# Patient Record
Sex: MALE | Race: WHITE | NOT HISPANIC OR LATINO | Employment: FULL TIME | URBAN - METROPOLITAN AREA
[De-identification: names, ages, dates, MRNs, and addresses within clinical notes are randomized per-mention and may not be internally consistent; named-entity substitution may affect disease eponyms.]

---

## 2017-03-08 ENCOUNTER — TELEPHONE (OUTPATIENT)
Dept: INTERNAL MEDICINE | Facility: CLINIC | Age: 52
End: 2017-03-08

## 2017-03-08 NOTE — TELEPHONE ENCOUNTER
----- Message from Jyothi Field sent at 3/8/2017 10:58 AM CST -----  Contact: Self [phone]  Patient is calling to schedule an appointment he thinks is his bronchitis or a stomach bug and would like to be seen some time this week. Please advice

## 2017-03-09 NOTE — TELEPHONE ENCOUNTER
----- Message from Uma Mahoney sent at 3/9/2017  8:10 AM CST -----  No. [phone] or [phone]    Patient returned your call.

## 2017-06-15 ENCOUNTER — OFFICE VISIT (OUTPATIENT)
Dept: INTERNAL MEDICINE | Facility: CLINIC | Age: 52
End: 2017-06-15
Payer: OTHER GOVERNMENT

## 2017-06-15 VITALS
DIASTOLIC BLOOD PRESSURE: 76 MMHG | TEMPERATURE: 98 F | WEIGHT: 315 LBS | HEIGHT: 71 IN | SYSTOLIC BLOOD PRESSURE: 124 MMHG | BODY MASS INDEX: 44.1 KG/M2 | HEART RATE: 80 BPM | RESPIRATION RATE: 16 BRPM

## 2017-06-15 DIAGNOSIS — J30.9 ALLERGIC RHINITIS, UNSPECIFIED ALLERGIC RHINITIS TRIGGER, UNSPECIFIED RHINITIS SEASONALITY: ICD-10-CM

## 2017-06-15 DIAGNOSIS — K62.89 ANAL OR RECTAL PAIN: Primary | ICD-10-CM

## 2017-06-15 DIAGNOSIS — K61.1 PERI-RECTAL ABSCESS: ICD-10-CM

## 2017-06-15 PROCEDURE — 99214 OFFICE O/P EST MOD 30 MIN: CPT | Mod: S$GLB,,, | Performed by: FAMILY MEDICINE

## 2017-06-15 PROCEDURE — 99999 PR PBB SHADOW E&M-EST. PATIENT-LVL IV: CPT | Mod: PBBFAC,,, | Performed by: FAMILY MEDICINE

## 2017-06-15 RX ORDER — SULFAMETHOXAZOLE AND TRIMETHOPRIM 800; 160 MG/1; MG/1
1 TABLET ORAL 2 TIMES DAILY
Qty: 28 TABLET | Refills: 0 | Status: SHIPPED | OUTPATIENT
Start: 2017-06-15 | End: 2020-12-07

## 2017-06-15 RX ORDER — HYDROCODONE BITARTRATE AND ACETAMINOPHEN 7.5; 325 MG/1; MG/1
1 TABLET ORAL EVERY 6 HOURS PRN
Qty: 30 TABLET | Refills: 0 | Status: SHIPPED | OUTPATIENT
Start: 2017-06-15 | End: 2020-12-07

## 2017-06-15 NOTE — PROGRESS NOTES
"Subjective:   Patient ID: Ashwin Peter is a 51 y.o. male.    Chief Complaint: Abscess      HPI  Cordial 50 yo male had perirectal abscess in [place] in December per his history. Had to go to ER and spontaneously resolved. Thinks problem has returned based upon abnormal sensation and "yellow" drainage.Denies fevers or chills    Patient queried and denies any further complaints.      ALLERGIES AND MEDICATIONS: updated and reviewed.  Review of patient's allergies indicates:  No Known Allergies    Current Outpatient Prescriptions:     fluticasone (FLONASE) 50 mcg/actuation nasal spray, 1 spray by Nasal route daily as needed. , Disp: , Rfl:     cetirizine (ZYRTEC) 10 MG tablet, Take 1 tablet (10 mg total) by mouth once daily., Disp: 10 tablet, Rfl: 0    hydrocodone-acetaminophen 7.5-325mg (NORCO) 7.5-325 mg per tablet, Take 1 tablet by mouth every 6 (six) hours as needed for Pain., Disp: 30 tablet, Rfl: 0    sulfamethoxazole-trimethoprim 800-160mg (BACTRIM DS) 800-160 mg Tab, Take 1 tablet by mouth 2 (two) times daily., Disp: 28 tablet, Rfl: 0    Review of Systems   Constitutional: Negative for activity change, appetite change, chills, diaphoresis, fatigue, fever and unexpected weight change.   HENT: Negative for congestion, ear discharge, ear pain, postnasal drip, rhinorrhea, sneezing and sore throat.    Eyes: Negative for photophobia and discharge.   Respiratory: Negative for cough, chest tightness, shortness of breath and wheezing.    Cardiovascular: Negative for chest pain and palpitations.   Gastrointestinal: Positive for rectal pain. Negative for abdominal distention, abdominal pain, anal bleeding, blood in stool, constipation, diarrhea, nausea and vomiting.   Genitourinary: Negative for dysuria.   Musculoskeletal: Negative for arthralgias and neck pain.   Skin: Negative for rash.   Neurological: Negative for headaches.       Objective:     Vitals:    06/15/17 1444   BP: 124/76   Pulse: 80   Resp: 16 " "  Temp: 97.8 °F (36.6 °C)   TempSrc: Oral   Weight: (!) 148.5 kg (327 lb 6.1 oz)   Height: 5' 11" (1.803 m)   PainSc: 0-No pain     Body mass index is 45.66 kg/m².    Physical Exam   Constitutional: He appears well-developed and well-nourished.   HENT:   Head: Normocephalic and atraumatic.   Right Ear: Hearing, tympanic membrane, external ear and ear canal normal. No drainage or swelling. No decreased hearing is noted.   Left Ear: Hearing, tympanic membrane, external ear and ear canal normal. No drainage or swelling. No decreased hearing is noted.   Nose: Nose normal. No rhinorrhea.   Mouth/Throat: Oropharynx is clear and moist. No oropharyngeal exudate, posterior oropharyngeal edema or posterior oropharyngeal erythema.   Eyes: Conjunctivae, EOM and lids are normal. Pupils are equal, round, and reactive to light. Right eye exhibits no discharge and no exudate. Left eye exhibits no discharge and no exudate. Right conjunctiva is not injected. Left conjunctiva is not injected.   Neck: Trachea normal and full passive range of motion without pain. Normal carotid pulses, no hepatojugular reflux and no JVD present. Carotid bruit is not present. No no neck rigidity. No edema and no erythema present. No thyroid mass and no thyromegaly present.   Cardiovascular: Normal rate, regular rhythm and normal heart sounds.    Pulmonary/Chest: Effort normal. No respiratory distress.   Abdominal: Soft. Normal appearance and bowel sounds are normal. There is no tenderness. There is negative Tellez's sign.   I see a external, non-thrombosed hemorrhoid but do not appreciate an abscess   Lymphadenopathy:     He has no cervical adenopathy.   Neurological: He is alert.   Skin: Skin is warm and dry.   Psychiatric: He has a normal mood and affect. His speech is normal and behavior is normal.       Assessment and Plan:   Ashwin was seen today for abscess.    Diagnoses and all orders for this visit:    Anal or rectal pain    Possible mallory-rectal " abscess    -     Ambulatory consult to General Surgery    -     sulfamethoxazole-trimethoprim 800-160mg (BACTRIM DS) 800-160 mg Tab; Take 1 tablet by mouth 2 (two) times daily.  -     hydrocodone-acetaminophen 7.5-325mg (NORCO) 7.5-325 mg per tablet; Take 1 tablet by mouth every 6 (six) hours as needed for Pain.        Return in about 2 weeks (around 6/29/2017).    THIS NOTE WILL BE SHARED WITH THE PATIENT.

## 2017-06-22 ENCOUNTER — OFFICE VISIT (OUTPATIENT)
Dept: SURGERY | Facility: CLINIC | Age: 52
End: 2017-06-22
Payer: OTHER GOVERNMENT

## 2017-06-22 VITALS
BODY MASS INDEX: 44.1 KG/M2 | HEART RATE: 78 BPM | DIASTOLIC BLOOD PRESSURE: 66 MMHG | WEIGHT: 315 LBS | SYSTOLIC BLOOD PRESSURE: 160 MMHG | HEIGHT: 71 IN

## 2017-06-22 DIAGNOSIS — K61.1 PERIRECTAL ABSCESS: Primary | ICD-10-CM

## 2017-06-22 PROCEDURE — 99999 PR PBB SHADOW E&M-EST. PATIENT-LVL III: CPT | Mod: PBBFAC,,, | Performed by: NURSE PRACTITIONER

## 2017-06-22 PROCEDURE — 99213 OFFICE O/P EST LOW 20 MIN: CPT | Mod: S$GLB,,, | Performed by: NURSE PRACTITIONER

## 2017-06-22 RX ORDER — CLOTRIMAZOLE AND BETAMETHASONE DIPROPIONATE 10; .64 MG/G; MG/G
CREAM TOPICAL 2 TIMES DAILY
Qty: 45 G | Refills: 2 | Status: SHIPPED | OUTPATIENT
Start: 2017-06-22 | End: 2020-12-07

## 2017-06-22 NOTE — PROGRESS NOTES
"Patient ID:  Ashwin Peter is a 51 y.o. male     Chief Complaint:   Chief Complaint   Patient presents with    Abscess        HPI: Pt comes to [facility] with complaints of some rectal tingling on the right.  In December he was in [place] on vacation and he developed a perirectal abscess on the right, while waiting for surgery the abscess spontaneously burst and the pain resolved, since then he has had intermittent "tingling" in  His rectal area, he will have some yellowish drainage and the tingling would go away, denies fever, no hx of colon or rectal surgery, has bm daily  Never had colonoscopy     ROS:        Constitutional: No fever, chills, activity or appetite change.      HENT: No hearing loss, facial swelling, neck pain or stiffness.       Eyes: No discharge, itching and visual disturbance.      Respiratory: No apnea, cough, choking or shortness of breath.       Cardiovascular: No leg swelling or chest pain      Gastrointestinal: No abdominal distention or change in bowel habbits     Genitourinary: No dysuria, frequency or flank pain.      Musculoskeletal: No arthralgias or gait problem.      Neurological: No dizziness, seizures or weakness.      Hematological: No adenopathy.      Psychiatric/Behavioral: No hallucinations or behavioral problems.       PE:    APPEARANCE: Well nourished, well developed, in no acute distress.   CHEST: Lungs clear. Normal respiratory effort.  CARDIOVASCULAR: Normal rhythm. No edema.  ABDOMEN: Soft. No tenderness or masses.  Rectum:  Perianal skin with fungal infection and several scratch marks, eversion of anus revealed no abnormality or fissure, OSMAN revealed no masses, blood or stool in vault, normal sphincter tone, anoscopy revealed normal internal hemorrhoids with no bleeding or stigmata of same. No tenderness with rectal, no abscess found  Musculoskeletal: Symmetric, normal range of motion and strength.   Neurological: Alert and oriented to person, place, and time. Normal " reflexes.   Skin: Skin is warm and dry.   Psychiatric: Normal mood and affect. Behavior is normal and appropriate.     Impression:    Perirectal abscess    PLAN:  Soak warm water  Keep buttock dry  lotrisome for fungal skin infection  Return to office for any increase in rectal pain  Long discussion about poss of anal fistula

## 2017-06-22 NOTE — PATIENT INSTRUCTIONS
Anal Fistula  The anal canal is the end portion of the intestinal tract. It includes the rectum and anus. Sometimes, an abnormal passage forms from the anal canal to the skin near the anus. This is called an anal fistula. Anal fistulas can also form from the anal canal to other organs, such as the vagina or urinary tract.  An anal fistula most often occurs due to an anal abscess or infection. It can also occur with certain conditions, such as Crohns disease. Trauma to the anal canal and surgery can also lead to anal fistulas.  Symptoms of an anal fistula can include:  · Pain in or near the rectum  · Drainage, which may contain blood, pus, or both (the drainage may be constant or stop and start again)  · Bleeding from the rectum  · Urinary problems  If you have an anal abscess or infection along with a fistula, you may also notice redness, swelling, or soreness in or near the anus or rectum. You may have a fever as well.  If caused by Crohns disease, an anal fistula may respond to medicines such as antibiotics and immunosuppressants. This may lead to complete closure of the fistula. But once treatment stops, there is a high chance that the fistula may form again.  Anal fistulas often require surgery if other treatments dont correct the problem. The type of surgery depends on the type of fistula. More than one surgery may be required.  Please discuss all forms of treatment with your healthcare provider.  Home care  As you recover from treatment, make sure to take any prescribed medicines as directed. Do not take any over-the-counter medicines without first talking to your healthcare provider.  You may also be advised to:  · Soak in a warm bath 3 or 4 times a day.  · Wear a pad over your anal area as directed.  · Eat a diet high in fiber.  · Drink plenty of fluids.  · Use a stool softener or bulk laxative as needed.  · Return to your normal routine only after being cleared by your healthcare provider.  Follow-up  care  Follow up with your healthcare provider, or as advised.  When to seek medical advice  Call your healthcare provider right away if any of these occur:  · Fever of 100.4°F (38°C) or higher  · Hard or painful stools or trouble controlling your bowel movements  · Symptoms of anal fistula return  · Increased pain, redness, swelling, or drainage in or near the anus or rectum  · Pain in the belly that does not respond to treatment or that does not go away after a few hours  · Swelling in the belly that does not go away after a few hours  · Mucus, pus or blood in the stool (dark or bright red)  · Vomiting that wont stop  Call 911  Call 911 right away if any of these occur:  · Trouble breathing or swallowing  · Fainting  · Rapid heart rate  · Large amounts of blood in stool  Resources  The resources below can help you learn more about anal fistulas. They may also help you find support if you have conditions such as Crohns disease.  · National [place] of Diabetes and Digestive and Kidney Diseases (NIDDK), www.niddk.nih.gov  · Crohns and Colitis Foundation of [place], www.ccfa.org  Date Last Reviewed: 6/22/2015  © 2202-1285 [employer]. [address] [place] [address], [place] [address]. All rights reserved. This information is not intended as a substitute for professional medical care. Always follow your healthcare professional's instructions.

## 2017-06-22 NOTE — LETTER
June 22, 2017      Lucio Donaldson MD  [address]  [address]th [address]  [place] [address]           [facility]  [address]  [facility] [address]  Phone: [phone]          Patient: Ashwin Peter   MR Number: [id number]   YOB: 1965   Date of Visit: 6/22/2017       Dear Dr. Lucio Donaldson:    Thank you for referring Ashwin Peter to me for evaluation. Attached you will find relevant portions of my assessment and plan of care.    If you have questions, please do not hesitate to call me. I look forward to following Ashwin Peter along with you.    Sincerely,    Hafsa Silveira, NP    Enclosure  CC:  No Recipients    If you would like to receive this communication electronically, please contact [email or web address] or [phone] to request more information on [employer] Link access.    For providers and/or their staff who would like to refer a patient to [facility], please contact us through our one-stop-shop provider referral line, [facility], at [phone].    If you feel you have received this communication in error or would no longer like to receive these types of communications, please e-mail [email or web address]

## 2018-04-22 ENCOUNTER — TELEPHONE (OUTPATIENT)
Dept: SPORTS MEDICINE | Facility: CLINIC | Age: 53
End: 2018-04-22

## 2018-04-22 NOTE — PROGRESS NOTES
Ashwin Peter, a 52 y.o. male, presents today for evaluation of his RIGHT and LEFT knee.      HISTORY OF PRESENT ILLNESS   Location: L>R knee, anterior/medial  Onset: insidious,   Palliative:               Relative rest   R/L VSI, [employer], 16.02, little improvement    L - CSI 03/23/16  Provocative:    ADLs  Prior:               Right: ACL reconstruction, OSH, 20xx              Left: meniscectomy, OSH, 20xx  Progression: worsening discomfort  Quality:              Stiffness                Sharp pain   L - 8/10    R - 3/10  Radiation: none  Severity: per nursing documentation  Timing: intermittent w/ use  Trauma: none  Review of systems (ROS):  A 10+ review of systems was performed with pertinent positives and negatives noted above in the history of present illness. Other systems were negative unless otherwise specified.    PHYSICAL EXAMINATION  General:  The patient is alert and oriented x 3. Mood is pleasant. Observation of ears, eyes and nose reveal no gross abnormalities. HEENT: NCAT, sclera anicteric.   Lungs: Respirations are equal and unlabored.  Gait is coordinated. Patient can toe walk and heel walk without difficulty.    RIGHT/LEFT KNEE EXAMINATION    Observation/Inspection  Gait:   antalgic   Alignment:  Neutral   Scars:   None   Muscle atrophy: Mild  Effusion:  None   Warmth:  None   Discoloration:   none     Tenderness / Crepitus (T / C):         T / C      T / C  Patella   - / -   Lateral joint line   - / -     Peripatellar medial  -  Medial joint line    + / -  Peripatellar lateral -  Medial plica   - / -  Patellar tendon -   Popliteal fossa   - / -  Quad tendon   -   Gastrocnemius   -  Prepatellar Bursa - / -   Quadricep   -  Tibial tubercle  -  Thigh/hamstring  -  Pes anserine/HS -  Fibula    -  ITB   - / -  Tibia     -  Tib/fib joint  - / -  LCL    -    MFC   - / -   MCL: Proximal  -    LFC   - / -   Distal    -          ROM: (* = pain)  PASSIVE   ACTIVE    Left :   5 / 0 / 145*   5 / 0 /  145*     Right :    5 / 0 / 145*   5 / 0 / 145*    Patellofemoral examination:  See above noted areas of tenderness.   Patella position    Subluxation / dislocation: Centered        Sup. / Inf;   Normal   Crepitus (PF):    Absent   Patellar Mobility:       Medial-lateral:   Normal    Superior-inferior:  Normal    Inferior tilt   Normal    Patellar tendon:  Normal   Lateral tilt:    Normal   J-sign:     None   Patellofemoral grind:   No pain       Meniscal Signs:     Pain on terminal extension:  +  Pain on terminal flexion:  +  Aarons maneuver:  +*  Squat     NT    Ligament Examination:  ACL / Lachman:  WNL  PCL-Post.  drawer: normal 0 to 2mm  MCL- Valgus:  normal 0 to 2mm  LCL- Varus:    normal 0 to 2mm  Pivot shift:  guarding   Dial Test:   difference c/w other side   At 30° flexion: normal (< 5°)    At 90° flexion: normal (< 5°)   Reverse Pivot Shift:   normal (Equal)     Strength: (* = with pain) Painful Side  Quadriceps   5/5  Hamstrin/5    Extremity Neuro-vascular Examination:   Sensation:  Grossly intact to light touch all dermatomal regions.   Motor Function:  Fully intact motor function at hip, knee, foot and ankle    DTRs;  quadriceps and  achilles 2+.  No clonus and downgoing Babinski.    Vascular status:  DP and PT pulses 2+, brisk capillary refill, symmetric.     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, right  #2 Kellgren-Joseph Grade III osteoarthritis of knee, med > ant compartment, left     No evidence of neurologic pathology  No evidence of vascular pathology     Imaging studies reviewed:   X-ray knee, bilateral 18.04     Plan:    We discussed the importance of appropriate diet, weight, and regular exercise including quadriceps strengthening     We discussed options including:  #1 watchful waiting,  #2 physical therapy aimed at:   Core stability   Strengthening quadriceps    Gait training  #3  injection therapy:   CSI iaknee                          Right,                           Left, effective good%, repeat    VSI iaknee    Right, effective modest%, repeat                          Left, effective modest%, repeat  #4 MRI for further evaluation                 The patient chooses #3 CSI iaknee bilat     Pain management required: handout given  Bracing required:   Physical therapy required:   Activity (e.g. sports, work) restrictions: as tolerated              school/vocation:      Follow up: pt will contact us BY TELEPHONE in 2 w  A/e csi iaknee bilat  Should symptoms worsen or fail to resolve, consider:  Revisiting the above options

## 2018-04-23 ENCOUNTER — HOSPITAL ENCOUNTER (OUTPATIENT)
Dept: RADIOLOGY | Facility: HOSPITAL | Age: 53
Discharge: HOME | End: 2018-04-23
Attending: FAMILY MEDICINE
Payer: OTHER GOVERNMENT

## 2018-04-23 ENCOUNTER — OFFICE VISIT (OUTPATIENT)
Dept: SPORTS MEDICINE | Facility: CLINIC | Age: 53
End: 2018-04-23
Payer: OTHER GOVERNMENT

## 2018-04-23 VITALS — WEIGHT: 315 LBS | TEMPERATURE: 98 F | BODY MASS INDEX: 44.1 KG/M2 | HEIGHT: 71 IN

## 2018-04-23 DIAGNOSIS — M17.0 PRIMARY OSTEOARTHRITIS OF BOTH KNEES: Primary | ICD-10-CM

## 2018-04-23 DIAGNOSIS — M25.561 PAIN IN BOTH KNEES, UNSPECIFIED CHRONICITY: ICD-10-CM

## 2018-04-23 DIAGNOSIS — M25.562 PAIN IN BOTH KNEES, UNSPECIFIED CHRONICITY: ICD-10-CM

## 2018-04-23 PROCEDURE — 99214 OFFICE O/P EST MOD 30 MIN: CPT | Mod: 25,S$GLB,, | Performed by: FAMILY MEDICINE

## 2018-04-23 PROCEDURE — 73564 X-RAY EXAM KNEE 4 OR MORE: CPT | Mod: TC,50,FY,PO

## 2018-04-23 PROCEDURE — 73564 X-RAY EXAM KNEE 4 OR MORE: CPT | Mod: 26,50,, | Performed by: RADIOLOGY

## 2018-04-23 PROCEDURE — 20611 DRAIN/INJ JOINT/BURSA W/US: CPT | Mod: 50,S$GLB,, | Performed by: FAMILY MEDICINE

## 2018-04-23 PROCEDURE — 99999 PR PBB SHADOW E&M-EST. PATIENT-LVL III: CPT | Mod: PBBFAC,,, | Performed by: FAMILY MEDICINE

## 2018-04-23 RX ORDER — TRIAMCINOLONE ACETONIDE 40 MG/ML
40 INJECTION, SUSPENSION INTRA-ARTICULAR; INTRAMUSCULAR
Status: DISCONTINUED | OUTPATIENT
Start: 2018-04-23 | End: 2018-04-23 | Stop reason: HOSPADM

## 2018-04-23 RX ADMIN — TRIAMCINOLONE ACETONIDE 40 MG: 40 INJECTION, SUSPENSION INTRA-ARTICULAR; INTRAMUSCULAR at 04:04

## 2018-04-23 NOTE — PROCEDURES
"Large Joint Aspiration/Injection  Date/Time: 4/23/2018 4:08 PM  Performed by: CHAYITO HARPER  Authorized by: CHAYITO HARPER     Consent Done?:  Yes (Verbal)  Indications:  Pain  Procedure site marked: Yes    Timeout: Prior to procedure the correct patient, procedure, and site was verified      Location:  Knee  Site:  R knee and L knee  Prep: Patient was prepped and draped in usual sterile fashion    Ultrasonic Guidance for needle placement: Yes  Images are saved and documented.  Needle size:  18 G  Approach:  Lateral  Medications:  40 mg triamcinolone acetonide 40 mg/mL; 40 mg triamcinolone acetonide 40 mg/mL  Patient tolerance:  Patient tolerated the procedure well with no immediate complications    Additional Comments: Description of ultrasound utilization for needle guidance:   Ultrasound guidance used for needle localization.  Images saved and stored for documentation.  The knee joint was visualized.  Dynamic visualization of the 20g x 1.5"  needle was continuous throughout the procedure.      "

## 2020-09-07 NOTE — TELEPHONE ENCOUNTER
Spoke c pt.     R/s appt to earlier date.      I have personally performed a face to face diagnostic evaluation on this patient. I have reviewed the ACP note and agree with the history, exam and plan of care, except as noted.

## 2020-11-12 ENCOUNTER — TELEPHONE (OUTPATIENT)
Dept: INTERNAL MEDICINE | Facility: CLINIC | Age: 55
End: 2020-11-12

## 2020-11-12 NOTE — TELEPHONE ENCOUNTER
Patient was advised to get a sooner appointment with a new PCP since last time he seen by Dr Avila was on 2015.  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  Patient advised again Urgent Care for further eval on swelling legs.  Patient was scheduled with Dr Guo on 12/02/20 @ 1 pm.  Patient verbalized understanding.

## 2020-11-12 NOTE — TELEPHONE ENCOUNTER
----- Message from Tessa Morley sent at 11/12/2020  2:10 PM CST -----  Contact: [phone]/Self  Type:  Sooner Appointment Request     Caller is requesting a sooner appointment.  Caller declined first available appointment listed below.  Caller will not accept being placed on the waitlist and is requesting a message be sent to doctor.  Name of Caller: pt  When is the first available appointment? -  Symptoms or Reason: swelling on both legs   Would the patient rather a call back or a response via [facility]? Call back  Best Call Back Number: [phone]  Additional Information:

## 2020-12-02 ENCOUNTER — HOSPITAL ENCOUNTER (OUTPATIENT)
Dept: RADIOLOGY | Facility: HOSPITAL | Age: 55
Discharge: HOME | End: 2020-12-02
Attending: INTERNAL MEDICINE
Payer: OTHER GOVERNMENT

## 2020-12-02 ENCOUNTER — OFFICE VISIT (OUTPATIENT)
Dept: INTERNAL MEDICINE | Facility: CLINIC | Age: 55
End: 2020-12-02
Payer: OTHER GOVERNMENT

## 2020-12-02 VITALS
OXYGEN SATURATION: 95 % | BODY MASS INDEX: 44.1 KG/M2 | SYSTOLIC BLOOD PRESSURE: 118 MMHG | HEART RATE: 77 BPM | HEIGHT: 71 IN | WEIGHT: 315 LBS | RESPIRATION RATE: 18 BRPM | DIASTOLIC BLOOD PRESSURE: 60 MMHG | TEMPERATURE: 98 F

## 2020-12-02 DIAGNOSIS — Z00.00 PREVENTATIVE HEALTH CARE: Primary | ICD-10-CM

## 2020-12-02 DIAGNOSIS — Z13.1 SCREENING FOR DIABETES MELLITUS: ICD-10-CM

## 2020-12-02 DIAGNOSIS — D86.2 SARCOIDOSIS OF LUNG WITH SARCOIDOSIS OF LYMPH NODES: ICD-10-CM

## 2020-12-02 DIAGNOSIS — M79.89 SWELLING OF LOWER LEG: ICD-10-CM

## 2020-12-02 DIAGNOSIS — Z11.4 ENCOUNTER FOR SCREENING FOR HIV: ICD-10-CM

## 2020-12-02 DIAGNOSIS — Z11.59 ENCOUNTER FOR HEPATITIS C SCREENING TEST FOR LOW RISK PATIENT: ICD-10-CM

## 2020-12-02 DIAGNOSIS — Z13.220 SCREENING FOR HYPERLIPIDEMIA: ICD-10-CM

## 2020-12-02 DIAGNOSIS — Z23 NEED FOR VACCINATION: ICD-10-CM

## 2020-12-02 DIAGNOSIS — Z11.3 SCREENING FOR VENEREAL DISEASE: ICD-10-CM

## 2020-12-02 PROCEDURE — 71046 X-RAY EXAM CHEST 2 VIEWS: CPT | Mod: TC,FY,PO

## 2020-12-02 PROCEDURE — 99999 PR PBB SHADOW E&M-EST. PATIENT-LVL V: ICD-10-PCS | Mod: PBBFAC,,, | Performed by: INTERNAL MEDICINE

## 2020-12-02 PROCEDURE — 71046 X-RAY EXAM CHEST 2 VIEWS: CPT | Mod: 26,,, | Performed by: RADIOLOGY

## 2020-12-02 PROCEDURE — 99386 PREV VISIT NEW AGE 40-64: CPT | Mod: 25,S$GLB,, | Performed by: INTERNAL MEDICINE

## 2020-12-02 PROCEDURE — 99999 PR PBB SHADOW E&M-EST. PATIENT-LVL V: CPT | Mod: PBBFAC,,, | Performed by: INTERNAL MEDICINE

## 2020-12-02 PROCEDURE — 71046 XR CHEST PA AND LATERAL: ICD-10-PCS | Mod: 26,,, | Performed by: RADIOLOGY

## 2020-12-02 PROCEDURE — 99386 PR PREVENTIVE VISIT,NEW,40-64: ICD-10-PCS | Mod: 25,S$GLB,, | Performed by: INTERNAL MEDICINE

## 2020-12-02 NOTE — PROGRESS NOTES
Subjective:       Patient ID: Ashwin Peter is a 55 y.o. male.    Chief Complaint:   Leg Swelling and Consult      HPI    #Last visit/Previous Provider  This patient is new to me  Previously seen by Primary Doctor No years ago Dr carroll   Last visit was > 3 yr  Last CPE was > 3yr         #Interim Hx    No urgent care or ED/hospitalization    #Concerns Today      Cough    Patient reports onset of cough roughly 2 months prior to presentation starting October.  He reports he did have sinus pressure and congestion and was using severe sign X for the symptoms.  He went to an urgent care at the time.  He was given an inhaler.  He had 2-COVID test.  Symptoms are daily and intermittent throughout the day.  He is not on lisinopril.  He has no reflux symptoms, only intermittently if he eats acidic food.  He does not have any hemoptysis.  He has no chest pain or shortness of breath.  He has no chest pressure.  He does work as a .  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        LE   Patient reports that over the last 2-3 weeks he has had bilateral lower extremity edema.  He reports daily.  It is intermittent not progressively worsening.  He has used heat ice and elevation for the symptoms.  He noticed occasional blisters on the leg when he gets symptoms.  He has not taken any other medications for the symptoms.              #Chronic Problems    Sarcoid  Dx; 2013  lymph node aspirate approximately  Provider; pulm  Meds; none  C/b; none  Plan; f/u with pulm annually OVERDUE      Health Maintenance Due   Topic Date Due    Hepatitis C Screening  1965    HIV Screening  08/17/1982    Pneumococcal Vaccine (Medium Risk) (1 of 1 - PPSV23) 08/17/1986    Shingles Vaccine (1 of 2) 08/17/2017    Colorectal Cancer Screening  08/17/2017    Lipid Panel   "07/14/2019       Health Maintenance Topics with due status: Not Due       Topic Last Completion Date    TETANUS VACCINE 07/14/2014         Review of Systems   Constitutional: Negative for activity change, appetite change, fatigue and fever.   Respiratory: Positive for cough. Negative for shortness of breath.    Cardiovascular: Positive for leg swelling. Negative for chest pain.   Gastrointestinal: Negative for abdominal pain.   Genitourinary: Negative for difficulty urinating.   Neurological: Negative for syncope and headaches.       Objective:   /60 (BP Location: Right arm, Patient Position: Sitting, BP Method: Medium (Manual))   Pulse 77   Temp 97.7 °F (36.5 °C) (Oral)   Resp 18   Ht 5' 11" (1.803 m)   Wt (!) 163 kg (359 lb 5.6 oz)   SpO2 95%   BMI 50.12 kg/m²          Physical Exam  Vitals signs and nursing note reviewed.   Constitutional:       General: He is not in acute distress.     Appearance: He is well-developed. He is not diaphoretic.   HENT:      Head: Normocephalic.      Mouth/Throat:      Pharynx: No oropharyngeal exudate.   Eyes:      General:         Right eye: No discharge.         Left eye: No discharge.      Conjunctiva/sclera: Conjunctivae normal.      Pupils: Pupils are equal, round, and reactive to light.   Cardiovascular:      Rate and Rhythm: Normal rate and regular rhythm.      Pulses: Normal pulses.      Heart sounds: Murmur present. Crescendo  systolic murmur present with a grade of 3/6. No diastolic murmur. No friction rub. No gallop.    Pulmonary:      Effort: Pulmonary effort is normal. No respiratory distress.      Breath sounds: No stridor. No wheezing, rhonchi or rales.      Comments: No clubbing  Chest:      Chest wall: No tenderness.   Abdominal:      General: There is no distension.      Palpations: Abdomen is soft.   Musculoskeletal:      Right lower leg: Edema present.      Left lower leg: Edema present.   Skin:     General: Skin is warm.      Comments: Scattered " cherry hemangiomas   Neurological:      Mental Status: He is alert.             ASCVD  The ASCVD Risk score (Johnsonshamar MALLORY Jr., et al., 2013) failed to calculate for the following reasons:    Cannot find a previous HDL lab    Cannot find a previous total cholesterol lab    Basic Labs  BMP  Lab Results   Component Value Date     07/11/2014    K 4.2 07/11/2014     07/11/2014    CO2 23 07/11/2014    BUN 15 07/11/2014    CREATININE 1.4 07/11/2014    CALCIUM 9.4 07/28/2014    ANIONGAP 8 07/11/2014    ESTGFRAFRICA >60 07/11/2014    EGFRNONAA 60 07/11/2014     Lab Results   Component Value Date    ALT 66 (H) 10/03/2009    AST 41 (H) 10/03/2009    ALKPHOS 84 10/03/2009    BILITOT 0.6 10/03/2009       Lab Results   Component Value Date    TSH 2.924 07/14/2014           STD  No results found for: HIV1X2, KWU07LBYC  No results found for: RPR  No results found for: HAV, HEPAIGM, HEPBIGM, HEPBCAB, HBEAG, HEPCAB      Lipids  Lab Results   Component Value Date    CHOL 246 (H) 07/14/2014     Lab Results   Component Value Date    HDL 43 07/14/2014     Lab Results   Component Value Date    LDLCALC 173.6 (H) 07/14/2014     Lab Results   Component Value Date    TRIG 147 07/14/2014     Lab Results   Component Value Date    CHOLHDL 17.5 (L) 07/14/2014       DM  No results found for: LABA1C, HGBA1C    PSA  PSA, Screen (ng/mL)   Date Value   07/14/2014 0.62       Assessment:       1. Preventative health care    2. Sarcoidosis of lung with sarcoidosis of lymph nodes    3. Swelling of lower leg    4. Need for vaccination    5. Screening for diabetes mellitus    6. Screening for venereal disease    7. Screening for hyperlipidemia    8. Encounter for hepatitis C screening test for low risk patient    9. Encounter for screening for HIV          New: stable  New: Uncontrolled  New : worsening  New: Improved    Chronic stable  Chonic : uncontrolled  Chronic : worsening  Chronic : improved    Plan:             Ashwin was seen today for leg  swelling and consult.    Diagnoses and all orders for this visit:    Preventative health care    Sarcoidosis of lung with sarcoidosis of lymph nodes  New;uncontrolled  Long overdue for appointment  Cough as above query pulm sarcoid  DDx UACS  Lower suspicion for gerd, medicaiton side effect, RAD  -     X-Ray Chest PA And Lateral; Future  -     Ambulatory referral/consult to Pulmonology; Future    Swelling of lower leg  New;uncontrolled  Unclear etiology  Will send broad screening    reviewed signs and symptoms that should prompt return to provider or evaluation in the ED  -     Basic Metabolic Panel; Future  -     Hepatic Function Panel; Future  -     TSH; Future  -     CBC Auto Differential; Future  -     BNP; Future    Need for vaccination    Screening for diabetes mellitus  -     Hemoglobin A1C; Future    Screening for venereal disease  -     RPR; Future    Screening for hyperlipidemia  -     Lipid Panel; Future    Encounter for hepatitis C screening test for low risk patient  -     Hepatitis C Antibody; Future    Encounter for screening for HIV  -     HIV 1/2 Ag/Ab (4th Gen); Future          Future Appointments   Date Time Provider Department [place]   12/2/2020  2:00 PM LAB, [facility] [place]   12/2/2020  2:15 PM [facility] XR1 500 LB LIMIT [facility]EN XRAY [place]   3/5/2021  3:00 PM Sergio Guo III, MD [facility]           Medication List with Changes/Refills   Current Medications    CETIRIZINE (ZYRTEC) 10 MG TABLET    Take 1 tablet (10 mg total) by mouth once daily.    CLOTRIMAZOLE-BETAMETHASONE 1-0.05% (LOTRISONE) CREAM    Apply topically 2 (two) times daily.    FLUTICASONE (FLONASE) 50 MCG/ACTUATION NASAL SPRAY    1 spray by Nasal route daily as needed.     HYDROCODONE-ACETAMINOPHEN 7.5-325MG (NORCO) 7.5-325 MG PER TABLET    Take 1 tablet by mouth every 6 (six) hours as needed for Pain.    SULFAMETHOXAZOLE-TRIMETHOPRIM 800-160MG (BACTRIM DS) 800-160 MG TAB    Take 1 tablet by mouth 2 (two) times  daily.         Disclaimer:  This note has been generated using voice-recognition software. There may be grammatical or spelling errors that have been missed during proof-reading

## 2020-12-02 NOTE — PATIENT INSTRUCTIONS
We were happy to see you today    For your Testing  Please have your labs and imaging test done today      For your Medication         Try some compression stocking for the leg swelling      Nasal Allergy Medicines  The table below lists the most common over-the-counter (OTC) medicines for nasal allergies. Some are pills. Some are liquids. And, some are nasal sprays. It's important to check with your healthcare provider or pharmacist before taking these medicines, even though they are available without a prescription. And, be sure to follow the instructions on the package labels.  Type of medicine Description of medicine   Antihistamines  Take ONE tab once a day  Claritin  Allegra  Xyzal  Zyrtec       · Stops the release of histamine, a substance in the body that causes many allergy symptoms.  · Helps prevent sneezing, runny nose, and itchy and watery eyes.   Corticosteroids  Use ONE spray in EACH nostril ONCE a day. For persistent symptoms increase to TWICE a day for 3-4 weeks  Flonase  Nasocort    · Reduces inflammation and swelling.  · Relieves itching and sneezing.    ·     ·    Anticholinergics · Decrease mucus production in the nasal passages.  · Relieves runny nose.   Saline sprays, rinses, and gels · Provide lubrication or moisture to nasal passages. These can be used as often as needed.  · Help soothe irritated nasal passages. Loosens thick mucus.   NOTE: Talk to your healthcare provider or pharmacist about the possible side effects and drug or food interactions of any medicine you take.   How to use nasal spray  Nasal sprays must be used the right way to be effective. Be sure to do the following:  · Blow your nose to clear your nostrils.  · Gently shake the bottle. Then remove the cap.  · With your right hand, carefully insert the tip of the bottle into your left nostril. Make sure to point the tip toward your ear and not the center of the nose.  · While gently breathing in through your nose, press down  once on the pump to release the spray.  · Breathe out through your mouth.  · With your left hand, repeat the steps for your right nostril.  Date Last Reviewed: 9/1/2016  © 5866-7175 [employer] [address] [place] [address], [place] [address]. All rights reserved. This information is not intended as a substitute for professional medical care. Always follow your healthcare professional's instructions.    For more information about side effects please visit medlineplus.gov      For your Referrals      We will likely need to get you back into the office of pulmonology but we will check on the labs and imaging test first      For your Vaccinations    We gave your the following vaccines  Please obtain the following vaccines at the pharmacy  Shingles      Please return to clinic in    3 months

## 2020-12-04 DIAGNOSIS — Z12.11 COLON CANCER SCREENING: ICD-10-CM

## 2020-12-07 ENCOUNTER — OFFICE VISIT (OUTPATIENT)
Dept: PULMONOLOGY | Facility: CLINIC | Age: 55
End: 2020-12-07
Payer: OTHER GOVERNMENT

## 2020-12-07 VITALS
WEIGHT: 315 LBS | OXYGEN SATURATION: 96 % | SYSTOLIC BLOOD PRESSURE: 128 MMHG | HEIGHT: 71 IN | DIASTOLIC BLOOD PRESSURE: 78 MMHG | HEART RATE: 76 BPM | BODY MASS INDEX: 44.1 KG/M2

## 2020-12-07 DIAGNOSIS — J30.2 SEASONAL ALLERGIES: ICD-10-CM

## 2020-12-07 DIAGNOSIS — D86.2 SARCOIDOSIS OF LUNG WITH SARCOIDOSIS OF LYMPH NODES: ICD-10-CM

## 2020-12-07 DIAGNOSIS — D86.9 SARCOIDOSIS, UNSPECIFIED: ICD-10-CM

## 2020-12-07 DIAGNOSIS — R05.3 CHRONIC COUGH: Primary | ICD-10-CM

## 2020-12-07 PROCEDURE — 99204 OFFICE O/P NEW MOD 45 MIN: CPT | Mod: S$GLB,,, | Performed by: INTERNAL MEDICINE

## 2020-12-07 PROCEDURE — 99999 PR PBB SHADOW E&M-EST. PATIENT-LVL III: ICD-10-PCS | Mod: PBBFAC,,, | Performed by: INTERNAL MEDICINE

## 2020-12-07 PROCEDURE — 99999 PR PBB SHADOW E&M-EST. PATIENT-LVL III: CPT | Mod: PBBFAC,,, | Performed by: INTERNAL MEDICINE

## 2020-12-07 PROCEDURE — 99204 PR OFFICE/OUTPT VISIT, NEW, LEVL IV, 45-59 MIN: ICD-10-PCS | Mod: S$GLB,,, | Performed by: INTERNAL MEDICINE

## 2020-12-07 RX ORDER — INFLUENZA A VIRUS A/NEBRASKA/14/2019 (H1N1) ANTIGEN (MDCK CELL DERIVED, PROPIOLACTONE INACTIVATED), INFLUENZA A VIRUS A/DELAWARE/39/2019 (H3N2) ANTIGEN (MDCK CELL DERIVED, PROPIOLACTONE INACTIVATED), INFLUENZA B VIRUS B/SINGAPORE/INFTT-16-0610/2016 ANTIGEN (MDCK CELL DERIVED, PROPIOLACTONE INACTIVATED), INFLUENZA B VIRUS B/DARWIN/7/2019 ANTIGEN (MDCK CELL DERIVED, PROPIOLACTONE INACTIVATED) 15; 15; 15; 15 UG/.5ML; UG/.5ML; UG/.5ML; UG/.5ML
0.5 INJECTION, SUSPENSION INTRAMUSCULAR ONCE
COMMUNITY
Start: 2020-09-21 | End: 2020-12-22

## 2020-12-07 RX ORDER — BENZONATATE 100 MG/1
1 CAPSULE ORAL 3 TIMES DAILY
COMMUNITY
Start: 2020-10-11 | End: 2021-03-08

## 2020-12-07 RX ORDER — FLUTICASONE PROPIONATE 50 MCG
1 SPRAY, SUSPENSION (ML) NASAL DAILY
Qty: 16 G | Refills: 3 | Status: ON HOLD | OUTPATIENT
Start: 2020-12-07 | End: 2022-03-31 | Stop reason: HOSPADM

## 2020-12-07 RX ORDER — LORATADINE 10 MG/1
10 TABLET ORAL DAILY
Qty: 90 TABLET | Refills: 3 | Status: SHIPPED | OUTPATIENT
Start: 2020-12-07 | End: 2022-08-04

## 2020-12-07 RX ORDER — ALBUTEROL SULFATE 90 UG/1
2 AEROSOL, METERED RESPIRATORY (INHALATION) 3 TIMES DAILY PRN
COMMUNITY
Start: 2020-10-02 | End: 2021-03-08

## 2020-12-07 NOTE — LETTER
December 7, 2020      Sergio Guo III, MD  [address] [facility] [place] [address]           [facility] - Pul[facility] [address]  [address]  [facility] [address]  Phone: [phone]          Patient: Ashwin Peter   MR Number: [id number]   YOB: 1965   Date of Visit: 12/7/2020       Dear Dr. Sergio Guo III:    Thank you for referring Ashwin Peter to me for evaluation. Attached you will find relevant portions of my assessment and plan of care.    If you have questions, please do not hesitate to call me. I look forward to following Ashwin Peter along with you.    Sincerely,    Peterson Madera MD    Enclosure  CC:  No Recipients    If you would like to receive this communication electronically, please contact [email or web address] or [phone] to request more information on [facility] [employer] access.    For providers and/or their staff who would like to refer a patient to [facility], please contact us through our one-stop-shop provider referral line, [facility] , at [phone].    If you feel you have received this communication in error or would no longer like to receive these types of communications, please e-mail [email or web address]

## 2020-12-07 NOTE — PROGRESS NOTES
[facility]  Pulmonary Clinic Note    Subjective:      Reason for Visit: New patient visit, sarcoidosis of the lung    56 yo WM with sarcoidosis diagnosed via mediastinal lymph node biopsy 12/2012. Patient saw Dr. Denise Dave in pulmonary clinic 7/2014 with no respiratory complaints, normal CXR and normal PFTs at the time. He was to have follow up in 1 year, but does not seem to have made this or any subsequent pulmonary visits.    Today, patient returns as a referral from his PCP Dr. Guo. Most troublesome for patient is frequent seasonal allergies. Symptoms include red, itchy eyes, sinus congestion with drainage and post-nasal drip, sore throat and cough. Symptoms are worse in spring and winter, but are present throughout the year. Normally visits acute care centers for steroid injection and antibiotic therapy with subsequent improvement in his symptoms. He does not take chronic anti-histamine medications or inhalers. In terms of his sarcoid, symptoms appear to be stable since 2014. He works as a , works on ships/docks, and occasionally . He is on his feet for long periods of time and denies significant exertional dyspnea. Patient also endorses BL LE edema that improves with elevating his feet. Recent CXR unchanged from prior completed in 2013. Patient not currently on medication for sarcoidosis.    Past Medical History:   Diagnosis Date    Anxiety     Hyperlipidemia     Multiple pulmonary nodules     Obesity     Sarcoidosis of lung with sarcoidosis of lymph nodes      Past Surgical History:   Procedure Laterality Date    ANTERIOR CRUCIATE LIGAMENT REPAIR  2007    right knee    LYMPHADENECTOMY      MENISCECTOMY      left knee    WISDOM TOOTH EXTRACTION       Family History   Problem Relation Age of Onset    Hypertension Father     Heart attack Father 55    Diabetes Paternal Grandmother     Aneurysm Mother         eye    Dementia Mother     Colon cancer  Neg Hx     Prostate cancer Neg Hx     Sarcoidosis Neg Hx      Social History     Tobacco Use    Smoking status: Never Smoker    Smokeless tobacco: Never Used   Substance Use Topics    Alcohol use: No    Drug use: No       (Not in a hospital admission)    Review of patient's allergies indicates:  No Known Allergies     Review of Systems   Constitutional: Negative for chills, fever and weight loss.   HENT: Positive for congestion.         Post nasal drip with cough   Eyes: Negative for blurred vision and double vision.   Respiratory: Positive for cough (dry). Negative for hemoptysis, sputum production, shortness of breath, wheezing and stridor.    Cardiovascular: Positive for leg swelling. Negative for chest pain, palpitations and PND.   Gastrointestinal: Negative for constipation, diarrhea, nausea and vomiting.   Genitourinary: Negative for dysuria and hematuria.   Musculoskeletal: Positive for joint pain (BL knee).   Skin: Negative for rash.   Neurological: Negative for focal weakness and weakness.       Objective:      Vital Signs (Most Recent)  See chart for VS    Physical Exam  Constitutional:       General: He is not in acute distress.     Appearance: He is obese. He is not ill-appearing or diaphoretic.   HENT:      Head: Normocephalic and atraumatic.      Mouth/Throat:      Mouth: Mucous membranes are moist.      Pharynx: Oropharynx is clear.   Eyes:      Extraocular Movements: Extraocular movements intact.      Pupils: Pupils are equal, round, and reactive to light.   Neck:      Musculoskeletal: Normal range of motion and neck supple.   Cardiovascular:      Rate and Rhythm: Normal rate and regular rhythm.      Heart sounds: Murmur (right and left upper sternal border) present.   Pulmonary:      Effort: Pulmonary effort is normal. No respiratory distress.      Breath sounds: Normal breath sounds. No stridor. No wheezing, rhonchi or rales.   Abdominal:      Palpations: Abdomen is soft.      Tenderness:  There is no abdominal tenderness. There is no guarding.   Musculoskeletal: Normal range of motion.      Right lower leg: Edema present.      Left lower leg: Edema present.   Skin:     General: Skin is warm and dry.   Neurological:      General: No focal deficit present.      Mental Status: He is alert and oriented to person, place, and time. Mental status is at baseline.   Psychiatric:         Mood and Affect: Mood normal.         Behavior: Behavior normal.       CXR: Reviewed and appreciated    Assessment:      54 yo WM with sarcoidosis presents to re-establish care with pulmonology. Main complaint today is of seasonal allergies with post-nasal drip and cough. Patient does not take chronic allergy medications. He is also not currently on medical therapy for his sarcoidosis, though he appears asymptomatic clinically without significant change in his CXR from prior.    Plan:    - Rx for Claritin and fluticasone nasal spray sent to pharmacy  - PFTs with DLCO ordered today  - Repeat CT chest for interval change from prior exam  - no indication for medical therapy of sarcoid at this time; will continue to monitor and re-evaluate at subsequent visits    Patient seen and medical therapy discussed with Dr. Madera. RTC in 6 weeks with PFTs and CT scan, will need to re-evaluate symptoms after starting anti-histamine and steroid nasal spray.    Chaparro Curran MD  [employer] / [facility], PGY-4

## 2020-12-09 ENCOUNTER — PATIENT MESSAGE (OUTPATIENT)
Dept: INTERNAL MEDICINE | Facility: CLINIC | Age: 55
End: 2020-12-09

## 2020-12-09 ENCOUNTER — TELEPHONE (OUTPATIENT)
Dept: INTERNAL MEDICINE | Facility: CLINIC | Age: 55
End: 2020-12-09

## 2020-12-09 DIAGNOSIS — D69.6 THROMBOCYTOPENIA: Primary | ICD-10-CM

## 2020-12-09 DIAGNOSIS — R79.89 LFT ELEVATION: ICD-10-CM

## 2020-12-09 NOTE — TELEPHONE ENCOUNTER
1. Thrombocytopenia - cbc, vitamin b12, b9, path interpretation  2. Hypoalbuminemia, hyperbili, transaemia --> rpt LFTs, ferritin, liver US

## 2020-12-14 ENCOUNTER — PATIENT MESSAGE (OUTPATIENT)
Dept: INTERNAL MEDICINE | Facility: CLINIC | Age: 55
End: 2020-12-14

## 2020-12-14 ENCOUNTER — HOSPITAL ENCOUNTER (OUTPATIENT)
Dept: RADIOLOGY | Facility: HOSPITAL | Age: 55
Discharge: HOME | End: 2020-12-14
Attending: INTERNAL MEDICINE
Payer: OTHER GOVERNMENT

## 2020-12-14 DIAGNOSIS — R79.89 LFT ELEVATION: ICD-10-CM

## 2020-12-14 PROCEDURE — 76705 ECHO EXAM OF ABDOMEN: CPT | Mod: 26,,, | Performed by: RADIOLOGY

## 2020-12-14 PROCEDURE — 76705 ECHO EXAM OF ABDOMEN: CPT | Mod: TC

## 2020-12-14 PROCEDURE — 76705 US ABDOMEN LIMITED_LIVER: ICD-10-PCS | Mod: 26,,, | Performed by: RADIOLOGY

## 2020-12-14 NOTE — TELEPHONE ENCOUNTER
US Abdomen Limited_Liver  Narrative: EXAMINATION:  US ABDOMEN LIMITED_LIVER    CLINICAL HISTORY:  .    Other specified abnormal findings of blood chemistry    TECHNIQUE:  Limited ultrasound of the right upper quadrant of the abdomen including pancreas, liver, gallbladder, common bile duct was performed.    COMPARISON:  None.    FINDINGS:  Liver: Normal in size, measuring 13.8 cm. Homogeneous echotexture. No focal hepatic lesions.    Gallbladder: 7.4 mm calculus without wall thickening.  There is a negative sonographic Tellez sign.  Gallbladder wall measures 3 mm.    Biliary system: The common duct is not dilated, measuring 4.7 mm.  No intrahepatic ductal dilatation.    Spleen: Enlarged measuring 16.3 by 6.2 cm.    Pancreas: The visualized portions of pancreas appear normal.    Miscellaneous: No ascites.  Impression: Cholelithiasis without evidence of cholecystitis nor biliary ductal dilatation.    Splenomegaly.    This report was flagged in Epic as abnormal.    Electronically signed by: Peterson Matthews  Date:    12/14/2020  Time:    10:44      BMP  Lab Results   Component Value Date     12/02/2020    K 3.9 12/02/2020     12/02/2020    CO2 24 12/02/2020    BUN 11 12/02/2020    CREATININE 0.7 12/02/2020    CALCIUM 8.0 (L) 12/02/2020    ANIONGAP 7 (L) 12/02/2020    ESTGFRAFRICA >60.0 12/02/2020    EGFRNONAA >60.0 12/02/2020     Lab Results   Component Value Date    ALT 67 (H) 12/02/2020     (H) 12/02/2020    ALKPHOS 135 12/02/2020    BILITOT 3.7 (H) 12/02/2020         
n/a

## 2020-12-14 NOTE — TELEPHONE ENCOUNTER
Future Appointments   Date Time Provider Department Center   3/5/2021  3:00 PM Sergio Guo III, MD [facility] [place]

## 2020-12-17 DIAGNOSIS — K76.9 LIVER DISEASE: Primary | ICD-10-CM

## 2020-12-18 ENCOUNTER — TELEPHONE (OUTPATIENT)
Dept: HEPATOLOGY | Facility: CLINIC | Age: 55
End: 2020-12-18

## 2020-12-18 NOTE — TELEPHONE ENCOUNTER
I left the pt a VM to call us to schedule a consult next week or the first week of William per Dr. Guo. I also mailed a letter.    ----- Message from Park Barnett MA sent at 12/17/2020  4:42 PM CST -----    ----- Message -----  From: Andry Ny MD  Sent: 12/17/2020   4:40 PM CST  To: An[facility] Wilde Staff    Please bring to clinic next week- he can see Dr Torrez or Bzowej    ----- Message -----  From: Sergio Guo III, MD  Sent: 12/15/2020   5:35 PM CST  To: Andry Ny MD    Greetings  I wanted to curbside you on a patient. This 55 gentleman who has a h/o of sarcoid  had elevated LFTs and bili 3.7, low platelets and elevated BMI, no significant abd symptom but did have LE edema. I went to get an US thinking it would be cirrhosis but got this instead. Any thoughts on next steps? Would you do more definitive imaging? MRI ? HIDA? Thanks!

## 2020-12-18 NOTE — PROGRESS NOTES
Called patient and reviwe labs and impaging  Suspected cirrhosis given clinical picutre  Will refer to hepatology

## 2020-12-22 ENCOUNTER — LAB VISIT (OUTPATIENT)
Dept: LAB | Facility: HOSPITAL | Age: 55
End: 2020-12-22
Payer: OTHER GOVERNMENT

## 2020-12-22 ENCOUNTER — OFFICE VISIT (OUTPATIENT)
Dept: HEPATOLOGY | Facility: CLINIC | Age: 55
End: 2020-12-22
Payer: OTHER GOVERNMENT

## 2020-12-22 VITALS
SYSTOLIC BLOOD PRESSURE: 148 MMHG | HEART RATE: 116 BPM | RESPIRATION RATE: 18 BRPM | HEIGHT: 71 IN | BODY MASS INDEX: 44.1 KG/M2 | OXYGEN SATURATION: 96 % | WEIGHT: 315 LBS | TEMPERATURE: 98 F | DIASTOLIC BLOOD PRESSURE: 67 MMHG

## 2020-12-22 DIAGNOSIS — D69.6 THROMBOCYTOPENIA: ICD-10-CM

## 2020-12-22 DIAGNOSIS — R74.8 ELEVATED LIVER ENZYMES: ICD-10-CM

## 2020-12-22 DIAGNOSIS — R16.1 SPLENOMEGALY: ICD-10-CM

## 2020-12-22 DIAGNOSIS — K76.9 LIVER DISEASE: ICD-10-CM

## 2020-12-22 DIAGNOSIS — R74.8 ELEVATED LIVER ENZYMES: Primary | ICD-10-CM

## 2020-12-22 LAB
A1AT SERPL-MCNC: 141 MG/DL (ref 100–190)
AFP SERPL-MCNC: 2.6 NG/ML (ref 0–8.4)
ALBUMIN SERPL BCP-MCNC: 2.3 G/DL (ref 3.5–5.2)
ALP SERPL-CCNC: 131 U/L (ref 55–135)
ALT SERPL W/O P-5'-P-CCNC: 57 U/L (ref 10–44)
ANION GAP SERPL CALC-SCNC: 5 MMOL/L (ref 8–16)
AST SERPL-CCNC: 110 U/L (ref 10–40)
BILIRUB SERPL-MCNC: 3.5 MG/DL (ref 0.1–1)
BUN SERPL-MCNC: 14 MG/DL (ref 6–20)
CALCIUM SERPL-MCNC: 8.2 MG/DL (ref 8.7–10.5)
CERULOPLASMIN SERPL-MCNC: 26 MG/DL (ref 15–45)
CHLORIDE SERPL-SCNC: 109 MMOL/L (ref 95–110)
CK SERPL-CCNC: 122 U/L (ref 20–200)
CO2 SERPL-SCNC: 27 MMOL/L (ref 23–29)
CREAT SERPL-MCNC: 0.9 MG/DL (ref 0.5–1.4)
ERYTHROCYTE [DISTWIDTH] IN BLOOD BY AUTOMATED COUNT: 15.1 % (ref 11.5–14.5)
EST. GFR  (AFRICAN AMERICAN): >60 ML/MIN/1.73 M^2
EST. GFR  (NON AFRICAN AMERICAN): >60 ML/MIN/1.73 M^2
FERRITIN SERPL-MCNC: 148 NG/ML (ref 20–300)
GLUCOSE SERPL-MCNC: 93 MG/DL (ref 70–110)
HCT VFR BLD AUTO: 42.6 % (ref 40–54)
HGB BLD-MCNC: 13.7 G/DL (ref 14–18)
IGG SERPL-MCNC: 1953 MG/DL (ref 650–1600)
INR PPP: 1.2 (ref 0.8–1.2)
IRON SERPL-MCNC: 172 UG/DL (ref 45–160)
MCH RBC QN AUTO: 33.2 PG (ref 27–31)
MCHC RBC AUTO-ENTMCNC: 32.2 G/DL (ref 32–36)
MCV RBC AUTO: 103 FL (ref 82–98)
PLATELET # BLD AUTO: 88 K/UL (ref 150–350)
PMV BLD AUTO: 11.1 FL (ref 9.2–12.9)
POTASSIUM SERPL-SCNC: 4.1 MMOL/L (ref 3.5–5.1)
PROT SERPL-MCNC: 6.4 G/DL (ref 6–8.4)
PROTHROMBIN TIME: 13.1 SEC (ref 9–12.5)
RBC # BLD AUTO: 4.13 M/UL (ref 4.6–6.2)
SATURATED IRON: 61 % (ref 20–50)
SODIUM SERPL-SCNC: 141 MMOL/L (ref 136–145)
TOTAL IRON BINDING CAPACITY: 283 UG/DL (ref 250–450)
TRANSFERRIN SERPL-MCNC: 191 MG/DL (ref 200–375)
WBC # BLD AUTO: 4.37 K/UL (ref 3.9–12.7)

## 2020-12-22 PROCEDURE — 80321 ALCOHOLS BIOMARKERS 1OR 2: CPT

## 2020-12-22 PROCEDURE — 82105 ALPHA-FETOPROTEIN SERUM: CPT

## 2020-12-22 PROCEDURE — 82390 ASSAY OF CERULOPLASMIN: CPT

## 2020-12-22 PROCEDURE — 99204 PR OFFICE/OUTPT VISIT, NEW, LEVL IV, 45-59 MIN: ICD-10-PCS | Mod: S$GLB,,, | Performed by: NURSE PRACTITIONER

## 2020-12-22 PROCEDURE — 86256 FLUORESCENT ANTIBODY TITER: CPT

## 2020-12-22 PROCEDURE — 86706 HEP B SURFACE ANTIBODY: CPT

## 2020-12-22 PROCEDURE — 99204 OFFICE O/P NEW MOD 45 MIN: CPT | Mod: S$GLB,,, | Performed by: NURSE PRACTITIONER

## 2020-12-22 PROCEDURE — 83540 ASSAY OF IRON: CPT

## 2020-12-22 PROCEDURE — 99999 PR PBB SHADOW E&M-EST. PATIENT-LVL V: CPT | Mod: PBBFAC,,, | Performed by: NURSE PRACTITIONER

## 2020-12-22 PROCEDURE — 82103 ALPHA-1-ANTITRYPSIN TOTAL: CPT

## 2020-12-22 PROCEDURE — 86038 ANTINUCLEAR ANTIBODIES: CPT

## 2020-12-22 PROCEDURE — 80074 ACUTE HEPATITIS PANEL: CPT

## 2020-12-22 PROCEDURE — 99999 PR PBB SHADOW E&M-EST. PATIENT-LVL V: ICD-10-PCS | Mod: PBBFAC,,, | Performed by: NURSE PRACTITIONER

## 2020-12-22 PROCEDURE — 86704 HEP B CORE ANTIBODY TOTAL: CPT

## 2020-12-22 PROCEDURE — 82784 ASSAY IGA/IGD/IGG/IGM EACH: CPT

## 2020-12-22 PROCEDURE — 36415 COLL VENOUS BLD VENIPUNCTURE: CPT

## 2020-12-22 PROCEDURE — 85027 COMPLETE CBC AUTOMATED: CPT

## 2020-12-22 PROCEDURE — 86235 NUCLEAR ANTIGEN ANTIBODY: CPT | Mod: 91

## 2020-12-22 PROCEDURE — 82550 ASSAY OF CK (CPK): CPT

## 2020-12-22 PROCEDURE — 86790 VIRUS ANTIBODY NOS: CPT

## 2020-12-22 PROCEDURE — 82728 ASSAY OF FERRITIN: CPT

## 2020-12-22 PROCEDURE — 85610 PROTHROMBIN TIME: CPT

## 2020-12-22 PROCEDURE — 80053 COMPREHEN METABOLIC PANEL: CPT

## 2020-12-22 NOTE — PROGRESS NOTES
[facility] VISIT NEW PT NOTE    REFERRING PROVIDER:  Dr. Sergio Guo III  PCP: Elkin Guo III, MD     CHIEF COMPLAINT: sarcoid, elevated bili, low platelets, splenomegaly, elevated liver enzymes, cirrhosis (?)    HPI: This is a 55 y.o. White male with PMH noted below, presenting for evaluation of elevated liver enzymes    Per chart notes, he was Previously diagnosed with sarcoidosis in 2014 by mediastinal lymph node biopsy due to abnormal chest imaging    Previous serologic w/u negative for viral hepatitis C - will complete full sero w/u     Prior serologic workup:   Lab Results   Component Value Date    HEPCAB Negative 12/02/2020       Liver fibrosis staging:  -- cannot do fibroscan due to BMI, will attempt MRI elastography     Risk factors for NAFLD include morbid obesity    Interval HPI: Presents today alone. Followed by pulm for sarcoid     Labs done 12/2020 show elevated transaminase levels (AST>ALT,  elevated since at least 2009, limited labs to review )  Platelets low since at least 2014, alk phos WNL  Synthetic liver functioning : abnormal, elevated bili, low albumin, will obtain INR    Lab Results   Component Value Date    ALT 67 (H) 12/02/2020     (H) 12/02/2020    ALKPHOS 135 12/02/2020    BILITOT 3.7 (H) 12/02/2020    ALBUMIN 2.6 (L) 12/02/2020     (L) 12/02/2020       Abd U/S done 12/2020 showed no liver abnormality, splenomegaly     Denies family history of liver disease . Denies alcohol consumption currently or in the past-     Immunity to Hep A and B - will check today     Denies jaundice, dark urine, abdominal distention, hematemesis, melena, slowed mentation. No abnormal skin rashes. No generalized joint or muscle pain.      Review of patient's allergies indicates:  No Known Allergies    Current Outpatient Medications on File Prior to Visit   Medication Sig Dispense Refill    albuterol (PROVENTIL/VENTOLIN HFA) 90 mcg/actuation inhaler 2 puffs 3 (three) times daily  "as needed.      benzonatate (TESSALON) 100 MG capsule 1 capsule 3 (three) times daily.      FLUCELVAX QUAD 3349-2852, PF, 60 mcg (15 mcg x 4)/0.5 mL Syrg 0.5 mLs once.      fluticasone propionate (FLONASE) 50 mcg/actuation nasal spray 1 spray (50 mcg total) by Each Nostril route once daily. 16 g 3    loratadine (CLARITIN) 10 mg tablet Take 1 tablet (10 mg total) by mouth once daily. 90 tablet 3     No current facility-administered medications on file prior to visit.        PMHX:  has a past medical history of Anxiety, Hyperlipidemia, Multiple pulmonary nodules, Obesity, and Sarcoidosis of lung with sarcoidosis of lymph nodes.    PSHX:  has a past surgical history that includes Lymphadenectomy; Anterior cruciate ligament repair (2007); [place] tooth extraction; and Meniscectomy.    FAMILY HISTORY: Negative for liver disease, reviewed in [facility]    SOCIAL HISTORY:   Social History     Tobacco Use   Smoking Status Never Smoker   Smokeless Tobacco Never Used       Social History     Substance and Sexual Activity   Alcohol Use No       Social History     Substance and Sexual Activity   Drug Use No       ROS:   GENERAL: Denies fever, chills, weight loss/gain,+ intermittent fatigue  HEENT: Denies headaches, dizziness, vision/hearing changes  CARDIOVASCULAR: Denies chest pain, palpitations, + chronic BLE edema  RESPIRATORY: Denies dyspnea, cough  GI: Denies abdominal pain, rectal bleeding, nausea, vomiting. No change in bowel pattern or color  : Denies dysuria, hematuria   SKIN: Denies rash, itching   NEURO: Denies confusion, memory loss, or mood changes  PSYCH: Denies depression or anxiety  HEME/LYMPH: Denies easy bruising or bleeding    PHYSICAL EXAM:   Friendly White male, in no acute distress; alert and oriented to person, place and time  VITALS: BP (!) 148/67 (BP Location: Left arm, Patient Position: Sitting, BP Method: Medium (Automatic))   Pulse (!) 116   Temp 98.1 °F (36.7 °C) (Oral)   Resp 18   Ht 5' 11" " (1.803 m)   Wt (!) 164.4 kg (362 lb 7 oz)   SpO2 96%   BMI 50.55 kg/m²   HENT: Normocephalic, without obvious abnormality. Oral mucosa pink and moist. Dentition good.  EYES: Sclerae anicteric. No conjunctival pallor.   NECK: Supple. No masses or cervical adenopathy.  CARDIOVASCULAR: Regular rate and rhythm. No murmurs.  RESPIRATORY: Normal respiratory effort. BBS CTA. No wheezes or crackles.  GI: Soft, non-tender, non-distended. +splenomegaly. No masses palpable. No ascites.  EXTREMITIES:  No clubbing, cyanosis +1 BLE edema.  SKIN: Warm and dry. No jaundice. No rashes noted to exposed skin. No telangectasias noted. No palmar erythema.  NEURO:  Normal gait. No asterixis.  PSYCH:  Memory intact. Thought and speech pattern appropriate. Behavior normal. No depression or anxiety noted.    DIAGNOSTIC STUDIES:  EGD-  COLONOSCOPY-  ABD. U/S-    Done 12/2020    MRI-  Will arrange     LIVER BIOPSY-  None in the past    FIBROSCAN - cannot obtain due to BMI        ASSESSMENT & PLAN:  55 y.o. White male with:  1.  Elevated AST, bilirubin, thrombocytopenia, splenomegaly - concer nfor Cirrhosis, unclear etiology   -- Computed MELD-Na score unavailable. Necessary lab results were not found in the last year.  Computed MELD score unavailable. Necessary lab results were not found in the last year.  -- CT/US findings, splenomegaly , bilirubin elevated, AST > ALT , thrombocytopenia - all suggestive of cirrhosis  -- HCC screening: AFP and abd. U/S.. U/S showed no lesions, AFP today - then both next due 6/2021  -- Immunity to Hep A and B - . Will check immunity markers for HBV/HAV and arrange for vaccination if needed   --- Serological workup to be done today  -- MRI elasto for liver fibrosis staging  Orders Placed This Encounter   Procedures    MR Elastography    AFP Tumor Marker    Alpha-1-Antitrypsin    BRANDON Screen w/Reflex    Antimitochondrial Antibody    Anti-Smooth Muscle Antibody    CBC Without Differential     Ceruloplasmin    CK    Comprehensive Metabolic Panel    Ferritin    Hepatitis Panel, Acute    IgG    Iron and TIBC    Phosphatidylethanol (PETH)    Protime-INR    Hepatitis A antibody, IgG    Hepatitis B Core Antibody, Total    Hepatitis B Surface Ab, Qualitative     -- Labs today for complete sero w/u , MELD labs   -- Cirrhosis counseling as noted above and discussed with patient. Discussed with patient that do not recommend any elective abdominal surgery due to risk of hepatic decompensation.      2. Sarcoid in lungs  -- followed by pulm. Normal alk phos so unsure if contributing to liver disease but will continue eval     3. Morbid obesity  -- increases risk for fatty liver     3. Portal hypertension (?)  -- likely needs EGD, will arrange if MRI confirms cirrhosis  -- No Ascites, + ankle edema   -- + Splenomegaly and thrombocytopenia        Labs today, MRI elasto soon, then   Follow up in about 2 weeks (around 1/5/2021). to discuss results     Thank you for allowing me to participate in the care of Ashwin Peter    Loraine Bennett, JOSE-COSTA    CC'ed note to:   Sergio Guo III, MD Vince Hill III, MD

## 2020-12-22 NOTE — LETTER
December 22, 2020      Sergio Guo III, MD  [address] [facility] [place] [address]           [facility] Trans[facility] 1st Fl  [address]  [facility] [address]  Phone: [phone]  Fax: [phone]          Patient: Ashwin Peter   MR Number: [id number]   YOB: 1965   Date of Visit: 12/22/2020       Dear Dr. Sergio Guo III:    Thank you for referring Ashwin Peter to me for evaluation. Attached you will find relevant portions of my assessment and plan of care.    If you have questions, please do not hesitate to call me. I look forward to following Ashwin Peter along with you.    Sincerely,    Loraine Bennett, JOSE    Enclosure  CC:  No Recipients    If you would like to receive this communication electronically, please contact [email or web address] or [phone] to request more information on [employer] access.    For providers and/or their staff who would like to refer a patient to [facility], please contact us through our one-stop-shop provider referral line, [facility], at [phone].    If you feel you have received this communication in error or would no longer like to receive these types of communications, please e-mail [email or web address]

## 2020-12-22 NOTE — PATIENT INSTRUCTIONS
1. MRI elastography soon to look for fat or scar tissue in the liver  2. Based on your labs and ultrasound (elevated bilirubin, low platelets, enlarged spleen, elevated liver enzymes), I have concerns that you have cirrhosis. The MRI will evaluate for this   3. Will check immunity markers for Hepatitis A and B and arrange for vaccination if needed  4. Labs today to  check for multiple causes of liver disease. I will send you the results of your labs when they are all resulted, which is typically 1 week after your visit  5.  Follow up in 1-2 weeks after labs and MRI completed

## 2020-12-23 LAB
ANA SER QL IF: NORMAL
HAV IGM SERPL QL IA: NEGATIVE
HBV CORE AB SERPL QL IA: NEGATIVE
HBV CORE IGM SERPL QL IA: NEGATIVE
HBV SURFACE AB SER-ACNC: NEGATIVE M[IU]/ML
HBV SURFACE AG SERPL QL IA: NEGATIVE
HCV AB SERPL QL IA: NEGATIVE
HEPATITIS A ANTIBODY, IGG: NEGATIVE
MITOCHONDRIA AB TITR SER IF: NORMAL {TITER}

## 2020-12-24 ENCOUNTER — HOSPITAL ENCOUNTER (OUTPATIENT)
Dept: RADIOLOGY | Facility: HOSPITAL | Age: 55
Discharge: HOME | End: 2020-12-24
Attending: NURSE PRACTITIONER
Payer: OTHER GOVERNMENT

## 2020-12-24 DIAGNOSIS — R74.8 ELEVATED LIVER ENZYMES: ICD-10-CM

## 2020-12-24 DIAGNOSIS — D69.6 THROMBOCYTOPENIA: ICD-10-CM

## 2020-12-24 DIAGNOSIS — R16.1 SPLENOMEGALY: ICD-10-CM

## 2020-12-24 PROCEDURE — 76391 MR ELASTOGRAPHY: CPT | Mod: 26,,, | Performed by: RADIOLOGY

## 2020-12-24 PROCEDURE — 76391 MR ELASTOGRAPHY: ICD-10-PCS | Mod: 26,,, | Performed by: RADIOLOGY

## 2020-12-24 PROCEDURE — 76391 MR ELASTOGRAPHY: CPT | Mod: TC

## 2020-12-28 LAB — SMOOTH MUSCLE AB TITR SER IF: NORMAL {TITER}

## 2020-12-29 LAB — PHOSPHATIDYLETHANOL (PETH): NEGATIVE NG/ML

## 2020-12-30 ENCOUNTER — PATIENT MESSAGE (OUTPATIENT)
Dept: HEPATOLOGY | Facility: CLINIC | Age: 55
End: 2020-12-30

## 2020-12-31 ENCOUNTER — TELEPHONE (OUTPATIENT)
Dept: HEPATOLOGY | Facility: CLINIC | Age: 55
End: 2020-12-31

## 2020-12-31 DIAGNOSIS — E83.19 IRON OVERLOAD: Primary | ICD-10-CM

## 2020-12-31 NOTE — TELEPHONE ENCOUNTER
Contacted patient and scheduled appointment as instructed. Patient requested that his appointment be pushed back due to covid positive results. Consulted with provider and she agreed. Sent reminders In the mail.    Instructions:    JOSE Cee Staff             He didn't read this message. Can you see if he can go to the lab sometime soon for the Hemochromatosis DNA lab to be done. Ideally he would go today or Monday so that I can have the results before his follow up visit     Thanks

## 2021-01-05 ENCOUNTER — PATIENT MESSAGE (OUTPATIENT)
Dept: ADMINISTRATIVE | Facility: HOSPITAL | Age: 56
End: 2021-01-05

## 2021-01-05 ENCOUNTER — PATIENT MESSAGE (OUTPATIENT)
Dept: PULMONOLOGY | Facility: CLINIC | Age: 56
End: 2021-01-05

## 2021-01-06 ENCOUNTER — LAB VISIT (OUTPATIENT)
Dept: LAB | Facility: HOSPITAL | Age: 56
End: 2021-01-06
Payer: OTHER GOVERNMENT

## 2021-01-06 DIAGNOSIS — E83.19 IRON OVERLOAD: ICD-10-CM

## 2021-01-06 PROCEDURE — 81256 HFE GENE: CPT

## 2021-01-06 PROCEDURE — 36415 COLL VENOUS BLD VENIPUNCTURE: CPT

## 2021-01-11 ENCOUNTER — TELEPHONE (OUTPATIENT)
Dept: HEPATOLOGY | Facility: CLINIC | Age: 56
End: 2021-01-11

## 2021-01-13 ENCOUNTER — OFFICE VISIT (OUTPATIENT)
Dept: HEPATOLOGY | Facility: CLINIC | Age: 56
End: 2021-01-13
Payer: OTHER GOVERNMENT

## 2021-01-13 VITALS
SYSTOLIC BLOOD PRESSURE: 144 MMHG | TEMPERATURE: 98 F | HEART RATE: 77 BPM | OXYGEN SATURATION: 96 % | WEIGHT: 315 LBS | DIASTOLIC BLOOD PRESSURE: 65 MMHG | RESPIRATION RATE: 20 BRPM | BODY MASS INDEX: 44.1 KG/M2 | HEIGHT: 71 IN

## 2021-01-13 DIAGNOSIS — R16.1 SPLENOMEGALY: ICD-10-CM

## 2021-01-13 DIAGNOSIS — K74.69 OTHER CIRRHOSIS OF LIVER: Primary | ICD-10-CM

## 2021-01-13 DIAGNOSIS — Z23 NEED FOR HEPATITIS A AND B VACCINATION: ICD-10-CM

## 2021-01-13 DIAGNOSIS — D69.6 THROMBOCYTOPENIA: ICD-10-CM

## 2021-01-13 DIAGNOSIS — E66.01 MORBID OBESITY WITH BMI OF 45.0-49.9, ADULT: ICD-10-CM

## 2021-01-13 DIAGNOSIS — D86.2 SARCOIDOSIS OF LUNG WITH SARCOIDOSIS OF LYMPH NODES: ICD-10-CM

## 2021-01-13 PROBLEM — R74.8 ELEVATED LIVER ENZYMES: Status: RESOLVED | Noted: 2020-12-22 | Resolved: 2021-01-13

## 2021-01-13 PROCEDURE — 99999 PR PBB SHADOW E&M-EST. PATIENT-LVL V: CPT | Mod: PBBFAC,,, | Performed by: NURSE PRACTITIONER

## 2021-01-13 PROCEDURE — 99999 PR PBB SHADOW E&M-EST. PATIENT-LVL V: ICD-10-PCS | Mod: PBBFAC,,, | Performed by: NURSE PRACTITIONER

## 2021-01-13 PROCEDURE — 99215 OFFICE O/P EST HI 40 MIN: CPT | Mod: S$GLB,,, | Performed by: NURSE PRACTITIONER

## 2021-01-13 PROCEDURE — 99215 PR OFFICE/OUTPT VISIT, EST, LEVL V, 40-54 MIN: ICD-10-PCS | Mod: S$GLB,,, | Performed by: NURSE PRACTITIONER

## 2021-01-14 ENCOUNTER — TELEPHONE (OUTPATIENT)
Dept: PULMONOLOGY | Facility: CLINIC | Age: 56
End: 2021-01-14

## 2021-01-14 ENCOUNTER — TELEPHONE (OUTPATIENT)
Dept: HEPATOLOGY | Facility: CLINIC | Age: 56
End: 2021-01-14

## 2021-01-15 ENCOUNTER — TELEPHONE (OUTPATIENT)
Dept: HEPATOLOGY | Facility: CLINIC | Age: 56
End: 2021-01-15

## 2021-01-15 DIAGNOSIS — D86.9 SARCOIDOSIS, UNSPECIFIED: Primary | ICD-10-CM

## 2021-01-17 LAB
GENETICIST REVIEW: NORMAL
HFE GENE MUT ANL BLD/T: NORMAL
HFE RELEASED BY: NORMAL
HFE RESULT SUMMARY: NEGATIVE
REF LAB TEST METHOD: NORMAL
SPECIMEN SOURCE: NORMAL
SPECIMEN,  HEMOCHROMATOSIS: NORMAL

## 2021-01-25 ENCOUNTER — PATIENT MESSAGE (OUTPATIENT)
Dept: PULMONOLOGY | Facility: CLINIC | Age: 56
End: 2021-01-25

## 2021-01-27 RX ORDER — FENTANYL CITRATE 50 UG/ML
50 INJECTION, SOLUTION INTRAMUSCULAR; INTRAVENOUS
Status: CANCELLED | OUTPATIENT
Start: 2021-01-27

## 2021-01-27 RX ORDER — MIDAZOLAM HYDROCHLORIDE 1 MG/ML
1 INJECTION INTRAMUSCULAR; INTRAVENOUS
Status: CANCELLED | OUTPATIENT
Start: 2021-01-27

## 2021-01-28 ENCOUNTER — HOSPITAL ENCOUNTER (OUTPATIENT)
Dept: INTERVENTIONAL RADIOLOGY/VASCULAR | Facility: HOSPITAL | Age: 56
Discharge: HOME | End: 2021-01-28
Attending: NURSE PRACTITIONER
Payer: OTHER GOVERNMENT

## 2021-01-28 ENCOUNTER — TELEPHONE (OUTPATIENT)
Dept: PULMONOLOGY | Facility: CLINIC | Age: 56
End: 2021-01-28

## 2021-01-28 VITALS
SYSTOLIC BLOOD PRESSURE: 165 MMHG | TEMPERATURE: 98 F | DIASTOLIC BLOOD PRESSURE: 65 MMHG | WEIGHT: 315 LBS | HEART RATE: 78 BPM | RESPIRATION RATE: 18 BRPM | BODY MASS INDEX: 44.1 KG/M2 | HEIGHT: 71 IN | OXYGEN SATURATION: 97 %

## 2021-01-28 DIAGNOSIS — D86.2 SARCOIDOSIS OF LUNG WITH SARCOIDOSIS OF LYMPH NODES: ICD-10-CM

## 2021-01-28 DIAGNOSIS — K74.69 OTHER CIRRHOSIS OF LIVER: ICD-10-CM

## 2021-01-28 DIAGNOSIS — D86.2 SARCOIDOSIS OF LUNG WITH SARCOIDOSIS OF LYMPH NODES: Primary | ICD-10-CM

## 2021-01-28 PROCEDURE — 25500020 PHARM REV CODE 255: Performed by: NURSE PRACTITIONER

## 2021-01-28 PROCEDURE — 88313 SPECIAL STAINS GROUP 2: CPT | Mod: 26,,, | Performed by: PATHOLOGY

## 2021-01-28 PROCEDURE — 75970 IR TRANSJUGULAR LIVER BIOPSY: ICD-10-PCS | Mod: 26,,, | Performed by: STUDENT IN AN ORGANIZED HEALTH CARE EDUCATION/TRAINING PROGRAM

## 2021-01-28 PROCEDURE — 37200 IR TRANSJUGULAR LIVER BIOPSY: ICD-10-PCS | Mod: ,,, | Performed by: STUDENT IN AN ORGANIZED HEALTH CARE EDUCATION/TRAINING PROGRAM

## 2021-01-28 PROCEDURE — 75889 VEIN X-RAY LIVER W/HEMODYNAM: CPT | Mod: 26,59,, | Performed by: STUDENT IN AN ORGANIZED HEALTH CARE EDUCATION/TRAINING PROGRAM

## 2021-01-28 PROCEDURE — 75825 VEIN X-RAY TRUNK: CPT | Mod: TC,59 | Performed by: STUDENT IN AN ORGANIZED HEALTH CARE EDUCATION/TRAINING PROGRAM

## 2021-01-28 PROCEDURE — 37200 TRANSCATHETER BIOPSY: CPT | Mod: ,,, | Performed by: STUDENT IN AN ORGANIZED HEALTH CARE EDUCATION/TRAINING PROGRAM

## 2021-01-28 PROCEDURE — 99152 MOD SED SAME PHYS/QHP 5/>YRS: CPT

## 2021-01-28 PROCEDURE — 36011 PLACE CATHETER IN VEIN: CPT | Mod: 51,RT,, | Performed by: STUDENT IN AN ORGANIZED HEALTH CARE EDUCATION/TRAINING PROGRAM

## 2021-01-28 PROCEDURE — 36011 IR TRANSJUGULAR LIVER BIOPSY: ICD-10-PCS | Mod: 51,RT,, | Performed by: STUDENT IN AN ORGANIZED HEALTH CARE EDUCATION/TRAINING PROGRAM

## 2021-01-28 PROCEDURE — 75970 VASCULAR BIOPSY: CPT | Mod: TC | Performed by: STUDENT IN AN ORGANIZED HEALTH CARE EDUCATION/TRAINING PROGRAM

## 2021-01-28 PROCEDURE — 99153 MOD SED SAME PHYS/QHP EA: CPT | Performed by: STUDENT IN AN ORGANIZED HEALTH CARE EDUCATION/TRAINING PROGRAM

## 2021-01-28 PROCEDURE — 76937 US GUIDE VASCULAR ACCESS: CPT | Mod: TC | Performed by: STUDENT IN AN ORGANIZED HEALTH CARE EDUCATION/TRAINING PROGRAM

## 2021-01-28 PROCEDURE — 88307 TISSUE EXAM BY PATHOLOGIST: CPT | Mod: 26,,, | Performed by: PATHOLOGY

## 2021-01-28 PROCEDURE — 88313 PR  SPECIAL STAINS,GROUP II: ICD-10-PCS | Mod: 26,,, | Performed by: PATHOLOGY

## 2021-01-28 PROCEDURE — 88307 PR  SURG PATH,LEVEL V: ICD-10-PCS | Mod: 26,,, | Performed by: PATHOLOGY

## 2021-01-28 PROCEDURE — 99152 MOD SED SAME PHYS/QHP 5/>YRS: CPT | Mod: ,,, | Performed by: STUDENT IN AN ORGANIZED HEALTH CARE EDUCATION/TRAINING PROGRAM

## 2021-01-28 PROCEDURE — 76937 IR TRANSJUGULAR LIVER BIOPSY: ICD-10-PCS | Mod: 26,,, | Performed by: STUDENT IN AN ORGANIZED HEALTH CARE EDUCATION/TRAINING PROGRAM

## 2021-01-28 PROCEDURE — 75889 IR TRANSJUGULAR LIVER BIOPSY: ICD-10-PCS | Mod: 26,59,, | Performed by: STUDENT IN AN ORGANIZED HEALTH CARE EDUCATION/TRAINING PROGRAM

## 2021-01-28 PROCEDURE — 99152 PR MOD CONSCIOUS SEDATION, SAME PHYS, 5+ YRS, FIRST 15 MIN: ICD-10-PCS | Mod: ,,, | Performed by: STUDENT IN AN ORGANIZED HEALTH CARE EDUCATION/TRAINING PROGRAM

## 2021-01-28 PROCEDURE — 63600175 PHARM REV CODE 636 W HCPCS: Performed by: STUDENT IN AN ORGANIZED HEALTH CARE EDUCATION/TRAINING PROGRAM

## 2021-01-28 PROCEDURE — 75825 IR TRANSJUGULAR LIVER BIOPSY: ICD-10-PCS | Mod: 26,59,, | Performed by: STUDENT IN AN ORGANIZED HEALTH CARE EDUCATION/TRAINING PROGRAM

## 2021-01-28 PROCEDURE — 75889 VEIN X-RAY LIVER W/HEMODYNAM: CPT | Mod: TC,59 | Performed by: STUDENT IN AN ORGANIZED HEALTH CARE EDUCATION/TRAINING PROGRAM

## 2021-01-28 PROCEDURE — 75970 VASCULAR BIOPSY: CPT | Mod: 26,,, | Performed by: STUDENT IN AN ORGANIZED HEALTH CARE EDUCATION/TRAINING PROGRAM

## 2021-01-28 PROCEDURE — 75825 VEIN X-RAY TRUNK: CPT | Mod: 26,59,, | Performed by: STUDENT IN AN ORGANIZED HEALTH CARE EDUCATION/TRAINING PROGRAM

## 2021-01-28 PROCEDURE — 36011 PLACE CATHETER IN VEIN: CPT | Mod: RT | Performed by: STUDENT IN AN ORGANIZED HEALTH CARE EDUCATION/TRAINING PROGRAM

## 2021-01-28 PROCEDURE — A4215 STERILE NEEDLE: HCPCS

## 2021-01-28 PROCEDURE — 88313 SPECIAL STAINS GROUP 2: CPT | Mod: 59 | Performed by: PATHOLOGY

## 2021-01-28 PROCEDURE — 37200 TRANSCATHETER BIOPSY: CPT | Performed by: STUDENT IN AN ORGANIZED HEALTH CARE EDUCATION/TRAINING PROGRAM

## 2021-01-28 PROCEDURE — 88307 TISSUE EXAM BY PATHOLOGIST: CPT | Performed by: PATHOLOGY

## 2021-01-28 RX ORDER — ONDANSETRON 2 MG/ML
4 INJECTION INTRAMUSCULAR; INTRAVENOUS EVERY 6 HOURS PRN
Status: DISCONTINUED | OUTPATIENT
Start: 2021-01-28 | End: 2021-01-29 | Stop reason: HOSPADM

## 2021-01-28 RX ORDER — SODIUM CHLORIDE 9 MG/ML
INJECTION, SOLUTION INTRAVENOUS ONCE
Status: DISCONTINUED | OUTPATIENT
Start: 2021-01-28 | End: 2021-01-29 | Stop reason: HOSPADM

## 2021-01-28 RX ORDER — MIDAZOLAM HYDROCHLORIDE 1 MG/ML
INJECTION INTRAMUSCULAR; INTRAVENOUS CODE/TRAUMA/SEDATION MEDICATION
Status: COMPLETED | OUTPATIENT
Start: 2021-01-28 | End: 2021-01-28

## 2021-01-28 RX ORDER — PREDNISONE 10 MG/1
30 TABLET ORAL DAILY
Qty: 90 TABLET | Refills: 0 | Status: SHIPPED | OUTPATIENT
Start: 2021-01-28 | End: 2021-05-07 | Stop reason: SDUPTHER

## 2021-01-28 RX ORDER — FENTANYL CITRATE 50 UG/ML
INJECTION, SOLUTION INTRAMUSCULAR; INTRAVENOUS CODE/TRAUMA/SEDATION MEDICATION
Status: COMPLETED | OUTPATIENT
Start: 2021-01-28 | End: 2021-01-28

## 2021-01-28 RX ADMIN — IOHEXOL 40 ML: 300 INJECTION, SOLUTION INTRAVENOUS at 02:01

## 2021-01-28 RX ADMIN — MIDAZOLAM HYDROCHLORIDE 0.5 MG: 1 INJECTION, SOLUTION INTRAMUSCULAR; INTRAVENOUS at 02:01

## 2021-01-28 RX ADMIN — MIDAZOLAM HYDROCHLORIDE 1 MG: 1 INJECTION, SOLUTION INTRAMUSCULAR; INTRAVENOUS at 02:01

## 2021-01-28 RX ADMIN — FENTANYL CITRATE 50 MCG: 50 INJECTION, SOLUTION INTRAMUSCULAR; INTRAVENOUS at 02:01

## 2021-01-28 RX ADMIN — FENTANYL CITRATE 25 MCG: 50 INJECTION, SOLUTION INTRAMUSCULAR; INTRAVENOUS at 02:01

## 2021-02-03 LAB
COMMENT: NORMAL
FINAL PATHOLOGIC DIAGNOSIS: NORMAL
GROSS: NORMAL

## 2021-02-11 ENCOUNTER — OFFICE VISIT (OUTPATIENT)
Dept: HEPATOLOGY | Facility: CLINIC | Age: 56
End: 2021-02-11
Payer: OTHER GOVERNMENT

## 2021-02-11 ENCOUNTER — TELEPHONE (OUTPATIENT)
Dept: HEPATOLOGY | Facility: CLINIC | Age: 56
End: 2021-02-11

## 2021-02-11 DIAGNOSIS — K62.5 RECTAL BLEEDING: ICD-10-CM

## 2021-02-11 DIAGNOSIS — K76.6 PORTAL HYPERTENSION: ICD-10-CM

## 2021-02-11 DIAGNOSIS — R60.0 BILATERAL LOWER EXTREMITY EDEMA: ICD-10-CM

## 2021-02-11 DIAGNOSIS — E66.01 MORBID OBESITY WITH BMI OF 45.0-49.9, ADULT: ICD-10-CM

## 2021-02-11 DIAGNOSIS — Z23 NEED FOR HEPATITIS A AND B VACCINATION: ICD-10-CM

## 2021-02-11 DIAGNOSIS — K74.69 OTHER CIRRHOSIS OF LIVER: Primary | ICD-10-CM

## 2021-02-11 PROCEDURE — 99215 PR OFFICE/OUTPT VISIT, EST, LEVL V, 40-54 MIN: ICD-10-PCS | Mod: 95,,, | Performed by: NURSE PRACTITIONER

## 2021-02-11 PROCEDURE — 99215 OFFICE O/P EST HI 40 MIN: CPT | Mod: 95,,, | Performed by: NURSE PRACTITIONER

## 2021-02-12 ENCOUNTER — TELEPHONE (OUTPATIENT)
Dept: HEPATOLOGY | Facility: CLINIC | Age: 56
End: 2021-02-12

## 2021-02-12 ENCOUNTER — PATIENT MESSAGE (OUTPATIENT)
Dept: HEPATOLOGY | Facility: CLINIC | Age: 56
End: 2021-02-12

## 2021-02-12 ENCOUNTER — LAB VISIT (OUTPATIENT)
Dept: LAB | Facility: HOSPITAL | Age: 56
End: 2021-02-12
Attending: INTERNAL MEDICINE
Payer: OTHER GOVERNMENT

## 2021-02-12 ENCOUNTER — PATIENT MESSAGE (OUTPATIENT)
Dept: PHARMACY | Facility: CLINIC | Age: 56
End: 2021-02-12

## 2021-02-12 DIAGNOSIS — R60.0 BILATERAL LOWER EXTREMITY EDEMA: Primary | ICD-10-CM

## 2021-02-12 DIAGNOSIS — R60.0 BILATERAL LOWER EXTREMITY EDEMA: ICD-10-CM

## 2021-02-12 LAB
ANION GAP SERPL CALC-SCNC: 6 MMOL/L (ref 8–16)
BUN SERPL-MCNC: 11 MG/DL (ref 6–20)
CALCIUM SERPL-MCNC: 7.9 MG/DL (ref 8.7–10.5)
CHLORIDE SERPL-SCNC: 109 MMOL/L (ref 95–110)
CO2 SERPL-SCNC: 25 MMOL/L (ref 23–29)
CREAT SERPL-MCNC: 0.7 MG/DL (ref 0.5–1.4)
EST. GFR  (AFRICAN AMERICAN): >60 ML/MIN/1.73 M^2
EST. GFR  (NON AFRICAN AMERICAN): >60 ML/MIN/1.73 M^2
GLUCOSE SERPL-MCNC: 76 MG/DL (ref 70–110)
POTASSIUM SERPL-SCNC: 4.1 MMOL/L (ref 3.5–5.1)
SODIUM SERPL-SCNC: 140 MMOL/L (ref 136–145)

## 2021-02-12 PROCEDURE — 80048 BASIC METABOLIC PNL TOTAL CA: CPT

## 2021-02-12 PROCEDURE — 36415 COLL VENOUS BLD VENIPUNCTURE: CPT

## 2021-02-12 RX ORDER — SPIRONOLACTONE 50 MG/1
50 TABLET, FILM COATED ORAL DAILY
Qty: 30 TABLET | Refills: 6 | Status: SHIPPED | OUTPATIENT
Start: 2021-02-12 | End: 2021-02-22

## 2021-02-12 RX ORDER — FUROSEMIDE 20 MG/1
20 TABLET ORAL DAILY
Qty: 30 TABLET | Refills: 6 | Status: SHIPPED | OUTPATIENT
Start: 2021-02-12 | End: 2021-02-22 | Stop reason: SDUPTHER

## 2021-02-17 ENCOUNTER — IMMUNIZATION (OUTPATIENT)
Dept: PHARMACY | Facility: CLINIC | Age: 56
End: 2021-02-17
Payer: OTHER GOVERNMENT

## 2021-02-17 ENCOUNTER — PATIENT MESSAGE (OUTPATIENT)
Dept: HEPATOLOGY | Facility: CLINIC | Age: 56
End: 2021-02-17

## 2021-02-18 ENCOUNTER — PATIENT MESSAGE (OUTPATIENT)
Dept: HEPATOLOGY | Facility: CLINIC | Age: 56
End: 2021-02-18

## 2021-02-18 ENCOUNTER — CONFERENCE (OUTPATIENT)
Dept: TRANSPLANT | Facility: CLINIC | Age: 56
End: 2021-02-18

## 2021-02-19 ENCOUNTER — TELEPHONE (OUTPATIENT)
Dept: HEPATOLOGY | Facility: CLINIC | Age: 56
End: 2021-02-19

## 2021-02-19 ENCOUNTER — LAB VISIT (OUTPATIENT)
Dept: LAB | Facility: HOSPITAL | Age: 56
End: 2021-02-19
Attending: INTERNAL MEDICINE
Payer: OTHER GOVERNMENT

## 2021-02-19 DIAGNOSIS — K74.69 OTHER CIRRHOSIS OF LIVER: ICD-10-CM

## 2021-02-19 DIAGNOSIS — K74.69 OTHER CIRRHOSIS OF LIVER: Primary | ICD-10-CM

## 2021-02-19 LAB
ANION GAP SERPL CALC-SCNC: 6 MMOL/L (ref 8–16)
BUN SERPL-MCNC: 19 MG/DL (ref 6–20)
CALCIUM SERPL-MCNC: 8.5 MG/DL (ref 8.7–10.5)
CHLORIDE SERPL-SCNC: 107 MMOL/L (ref 95–110)
CO2 SERPL-SCNC: 24 MMOL/L (ref 23–29)
CREAT SERPL-MCNC: 0.8 MG/DL (ref 0.5–1.4)
EST. GFR  (AFRICAN AMERICAN): >60 ML/MIN/1.73 M^2
EST. GFR  (NON AFRICAN AMERICAN): >60 ML/MIN/1.73 M^2
GLUCOSE SERPL-MCNC: 100 MG/DL (ref 70–110)
POTASSIUM SERPL-SCNC: 4.1 MMOL/L (ref 3.5–5.1)
SODIUM SERPL-SCNC: 137 MMOL/L (ref 136–145)

## 2021-02-19 PROCEDURE — 36415 COLL VENOUS BLD VENIPUNCTURE: CPT

## 2021-02-19 PROCEDURE — 80048 BASIC METABOLIC PNL TOTAL CA: CPT

## 2021-02-21 ENCOUNTER — PATIENT MESSAGE (OUTPATIENT)
Dept: HEPATOLOGY | Facility: CLINIC | Age: 56
End: 2021-02-21

## 2021-02-22 ENCOUNTER — PATIENT MESSAGE (OUTPATIENT)
Dept: HEPATOLOGY | Facility: CLINIC | Age: 56
End: 2021-02-22

## 2021-02-22 DIAGNOSIS — R60.0 BILATERAL LOWER EXTREMITY EDEMA: ICD-10-CM

## 2021-02-22 RX ORDER — FUROSEMIDE 20 MG/1
40 TABLET ORAL DAILY
Qty: 60 TABLET | Refills: 6 | Status: SHIPPED | OUTPATIENT
Start: 2021-02-22 | End: 2021-09-17

## 2021-02-22 RX ORDER — SPIRONOLACTONE 50 MG/1
100 TABLET, FILM COATED ORAL DAILY
Qty: 60 TABLET | Refills: 6 | Status: ON HOLD | OUTPATIENT
Start: 2021-02-22 | End: 2022-01-07 | Stop reason: SDUPTHER

## 2021-02-25 ENCOUNTER — PATIENT MESSAGE (OUTPATIENT)
Dept: HEPATOLOGY | Facility: CLINIC | Age: 56
End: 2021-02-25

## 2021-02-25 ENCOUNTER — TELEPHONE (OUTPATIENT)
Dept: HEPATOLOGY | Facility: CLINIC | Age: 56
End: 2021-02-25

## 2021-02-26 ENCOUNTER — TELEPHONE (OUTPATIENT)
Dept: HEPATOLOGY | Facility: CLINIC | Age: 56
End: 2021-02-26

## 2021-02-26 ENCOUNTER — LAB VISIT (OUTPATIENT)
Dept: LAB | Facility: HOSPITAL | Age: 56
End: 2021-02-26
Attending: INTERNAL MEDICINE
Payer: OTHER GOVERNMENT

## 2021-02-26 ENCOUNTER — PATIENT MESSAGE (OUTPATIENT)
Dept: HEPATOLOGY | Facility: CLINIC | Age: 56
End: 2021-02-26

## 2021-02-26 DIAGNOSIS — R60.0 BILATERAL LOWER EXTREMITY EDEMA: ICD-10-CM

## 2021-02-26 PROCEDURE — 36415 COLL VENOUS BLD VENIPUNCTURE: CPT

## 2021-02-26 PROCEDURE — 80048 BASIC METABOLIC PNL TOTAL CA: CPT

## 2021-02-27 LAB
ANION GAP SERPL CALC-SCNC: 9 MMOL/L (ref 8–16)
BUN SERPL-MCNC: 15 MG/DL (ref 6–20)
CALCIUM SERPL-MCNC: 8 MG/DL (ref 8.7–10.5)
CHLORIDE SERPL-SCNC: 106 MMOL/L (ref 95–110)
CO2 SERPL-SCNC: 24 MMOL/L (ref 23–29)
CREAT SERPL-MCNC: 0.8 MG/DL (ref 0.5–1.4)
EST. GFR  (AFRICAN AMERICAN): >60 ML/MIN/1.73 M^2
EST. GFR  (NON AFRICAN AMERICAN): >60 ML/MIN/1.73 M^2
GLUCOSE SERPL-MCNC: 122 MG/DL (ref 70–110)
POTASSIUM SERPL-SCNC: 4.2 MMOL/L (ref 3.5–5.1)
SODIUM SERPL-SCNC: 139 MMOL/L (ref 136–145)

## 2021-03-08 ENCOUNTER — OFFICE VISIT (OUTPATIENT)
Dept: INTERNAL MEDICINE | Facility: CLINIC | Age: 56
End: 2021-03-08
Payer: OTHER GOVERNMENT

## 2021-03-08 VITALS
BODY MASS INDEX: 44.1 KG/M2 | OXYGEN SATURATION: 95 % | HEIGHT: 71 IN | SYSTOLIC BLOOD PRESSURE: 130 MMHG | WEIGHT: 315 LBS | TEMPERATURE: 97 F | DIASTOLIC BLOOD PRESSURE: 64 MMHG | HEART RATE: 88 BPM

## 2021-03-08 DIAGNOSIS — D86.2 SARCOIDOSIS OF LUNG WITH SARCOIDOSIS OF LYMPH NODES: ICD-10-CM

## 2021-03-08 DIAGNOSIS — Z71.89 ADVANCE CARE PLANNING: ICD-10-CM

## 2021-03-08 DIAGNOSIS — R60.0 LOWER EXTREMITY EDEMA: Primary | ICD-10-CM

## 2021-03-08 DIAGNOSIS — K74.69 OTHER CIRRHOSIS OF LIVER: ICD-10-CM

## 2021-03-08 PROCEDURE — 99999 PR PBB SHADOW E&M-EST. PATIENT-LVL IV: ICD-10-PCS | Mod: PBBFAC,,, | Performed by: INTERNAL MEDICINE

## 2021-03-08 PROCEDURE — 99497 ADVNCD CARE PLAN 30 MIN: CPT | Mod: S$GLB,,, | Performed by: INTERNAL MEDICINE

## 2021-03-08 PROCEDURE — 99213 OFFICE O/P EST LOW 20 MIN: CPT | Mod: S$GLB,,, | Performed by: INTERNAL MEDICINE

## 2021-03-08 PROCEDURE — 99999 PR PBB SHADOW E&M-EST. PATIENT-LVL IV: CPT | Mod: PBBFAC,,, | Performed by: INTERNAL MEDICINE

## 2021-03-08 PROCEDURE — 99497 PR ADVNCD CARE PLAN 30 MIN: ICD-10-PCS | Mod: S$GLB,,, | Performed by: INTERNAL MEDICINE

## 2021-03-08 PROCEDURE — 99213 PR OFFICE/OUTPT VISIT, EST, LEVL III, 20-29 MIN: ICD-10-PCS | Mod: S$GLB,,, | Performed by: INTERNAL MEDICINE

## 2021-03-17 ENCOUNTER — PATIENT MESSAGE (OUTPATIENT)
Dept: PHARMACY | Facility: CLINIC | Age: 56
End: 2021-03-17

## 2021-03-18 ENCOUNTER — IMMUNIZATION (OUTPATIENT)
Dept: PHARMACY | Facility: CLINIC | Age: 56
End: 2021-03-18
Payer: OTHER GOVERNMENT

## 2021-04-01 ENCOUNTER — PATIENT MESSAGE (OUTPATIENT)
Dept: ENDOSCOPY | Facility: HOSPITAL | Age: 56
End: 2021-04-01

## 2021-04-01 DIAGNOSIS — K74.60 HEPATIC CIRRHOSIS, UNSPECIFIED HEPATIC CIRRHOSIS TYPE, UNSPECIFIED WHETHER ASCITES PRESENT: Primary | ICD-10-CM

## 2021-04-01 DIAGNOSIS — Z01.818 PRE-OP TESTING: Primary | ICD-10-CM

## 2021-04-01 DIAGNOSIS — Z12.11 SCREEN FOR COLON CANCER: Primary | ICD-10-CM

## 2021-04-01 RX ORDER — POLYETHYLENE GLYCOL 3350, SODIUM SULFATE ANHYDROUS, SODIUM BICARBONATE, SODIUM CHLORIDE, POTASSIUM CHLORIDE 236; 22.74; 6.74; 5.86; 2.97 G/4L; G/4L; G/4L; G/4L; G/4L
4 POWDER, FOR SOLUTION ORAL ONCE
Qty: 4000 ML | Refills: 0 | Status: SHIPPED | OUTPATIENT
Start: 2021-04-01 | End: 2021-04-01

## 2021-04-05 ENCOUNTER — LAB VISIT (OUTPATIENT)
Dept: INTERNAL MEDICINE | Facility: CLINIC | Age: 56
End: 2021-04-05
Payer: OTHER GOVERNMENT

## 2021-04-05 ENCOUNTER — PATIENT MESSAGE (OUTPATIENT)
Dept: ADMINISTRATIVE | Facility: HOSPITAL | Age: 56
End: 2021-04-05

## 2021-04-05 DIAGNOSIS — Z01.818 PRE-OP TESTING: ICD-10-CM

## 2021-04-05 PROCEDURE — U0003 INFECTIOUS AGENT DETECTION BY NUCLEIC ACID (DNA OR RNA); SEVERE ACUTE RESPIRATORY SYNDROME CORONAVIRUS 2 (SARS-COV-2) (CORONAVIRUS DISEASE [COVID-19]), AMPLIFIED PROBE TECHNIQUE, MAKING USE OF HIGH THROUGHPUT TECHNOLOGIES AS DESCRIBED BY CMS-2020-01-R: HCPCS | Performed by: FAMILY MEDICINE

## 2021-04-05 PROCEDURE — U0005 INFEC AGEN DETEC AMPLI PROBE: HCPCS | Performed by: FAMILY MEDICINE

## 2021-04-06 LAB — SARS-COV-2 RNA RESP QL NAA+PROBE: NOT DETECTED

## 2021-04-08 ENCOUNTER — ANESTHESIA (OUTPATIENT)
Dept: ENDOSCOPY | Facility: HOSPITAL | Age: 56
End: 2021-04-08
Payer: OTHER GOVERNMENT

## 2021-04-08 ENCOUNTER — HOSPITAL ENCOUNTER (OUTPATIENT)
Facility: HOSPITAL | Age: 56
Discharge: HOME | End: 2021-04-08
Attending: INTERNAL MEDICINE | Admitting: INTERNAL MEDICINE
Payer: OTHER GOVERNMENT

## 2021-04-08 ENCOUNTER — ANESTHESIA EVENT (OUTPATIENT)
Dept: ENDOSCOPY | Facility: HOSPITAL | Age: 56
End: 2021-04-08
Payer: OTHER GOVERNMENT

## 2021-04-08 VITALS
DIASTOLIC BLOOD PRESSURE: 67 MMHG | HEIGHT: 71 IN | HEART RATE: 80 BPM | TEMPERATURE: 98 F | BODY MASS INDEX: 44.1 KG/M2 | RESPIRATION RATE: 19 BRPM | SYSTOLIC BLOOD PRESSURE: 151 MMHG | OXYGEN SATURATION: 98 % | WEIGHT: 315 LBS

## 2021-04-08 DIAGNOSIS — K74.60 LIVER CIRRHOSIS SECONDARY TO NASH: ICD-10-CM

## 2021-04-08 DIAGNOSIS — K75.81 LIVER CIRRHOSIS SECONDARY TO NASH: ICD-10-CM

## 2021-04-08 DIAGNOSIS — K62.5 RECTAL BLEEDING: Primary | ICD-10-CM

## 2021-04-08 PROCEDURE — 88305 TISSUE EXAM BY PATHOLOGIST: CPT | Mod: 26,,, | Performed by: STUDENT IN AN ORGANIZED HEALTH CARE EDUCATION/TRAINING PROGRAM

## 2021-04-08 PROCEDURE — 25000003 PHARM REV CODE 250: Performed by: NURSE ANESTHETIST, CERTIFIED REGISTERED

## 2021-04-08 PROCEDURE — 88305 TISSUE EXAM BY PATHOLOGIST: CPT | Performed by: STUDENT IN AN ORGANIZED HEALTH CARE EDUCATION/TRAINING PROGRAM

## 2021-04-08 PROCEDURE — 45385 COLONOSCOPY W/LESION REMOVAL: CPT | Mod: 33,,, | Performed by: INTERNAL MEDICINE

## 2021-04-08 PROCEDURE — 27201012 HC FORCEPS, HOT/COLD, DISP: Performed by: INTERNAL MEDICINE

## 2021-04-08 PROCEDURE — 88305 TISSUE EXAM BY PATHOLOGIST: ICD-10-PCS | Mod: 26,,, | Performed by: STUDENT IN AN ORGANIZED HEALTH CARE EDUCATION/TRAINING PROGRAM

## 2021-04-08 PROCEDURE — 37000008 HC ANESTHESIA 1ST 15 MINUTES: Performed by: INTERNAL MEDICINE

## 2021-04-08 PROCEDURE — 43239 PR EGD, FLEX, W/BIOPSY, SGL/MULTI: ICD-10-PCS | Mod: 51,,, | Performed by: INTERNAL MEDICINE

## 2021-04-08 PROCEDURE — D9220A PRA ANESTHESIA: Mod: 33,,, | Performed by: ANESTHESIOLOGY

## 2021-04-08 PROCEDURE — 27201089 HC SNARE, DISP (ANY): Performed by: INTERNAL MEDICINE

## 2021-04-08 PROCEDURE — 43239 EGD BIOPSY SINGLE/MULTIPLE: CPT | Mod: 51,,, | Performed by: INTERNAL MEDICINE

## 2021-04-08 PROCEDURE — 43239 EGD BIOPSY SINGLE/MULTIPLE: CPT | Performed by: INTERNAL MEDICINE

## 2021-04-08 PROCEDURE — 37000009 HC ANESTHESIA EA ADD 15 MINS: Performed by: INTERNAL MEDICINE

## 2021-04-08 PROCEDURE — 45385 COLONOSCOPY W/LESION REMOVAL: CPT | Performed by: INTERNAL MEDICINE

## 2021-04-08 PROCEDURE — 88342 IMHCHEM/IMCYTCHM 1ST ANTB: CPT | Mod: 26,,, | Performed by: STUDENT IN AN ORGANIZED HEALTH CARE EDUCATION/TRAINING PROGRAM

## 2021-04-08 PROCEDURE — 45385 PR COLONOSCOPY,REMV LESN,SNARE: ICD-10-PCS | Mod: 33,,, | Performed by: INTERNAL MEDICINE

## 2021-04-08 PROCEDURE — 88342 IMHCHEM/IMCYTCHM 1ST ANTB: CPT | Performed by: STUDENT IN AN ORGANIZED HEALTH CARE EDUCATION/TRAINING PROGRAM

## 2021-04-08 PROCEDURE — D9220A PRA ANESTHESIA: ICD-10-PCS | Mod: 33,,, | Performed by: ANESTHESIOLOGY

## 2021-04-08 PROCEDURE — 88342 CHG IMMUNOCYTOCHEMISTRY: ICD-10-PCS | Mod: 26,,, | Performed by: STUDENT IN AN ORGANIZED HEALTH CARE EDUCATION/TRAINING PROGRAM

## 2021-04-08 PROCEDURE — 63600175 PHARM REV CODE 636 W HCPCS: Performed by: NURSE ANESTHETIST, CERTIFIED REGISTERED

## 2021-04-08 PROCEDURE — 25000003 PHARM REV CODE 250: Performed by: INTERNAL MEDICINE

## 2021-04-08 RX ORDER — PROPOFOL 10 MG/ML
VIAL (ML) INTRAVENOUS
Status: DISCONTINUED | OUTPATIENT
Start: 2021-04-08 | End: 2021-04-08

## 2021-04-08 RX ORDER — KETAMINE HCL IN 0.9 % NACL 50 MG/5 ML
SYRINGE (ML) INTRAVENOUS
Status: DISCONTINUED | OUTPATIENT
Start: 2021-04-08 | End: 2021-04-08

## 2021-04-08 RX ORDER — SODIUM CHLORIDE 9 MG/ML
INJECTION, SOLUTION INTRAVENOUS CONTINUOUS
Status: DISCONTINUED | OUTPATIENT
Start: 2021-04-08 | End: 2021-04-08 | Stop reason: HOSPADM

## 2021-04-08 RX ORDER — MIDAZOLAM HYDROCHLORIDE 1 MG/ML
INJECTION INTRAMUSCULAR; INTRAVENOUS
Status: DISCONTINUED | OUTPATIENT
Start: 2021-04-08 | End: 2021-04-08

## 2021-04-08 RX ORDER — SODIUM CHLORIDE 0.9 % (FLUSH) 0.9 %
3 SYRINGE (ML) INJECTION
Status: DISCONTINUED | OUTPATIENT
Start: 2021-04-08 | End: 2021-04-08 | Stop reason: HOSPADM

## 2021-04-08 RX ORDER — PROPOFOL 10 MG/ML
VIAL (ML) INTRAVENOUS CONTINUOUS PRN
Status: DISCONTINUED | OUTPATIENT
Start: 2021-04-08 | End: 2021-04-08

## 2021-04-08 RX ORDER — LIDOCAINE HYDROCHLORIDE 20 MG/ML
INJECTION INTRAVENOUS
Status: DISCONTINUED | OUTPATIENT
Start: 2021-04-08 | End: 2021-04-08

## 2021-04-08 RX ORDER — SODIUM CHLORIDE 9 MG/ML
INJECTION, SOLUTION INTRAVENOUS CONTINUOUS PRN
Status: DISCONTINUED | OUTPATIENT
Start: 2021-04-08 | End: 2021-04-08

## 2021-04-08 RX ADMIN — MIDAZOLAM HYDROCHLORIDE 2 MG: 1 INJECTION, SOLUTION INTRAMUSCULAR; INTRAVENOUS at 01:04

## 2021-04-08 RX ADMIN — LIDOCAINE HYDROCHLORIDE 50 MG: 20 INJECTION, SOLUTION INTRAVENOUS at 01:04

## 2021-04-08 RX ADMIN — GLYCOPYRROLATE 0.2 MG: 0.2 INJECTION, SOLUTION INTRAMUSCULAR; INTRAVITREAL at 01:04

## 2021-04-08 RX ADMIN — PROPOFOL 100 MG: 10 INJECTION, EMULSION INTRAVENOUS at 01:04

## 2021-04-08 RX ADMIN — Medication 20 MG: at 01:04

## 2021-04-08 RX ADMIN — SODIUM CHLORIDE: 0.9 INJECTION, SOLUTION INTRAVENOUS at 10:04

## 2021-04-08 RX ADMIN — Medication 30 MG: at 01:04

## 2021-04-08 RX ADMIN — PROPOFOL 150 MCG/KG/MIN: 10 INJECTION, EMULSION INTRAVENOUS at 01:04

## 2021-04-08 RX ADMIN — SODIUM CHLORIDE: 0.9 INJECTION, SOLUTION INTRAVENOUS at 12:04

## 2021-04-14 ENCOUNTER — TELEPHONE (OUTPATIENT)
Dept: GASTROENTEROLOGY | Facility: CLINIC | Age: 56
End: 2021-04-14

## 2021-04-14 LAB
COMMENT: NORMAL
FINAL PATHOLOGIC DIAGNOSIS: NORMAL
GROSS: NORMAL
Lab: NORMAL
MICROSCOPIC EXAM: NORMAL

## 2021-04-16 ENCOUNTER — PATIENT MESSAGE (OUTPATIENT)
Dept: RESEARCH | Facility: HOSPITAL | Age: 56
End: 2021-04-16

## 2021-04-26 ENCOUNTER — HOSPITAL ENCOUNTER (OUTPATIENT)
Dept: RADIOLOGY | Facility: HOSPITAL | Age: 56
Discharge: HOME | End: 2021-04-26
Attending: NURSE PRACTITIONER
Payer: OTHER GOVERNMENT

## 2021-04-26 DIAGNOSIS — K74.69 OTHER CIRRHOSIS OF LIVER: ICD-10-CM

## 2021-04-26 PROCEDURE — 76705 US ABDOMEN LIMITED: ICD-10-PCS | Mod: 26,,, | Performed by: RADIOLOGY

## 2021-04-26 PROCEDURE — 76705 ECHO EXAM OF ABDOMEN: CPT | Mod: 26,,, | Performed by: RADIOLOGY

## 2021-04-26 PROCEDURE — 76705 ECHO EXAM OF ABDOMEN: CPT | Mod: TC

## 2021-04-27 ENCOUNTER — PATIENT MESSAGE (OUTPATIENT)
Dept: HEPATOLOGY | Facility: CLINIC | Age: 56
End: 2021-04-27

## 2021-04-29 ENCOUNTER — PATIENT OUTREACH (OUTPATIENT)
Dept: ADMINISTRATIVE | Facility: OTHER | Age: 56
End: 2021-04-29

## 2021-05-03 ENCOUNTER — OFFICE VISIT (OUTPATIENT)
Dept: HEPATOLOGY | Facility: CLINIC | Age: 56
End: 2021-05-03
Payer: OTHER GOVERNMENT

## 2021-05-03 DIAGNOSIS — R17 JAUNDICE: ICD-10-CM

## 2021-05-03 DIAGNOSIS — R79.89 ELEVATED LFTS: ICD-10-CM

## 2021-05-03 DIAGNOSIS — K76.0 FATTY LIVER: ICD-10-CM

## 2021-05-03 DIAGNOSIS — R17 UNSPECIFIED JAUNDICE: ICD-10-CM

## 2021-05-03 DIAGNOSIS — K21.9 GERD WITHOUT ESOPHAGITIS: ICD-10-CM

## 2021-05-03 DIAGNOSIS — K74.60 LIVER CIRRHOSIS SECONDARY TO NASH: Primary | ICD-10-CM

## 2021-05-03 DIAGNOSIS — K75.81 LIVER CIRRHOSIS SECONDARY TO NASH: Primary | ICD-10-CM

## 2021-05-03 PROCEDURE — 99213 PR OFFICE/OUTPT VISIT, EST, LEVL III, 20-29 MIN: ICD-10-PCS | Mod: 95,,, | Performed by: STUDENT IN AN ORGANIZED HEALTH CARE EDUCATION/TRAINING PROGRAM

## 2021-05-03 PROCEDURE — 99213 OFFICE O/P EST LOW 20 MIN: CPT | Mod: 95,,, | Performed by: STUDENT IN AN ORGANIZED HEALTH CARE EDUCATION/TRAINING PROGRAM

## 2021-05-07 ENCOUNTER — PATIENT MESSAGE (OUTPATIENT)
Dept: HEPATOLOGY | Facility: CLINIC | Age: 56
End: 2021-05-07

## 2021-05-07 ENCOUNTER — TELEPHONE (OUTPATIENT)
Dept: PULMONOLOGY | Facility: CLINIC | Age: 56
End: 2021-05-07

## 2021-05-07 DIAGNOSIS — D86.2 SARCOIDOSIS OF LUNG WITH SARCOIDOSIS OF LYMPH NODES: ICD-10-CM

## 2021-05-07 RX ORDER — PANTOPRAZOLE SODIUM 40 MG/1
40 TABLET, DELAYED RELEASE ORAL DAILY
Qty: 30 TABLET | Refills: 11 | Status: SHIPPED | OUTPATIENT
Start: 2021-05-07 | End: 2021-08-06 | Stop reason: SDUPTHER

## 2021-05-07 RX ORDER — PREDNISONE 10 MG/1
20 TABLET ORAL DAILY
Qty: 60 TABLET | Refills: 0 | Status: SHIPPED | OUTPATIENT
Start: 2021-05-07 | End: 2021-10-04 | Stop reason: SDUPTHER

## 2021-05-20 ENCOUNTER — PATIENT MESSAGE (OUTPATIENT)
Dept: HEPATOLOGY | Facility: CLINIC | Age: 56
End: 2021-05-20

## 2021-06-04 ENCOUNTER — LAB VISIT (OUTPATIENT)
Dept: LAB | Facility: HOSPITAL | Age: 56
End: 2021-06-04
Attending: STUDENT IN AN ORGANIZED HEALTH CARE EDUCATION/TRAINING PROGRAM
Payer: OTHER GOVERNMENT

## 2021-06-04 DIAGNOSIS — K74.60 LIVER CIRRHOSIS SECONDARY TO NASH: ICD-10-CM

## 2021-06-04 DIAGNOSIS — K75.81 LIVER CIRRHOSIS SECONDARY TO NASH: ICD-10-CM

## 2021-06-04 DIAGNOSIS — R79.89 ELEVATED LFTS: ICD-10-CM

## 2021-06-04 LAB
AFP SERPL-MCNC: 2.5 NG/ML (ref 0–8.4)
ALBUMIN SERPL BCP-MCNC: 2.4 G/DL (ref 3.5–5.2)
ALP SERPL-CCNC: 100 U/L (ref 55–135)
ALT SERPL W/O P-5'-P-CCNC: 46 U/L (ref 10–44)
ANION GAP SERPL CALC-SCNC: 6 MMOL/L (ref 8–16)
AST SERPL-CCNC: 78 U/L (ref 10–40)
BASOPHILS # BLD AUTO: 0.02 K/UL (ref 0–0.2)
BASOPHILS NFR BLD: 0.5 % (ref 0–1.9)
BILIRUB SERPL-MCNC: 3.5 MG/DL (ref 0.1–1)
BUN SERPL-MCNC: 12 MG/DL (ref 6–20)
CALCIUM SERPL-MCNC: 8.3 MG/DL (ref 8.7–10.5)
CHLORIDE SERPL-SCNC: 111 MMOL/L (ref 95–110)
CO2 SERPL-SCNC: 21 MMOL/L (ref 23–29)
CREAT SERPL-MCNC: 0.8 MG/DL (ref 0.5–1.4)
DIFFERENTIAL METHOD: ABNORMAL
EOSINOPHIL # BLD AUTO: 0.2 K/UL (ref 0–0.5)
EOSINOPHIL NFR BLD: 3.9 % (ref 0–8)
ERYTHROCYTE [DISTWIDTH] IN BLOOD BY AUTOMATED COUNT: 14.5 % (ref 11.5–14.5)
EST. GFR  (AFRICAN AMERICAN): >60 ML/MIN/1.73 M^2
EST. GFR  (NON AFRICAN AMERICAN): >60 ML/MIN/1.73 M^2
GLUCOSE SERPL-MCNC: 89 MG/DL (ref 70–110)
HCT VFR BLD AUTO: 39.4 % (ref 40–54)
HGB BLD-MCNC: 13.7 G/DL (ref 14–18)
IMM GRANULOCYTES # BLD AUTO: 0.01 K/UL (ref 0–0.04)
IMM GRANULOCYTES NFR BLD AUTO: 0.3 % (ref 0–0.5)
INR PPP: 1.2 (ref 0.8–1.2)
LYMPHOCYTES # BLD AUTO: 0.9 K/UL (ref 1–4.8)
LYMPHOCYTES NFR BLD: 22.5 % (ref 18–48)
MCH RBC QN AUTO: 35.1 PG (ref 27–31)
MCHC RBC AUTO-ENTMCNC: 34.8 G/DL (ref 32–36)
MCV RBC AUTO: 101 FL (ref 82–98)
MONOCYTES # BLD AUTO: 0.5 K/UL (ref 0.3–1)
MONOCYTES NFR BLD: 11.7 % (ref 4–15)
NEUTROPHILS # BLD AUTO: 2.4 K/UL (ref 1.8–7.7)
NEUTROPHILS NFR BLD: 61.1 % (ref 38–73)
NRBC BLD-RTO: 0 /100 WBC
PLATELET # BLD AUTO: 78 K/UL (ref 150–450)
PMV BLD AUTO: 10.5 FL (ref 9.2–12.9)
POTASSIUM SERPL-SCNC: 4 MMOL/L (ref 3.5–5.1)
PROT SERPL-MCNC: 6.4 G/DL (ref 6–8.4)
PROTHROMBIN TIME: 12.8 SEC (ref 9–12.5)
RBC # BLD AUTO: 3.9 M/UL (ref 4.6–6.2)
SODIUM SERPL-SCNC: 138 MMOL/L (ref 136–145)
WBC # BLD AUTO: 3.86 K/UL (ref 3.9–12.7)

## 2021-06-04 PROCEDURE — 82105 ALPHA-FETOPROTEIN SERUM: CPT | Performed by: STUDENT IN AN ORGANIZED HEALTH CARE EDUCATION/TRAINING PROGRAM

## 2021-06-04 PROCEDURE — 80053 COMPREHEN METABOLIC PANEL: CPT | Performed by: STUDENT IN AN ORGANIZED HEALTH CARE EDUCATION/TRAINING PROGRAM

## 2021-06-04 PROCEDURE — 36415 COLL VENOUS BLD VENIPUNCTURE: CPT | Performed by: STUDENT IN AN ORGANIZED HEALTH CARE EDUCATION/TRAINING PROGRAM

## 2021-06-04 PROCEDURE — 85025 COMPLETE CBC W/AUTO DIFF WBC: CPT | Performed by: STUDENT IN AN ORGANIZED HEALTH CARE EDUCATION/TRAINING PROGRAM

## 2021-06-04 PROCEDURE — 80321 ALCOHOLS BIOMARKERS 1OR 2: CPT | Performed by: STUDENT IN AN ORGANIZED HEALTH CARE EDUCATION/TRAINING PROGRAM

## 2021-06-04 PROCEDURE — 85610 PROTHROMBIN TIME: CPT | Performed by: STUDENT IN AN ORGANIZED HEALTH CARE EDUCATION/TRAINING PROGRAM

## 2021-06-10 LAB — PHOSPHATIDYLETHANOL (PETH): NEGATIVE NG/ML

## 2021-07-12 ENCOUNTER — TELEPHONE (OUTPATIENT)
Dept: PULMONOLOGY | Facility: CLINIC | Age: 56
End: 2021-07-12

## 2021-07-30 ENCOUNTER — LAB VISIT (OUTPATIENT)
Dept: LAB | Facility: HOSPITAL | Age: 56
End: 2021-07-30
Attending: STUDENT IN AN ORGANIZED HEALTH CARE EDUCATION/TRAINING PROGRAM
Payer: OTHER GOVERNMENT

## 2021-07-30 DIAGNOSIS — K75.81 LIVER CIRRHOSIS SECONDARY TO NASH: ICD-10-CM

## 2021-07-30 DIAGNOSIS — R79.89 ELEVATED LFTS: ICD-10-CM

## 2021-07-30 DIAGNOSIS — K74.60 LIVER CIRRHOSIS SECONDARY TO NASH: ICD-10-CM

## 2021-07-30 LAB
AFP SERPL-MCNC: 2.4 NG/ML (ref 0–8.4)
ALBUMIN SERPL BCP-MCNC: 2.3 G/DL (ref 3.5–5.2)
ALP SERPL-CCNC: 88 U/L (ref 55–135)
ALT SERPL W/O P-5'-P-CCNC: 44 U/L (ref 10–44)
ANION GAP SERPL CALC-SCNC: 6 MMOL/L (ref 8–16)
AST SERPL-CCNC: 74 U/L (ref 10–40)
BASOPHILS # BLD AUTO: 0.02 K/UL (ref 0–0.2)
BASOPHILS NFR BLD: 0.5 % (ref 0–1.9)
BILIRUB SERPL-MCNC: 4 MG/DL (ref 0.1–1)
BUN SERPL-MCNC: 16 MG/DL (ref 6–20)
CALCIUM SERPL-MCNC: 8.4 MG/DL (ref 8.7–10.5)
CHLORIDE SERPL-SCNC: 110 MMOL/L (ref 95–110)
CO2 SERPL-SCNC: 22 MMOL/L (ref 23–29)
CREAT SERPL-MCNC: 0.9 MG/DL (ref 0.5–1.4)
DIFFERENTIAL METHOD: ABNORMAL
EOSINOPHIL # BLD AUTO: 0.1 K/UL (ref 0–0.5)
EOSINOPHIL NFR BLD: 3.6 % (ref 0–8)
ERYTHROCYTE [DISTWIDTH] IN BLOOD BY AUTOMATED COUNT: 14.6 % (ref 11.5–14.5)
EST. GFR  (AFRICAN AMERICAN): >60 ML/MIN/1.73 M^2
EST. GFR  (NON AFRICAN AMERICAN): >60 ML/MIN/1.73 M^2
GLUCOSE SERPL-MCNC: 114 MG/DL (ref 70–110)
HCT VFR BLD AUTO: 39.2 % (ref 40–54)
HGB BLD-MCNC: 13.2 G/DL (ref 14–18)
IMM GRANULOCYTES # BLD AUTO: 0.01 K/UL (ref 0–0.04)
IMM GRANULOCYTES NFR BLD AUTO: 0.3 % (ref 0–0.5)
INR PPP: 1.2 (ref 0.8–1.2)
LYMPHOCYTES # BLD AUTO: 1.1 K/UL (ref 1–4.8)
LYMPHOCYTES NFR BLD: 27.9 % (ref 18–48)
MCH RBC QN AUTO: 34.6 PG (ref 27–31)
MCHC RBC AUTO-ENTMCNC: 33.7 G/DL (ref 32–36)
MCV RBC AUTO: 103 FL (ref 82–98)
MONOCYTES # BLD AUTO: 0.4 K/UL (ref 0.3–1)
MONOCYTES NFR BLD: 10.5 % (ref 4–15)
NEUTROPHILS # BLD AUTO: 2.2 K/UL (ref 1.8–7.7)
NEUTROPHILS NFR BLD: 57.2 % (ref 38–73)
NRBC BLD-RTO: 0 /100 WBC
PLATELET # BLD AUTO: 97 K/UL (ref 150–450)
PMV BLD AUTO: 10.5 FL (ref 9.2–12.9)
POTASSIUM SERPL-SCNC: 3.9 MMOL/L (ref 3.5–5.1)
PROT SERPL-MCNC: 6.8 G/DL (ref 6–8.4)
PROTHROMBIN TIME: 12.9 SEC (ref 9–12.5)
RBC # BLD AUTO: 3.81 M/UL (ref 4.6–6.2)
SODIUM SERPL-SCNC: 138 MMOL/L (ref 136–145)
WBC # BLD AUTO: 3.91 K/UL (ref 3.9–12.7)

## 2021-07-30 PROCEDURE — 36415 COLL VENOUS BLD VENIPUNCTURE: CPT | Performed by: STUDENT IN AN ORGANIZED HEALTH CARE EDUCATION/TRAINING PROGRAM

## 2021-07-30 PROCEDURE — 82105 ALPHA-FETOPROTEIN SERUM: CPT | Performed by: STUDENT IN AN ORGANIZED HEALTH CARE EDUCATION/TRAINING PROGRAM

## 2021-07-30 PROCEDURE — 85025 COMPLETE CBC W/AUTO DIFF WBC: CPT | Performed by: STUDENT IN AN ORGANIZED HEALTH CARE EDUCATION/TRAINING PROGRAM

## 2021-07-30 PROCEDURE — 80321 ALCOHOLS BIOMARKERS 1OR 2: CPT | Performed by: STUDENT IN AN ORGANIZED HEALTH CARE EDUCATION/TRAINING PROGRAM

## 2021-07-30 PROCEDURE — 80053 COMPREHEN METABOLIC PANEL: CPT | Performed by: STUDENT IN AN ORGANIZED HEALTH CARE EDUCATION/TRAINING PROGRAM

## 2021-07-30 PROCEDURE — 85610 PROTHROMBIN TIME: CPT | Performed by: STUDENT IN AN ORGANIZED HEALTH CARE EDUCATION/TRAINING PROGRAM

## 2021-08-02 ENCOUNTER — TELEPHONE (OUTPATIENT)
Dept: PULMONOLOGY | Facility: CLINIC | Age: 56
End: 2021-08-02

## 2021-08-02 DIAGNOSIS — Z01.818 PRE-OP TESTING: ICD-10-CM

## 2021-08-04 ENCOUNTER — LAB VISIT (OUTPATIENT)
Dept: SPORTS MEDICINE | Facility: CLINIC | Age: 56
End: 2021-08-04
Payer: OTHER GOVERNMENT

## 2021-08-04 DIAGNOSIS — Z13.9 SCREENING PROCEDURE: ICD-10-CM

## 2021-08-04 PROCEDURE — U0003 INFECTIOUS AGENT DETECTION BY NUCLEIC ACID (DNA OR RNA); SEVERE ACUTE RESPIRATORY SYNDROME CORONAVIRUS 2 (SARS-COV-2) (CORONAVIRUS DISEASE [COVID-19]), AMPLIFIED PROBE TECHNIQUE, MAKING USE OF HIGH THROUGHPUT TECHNOLOGIES AS DESCRIBED BY CMS-2020-01-R: HCPCS | Performed by: INTERNAL MEDICINE

## 2021-08-04 PROCEDURE — U0005 INFEC AGEN DETEC AMPLI PROBE: HCPCS | Performed by: INTERNAL MEDICINE

## 2021-08-05 ENCOUNTER — PATIENT OUTREACH (OUTPATIENT)
Dept: ADMINISTRATIVE | Facility: OTHER | Age: 56
End: 2021-08-05

## 2021-08-05 LAB
SARS-COV-2 RNA RESP QL NAA+PROBE: NOT DETECTED
SARS-COV-2- CYCLE NUMBER: -1

## 2021-08-06 ENCOUNTER — HOSPITAL ENCOUNTER (OUTPATIENT)
Dept: RADIOLOGY | Facility: HOSPITAL | Age: 56
Discharge: HOME | End: 2021-08-06
Attending: INTERNAL MEDICINE
Payer: OTHER GOVERNMENT

## 2021-08-06 ENCOUNTER — HOSPITAL ENCOUNTER (OUTPATIENT)
Dept: PULMONOLOGY | Facility: CLINIC | Age: 56
Discharge: HOME | End: 2021-08-06
Payer: OTHER GOVERNMENT

## 2021-08-06 ENCOUNTER — OFFICE VISIT (OUTPATIENT)
Dept: PULMONOLOGY | Facility: CLINIC | Age: 56
End: 2021-08-06
Payer: OTHER GOVERNMENT

## 2021-08-06 VITALS
OXYGEN SATURATION: 96 % | WEIGHT: 315 LBS | BODY MASS INDEX: 44.1 KG/M2 | HEART RATE: 69 BPM | HEIGHT: 71 IN | SYSTOLIC BLOOD PRESSURE: 122 MMHG | DIASTOLIC BLOOD PRESSURE: 66 MMHG

## 2021-08-06 VITALS — WEIGHT: 315 LBS | HEIGHT: 71 IN | BODY MASS INDEX: 44.1 KG/M2

## 2021-08-06 DIAGNOSIS — D86.9 SARCOIDOSIS, UNSPECIFIED: ICD-10-CM

## 2021-08-06 DIAGNOSIS — D86.2 SARCOIDOSIS OF LUNG WITH SARCOIDOSIS OF LYMPH NODES: ICD-10-CM

## 2021-08-06 DIAGNOSIS — R09.02 HYPOXIA: ICD-10-CM

## 2021-08-06 DIAGNOSIS — D86.0 SARCOIDOSIS OF LUNG: ICD-10-CM

## 2021-08-06 DIAGNOSIS — R09.02 HYPOXIA: Primary | ICD-10-CM

## 2021-08-06 DIAGNOSIS — K21.9 GERD WITHOUT ESOPHAGITIS: ICD-10-CM

## 2021-08-06 DIAGNOSIS — R05.9 COUGH: ICD-10-CM

## 2021-08-06 PROCEDURE — 99214 PR OFFICE/OUTPT VISIT, EST, LEVL IV, 30-39 MIN: ICD-10-PCS | Mod: S$GLB,,, | Performed by: INTERNAL MEDICINE

## 2021-08-06 PROCEDURE — 94010 BREATHING CAPACITY TEST: CPT | Mod: 59,S$GLB,, | Performed by: INTERNAL MEDICINE

## 2021-08-06 PROCEDURE — 94729 PR C02/MEMBANE DIFFUSE CAPACITY: ICD-10-PCS | Mod: S$GLB,,, | Performed by: INTERNAL MEDICINE

## 2021-08-06 PROCEDURE — 94618 PULMONARY STRESS TESTING: ICD-10-PCS | Mod: S$GLB,,, | Performed by: INTERNAL MEDICINE

## 2021-08-06 PROCEDURE — 94010 BREATHING CAPACITY TEST: ICD-10-PCS | Mod: 59,S$GLB,, | Performed by: INTERNAL MEDICINE

## 2021-08-06 PROCEDURE — 99214 OFFICE O/P EST MOD 30 MIN: CPT | Mod: S$GLB,,, | Performed by: INTERNAL MEDICINE

## 2021-08-06 PROCEDURE — 71250 CT THORAX DX C-: CPT | Mod: 26,,, | Performed by: STUDENT IN AN ORGANIZED HEALTH CARE EDUCATION/TRAINING PROGRAM

## 2021-08-06 PROCEDURE — 94727 PR PULM FUNCTION TEST BY GAS: ICD-10-PCS | Mod: S$GLB,,, | Performed by: INTERNAL MEDICINE

## 2021-08-06 PROCEDURE — 94727 GAS DIL/WSHOT DETER LNG VOL: CPT | Mod: S$GLB,,, | Performed by: INTERNAL MEDICINE

## 2021-08-06 PROCEDURE — 94729 DIFFUSING CAPACITY: CPT | Mod: S$GLB,,, | Performed by: INTERNAL MEDICINE

## 2021-08-06 PROCEDURE — 71250 CT THORAX DX C-: CPT | Mod: TC

## 2021-08-06 PROCEDURE — 71250 CT CHEST WITHOUT CONTRAST: ICD-10-PCS | Mod: 26,,, | Performed by: STUDENT IN AN ORGANIZED HEALTH CARE EDUCATION/TRAINING PROGRAM

## 2021-08-06 PROCEDURE — 99999 PR PBB SHADOW E&M-EST. PATIENT-LVL III: ICD-10-PCS | Mod: PBBFAC,,, | Performed by: INTERNAL MEDICINE

## 2021-08-06 PROCEDURE — 99999 PR PBB SHADOW E&M-EST. PATIENT-LVL III: CPT | Mod: PBBFAC,,, | Performed by: INTERNAL MEDICINE

## 2021-08-06 PROCEDURE — 94618 PULMONARY STRESS TESTING: CPT | Mod: S$GLB,,, | Performed by: INTERNAL MEDICINE

## 2021-08-06 RX ORDER — PANTOPRAZOLE SODIUM 40 MG/1
40 TABLET, DELAYED RELEASE ORAL DAILY
Qty: 30 TABLET | Refills: 5 | Status: SHIPPED | OUTPATIENT
Start: 2021-08-06 | End: 2022-07-25

## 2021-08-06 RX ORDER — FLUTICASONE FUROATE AND VILANTEROL TRIFENATATE 200; 25 UG/1; UG/1
1 POWDER RESPIRATORY (INHALATION) DAILY
Qty: 1 EACH | Refills: 5 | Status: ON HOLD | OUTPATIENT
Start: 2021-08-06 | End: 2022-03-31 | Stop reason: HOSPADM

## 2021-08-10 LAB — PHOSPHATIDYLETHANOL (PETH): NEGATIVE NG/ML

## 2021-08-16 ENCOUNTER — HOSPITAL ENCOUNTER (OUTPATIENT)
Dept: CARDIOLOGY | Facility: HOSPITAL | Age: 56
Discharge: HOME | End: 2021-08-16
Attending: INTERNAL MEDICINE
Payer: OTHER GOVERNMENT

## 2021-08-16 VITALS
SYSTOLIC BLOOD PRESSURE: 148 MMHG | HEART RATE: 74 BPM | DIASTOLIC BLOOD PRESSURE: 70 MMHG | WEIGHT: 315 LBS | BODY MASS INDEX: 44.1 KG/M2 | HEIGHT: 71 IN

## 2021-08-16 DIAGNOSIS — R09.02 HYPOXIA: ICD-10-CM

## 2021-08-16 LAB
ASCENDING AORTA: 3.08 CM
AV INDEX (PROSTH): 0.65
AV PEAK GRADIENT: 24 MMHG
AV VALVE AREA: 3.26 CM2
AV VELOCITY RATIO: 0.71
BSA FOR ECHO PROCEDURE: 2.86 M2
CV ECHO LV RWT: 0.39 CM
DOP CALC AO PEAK VEL: 2.45 M/S
DOP CALC AO VTI: 53.53 CM
DOP CALC LVOT AREA: 5 CM2
DOP CALC LVOT DIAMETER: 2.53 CM
DOP CALC LVOT PEAK VEL: 1.73 M/S
DOP CALC LVOT STROKE VOLUME: 174.66 CM3
DOP CALCLVOT PEAK VEL VTI: 34.76 CM
E WAVE DECELERATION TIME: 290.05 MSEC
E/A RATIO: 0.99
E/E' RATIO: 8.8 M/S
ECHO LV POSTERIOR WALL: 1.1 CM (ref 0.6–1.1)
EJECTION FRACTION: 65 %
FRACTIONAL SHORTENING: 28 % (ref 28–44)
INTERVENTRICULAR SEPTUM: 1.01 CM (ref 0.6–1.1)
LA MAJOR: 6.23 CM
LA MINOR: 6.19 CM
LA WIDTH: 4.5 CM
LEFT ATRIUM SIZE: 4.79 CM
LEFT ATRIUM VOLUME INDEX MOD: 43.2 ML/M2
LEFT ATRIUM VOLUME INDEX: 42 ML/M2
LEFT ATRIUM VOLUME MOD: 117 CM3
LEFT ATRIUM VOLUME: 113.78 CM3
LEFT INTERNAL DIMENSION IN SYSTOLE: 4.03 CM (ref 2.1–4)
LEFT VENTRICLE DIASTOLIC VOLUME INDEX: 56.93 ML/M2
LEFT VENTRICLE DIASTOLIC VOLUME: 154.29 ML
LEFT VENTRICLE MASS INDEX: 87 G/M2
LEFT VENTRICLE SYSTOLIC VOLUME INDEX: 26.3 ML/M2
LEFT VENTRICLE SYSTOLIC VOLUME: 71.24 ML
LEFT VENTRICULAR INTERNAL DIMENSION IN DIASTOLE: 5.61 CM (ref 3.5–6)
LEFT VENTRICULAR MASS: 236.51 G
LV LATERAL E/E' RATIO: 7.86 M/S
LV SEPTAL E/E' RATIO: 10 M/S
MV A" WAVE DURATION": 11.7 MSEC
MV PEAK A VEL: 1.11 M/S
MV PEAK E VEL: 1.1 M/S
MV STENOSIS PRESSURE HALF TIME: 84.11 MS
MV VALVE AREA P 1/2 METHOD: 2.62 CM2
PISA TR MAX VEL: 3.03 M/S
PULM VEIN S/D RATIO: 0.89
PV PEAK D VEL: 0.72 M/S
PV PEAK S VEL: 0.64 M/S
RA MAJOR: 6.27 CM
RA PRESSURE: 15 MMHG
RA WIDTH: 3.81 CM
RIGHT ATRIAL AREA: 20.5 CM2
RIGHT VENTRICULAR END-DIASTOLIC DIMENSION: 4.94 CM
RV TISSUE DOPPLER FREE WALL SYSTOLIC VELOCITY 1 (APICAL 4 CHAMBER VIEW): 20.5 CM/S
SINUS: 3.04 CM
STJ: 2.68 CM
TDI LATERAL: 0.14 M/S
TDI SEPTAL: 0.11 M/S
TDI: 0.13 M/S
TR MAX PG: 37 MMHG
TRICUSPID ANNULAR PLANE SYSTOLIC EXCURSION: 3.57 CM
TV REST PULMONARY ARTERY PRESSURE: 52 MMHG

## 2021-08-16 PROCEDURE — 93306 TTE W/DOPPLER COMPLETE: CPT

## 2021-08-16 PROCEDURE — 93306 TTE W/DOPPLER COMPLETE: CPT | Mod: 26,,, | Performed by: INTERNAL MEDICINE

## 2021-08-16 PROCEDURE — 93306 ECHO (CUPID ONLY): ICD-10-PCS | Mod: 26,,, | Performed by: INTERNAL MEDICINE

## 2021-09-17 ENCOUNTER — OFFICE VISIT (OUTPATIENT)
Dept: INTERNAL MEDICINE | Facility: CLINIC | Age: 56
End: 2021-09-17
Payer: OTHER GOVERNMENT

## 2021-09-17 VITALS
BODY MASS INDEX: 44.1 KG/M2 | HEIGHT: 71 IN | OXYGEN SATURATION: 97 % | WEIGHT: 315 LBS | SYSTOLIC BLOOD PRESSURE: 128 MMHG | HEART RATE: 77 BPM | DIASTOLIC BLOOD PRESSURE: 60 MMHG

## 2021-09-17 DIAGNOSIS — K76.6 PORTAL HYPERTENSION: ICD-10-CM

## 2021-09-17 DIAGNOSIS — R60.0 LEG EDEMA: Primary | ICD-10-CM

## 2021-09-17 DIAGNOSIS — D86.2 SARCOIDOSIS OF LUNG WITH SARCOIDOSIS OF LYMPH NODES: ICD-10-CM

## 2021-09-17 DIAGNOSIS — Z23 NEED FOR VACCINATION: ICD-10-CM

## 2021-09-17 DIAGNOSIS — R60.0 LEG EDEMA: ICD-10-CM

## 2021-09-17 DIAGNOSIS — K74.60 LIVER CIRRHOSIS SECONDARY TO NASH: ICD-10-CM

## 2021-09-17 DIAGNOSIS — R16.1 SPLENOMEGALY: ICD-10-CM

## 2021-09-17 DIAGNOSIS — K75.81 LIVER CIRRHOSIS SECONDARY TO NASH: ICD-10-CM

## 2021-09-17 DIAGNOSIS — D69.6 THROMBOCYTOPENIA: ICD-10-CM

## 2021-09-17 PROCEDURE — 90686 IIV4 VACC NO PRSV 0.5 ML IM: CPT | Mod: S$GLB,,, | Performed by: INTERNAL MEDICINE

## 2021-09-17 PROCEDURE — 90732 PNEUMOCOCCAL POLYSACCHARIDE VACCINE 23-VALENT =>2YO SQ IM: ICD-10-PCS | Mod: S$GLB,,, | Performed by: INTERNAL MEDICINE

## 2021-09-17 PROCEDURE — 90732 PPSV23 VACC 2 YRS+ SUBQ/IM: CPT | Mod: S$GLB,,, | Performed by: INTERNAL MEDICINE

## 2021-09-17 PROCEDURE — 90471 IMMUNIZATION ADMIN: CPT | Mod: S$GLB,,, | Performed by: INTERNAL MEDICINE

## 2021-09-17 PROCEDURE — 99999 PR PBB SHADOW E&M-EST. PATIENT-LVL IV: CPT | Mod: PBBFAC,,, | Performed by: INTERNAL MEDICINE

## 2021-09-17 PROCEDURE — 99214 OFFICE O/P EST MOD 30 MIN: CPT | Mod: 25,S$GLB,, | Performed by: INTERNAL MEDICINE

## 2021-09-17 PROCEDURE — 99999 PR PBB SHADOW E&M-EST. PATIENT-LVL IV: ICD-10-PCS | Mod: PBBFAC,,, | Performed by: INTERNAL MEDICINE

## 2021-09-17 PROCEDURE — 90471 PNEUMOCOCCAL POLYSACCHARIDE VACCINE 23-VALENT =>2YO SQ IM: ICD-10-PCS | Mod: S$GLB,,, | Performed by: INTERNAL MEDICINE

## 2021-09-17 PROCEDURE — 90472 IMMUNIZATION ADMIN EACH ADD: CPT | Mod: S$GLB,,, | Performed by: INTERNAL MEDICINE

## 2021-09-17 PROCEDURE — 90686 FLU VACCINE (QUAD) GREATER THAN OR EQUAL TO 3YO PRESERVATIVE FREE IM: ICD-10-PCS | Mod: S$GLB,,, | Performed by: INTERNAL MEDICINE

## 2021-09-17 PROCEDURE — 99214 PR OFFICE/OUTPT VISIT, EST, LEVL IV, 30-39 MIN: ICD-10-PCS | Mod: 25,S$GLB,, | Performed by: INTERNAL MEDICINE

## 2021-09-17 PROCEDURE — 90472 FLU VACCINE (QUAD) GREATER THAN OR EQUAL TO 3YO PRESERVATIVE FREE IM: ICD-10-PCS | Mod: S$GLB,,, | Performed by: INTERNAL MEDICINE

## 2021-09-17 RX ORDER — FUROSEMIDE 40 MG/1
40 TABLET ORAL 2 TIMES DAILY
Qty: 60 TABLET | Refills: 1 | Status: SHIPPED | OUTPATIENT
Start: 2021-09-17 | End: 2021-09-20

## 2021-09-20 ENCOUNTER — TELEPHONE (OUTPATIENT)
Dept: HEPATOLOGY | Facility: CLINIC | Age: 56
End: 2021-09-20

## 2021-09-20 RX ORDER — FUROSEMIDE 40 MG/1
TABLET ORAL
Qty: 180 TABLET | Refills: 0 | Status: SHIPPED | OUTPATIENT
Start: 2021-09-20 | End: 2021-10-21

## 2021-09-27 ENCOUNTER — PATIENT MESSAGE (OUTPATIENT)
Dept: PULMONOLOGY | Facility: CLINIC | Age: 56
End: 2021-09-27

## 2021-09-27 ENCOUNTER — PATIENT MESSAGE (OUTPATIENT)
Dept: HEPATOLOGY | Facility: CLINIC | Age: 56
End: 2021-09-27

## 2021-10-01 ENCOUNTER — HOSPITAL ENCOUNTER (OUTPATIENT)
Dept: WOUND CARE | Facility: HOSPITAL | Age: 56
Discharge: HOME | End: 2021-10-01
Attending: INTERNAL MEDICINE
Payer: OTHER GOVERNMENT

## 2021-10-01 VITALS
HEART RATE: 72 BPM | HEIGHT: 71 IN | WEIGHT: 315 LBS | SYSTOLIC BLOOD PRESSURE: 126 MMHG | TEMPERATURE: 98 F | BODY MASS INDEX: 44.1 KG/M2 | RESPIRATION RATE: 20 BRPM | DIASTOLIC BLOOD PRESSURE: 60 MMHG

## 2021-10-01 DIAGNOSIS — R60.0 LEG EDEMA: Primary | ICD-10-CM

## 2021-10-01 DIAGNOSIS — R60.0 BILATERAL LOWER EXTREMITY EDEMA: ICD-10-CM

## 2021-10-01 DIAGNOSIS — D86.2 SARCOIDOSIS OF LUNG WITH SARCOIDOSIS OF LYMPH NODES: ICD-10-CM

## 2021-10-01 PROCEDURE — 99202 OFFICE O/P NEW SF 15 MIN: CPT

## 2021-10-01 RX ORDER — MUPIROCIN 20 MG/G
OINTMENT TOPICAL 3 TIMES DAILY
Qty: 22 G | Refills: 3 | Status: SHIPPED | OUTPATIENT
Start: 2021-10-01 | End: 2022-08-04

## 2021-10-04 DIAGNOSIS — D86.2 SARCOIDOSIS OF LUNG WITH SARCOIDOSIS OF LYMPH NODES: ICD-10-CM

## 2021-10-04 RX ORDER — PREDNISONE 10 MG/1
20 TABLET ORAL DAILY
Qty: 60 TABLET | Refills: 0 | Status: ON HOLD | OUTPATIENT
Start: 2021-10-04 | End: 2022-01-07 | Stop reason: SDUPTHER

## 2021-10-13 ENCOUNTER — HOSPITAL ENCOUNTER (OUTPATIENT)
Dept: RADIOLOGY | Facility: HOSPITAL | Age: 56
Discharge: HOME | End: 2021-10-13
Attending: PREVENTIVE MEDICINE
Payer: OTHER GOVERNMENT

## 2021-10-13 ENCOUNTER — HOSPITAL ENCOUNTER (OUTPATIENT)
Dept: WOUND CARE | Facility: HOSPITAL | Age: 56
Discharge: HOME | End: 2021-10-13
Attending: INTERNAL MEDICINE
Payer: OTHER GOVERNMENT

## 2021-10-13 DIAGNOSIS — R60.0 LEG EDEMA: ICD-10-CM

## 2021-10-13 DIAGNOSIS — R60.0 BILATERAL LOWER EXTREMITY EDEMA: ICD-10-CM

## 2021-10-13 DIAGNOSIS — R60.0 LEG EDEMA: Primary | ICD-10-CM

## 2021-10-13 PROCEDURE — 93925 LOWER EXTREMITY STUDY: CPT | Mod: 26,,, | Performed by: RADIOLOGY

## 2021-10-13 PROCEDURE — 99212 OFFICE O/P EST SF 10 MIN: CPT | Mod: 25

## 2021-10-13 PROCEDURE — 93970 EXTREMITY STUDY: CPT | Mod: TC

## 2021-10-13 PROCEDURE — 93970 EXTREMITY STUDY: CPT | Mod: 26,,, | Performed by: RADIOLOGY

## 2021-10-13 PROCEDURE — 93925 LOWER EXTREMITY STUDY: CPT | Mod: TC

## 2021-10-13 PROCEDURE — 93970 US LOWER EXTREMITY VEINS BILATERAL: ICD-10-PCS | Mod: 26,,, | Performed by: RADIOLOGY

## 2021-10-13 PROCEDURE — 93925 US LOWER EXTREMITY ARTERIES BILATERAL: ICD-10-PCS | Mod: 26,,, | Performed by: RADIOLOGY

## 2021-10-15 ENCOUNTER — HOSPITAL ENCOUNTER (OUTPATIENT)
Dept: WOUND CARE | Facility: HOSPITAL | Age: 56
Discharge: HOME | End: 2021-10-15
Attending: PREVENTIVE MEDICINE
Payer: OTHER GOVERNMENT

## 2021-10-15 VITALS
WEIGHT: 315 LBS | HEIGHT: 71 IN | SYSTOLIC BLOOD PRESSURE: 128 MMHG | DIASTOLIC BLOOD PRESSURE: 66 MMHG | HEART RATE: 75 BPM | TEMPERATURE: 98 F | BODY MASS INDEX: 44.1 KG/M2

## 2021-10-15 DIAGNOSIS — R60.0 LEG EDEMA: ICD-10-CM

## 2021-10-15 DIAGNOSIS — R60.0 BILATERAL LOWER EXTREMITY EDEMA: Primary | ICD-10-CM

## 2021-10-15 PROCEDURE — 99212 OFFICE O/P EST SF 10 MIN: CPT

## 2021-10-21 DIAGNOSIS — R60.0 LEG EDEMA: ICD-10-CM

## 2021-10-26 RX ORDER — FUROSEMIDE 40 MG/1
40 TABLET ORAL 2 TIMES DAILY
Qty: 180 TABLET | Refills: 3 | Status: ON HOLD | OUTPATIENT
Start: 2021-10-26 | End: 2022-01-07 | Stop reason: SDUPTHER

## 2021-10-29 ENCOUNTER — HOSPITAL ENCOUNTER (OUTPATIENT)
Dept: WOUND CARE | Facility: HOSPITAL | Age: 56
Discharge: HOME | End: 2021-10-29
Attending: PREVENTIVE MEDICINE
Payer: OTHER GOVERNMENT

## 2021-10-29 VITALS
WEIGHT: 315 LBS | BODY MASS INDEX: 44.1 KG/M2 | HEIGHT: 71 IN | HEART RATE: 81 BPM | DIASTOLIC BLOOD PRESSURE: 85 MMHG | TEMPERATURE: 98 F | SYSTOLIC BLOOD PRESSURE: 132 MMHG

## 2021-10-29 DIAGNOSIS — I89.0 LYMPHEDEMA: Primary | ICD-10-CM

## 2021-10-29 DIAGNOSIS — R60.0 LEG EDEMA: ICD-10-CM

## 2021-10-29 DIAGNOSIS — R60.0 BILATERAL LOWER EXTREMITY EDEMA: ICD-10-CM

## 2021-10-29 PROCEDURE — 99212 OFFICE O/P EST SF 10 MIN: CPT

## 2021-11-08 ENCOUNTER — CLINICAL SUPPORT (OUTPATIENT)
Dept: REHABILITATION | Facility: HOSPITAL | Age: 56
End: 2021-11-08
Payer: OTHER GOVERNMENT

## 2021-11-08 DIAGNOSIS — I89.0 ACQUIRED LYMPHEDEMA OF LOWER EXTREMITY: ICD-10-CM

## 2021-11-08 DIAGNOSIS — R60.0 LEG EDEMA: ICD-10-CM

## 2021-11-08 DIAGNOSIS — M79.89 SWELLING OF LOWER EXTREMITY: ICD-10-CM

## 2021-11-08 PROCEDURE — 97535 SELF CARE MNGMENT TRAINING: CPT

## 2021-11-08 PROCEDURE — 97140 MANUAL THERAPY 1/> REGIONS: CPT

## 2021-11-08 PROCEDURE — 97162 PT EVAL MOD COMPLEX 30 MIN: CPT

## 2021-11-29 ENCOUNTER — OFFICE VISIT (OUTPATIENT)
Dept: ORTHOPEDICS | Facility: CLINIC | Age: 56
End: 2021-11-29
Payer: OTHER GOVERNMENT

## 2021-11-29 ENCOUNTER — HOSPITAL ENCOUNTER (OUTPATIENT)
Dept: RADIOLOGY | Facility: HOSPITAL | Age: 56
Discharge: HOME | End: 2021-11-29
Attending: PHYSICIAN ASSISTANT
Payer: OTHER GOVERNMENT

## 2021-11-29 VITALS
WEIGHT: 315 LBS | HEIGHT: 71 IN | SYSTOLIC BLOOD PRESSURE: 178 MMHG | BODY MASS INDEX: 44.1 KG/M2 | DIASTOLIC BLOOD PRESSURE: 79 MMHG | HEART RATE: 80 BPM

## 2021-11-29 DIAGNOSIS — M25.561 PAIN IN BOTH KNEES, UNSPECIFIED CHRONICITY: Primary | ICD-10-CM

## 2021-11-29 DIAGNOSIS — M17.31 POST-TRAUMATIC OSTEOARTHRITIS OF RIGHT KNEE: Primary | ICD-10-CM

## 2021-11-29 DIAGNOSIS — M25.562 PAIN IN BOTH KNEES, UNSPECIFIED CHRONICITY: ICD-10-CM

## 2021-11-29 DIAGNOSIS — M25.562 PAIN IN BOTH KNEES, UNSPECIFIED CHRONICITY: Primary | ICD-10-CM

## 2021-11-29 DIAGNOSIS — M25.561 PAIN IN BOTH KNEES, UNSPECIFIED CHRONICITY: ICD-10-CM

## 2021-11-29 PROCEDURE — 73562 X-RAY EXAM OF KNEE 3: CPT | Mod: TC,50,FY

## 2021-11-29 PROCEDURE — 99999 PR PBB SHADOW E&M-EST. PATIENT-LVL III: CPT | Mod: PBBFAC,,, | Performed by: PHYSICIAN ASSISTANT

## 2021-11-29 PROCEDURE — 20610 LARGE JOINT ASPIRATION/INJECTION: R KNEE: ICD-10-PCS | Mod: RT,S$GLB,, | Performed by: PHYSICIAN ASSISTANT

## 2021-11-29 PROCEDURE — 99203 PR OFFICE/OUTPT VISIT, NEW, LEVL III, 30-44 MIN: ICD-10-PCS | Mod: 25,S$GLB,, | Performed by: PHYSICIAN ASSISTANT

## 2021-11-29 PROCEDURE — 99999 PR PBB SHADOW E&M-EST. PATIENT-LVL III: ICD-10-PCS | Mod: PBBFAC,,, | Performed by: PHYSICIAN ASSISTANT

## 2021-11-29 PROCEDURE — 73562 X-RAY EXAM OF KNEE 3: CPT | Mod: 26,50,, | Performed by: RADIOLOGY

## 2021-11-29 PROCEDURE — 73562 XR KNEE ORTHO BILAT: ICD-10-PCS | Mod: 26,50,, | Performed by: RADIOLOGY

## 2021-11-29 PROCEDURE — 20610 DRAIN/INJ JOINT/BURSA W/O US: CPT | Mod: RT,S$GLB,, | Performed by: PHYSICIAN ASSISTANT

## 2021-11-29 PROCEDURE — 99203 OFFICE O/P NEW LOW 30 MIN: CPT | Mod: 25,S$GLB,, | Performed by: PHYSICIAN ASSISTANT

## 2021-11-29 RX ORDER — KETOROLAC TROMETHAMINE 30 MG/ML
30 INJECTION, SOLUTION INTRAMUSCULAR; INTRAVENOUS
Status: DISCONTINUED | OUTPATIENT
Start: 2021-11-29 | End: 2021-11-29 | Stop reason: HOSPADM

## 2021-11-29 RX ADMIN — KETOROLAC TROMETHAMINE 30 MG: 30 INJECTION, SOLUTION INTRAMUSCULAR; INTRAVENOUS at 02:11

## 2022-01-05 ENCOUNTER — CLINICAL SUPPORT (OUTPATIENT)
Dept: REHABILITATION | Facility: HOSPITAL | Age: 57
End: 2022-01-05
Payer: OTHER GOVERNMENT

## 2022-01-05 ENCOUNTER — HOSPITAL ENCOUNTER (OUTPATIENT)
Facility: HOSPITAL | Age: 57
Discharge: HOME | End: 2022-01-07
Attending: EMERGENCY MEDICINE | Admitting: STUDENT IN AN ORGANIZED HEALTH CARE EDUCATION/TRAINING PROGRAM
Payer: OTHER GOVERNMENT

## 2022-01-05 DIAGNOSIS — R07.9 CHEST PAIN: ICD-10-CM

## 2022-01-05 DIAGNOSIS — R60.0 BILATERAL LOWER EXTREMITY EDEMA: ICD-10-CM

## 2022-01-05 DIAGNOSIS — R50.9 FEVER, UNSPECIFIED FEVER CAUSE: Primary | ICD-10-CM

## 2022-01-05 DIAGNOSIS — M79.89 SWELLING OF LOWER EXTREMITY: ICD-10-CM

## 2022-01-05 DIAGNOSIS — K74.69 OTHER CIRRHOSIS OF LIVER: ICD-10-CM

## 2022-01-05 DIAGNOSIS — D86.2 SARCOIDOSIS OF LUNG WITH SARCOIDOSIS OF LYMPH NODES: ICD-10-CM

## 2022-01-05 DIAGNOSIS — I89.0 ACQUIRED LYMPHEDEMA OF LOWER EXTREMITY: Primary | ICD-10-CM

## 2022-01-05 DIAGNOSIS — R00.0 TACHYCARDIA: ICD-10-CM

## 2022-01-05 DIAGNOSIS — R06.02 SHORTNESS OF BREATH: ICD-10-CM

## 2022-01-05 DIAGNOSIS — I50.33 ACUTE ON CHRONIC DIASTOLIC HEART FAILURE: ICD-10-CM

## 2022-01-05 DIAGNOSIS — E66.01 MORBID OBESITY WITH BMI OF 45.0-49.9, ADULT: ICD-10-CM

## 2022-01-05 DIAGNOSIS — R60.0 LEG EDEMA: ICD-10-CM

## 2022-01-05 DIAGNOSIS — F41.1 GENERALIZED ANXIETY DISORDER: ICD-10-CM

## 2022-01-05 LAB
ALBUMIN SERPL BCP-MCNC: 2.4 G/DL (ref 3.5–5.2)
ALP SERPL-CCNC: 95 U/L (ref 55–135)
ALT SERPL W/O P-5'-P-CCNC: 41 U/L (ref 10–44)
ANION GAP SERPL CALC-SCNC: 8 MMOL/L (ref 8–16)
AST SERPL-CCNC: 91 U/L (ref 10–40)
BASOPHILS # BLD AUTO: 0.01 K/UL (ref 0–0.2)
BASOPHILS NFR BLD: 0.2 % (ref 0–1.9)
BILIRUB SERPL-MCNC: 5.3 MG/DL (ref 0.1–1)
BUN SERPL-MCNC: 23 MG/DL (ref 6–20)
CALCIUM SERPL-MCNC: 8.5 MG/DL (ref 8.7–10.5)
CHLORIDE SERPL-SCNC: 111 MMOL/L (ref 95–110)
CO2 SERPL-SCNC: 18 MMOL/L (ref 23–29)
CREAT SERPL-MCNC: 0.8 MG/DL (ref 0.5–1.4)
CTP QC/QA: YES
CTP QC/QA: YES
DIFFERENTIAL METHOD: ABNORMAL
EOSINOPHIL # BLD AUTO: 0 K/UL (ref 0–0.5)
EOSINOPHIL NFR BLD: 0.2 % (ref 0–8)
ERYTHROCYTE [DISTWIDTH] IN BLOOD BY AUTOMATED COUNT: 15.7 % (ref 11.5–14.5)
EST. GFR  (AFRICAN AMERICAN): >60 ML/MIN/1.73 M^2
EST. GFR  (NON AFRICAN AMERICAN): >60 ML/MIN/1.73 M^2
GLUCOSE SERPL-MCNC: 99 MG/DL (ref 70–110)
HCT VFR BLD AUTO: 41 % (ref 40–54)
HGB BLD-MCNC: 14 G/DL (ref 14–18)
IMM GRANULOCYTES # BLD AUTO: 0.02 K/UL (ref 0–0.04)
IMM GRANULOCYTES NFR BLD AUTO: 0.4 % (ref 0–0.5)
LACTATE SERPL-SCNC: 1.4 MMOL/L (ref 0.5–2.2)
LYMPHOCYTES # BLD AUTO: 0.2 K/UL (ref 1–4.8)
LYMPHOCYTES NFR BLD: 3.2 % (ref 18–48)
MCH RBC QN AUTO: 35.2 PG (ref 27–31)
MCHC RBC AUTO-ENTMCNC: 34.1 G/DL (ref 32–36)
MCV RBC AUTO: 103 FL (ref 82–98)
MONOCYTES # BLD AUTO: 0.3 K/UL (ref 0.3–1)
MONOCYTES NFR BLD: 5.9 % (ref 4–15)
NEUTROPHILS # BLD AUTO: 4.6 K/UL (ref 1.8–7.7)
NEUTROPHILS NFR BLD: 90.1 % (ref 38–73)
NRBC BLD-RTO: 0 /100 WBC
PLATELET # BLD AUTO: 70 K/UL (ref 150–450)
PMV BLD AUTO: 11.7 FL (ref 9.2–12.9)
POC MOLECULAR INFLUENZA A AGN: NEGATIVE
POC MOLECULAR INFLUENZA B AGN: NEGATIVE
POTASSIUM SERPL-SCNC: 4.7 MMOL/L (ref 3.5–5.1)
PROCALCITONIN SERPL IA-MCNC: 5.79 NG/ML
PROT SERPL-MCNC: 7.1 G/DL (ref 6–8.4)
RBC # BLD AUTO: 3.98 M/UL (ref 4.6–6.2)
SARS-COV-2 RDRP RESP QL NAA+PROBE: NEGATIVE
SODIUM SERPL-SCNC: 137 MMOL/L (ref 136–145)
WBC # BLD AUTO: 5.06 K/UL (ref 3.9–12.7)

## 2022-01-05 PROCEDURE — 99285 PR EMERGENCY DEPT VISIT,LEVEL V: ICD-10-PCS | Mod: CS,,, | Performed by: EMERGENCY MEDICINE

## 2022-01-05 PROCEDURE — 80053 COMPREHEN METABOLIC PANEL: CPT | Performed by: EMERGENCY MEDICINE

## 2022-01-05 PROCEDURE — 84145 PROCALCITONIN (PCT): CPT | Performed by: EMERGENCY MEDICINE

## 2022-01-05 PROCEDURE — 87086 URINE CULTURE/COLONY COUNT: CPT | Performed by: EMERGENCY MEDICINE

## 2022-01-05 PROCEDURE — 83605 ASSAY OF LACTIC ACID: CPT | Performed by: EMERGENCY MEDICINE

## 2022-01-05 PROCEDURE — 63600175 PHARM REV CODE 636 W HCPCS: Performed by: EMERGENCY MEDICINE

## 2022-01-05 PROCEDURE — 93010 ELECTROCARDIOGRAM REPORT: CPT | Mod: ,,, | Performed by: INTERNAL MEDICINE

## 2022-01-05 PROCEDURE — 96365 THER/PROPH/DIAG IV INF INIT: CPT

## 2022-01-05 PROCEDURE — U0002 COVID-19 LAB TEST NON-CDC: HCPCS | Performed by: EMERGENCY MEDICINE

## 2022-01-05 PROCEDURE — 97140 MANUAL THERAPY 1/> REGIONS: CPT

## 2022-01-05 PROCEDURE — 83880 ASSAY OF NATRIURETIC PEPTIDE: CPT | Performed by: EMERGENCY MEDICINE

## 2022-01-05 PROCEDURE — 25000003 PHARM REV CODE 250: Performed by: EMERGENCY MEDICINE

## 2022-01-05 PROCEDURE — 87040 BLOOD CULTURE FOR BACTERIA: CPT | Mod: 59 | Performed by: EMERGENCY MEDICINE

## 2022-01-05 PROCEDURE — 87502 INFLUENZA DNA AMP PROBE: CPT

## 2022-01-05 PROCEDURE — 93010 EKG 12-LEAD: ICD-10-PCS | Mod: ,,, | Performed by: INTERNAL MEDICINE

## 2022-01-05 PROCEDURE — 99285 EMERGENCY DEPT VISIT HI MDM: CPT | Mod: CS,,, | Performed by: EMERGENCY MEDICINE

## 2022-01-05 PROCEDURE — 99285 EMERGENCY DEPT VISIT HI MDM: CPT | Mod: 25

## 2022-01-05 PROCEDURE — 85025 COMPLETE CBC W/AUTO DIFF WBC: CPT | Performed by: EMERGENCY MEDICINE

## 2022-01-05 PROCEDURE — 81001 URINALYSIS AUTO W/SCOPE: CPT | Performed by: EMERGENCY MEDICINE

## 2022-01-05 PROCEDURE — 93005 ELECTROCARDIOGRAM TRACING: CPT

## 2022-01-05 RX ORDER — ACETAMINOPHEN 500 MG
1000 TABLET ORAL
Status: COMPLETED | OUTPATIENT
Start: 2022-01-05 | End: 2022-01-05

## 2022-01-05 RX ADMIN — ACETAMINOPHEN 1000 MG: 500 TABLET ORAL at 09:01

## 2022-01-05 RX ADMIN — PIPERACILLIN AND TAZOBACTAM 4.5 G: 4; .5 INJECTION, POWDER, LYOPHILIZED, FOR SOLUTION INTRAVENOUS; PARENTERAL at 10:01

## 2022-01-05 NOTE — PROGRESS NOTES
Physical Therapy Daily Treatment Note     Name: Ashwin Peter  Clinic Number: [id number]    Therapy Diagnosis:   Encounter Diagnoses   Name Primary?    Swelling of lower extremity     Acquired lymphedema of lower extremity Yes    Bilateral lower extremity edema     Sarcoidosis of lung with sarcoidosis of lymph nodes     Morbid obesity with BMI of 45.0-49.9, adult      Physician: Andrew Dunham MD    Visit Date: 1/5/2022       Physician Orders: PT Eval and Treat - lymphedema  Medical Diagnosis from Referral:   Diagnosis Description   R60.0 (ICD-10-CM) - Leg edema      Evaluation Date: 11/8/2021  Authorization Period Expiration: 12/31/22  Plan of Care Expiration: 1/31/22  Visit # / Visits authorized: 2/20     Time In: 405p  Time Out: 510 p  Total Billable Time: 35 min then declined status EMS notified ~ 440pm left via ambulance to ED ~ 515pm    Pt was being treated for his B LE lymphedema  Pt arrived ~ 330pm for 400pm visit and taken back 405pm  Pt amb to dept no device- no significant distress noted.  Removed sweat pants, shoes/socks- wearing shorts  Supine with legs elevated - therapist initiated Manual Lymphatic Drainage, MLD, to his R LE and instruction in bandaging and expected plan of care.  During session became slightly restless - when questioned felt anxious, may be a panic attack.  Pt then needed to pass gas and requested restroom- pt walked to restroom and upon return was more restless, SOB, shaking-   Pulse ox ranges 88-92 varied  + and varied  Attempts to calm, slow breathing, continued assessment and further decline.  Called 911  Placed pt supine with HOB elevated, pt with SOB, coughing, chills- monitored pulse ox  Unable to get BP due to sweatshirt and size.  Monitored until EMS arrival - started O2 with pulse ox improving to 96- transport to ED.     Precautions: Standard and sarcoidosis, HTN, liver cirrhosis  Subjective     Pt reports: was out of town, evaluation was in November -  just returning for follow up.   He was compliant with home exercise program.  Response to previous treatment: pt has home pump   Admits was dog sitting for his daughter, also at hunting camp and discussion with decline member at nearby camper was + exposure.  Functional change: amb no device.    Objective     Male amb to dept no device, slip on tennis shoes, no compression- basic cotton crew socks  Amount of Swelling/Location of Swelling: mod B LE especially lower legs, obesity and body habitus   Skin Integrity: red, skin irritation - dermatitis, red dots with past open areas or rash per pt. - states pants rub for irritation  Thick density in lower legs, knees and thighs  Palpation/Texture: thick pitting, dense firmness   + L > R Stemmer Sign  - David's Sign  Circulation: intact     Treatment:   Ashwin received the following manual therapy techniques:- Manual Lymphatic Drainage were applied to the: R LE  for 35 minutes, including: MLD   Only MLD performed then decline in status.    HOLD PT     Will determine if and when appropriate to resume.    Maryan Justice, PT

## 2022-01-06 PROBLEM — J96.21 ACUTE ON CHRONIC RESPIRATORY FAILURE WITH HYPOXIA: Status: ACTIVE | Noted: 2022-01-06

## 2022-01-06 LAB
ALBUMIN SERPL BCP-MCNC: 2 G/DL (ref 3.5–5.2)
ALP SERPL-CCNC: 81 U/L (ref 55–135)
ALT SERPL W/O P-5'-P-CCNC: 36 U/L (ref 10–44)
AMMONIA PLAS-SCNC: 97 UMOL/L (ref 10–50)
ANION GAP SERPL CALC-SCNC: 5 MMOL/L (ref 8–16)
ANISOCYTOSIS BLD QL SMEAR: SLIGHT
ASCENDING AORTA: 3.12 CM
AST SERPL-CCNC: 75 U/L (ref 10–40)
AV INDEX (PROSTH): 0.71
AV MEAN GRADIENT: 10 MMHG
AV PEAK GRADIENT: 17 MMHG
AV VALVE AREA: 2.49 CM2
AV VELOCITY RATIO: 0.69
BACTERIA #/AREA URNS AUTO: ABNORMAL /HPF
BASO STIPL BLD QL SMEAR: ABNORMAL
BASOPHILS NFR BLD: 0.7 % (ref 0–1.9)
BILIRUB SERPL-MCNC: 4.5 MG/DL (ref 0.1–1)
BILIRUB UR QL STRIP: NEGATIVE
BNP SERPL-MCNC: 130 PG/ML (ref 0–99)
BSA FOR ECHO PROCEDURE: 2.82 M2
BUN SERPL-MCNC: 20 MG/DL (ref 6–20)
BURR CELLS BLD QL SMEAR: ABNORMAL
CALCIUM SERPL-MCNC: 8.1 MG/DL (ref 8.7–10.5)
CHLORIDE SERPL-SCNC: 107 MMOL/L (ref 95–110)
CLARITY UR REFRACT.AUTO: ABNORMAL
CO2 SERPL-SCNC: 20 MMOL/L (ref 23–29)
COLOR UR AUTO: ABNORMAL
CREAT SERPL-MCNC: 0.8 MG/DL (ref 0.5–1.4)
CV ECHO LV RWT: 0.37 CM
DIFFERENTIAL METHOD: ABNORMAL
DOP CALC AO PEAK VEL: 2.04 M/S
DOP CALC AO VTI: 41.25 CM
DOP CALC LVOT AREA: 3.5 CM2
DOP CALC LVOT DIAMETER: 2.12 CM
DOP CALC LVOT PEAK VEL: 1.41 M/S
DOP CALC LVOT STROKE VOLUME: 102.63 CM3
DOP CALCLVOT PEAK VEL VTI: 29.09 CM
ECHO LV POSTERIOR WALL: 0.96 CM (ref 0.6–1.1)
EJECTION FRACTION: 65 %
EOSINOPHIL NFR BLD: 0 % (ref 0–8)
ERYTHROCYTE [DISTWIDTH] IN BLOOD BY AUTOMATED COUNT: 15.9 % (ref 11.5–14.5)
EST. GFR  (AFRICAN AMERICAN): >60 ML/MIN/1.73 M^2
EST. GFR  (NON AFRICAN AMERICAN): >60 ML/MIN/1.73 M^2
FRACTIONAL SHORTENING: 37 % (ref 28–44)
GLUCOSE SERPL-MCNC: 110 MG/DL (ref 70–110)
GLUCOSE UR QL STRIP: NEGATIVE
HCT VFR BLD AUTO: 38.3 % (ref 40–54)
HGB BLD-MCNC: 12.5 G/DL (ref 14–18)
HGB UR QL STRIP: ABNORMAL
HYALINE CASTS UR QL AUTO: 0 /LPF
IMM GRANULOCYTES # BLD AUTO: ABNORMAL K/UL (ref 0–0.04)
IMM GRANULOCYTES NFR BLD AUTO: ABNORMAL % (ref 0–0.5)
INTERVENTRICULAR SEPTUM: 1.23 CM (ref 0.6–1.1)
KETONES UR QL STRIP: NEGATIVE
LA MAJOR: 6.53 CM
LA MINOR: 6.02 CM
LA WIDTH: 3.61 CM
LEFT ATRIUM SIZE: 4.29 CM
LEFT ATRIUM VOLUME INDEX: 30.8 ML/M2
LEFT ATRIUM VOLUME: 82.47 CM3
LEFT INTERNAL DIMENSION IN SYSTOLE: 3.24 CM (ref 2.1–4)
LEFT VENTRICLE DIASTOLIC VOLUME INDEX: 46.79 ML/M2
LEFT VENTRICLE DIASTOLIC VOLUME: 125.41 ML
LEFT VENTRICLE MASS INDEX: 80 G/M2
LEFT VENTRICLE SYSTOLIC VOLUME INDEX: 15.8 ML/M2
LEFT VENTRICLE SYSTOLIC VOLUME: 42.37 ML
LEFT VENTRICULAR INTERNAL DIMENSION IN DIASTOLE: 5.13 CM (ref 3.5–6)
LEFT VENTRICULAR MASS: 214.6 G
LEUKOCYTE ESTERASE UR QL STRIP: ABNORMAL
LYMPHOCYTES NFR BLD: 4.7 % (ref 18–48)
MAGNESIUM SERPL-MCNC: 1.6 MG/DL (ref 1.6–2.6)
MCH RBC QN AUTO: 34.8 PG (ref 27–31)
MCHC RBC AUTO-ENTMCNC: 32.6 G/DL (ref 32–36)
MCV RBC AUTO: 107 FL (ref 82–98)
MICROSCOPIC COMMENT: ABNORMAL
MONOCYTES NFR BLD: 5.3 % (ref 4–15)
NEUTROPHILS NFR BLD: 82.6 % (ref 38–73)
NEUTS BAND NFR BLD MANUAL: 6.7 %
NITRITE UR QL STRIP: NEGATIVE
NRBC BLD-RTO: 0 /100 WBC
PH UR STRIP: 5 [PH] (ref 5–8)
PHOSPHATE SERPL-MCNC: 3.2 MG/DL (ref 2.7–4.5)
PISA TR MAX VEL: 2.9 M/S
PLATELET # BLD AUTO: 60 K/UL (ref 150–450)
PLATELET BLD QL SMEAR: ABNORMAL
PMV BLD AUTO: 10.2 FL (ref 9.2–12.9)
POIKILOCYTOSIS BLD QL SMEAR: SLIGHT
POTASSIUM SERPL-SCNC: 4.4 MMOL/L (ref 3.5–5.1)
PROT SERPL-MCNC: 5.9 G/DL (ref 6–8.4)
PROT UR QL STRIP: ABNORMAL
RA MAJOR: 4.82 CM
RA PRESSURE: 15 MMHG
RA WIDTH: 3.85 CM
RBC # BLD AUTO: 3.59 M/UL (ref 4.6–6.2)
RBC #/AREA URNS AUTO: 54 /HPF (ref 0–4)
RIGHT VENTRICULAR END-DIASTOLIC DIMENSION: 3.83 CM
SINUS: 3.17 CM
SODIUM SERPL-SCNC: 132 MMOL/L (ref 136–145)
SP GR UR STRIP: >1.03 (ref 1–1.03)
SQUAMOUS #/AREA URNS AUTO: 0 /HPF
STJ: 2.84 CM
TDI LATERAL: 0.1 M/S
TDI SEPTAL: 0.12 M/S
TDI: 0.11 M/S
TR MAX PG: 34 MMHG
TRICUSPID ANNULAR PLANE SYSTOLIC EXCURSION: 2.67 CM
TV REST PULMONARY ARTERY PRESSURE: 49 MMHG
URN SPEC COLLECT METH UR: ABNORMAL
WBC # BLD AUTO: 6.29 K/UL (ref 3.9–12.7)
WBC #/AREA URNS AUTO: 19 /HPF (ref 0–5)

## 2022-01-06 PROCEDURE — 85027 COMPLETE CBC AUTOMATED: CPT | Performed by: STUDENT IN AN ORGANIZED HEALTH CARE EDUCATION/TRAINING PROGRAM

## 2022-01-06 PROCEDURE — G0378 HOSPITAL OBSERVATION PER HR: HCPCS

## 2022-01-06 PROCEDURE — 27000221 HC OXYGEN, UP TO 24 HOURS

## 2022-01-06 PROCEDURE — 99900035 HC TECH TIME PER 15 MIN (STAT)

## 2022-01-06 PROCEDURE — 99219 PR INITIAL OBSERVATION CARE,LEVL II: CPT | Mod: ,,, | Performed by: STUDENT IN AN ORGANIZED HEALTH CARE EDUCATION/TRAINING PROGRAM

## 2022-01-06 PROCEDURE — 96367 TX/PROPH/DG ADDL SEQ IV INF: CPT

## 2022-01-06 PROCEDURE — 25500020 PHARM REV CODE 255: Performed by: EMERGENCY MEDICINE

## 2022-01-06 PROCEDURE — 82140 ASSAY OF AMMONIA: CPT | Performed by: STUDENT IN AN ORGANIZED HEALTH CARE EDUCATION/TRAINING PROGRAM

## 2022-01-06 PROCEDURE — 84100 ASSAY OF PHOSPHORUS: CPT | Performed by: STUDENT IN AN ORGANIZED HEALTH CARE EDUCATION/TRAINING PROGRAM

## 2022-01-06 PROCEDURE — 85007 BL SMEAR W/DIFF WBC COUNT: CPT | Mod: NCS | Performed by: STUDENT IN AN ORGANIZED HEALTH CARE EDUCATION/TRAINING PROGRAM

## 2022-01-06 PROCEDURE — 83735 ASSAY OF MAGNESIUM: CPT | Performed by: STUDENT IN AN ORGANIZED HEALTH CARE EDUCATION/TRAINING PROGRAM

## 2022-01-06 PROCEDURE — 96375 TX/PRO/DX INJ NEW DRUG ADDON: CPT

## 2022-01-06 PROCEDURE — 94761 N-INVAS EAR/PLS OXIMETRY MLT: CPT

## 2022-01-06 PROCEDURE — 25000003 PHARM REV CODE 250: Performed by: STUDENT IN AN ORGANIZED HEALTH CARE EDUCATION/TRAINING PROGRAM

## 2022-01-06 PROCEDURE — 96366 THER/PROPH/DIAG IV INF ADDON: CPT

## 2022-01-06 PROCEDURE — 63600175 PHARM REV CODE 636 W HCPCS: Performed by: EMERGENCY MEDICINE

## 2022-01-06 PROCEDURE — 25000003 PHARM REV CODE 250: Performed by: EMERGENCY MEDICINE

## 2022-01-06 PROCEDURE — 99219 PR INITIAL OBSERVATION CARE,LEVL II: ICD-10-PCS | Mod: ,,, | Performed by: STUDENT IN AN ORGANIZED HEALTH CARE EDUCATION/TRAINING PROGRAM

## 2022-01-06 PROCEDURE — 63600175 PHARM REV CODE 636 W HCPCS: Performed by: STUDENT IN AN ORGANIZED HEALTH CARE EDUCATION/TRAINING PROGRAM

## 2022-01-06 PROCEDURE — 80053 COMPREHEN METABOLIC PANEL: CPT | Performed by: STUDENT IN AN ORGANIZED HEALTH CARE EDUCATION/TRAINING PROGRAM

## 2022-01-06 RX ORDER — FUROSEMIDE 40 MG/1
40 TABLET ORAL 2 TIMES DAILY
Status: DISCONTINUED | OUTPATIENT
Start: 2022-01-06 | End: 2022-01-06

## 2022-01-06 RX ORDER — SPIRONOLACTONE 25 MG/1
100 TABLET ORAL DAILY
Status: DISCONTINUED | OUTPATIENT
Start: 2022-01-06 | End: 2022-01-07 | Stop reason: HOSPADM

## 2022-01-06 RX ORDER — SODIUM CHLORIDE 0.9 % (FLUSH) 0.9 %
10 SYRINGE (ML) INJECTION EVERY 12 HOURS PRN
Status: DISCONTINUED | OUTPATIENT
Start: 2022-01-06 | End: 2022-01-07 | Stop reason: HOSPADM

## 2022-01-06 RX ORDER — GLUCAGON 1 MG
1 KIT INJECTION
Status: DISCONTINUED | OUTPATIENT
Start: 2022-01-06 | End: 2022-01-07 | Stop reason: HOSPADM

## 2022-01-06 RX ORDER — IBUPROFEN 200 MG
24 TABLET ORAL
Status: DISCONTINUED | OUTPATIENT
Start: 2022-01-06 | End: 2022-01-07 | Stop reason: HOSPADM

## 2022-01-06 RX ORDER — IBUPROFEN 200 MG
16 TABLET ORAL
Status: DISCONTINUED | OUTPATIENT
Start: 2022-01-06 | End: 2022-01-07 | Stop reason: HOSPADM

## 2022-01-06 RX ORDER — CETIRIZINE HYDROCHLORIDE 10 MG/1
10 TABLET ORAL DAILY
Status: DISCONTINUED | OUTPATIENT
Start: 2022-01-06 | End: 2022-01-07 | Stop reason: HOSPADM

## 2022-01-06 RX ORDER — FLUTICASONE FUROATE AND VILANTEROL 200; 25 UG/1; UG/1
1 POWDER RESPIRATORY (INHALATION) DAILY
Status: DISCONTINUED | OUTPATIENT
Start: 2022-01-06 | End: 2022-01-07 | Stop reason: HOSPADM

## 2022-01-06 RX ORDER — FLUTICASONE PROPIONATE 50 MCG
1 SPRAY, SUSPENSION (ML) NASAL DAILY
Status: DISCONTINUED | OUTPATIENT
Start: 2022-01-06 | End: 2022-01-07 | Stop reason: HOSPADM

## 2022-01-06 RX ORDER — PREDNISONE 20 MG/1
20 TABLET ORAL DAILY
Status: DISCONTINUED | OUTPATIENT
Start: 2022-01-06 | End: 2022-01-07

## 2022-01-06 RX ORDER — HYDROXYZINE HYDROCHLORIDE 25 MG/1
25 TABLET, FILM COATED ORAL 3 TIMES DAILY PRN
Status: DISCONTINUED | OUTPATIENT
Start: 2022-01-06 | End: 2022-01-07 | Stop reason: HOSPADM

## 2022-01-06 RX ORDER — FUROSEMIDE 10 MG/ML
40 INJECTION INTRAMUSCULAR; INTRAVENOUS
Status: DISCONTINUED | OUTPATIENT
Start: 2022-01-06 | End: 2022-01-06

## 2022-01-06 RX ORDER — ACETAMINOPHEN 500 MG
TABLET ORAL
Status: ON HOLD | COMMUNITY
End: 2023-11-08 | Stop reason: HOSPADM

## 2022-01-06 RX ORDER — DICLOFENAC SODIUM 10 MG/G
GEL TOPICAL
Status: ON HOLD | COMMUNITY
End: 2024-01-10 | Stop reason: HOSPADM

## 2022-01-06 RX ORDER — NALOXONE HCL 0.4 MG/ML
0.02 VIAL (ML) INJECTION
Status: DISCONTINUED | OUTPATIENT
Start: 2022-01-06 | End: 2022-01-07 | Stop reason: HOSPADM

## 2022-01-06 RX ADMIN — PREDNISONE 20 MG: 20 TABLET ORAL at 09:01

## 2022-01-06 RX ADMIN — VANCOMYCIN HYDROCHLORIDE 2500 MG: 1.5 INJECTION, POWDER, LYOPHILIZED, FOR SOLUTION INTRAVENOUS at 12:01

## 2022-01-06 RX ADMIN — FUROSEMIDE 40 MG: 10 INJECTION, SOLUTION INTRAMUSCULAR; INTRAVENOUS at 07:01

## 2022-01-06 RX ADMIN — SPIRONOLACTONE 100 MG: 25 TABLET ORAL at 09:01

## 2022-01-06 RX ADMIN — IOHEXOL 100 ML: 350 INJECTION, SOLUTION INTRAVENOUS at 12:01

## 2022-01-06 RX ADMIN — CETIRIZINE HYDROCHLORIDE 10 MG: 10 TABLET, FILM COATED ORAL at 09:01

## 2022-01-06 NOTE — ED NOTES
Assumed care of patient. Patient placed on tele box. Denies any pain or other needs. Bed placed in low locked position, side rails up x 2, call bell is within reach. Will continue to monitor.

## 2022-01-06 NOTE — ASSESSMENT & PLAN NOTE
Compensated cirrhosis 2/2 GUAJARDO,   H/o SBP: none   Follows with hepatology outpatient   H/o varices: EGD in 04/2021 showed grade I varices. Repeat EGD in 04/2022    MELD-Na score: 14 at 7/30/2021  7:23 AM  MELD score: 14 at 7/30/2021  7:23 AM  Calculated from:  Serum Creatinine: 0.9 mg/dL (Using min of 1 mg/dL) at 7/30/2021  7:23 AM  Serum Sodium: 138 mmol/L (Using max of 137 mmol/L) at 7/30/2021  7:23 AM  Total Bilirubin: 4.0 mg/dL at 7/30/2021  7:23 AM  INR(ratio): 1.2 at 7/30/2021  7:23 AM  Age: 55 years    PLAN  - Continue lasix, spironolactone   - Avoid hepatotoxic agents  - Monitor mental status  - CMP, INR daily  - Na restriction < 2g

## 2022-01-06 NOTE — ED NOTES
Pt resting in bed. No acute distress noted. RR even and unlabored. Does not voice any new complaints. Updated on POC. Side rails up x2. Call light within reach. Inhaler and nasal spray due at 0900 requested from pharmacy

## 2022-01-06 NOTE — ED TRIAGE NOTES
Pt transferred from [facility]. Pt states that he has been getting more SOB for the past two days and more anxious. Pt with bilat lower leg swelling with redness and tenderness.

## 2022-01-06 NOTE — SUBJECTIVE & OBJECTIVE
Past Medical History:   Diagnosis Date    Anxiety     Hyperlipidemia     Morbid obesity with BMI of 45.0-49.9, adult     Multiple pulmonary nodules     Obesity     Other cirrhosis of liver 1/13/2021    Portal hypertension 2/11/2021    Sarcoidosis of lung with sarcoidosis of lymph nodes     Splenomegaly 12/22/2020    Thrombocytopenia 12/22/2020       Past Surgical History:   Procedure Laterality Date    ANTERIOR CRUCIATE LIGAMENT REPAIR  2007    right knee    COLONOSCOPY N/A 4/8/2021    Procedure: COLONOSCOPY;  Surgeon: Jeff Olvera MD;  Location: [facility] ([address] [place] [address]);  Service: Endoscopy;  Laterality: N/A;  rectal bleeding,   2nd floor BMI 50 (354 lbs)  COVID test on 4/5/21 at [facility]    ESOPHAGOGASTRODUODENOSCOPY N/A 4/8/2021    Procedure: EGD (ESOPHAGOGASTRODUODENOSCOPY);  Surgeon: Jeff Olvera MD;  Location: [facility] ([address] [place] [address]);  Service: Endoscopy;  Laterality: N/A;  variceal screening/ labs the am of procedure  2nd floor BMI 50 (354 lbs)    LYMPHADENECTOMY  1985    MENISCECTOMY      left knee    NASAL SEPTUM SURGERY  1985    WISDOM TOOTH EXTRACTION         Review of patient's allergies indicates:  No Known Allergies    No current facility-administered medications on file prior to encounter.     Current Outpatient Medications on File Prior to Encounter   Medication Sig    fluticasone furoate-vilanteroL (BREO ELLIPTA) 200-25 mcg/dose DsDv diskus inhaler Inhale 1 puff into the lungs once daily. Controller    fluticasone propionate (FLONASE) 50 mcg/actuation nasal spray 1 spray (50 mcg total) by Each Nostril route once daily.    furosemide (LASIX) 40 MG tablet Take 1 tablet (40 mg total) by mouth 2 (two) times a day.    loratadine (CLARITIN) 10 mg tablet Take 1 tablet (10 mg total) by mouth once daily.    mupirocin (BACTROBAN) 2 % ointment Apply topically 3 (three) times daily.    pantoprazole (PROTONIX) 40 MG tablet Take 1 tablet (40 mg total) by mouth once daily.    predniSONE  (DELTASONE) 10 MG tablet Take 2 tablets (20 mg total) by mouth once daily.    spironolactone (ALDACTONE) 50 MG tablet Take 2 tablets (100 mg total) by mouth once daily. DOSE CHANGE     Family History     Problem Relation (Age of Onset)    Aneurysm Mother    Dementia Mother    Diabetes Paternal Grandmother    Heart attack Father (55)    Hypertension Father        Tobacco Use    Smoking status: Never Smoker    Smokeless tobacco: Never Used   Substance and Sexual Activity    Alcohol use: No    Drug use: No    Sexual activity: Not on file     Review of Systems   Constitutional: Negative for chills, diaphoresis and fever.   HENT: Negative for postnasal drip, sinus pressure and trouble swallowing.    Eyes: Negative for visual disturbance.   Respiratory: Positive for shortness of breath. Negative for cough, choking and chest tightness.    Cardiovascular: Positive for leg swelling. Negative for chest pain and palpitations.   Gastrointestinal: Negative for abdominal pain, nausea and vomiting.   Genitourinary: Negative for dysuria.   Musculoskeletal: Negative for back pain.   Skin: Negative for rash.   Neurological: Negative for syncope and headaches.   Hematological: Does not bruise/bleed easily.   Psychiatric/Behavioral: Negative for confusion and decreased concentration.     Objective:     Vital Signs (Most Recent):  Temp: 98.5 °F (36.9 °C) (01/06/22 0400)  Pulse: 84 (01/06/22 0400)  Resp: 18 (01/06/22 0400)  BP: (!) 162/72 (01/06/22 0400)  SpO2: 98 % (01/06/22 0400) Vital Signs (24h Range):  Temp:  [98.5 °F (36.9 °C)-100.5 °F (38.1 °C)] 98.5 °F (36.9 °C)  Pulse:  [] 84  Resp:  [18-20] 18  SpO2:  [93 %-99 %] 98 %  BP: (139-162)/(58-91) 162/72     Weight: (!) 158.8 kg (350 lb)  Body mass index is 48.82 kg/m².    Physical Exam  Vitals and nursing note reviewed.   Constitutional:       General: He is not in acute distress.     Appearance: He is obese. He is not diaphoretic.   HENT:      Head: Normocephalic and  atraumatic.      Nose: Nose normal.      Mouth/Throat:      Mouth: Mucous membranes are moist.      Pharynx: No oropharyngeal exudate or posterior oropharyngeal erythema.   Eyes:      General: No scleral icterus.     Extraocular Movements: Extraocular movements intact.      Conjunctiva/sclera: Conjunctivae normal.      Pupils: Pupils are equal, round, and reactive to light.   Cardiovascular:      Rate and Rhythm: Normal rate and regular rhythm.      Pulses: Normal pulses.      Heart sounds: Normal heart sounds. No murmur heard.  No friction rub. No gallop.    Pulmonary:      Effort: Pulmonary effort is normal.      Breath sounds: Normal breath sounds. No wheezing or rhonchi.   Abdominal:      General: Bowel sounds are normal. There is distension.      Palpations: Abdomen is soft.   Musculoskeletal:         General: Normal range of motion.      Cervical back: Normal range of motion.      Right lower leg: 3+ Edema present.      Left lower leg: 3+ Edema present.   Skin:     General: Skin is warm and dry.      Capillary Refill: Capillary refill takes less than 2 seconds.      Findings: No bruising.   Neurological:      General: No focal deficit present.      Mental Status: He is alert and oriented to person, place, and time.   Psychiatric:         Mood and Affect: Mood normal.         Behavior: Behavior normal.         Thought Content: Thought content normal.         Judgment: Judgment normal.           CRANIAL NERVES     CN III, IV, VI   Pupils are equal, round, and reactive to light.        Significant Labs:   CBC:   Recent Labs   Lab 01/05/22 2007 01/06/22  0350   WBC 5.06 6.29   HGB 14.0 12.5*   HCT 41.0 38.3*   PLT 70* 60*     CMP:   Recent Labs   Lab 01/05/22 2007 01/06/22  0350    132*   K 4.7 4.4   * 107   CO2 18* 20*   GLU 99 110   BUN 23* 20   CREATININE 0.8 0.8   CALCIUM 8.5* 8.1*   PROT 7.1 5.9*   ALBUMIN 2.4* 2.0*   BILITOT 5.3* 4.5*   ALKPHOS 95 81   AST 91* 75*   ALT 41 36   ANIONGAP 8 5*    EGFRNONAA >60.0 >60.0     Lactic Acid:   Recent Labs   Lab 01/05/22  2243   LACTATE 1.4     Troponin: No results for input(s): TROPONINI in the last 48 hours.  Urine Studies:   Recent Labs   Lab 01/05/22  2315   COLORU Rufina   APPEARANCEUA Hazy*   PHUR 5.0   SPECGRAV >1.030*   PROTEINUA 1+*   GLUCUA Negative   KETONESU Negative   BILIRUBINUA Negative   OCCULTUA 3+*   NITRITE Negative   LEUKOCYTESUR Trace*   RBCUA 54*   WBCUA 19*   BACTERIA Rare   SQUAMEPITHEL 0   HYALINECASTS 0       Significant Imaging: I have reviewed all pertinent imaging results/findings within the past 24 hours.     Imaging Results          CTA Chest Non-Coronary (PE Study) (Final result)  Result time 01/06/22 00:46:41    Final result by Andry Urena MD (01/06/22 00:46:41)                 Impression:      1. No sizable pulmonary embolus identified, allowing for suboptimal bolus timing and motion limitations.  No CT findings of right heart strain.  2. Borderline cardiomegaly and probable mild diffuse interstitial pulmonary edema versus pneumonitis.  3. Cirrhosis with sequelae of portal hypertension including splenomegaly, upper abdominal collateral vessels, and gastroesophageal varices.    Electronically signed by resident: Siddharth Ashby  Date:    01/06/2022  Time:    00:10    Electronically signed by: Andry Urena MD  Date:    01/06/2022  Time:    00:46             Narrative:    EXAMINATION:  CTA CHEST NON CORONARY    CLINICAL HISTORY:  Pulmonary embolism (PE) suspected, unknown D-dimer;    TECHNIQUE:  Low dose axial images, sagittal and coronal reformations were obtained from the thoracic inlet to the lung bases following the IV administration of 100 mL of Omnipaque 350.  Contrast timing was optimized to evaluate the pulmonary arteries.    COMPARISON:  CT chest 08/06/2021, 12/07/2012    FINDINGS:  Base of Neck:  No significant abnormality.    Thoracic soft tissues:  Bilateral gynecomastia.  No acute abnormality.    Aorta: Left-sided  aortic arch with normal branching pattern.  The thoracic aorta is normal in caliber without calcific atherosclerosis.    Heart: Heart is at the upper limits of normal in size.  No pericardial effusion. No coronary artery calcific atherosclerosis.    Pulmonary vasculature: The pulmonary arteries distribute normally.  Exam quality is limited by suboptimal bolus timing.  Density of the main pulmonary artery is 154 HU (good quality study is greater than 300 HU).  No evidence of acute pulmonary embolus to the level of the proximal segmental pulmonary arteries.  Evaluation of the segmental and more distal pulmonary arteries is limited by poor bolus timing and significant respiratory motion, particularly in the lower lobes.  There are prominent pulmonary veins.    Pastora/Mediastinum:  No hilar or mediastinal lymphadenopathy.  Azygous vein is dilated.    Airways:  Trachea is midline and proximal airways are patent without significant abnormality.    Lungs: CTA technique sacrifices pulmonary parenchymal fine detail and contrast resolution in order to optimize assessment of pulmonary arteries.  Evaluation of the pulmonary parenchyma is also limited by significant motion artifact.  Mild diffuse ground-glass attenuation of the lungs with interlobular septal thickening in the lung bases.  No large consolidation.  No pleural fluid or pneumothorax.    Esophagus: Normal in course and caliber.    Upper abdomen: No acute intra-abdominal abnormality.  Small hiatal hernia.  Nodular contour of the liver and caudate lobe hypertrophy suggestive of cirrhosis.  Splenomegaly and prominent splenic collaterals.  There are gastroesophageal varices as well.  Small gallstones layer dependently in the gallbladder.    Bones:  No acute fracture or suspicious osseous lesions.                               X-Ray Chest 1 View (Final result)  Result time 01/05/22 22:08:08    Final result by Andry Urena MD (01/05/22 22:08:08)                  "Impression:      Low lung volumes with central vascular congestion and possible minimal perihilar interstitial edema.      Electronically signed by: Andry Urena MD  Date:    01/05/2022  Time:    22:08             Narrative:    EXAMINATION:  XR CHEST 1 VIEW    CLINICAL HISTORY:  Provided history is "  Shortness of breath".    TECHNIQUE:  One view of the chest.    COMPARISON:  12/02/2020.    FINDINGS:  Cardiac wires overlie the chest.  Cardiomediastinal silhouette is not significantly enlarged.  Lung volumes are low.  There is central vascular congestion and prominent perihilar interstitial markings.  Scarring versus subsegmental atelectasis in the left lower lung zone.  No confluent area of consolidation.  No sizable pleural effusion.  No pneumothorax.  No detrimental change when compared with the prior study when allowing for differences in lung volumes.                                "

## 2022-01-06 NOTE — PROVIDER PROGRESS NOTES - EMERGENCY DEPT.
Encounter Date: 1/5/2022    ED Physician Progress Notes        Physician Note:   11:00 PM    Patient receiving sign-out from Dr. Andino.  Briefly, patient is a 56-year-old male history of LAW on PRN home oxygen presenting with shortness of breath, hypoxia.  Patient was also febrile on arrival.    Labs reviewed no leukocytosis.  Profile was elevated at 5.7.  CMP reviewed, elevated bilirubin which is patient's baseline.    Pending CT of the chest, UA, LA, BNP      12:50 AM  Impression:     1. No sizable pulmonary embolus identified, allowing for suboptimal bolus timing and motion limitations.  No CT findings of right heart strain.  2. Borderline cardiomegaly and probable mild diffuse interstitial pulmonary edema versus pneumonitis.  3. Cirrhosis with sequelae of portal hypertension including splenomegaly, upper abdominal collateral vessels, and gastroesophageal varices.     Electronically signed by resident: Siddharth Ashby  Date:                                            01/06/2022  Time:                                           00:10     Electronically signed by: Andry Urena MD  Date:                                            01/06/2022  Time:                                           00:46    BNP slightly elevated at 130, lactate normal at 1.4.  UA with rbc 54, wbc 19, rare bacteria.  CTA of the chest was negative for pulmonary embolism, suboptimal bolus timing.  No start strain.  There is cardiomegaly with mild diffuse interstitial pulmonary edema versus pneumonitis.    His currently on Lasix 40 mg b.i.d,

## 2022-01-06 NOTE — ASSESSMENT & PLAN NOTE
Diiagnosed via mediastinal lymph node biopsy 2012.   Has overall been asymptomatic with normal CXR and normal PFTs.   Followed by pulmonology (last seen 8/2021)  Home medications: Breo, prednisone 20mg qd, claritin, fluticasone  On supplemental oxygen at home    Plan:   -Continue home spironolactone  -Lasix IV for aggressive diuresis

## 2022-01-06 NOTE — ED PROVIDER NOTES
Encounter Date: 1/5/2022       History     Chief Complaint   Patient presents with    Shortness of Breath    Anxiety     Pt presents from [facility] for eval of SOB during physical therapy.      56-year-old male with a history of anxiety, hyperlipidemia, liver cirrhosis, portal hypertension, sarcoidosis, thrombocytopenia, lymphedema, presenting with shortness of breath.    Context:  The patient reports yesterday he had an episode of feeling short of breath and anxious.  Today was at [facility] for his chronic leg swelling and he had another episode.  He is on oxygen as needed at home.  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  Onset:  Gradual  Duration:  Yesterday and today  Associated Symptoms:  Chronic lower extremity edema    The history is provided by the patient and medical records. No  was used.     Review of patient's allergies indicates:  No Known Allergies  Past Medical History:   Diagnosis Date    Anxiety     Hyperlipidemia     Morbid obesity with BMI of 45.0-49.9, adult     Multiple pulmonary nodules     Obesity     Other cirrhosis of liver 1/13/2021    Portal hypertension 2/11/2021    Sarcoidosis of lung with sarcoidosis of lymph nodes     Splenomegaly 12/22/2020    Thrombocytopenia 12/22/2020     Past Surgical History:   Procedure Laterality Date    ANTERIOR CRUCIATE LIGAMENT REPAIR  2007    right knee    COLONOSCOPY N/A 4/8/2021    Procedure: COLONOSCOPY;  Surgeon: Jeff Olvera MD;  Location: [facility] ([address] [place] [address]);  Service: Endoscopy;  Laterality: N/A;  rectal bleeding,   2nd floor BMI 50 (354 lbs)  C[facility] on 4/5/21 [facility]    ESOPHAGOGASTRODUODENOSCOPY N/A 4/8/2021    Procedure: EGD (ESOPHAGOGASTRODUODENOSCOPY);  Surgeon: Jeff Olvera MD;  Location: [address]);  Service: Endoscopy;  Laterality: N/A;  variceal screening/ labs the am of procedure  2nd floor BMI 50 (354 lbs)     LYMPHADENECTOMY  1985    MENISCECTOMY      left knee    NASAL SEPTUM SURGERY  1985    WISDOM TOOTH EXTRACTION       Family History   Problem Relation Age of Onset    Hypertension Father     Heart attack Father 55    Diabetes Paternal Grandmother     Aneurysm Mother         eye    Dementia Mother     Colon cancer Neg Hx     Prostate cancer Neg Hx     Sarcoidosis Neg Hx      Social History     Tobacco Use    Smoking status: Never Smoker    Smokeless tobacco: Never Used   Substance Use Topics    Alcohol use: No    Drug use: No     Review of Systems   Constitutional: Positive for fever.   HENT: Negative for facial swelling.    Eyes: Negative for redness.   Respiratory: Positive for shortness of breath.    Cardiovascular: Negative for chest pain.   Gastrointestinal: Negative for vomiting.   Allergic/Immunologic: Negative for food allergies.   Neurological: Negative for facial asymmetry.   Hematological: Does not bruise/bleed easily.   Psychiatric/Behavioral: The patient is nervous/anxious.        Physical Exam     Initial Vitals [01/05/22 1754]   BP Pulse Resp Temp SpO2   (!) 140/78 110 18 (!) 100.5 °F (38.1 °C) 99 %      MAP       --         Physical Exam    Nursing note and vitals reviewed.  Constitutional: He is not diaphoretic. No distress.   HENT:   Head: Normocephalic and atraumatic.   Eyes: Right eye exhibits no discharge. Left eye exhibits no discharge.   Neck: Neck supple. No tracheal deviation present.   Cardiovascular: Normal rate and regular rhythm.   Pulmonary/Chest: No stridor. He is in respiratory distress (Mild, tachypnea).   Abdominal: Abdomen is soft. There is no abdominal tenderness.   Musculoskeletal:         General: Edema present.      Cervical back: Neck supple.      Comments: Bilateral pitting edema and erythema      Neurological: He is alert and oriented to person, place, and time.   Skin: Skin is warm. No rash noted.   Psychiatric: He has a normal mood and affect. His behavior is  normal.         ED Course   Procedures  Labs Reviewed   CBC W/ AUTO DIFFERENTIAL - Abnormal; Notable for the following components:       Result Value    RBC 3.98 (*)      (*)     MCH 35.2 (*)     RDW 15.7 (*)     Platelets 70 (*)     Lymph # 0.2 (*)     Gran % 90.1 (*)     Lymph % 3.2 (*)     All other components within normal limits   COMPREHENSIVE METABOLIC PANEL - Abnormal; Notable for the following components:    Chloride 111 (*)     CO2 18 (*)     BUN 23 (*)     Calcium 8.5 (*)     Albumin 2.4 (*)     Total Bilirubin 5.3 (*)     AST 91 (*)     All other components within normal limits   PROCALCITONIN - Abnormal; Notable for the following components:    Procalcitonin 5.79 (*)     All other components within normal limits   B-TYPE NATRIURETIC PEPTIDE - Abnormal; Notable for the following components:     (*)     All other components within normal limits   CULTURE, BLOOD   CULTURE, BLOOD   LACTIC ACID, PLASMA   URINALYSIS, REFLEX TO URINE CULTURE   URINALYSIS MICROSCOPIC   SARS-COV-2 RDRP GENE    Narrative:     This test utilizes isothermal nucleic acid amplification   technology to detect the SARS-CoV-2 RdRp nucleic acid segment.   The analytical sensitivity (limit of detection) is 125 genome   equivalents/mL.   A POSITIVE result implies infection with the SARS-CoV-2 virus;   the patient is presumed to be contagious.     A NEGATIVE result means that SARS-CoV-2 nucleic acids are not   present above the limit of detection. A NEGATIVE result should be   treated as presumptive. It does not rule out the possibility of   COVID-19 and should not be the sole basis for treatment decisions.   If COVID-19 is strongly suspected based on clinical and exposure   history, re-testing using an alternate molecular assay should be   considered.   This test is only for use under the Food and Drug   Administration s Emergency Use Authorization (EUA).   Commercial kits are provided by [employer].   Performance  "characteristics of the EUA have been independently   verified by [facility] Department of   Pathology and Laboratory Medicine.   _________________________________________________________________   The authorized Fact Sheet for Healthcare Providers and the authorized Fact   Sheet for Patients of the ID NOW COVID-19 are available on the FDA   website:     https://www.fda.gov/media/696290/download  https://www.fda.gov/media/881732/download       POCT INFLUENZA A/B MOLECULAR     EKG Readings: (Independently Interpreted)   Initial Reading: No STEMI. Rhythm: Sinus Tachycardia. Heart Rate: 114. Ectopy: No Ectopy. Clinical Impression: Sinus Tachycardia       Imaging Results          CTA Chest Non-Coronary (PE Study) (In process)  Result time 01/06/22 00:10:44               X-Ray Chest 1 View (Final result)  Result time 01/05/22 22:08:08    Final result by Andry Urena MD (01/05/22 22:08:08)                 Impression:      Low lung volumes with central vascular congestion and possible minimal perihilar interstitial edema.      Electronically signed by: Andry Urena MD  Date:    01/05/2022  Time:    22:08             Narrative:    EXAMINATION:  XR CHEST 1 VIEW    CLINICAL HISTORY:  Provided history is "  Shortness of breath".    TECHNIQUE:  One view of the chest.    COMPARISON:  12/02/2020.    FINDINGS:  Cardiac wires overlie the chest.  Cardiomediastinal silhouette is not significantly enlarged.  Lung volumes are low.  There is central vascular congestion and prominent perihilar interstitial markings.  Scarring versus subsegmental atelectasis in the left lower lung zone.  No confluent area of consolidation.  No sizable pleural effusion.  No pneumothorax.  No detrimental change when compared with the prior study when allowing for differences in lung volumes.                                 Medications   vancomycin (VANCOCIN) 2,500 mg in dextrose 5 % 500 mL IVPB (2,500 mg Intravenous New Bag 1/6/22 0021) " "  acetaminophen tablet 1,000 mg (1,000 mg Oral Given 1/5/22 2119)   piperacillin-tazobactam 4.5 g in sodium chloride 0.9% 100 mL IVPB (ready to mix system) (0 g Intravenous Stopped 1/5/22 2335)   iohexoL (OMNIPAQUE 350) injection 100 mL (100 mLs Intravenous Given 1/6/22 0007)     Medical Decision Making:   History:   Old Medical Records: I decided to obtain old medical records.  Old Records Summarized: other records.       <> Summary of Records: Most recent ECHO:  · The left ventricle is normal in size with normal systolic function.  · The estimated ejection fraction is 65%.  · Indeterminate left ventricular diastolic function.      Initial Assessment:   56-year-old male presenting with shortness of breath beginning yesterday.  On arrival, he was febrile tachycardic.  EKG sinus tachycardia.  Mild tachypnea.  Placed on cardiac monitoring and continuous pulse oximetry.  Differential Diagnosis:   Including, not limited to:  Viral illness  COVID 19  Pneumonia  Influenza  Cellulitis  PE  Anxiety  Independently Interpreted Test(s):   I have ordered and independently interpreted EKG Reading(s) - see prior notes  Clinical Tests:   Lab Tests: Reviewed and Ordered  Radiological Study: Ordered and Reviewed  Medical Tests: Reviewed and Ordered  Sepsis Perfusion Assessment: "I attest a sepsis perfusion exam was performed within 6 hours of sepsis, severe sepsis, or septic shock presentation, following fluid resuscitation."  ED Management:  No leukocytosis, COVID and influenza swabs negative.  No acute infiltrate on chest x-ray.  At the time of attending sign-out, etiology of fever and presentation not elucidated.    Signed out to oncoming attending with UA and CT angio to rule out PE pending.    Patient will likely require admission depending on CTA findings.    He was covered with broad-spectrum antibiotics.  No hypotension or elevated lactate to suggest septic shock or warrant 30 cc/kilos bolus.             ED Course as of " 01/06/22 0021 Wed Jan 05, 2022 2136 Pulse: 110 [AB]   2136 Temp(!): 100.5 °F (38.1 °C) [AB]      ED Course User Index  [AB] Stef Shelley MD             Clinical Impression:   Final diagnoses:  [R06.02] Shortness of breath  [R50.9] Fever, unspecified fever cause (Primary)  [R00.0] Tachycardia                 Stef Shelley MD  01/06/22 0021

## 2022-01-06 NOTE — ED NOTES
Patient identifiers have been checked and are correct.  Patient placed in a hospital gown.     LOC: The patient is awake, alert, and aware of environment. The patient is oriented x 3 and speaking appropriately.   APPEARANCE: No acute distress noted.   PSYCHOSOCIAL: Patient is calm and cooperative.   SKIN: The skin is warm, dry  RESPIRATORY: Airway is open and patent. Bilateral chest rise and fall. Respirations are spontaneous, even and unlabored. Denies any current SOB. On 1L NC.   CARDIAC: Patient has a normal rate and rhythm on continuous cardiac monitor. Denies any c/o CP. Swelling noted to bilateral lower legs.   URINARY:  Voids independently.   NEUROLOGIC: Eyes open spontaneously. Speech clear. Tolerating saliva secretions well. Able to follow commands, demonstrating ability to actively and appropriately communicate within context of current conversation. Symmetrical facial muscles. Moving all extremities well. Movement is purposeful.   MUSCULOSKELETAL: No obvious deformities noted.     Side rails up x2. Call light within reach. Updated on POC-verbalizes understanding.

## 2022-01-06 NOTE — FIRST PROVIDER EVALUATION
"Medical screening exam completed.  I have conducted a focused provider triage encounter, findings are as follows:    Brief history of present illness:  PRESENTS WITH SOB   FEVER NOTED     Vitals:    01/05/22 1754   BP: (!) 140/78   Pulse: 110   Resp: 18   Temp: (!) 100.5 °F (38.1 °C)   TempSrc: Oral   SpO2: 99%  Comment: 88% RA upon EMS arrival.   Weight: (!) 158.8 kg (350 lb)   Height: 5' 11" (1.803 m)       Pertinent physical exam:  ON O2, SLIGHT TACHYPNEA, DISTENDED ABDOMEN     Brief workup plan: LABS     Preliminary workup initiated; this workup will be continued and followed by the physician or advanced practice provider that is assigned to the patient when roomed.  "

## 2022-01-06 NOTE — HPI
Mr. Peter is 56 M with PMHx of HLD, anxiety, morbid obesity, HLD, liver cirrhosis, portal hypertension, sarcoidosis, thrombocytopenia, lymphedema, presenting with shortness of breath. Symptoms have gradually worsened over last 3 days with associated lower extremity edema. Has not taken home furosemide in 2 days. Patient reports feeling short of breath and anxious that he attributed to a panic attack during rehab session for chronic leg swelling at [facility]. Reports having previous episode 1 day prior. Staff at facility felt he should present for further evaluation. 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

## 2022-01-06 NOTE — ASSESSMENT & PLAN NOTE
Diiagnosed via mediastinal lymph node biopsy 2012.   Has overall been asymptomatic with normal CXR and normal PFTs.   Followed by pulmonology (last seen 8/2021)  Home medications: Breo, prednisone 20mg qd, claritin, fluticasone  On supplemental oxygen at home    Plan: Continue home medications

## 2022-01-06 NOTE — ASSESSMENT & PLAN NOTE
PLT 60 on admission   Antiplatelet/anticoagulation: none   No over signs of bleeding on exam      Plan:  - CBCs daily   - Transfuse if PLT<50 with bleeding or PLT<10 without

## 2022-01-06 NOTE — ASSESSMENT & PLAN NOTE
Patient with Hypoxic Respiratory failure which is Acute on chronic.  he is on home oxygen at 2 LPM. Supplemental oxygen was provided and noted-  .   Signs/symptoms of respiratory failure include- increased work of breathing. Contributing diagnoses includes - CHF, Pneumonia, Pulmonary Embolus and LAW Labs and images were reviewed. Patient Has not had a recent ABG. Will treat underlying causes and adjust management of respiratory failure as follows    CTA Chest negative for PE or right heart strain in patient tachycardic on arrival to ED. Tachycardia likely related to anxiety.   CXR suggestive of pulmonary edema   Hypervolemic on exam    Plan  -Started on broad spectrum abx in ED given hypoxia, elevated procal. Will defer continuing while inpatient given SpO2>93% in patient with probable LAW combined with pulmonary edema  -Follow up blood cultures   -Wean oxygen as appropriate   -Diuresis with furosemide, spironolactone  for suspected pulmonary edema

## 2022-01-06 NOTE — ASSESSMENT & PLAN NOTE
Patient with Hypoxic Respiratory failure which is Acute on chronic.  he is on home oxygen at 2 LPM. Supplemental oxygen was provided and noted-  .   Signs/symptoms of respiratory failure include- increased work of breathing. Contributing diagnoses includes - CHF, Pneumonia, Pulmonary Embolus and LAW Labs and images were reviewed. Patient Has not had a recent ABG. Will treat underlying causes and adjust management of respiratory failure as follows    CTA Chest negative for PE or right heart strain in patient tachycardic on arrival to ED. Tachycardia likely related to anxiety.   CXR suggestive of pulmonary edema   Hypervolemic on exam    Plan  -Started on broad spectrum abx in ED given hypoxia, elevated procal. Will defer continuing while inpatient given SpO2>93% in patient with probable LAW combined with pulmonary edema  -Follow up blood cultures   -Wean oxygen as appropriate   -Diuresis with furosemide IV, spironolactone  for suspected pulmonary edema   -Trend CMP, CBC  -Monitor K

## 2022-01-06 NOTE — H&P
[facility] - Emergency Dept  [facility] Medicine  History & Physical    Patient Name: Ashwin Peter  MRN: [id number]  Patient Class: OP- Observation  Admission Date: 1/5/2022  Attending Physician: No att. providers found   Primary Care Provider: Elkin Guo III, MD         Patient information was obtained from patient, past medical records and ER records.     Subjective:     Principal Problem:Acute on chronic respiratory failure with hypoxia    Chief Complaint:   Chief Complaint   Patient presents with    Shortness of Breath    Anxiety     Pt presents from [facility] for eval of SOB during physical therapy.         HPI: Mr. Peter is 56 M with PMHx of HLD, anxiety, morbid obesity, HLD, liver cirrhosis, portal hypertension, sarcoidosis, thrombocytopenia, lymphedema, presenting with shortness of breath. Symptoms have gradually worsened over last 3 days with associated lower extremity edema. Has not taken home furosemide in 2 days. Patient reports feeling short of breath and anxious that he attributed to a panic attack during rehab session for chronic leg swelling at [facility]. Reports having previous episode 1 day prior. Staff at facility felt he should present for further evaluation. 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      Past Medical History:   Diagnosis Date    Anxiety     Hyperlipidemia     Morbid obesity with BMI of 45.0-49.9, adult     Multiple pulmonary nodules     Obesity     Other cirrhosis of liver 1/13/2021    Portal hypertension 2/11/2021    Sarcoidosis of lung with sarcoidosis of lymph nodes     Splenomegaly 12/22/2020    Thrombocytopenia 12/22/2020       Past Surgical History:   Procedure Laterality Date    ANTERIOR CRUCIATE LIGAMENT REPAIR  2007    right knee    COLONOSCOPY N/A 4/8/2021    Procedure: COLONOSCOPY;  Surgeon: Jeff Olvera MD;  Location: [facility] (2ND FLR);   Service: Endoscopy;  Laterality: N/A;  rectal bleeding,   2nd floor BMI 50 (354 lbs)  COVID test on 4/5/21 at [facility]    ESOPHAGOGASTRODUODENOSCOPY N/A 4/8/2021    Procedure: EGD (ESOPHAGOGASTRODUODENOSCOPY);  Surgeon: Jeff Olvera MD;  Location: [address]);  Service: Endoscopy;  Laterality: N/A;  variceal screening/ labs the am of procedure  2nd floor BMI 50 (354 lbs)    LYMPHADENECTOMY  1985    MENISCECTOMY      left knee    NASAL SEPTUM SURGERY  1985    WISDOM TOOTH EXTRACTION         Review of patient's allergies indicates:  No Known Allergies    No current facility-administered medications on file prior to encounter.     Current Outpatient Medications on File Prior to Encounter   Medication Sig    fluticasone furoate-vilanteroL (BREO ELLIPTA) 200-25 mcg/dose DsDv diskus inhaler Inhale 1 puff into the lungs once daily. Controller    fluticasone propionate (FLONASE) 50 mcg/actuation nasal spray 1 spray (50 mcg total) by Each Nostril route once daily.    furosemide (LASIX) 40 MG tablet Take 1 tablet (40 mg total) by mouth 2 (two) times a day.    loratadine (CLARITIN) 10 mg tablet Take 1 tablet (10 mg total) by mouth once daily.    mupirocin (BACTROBAN) 2 % ointment Apply topically 3 (three) times daily.    pantoprazole (PROTONIX) 40 MG tablet Take 1 tablet (40 mg total) by mouth once daily.    predniSONE (DELTASONE) 10 MG tablet Take 2 tablets (20 mg total) by mouth once daily.    spironolactone (ALDACTONE) 50 MG tablet Take 2 tablets (100 mg total) by mouth once daily. DOSE CHANGE     Family History     Problem Relation (Age of Onset)    Aneurysm Mother    Dementia Mother    Diabetes Paternal Grandmother    Heart attack Father (55)    Hypertension Father        Tobacco Use    Smoking status: Never Smoker    Smokeless tobacco: Never Used   Substance and Sexual Activity    Alcohol use: No    Drug use: No    Sexual activity: Not on file     Review of Systems   Constitutional:  Negative for chills, diaphoresis and fever.   HENT: Negative for postnasal drip, sinus pressure and trouble swallowing.    Eyes: Negative for visual disturbance.   Respiratory: Positive for shortness of breath. Negative for cough, choking and chest tightness.    Cardiovascular: Positive for leg swelling. Negative for chest pain and palpitations.   Gastrointestinal: Negative for abdominal pain, nausea and vomiting.   Genitourinary: Negative for dysuria.   Musculoskeletal: Negative for back pain.   Skin: Negative for rash.   Neurological: Negative for syncope and headaches.   Hematological: Does not bruise/bleed easily.   Psychiatric/Behavioral: Negative for confusion and decreased concentration.     Objective:     Vital Signs (Most Recent):  Temp: 98.5 °F (36.9 °C) (01/06/22 0400)  Pulse: 84 (01/06/22 0400)  Resp: 18 (01/06/22 0400)  BP: (!) 162/72 (01/06/22 0400)  SpO2: 98 % (01/06/22 0400) Vital Signs (24h Range):  Temp:  [98.5 °F (36.9 °C)-100.5 °F (38.1 °C)] 98.5 °F (36.9 °C)  Pulse:  [] 84  Resp:  [18-20] 18  SpO2:  [93 %-99 %] 98 %  BP: (139-162)/(58-91) 162/72     Weight: (!) 158.8 kg (350 lb)  Body mass index is 48.82 kg/m².    Physical Exam  Vitals and nursing note reviewed.   Constitutional:       General: He is not in acute distress.     Appearance: He is obese. He is not diaphoretic.   HENT:      Head: Normocephalic and atraumatic.      Nose: Nose normal.      Mouth/Throat:      Mouth: Mucous membranes are moist.      Pharynx: No oropharyngeal exudate or posterior oropharyngeal erythema.   Eyes:      General: No scleral icterus.     Extraocular Movements: Extraocular movements intact.      Conjunctiva/sclera: Conjunctivae normal.      Pupils: Pupils are equal, round, and reactive to light.   Cardiovascular:      Rate and Rhythm: Normal rate and regular rhythm.      Pulses: Normal pulses.      Heart sounds: Normal heart sounds. No murmur heard.  No friction rub. No gallop.    Pulmonary:      Effort:  Pulmonary effort is normal.      Breath sounds: Normal breath sounds. No wheezing or rhonchi.   Abdominal:      General: Bowel sounds are normal. There is distension.      Palpations: Abdomen is soft.   Musculoskeletal:         General: Normal range of motion.      Cervical back: Normal range of motion.      Right lower leg: 3+ Edema present.      Left lower leg: 3+ Edema present.   Skin:     General: Skin is warm and dry.      Capillary Refill: Capillary refill takes less than 2 seconds.      Findings: No bruising.   Neurological:      General: No focal deficit present.      Mental Status: He is alert and oriented to person, place, and time.   Psychiatric:         Mood and Affect: Mood normal.         Behavior: Behavior normal.         Thought Content: Thought content normal.         Judgment: Judgment normal.           CRANIAL NERVES     CN III, IV, VI   Pupils are equal, round, and reactive to light.        Significant Labs:   CBC:   Recent Labs   Lab 01/05/22 2007 01/06/22  0350   WBC 5.06 6.29   HGB 14.0 12.5*   HCT 41.0 38.3*   PLT 70* 60*     CMP:   Recent Labs   Lab 01/05/22 2007 01/06/22  0350    132*   K 4.7 4.4   * 107   CO2 18* 20*   GLU 99 110   BUN 23* 20   CREATININE 0.8 0.8   CALCIUM 8.5* 8.1*   PROT 7.1 5.9*   ALBUMIN 2.4* 2.0*   BILITOT 5.3* 4.5*   ALKPHOS 95 81   AST 91* 75*   ALT 41 36   ANIONGAP 8 5*   EGFRNONAA >60.0 >60.0     Lactic Acid:   Recent Labs   Lab 01/05/22  2243   LACTATE 1.4     Troponin: No results for input(s): TROPONINI in the last 48 hours.  Urine Studies:   Recent Labs   Lab 01/05/22  2315   COLORU Rufina   APPEARANCEUA Hazy*   PHUR 5.0   SPECGRAV >1.030*   PROTEINUA 1+*   GLUCUA Negative   KETONESU Negative   BILIRUBINUA Negative   OCCULTUA 3+*   NITRITE Negative   LEUKOCYTESUR Trace*   RBCUA 54*   WBCUA 19*   BACTERIA Rare   SQUAMEPITHEL 0   HYALINECASTS 0       Significant Imaging: I have reviewed all pertinent imaging results/findings within the past 24 hours.      Imaging Results          CTA Chest Non-Coronary (PE Study) (Final result)  Result time 01/06/22 00:46:41    Final result by Andry Urena MD (01/06/22 00:46:41)                 Impression:      1. No sizable pulmonary embolus identified, allowing for suboptimal bolus timing and motion limitations.  No CT findings of right heart strain.  2. Borderline cardiomegaly and probable mild diffuse interstitial pulmonary edema versus pneumonitis.  3. Cirrhosis with sequelae of portal hypertension including splenomegaly, upper abdominal collateral vessels, and gastroesophageal varices.    Electronically signed by resident: Siddharth Ashby  Date:    01/06/2022  Time:    00:10    Electronically signed by: Andry Urena MD  Date:    01/06/2022  Time:    00:46             Narrative:    EXAMINATION:  CTA CHEST NON CORONARY    CLINICAL HISTORY:  Pulmonary embolism (PE) suspected, unknown D-dimer;    TECHNIQUE:  Low dose axial images, sagittal and coronal reformations were obtained from the thoracic inlet to the lung bases following the IV administration of 100 mL of Omnipaque 350.  Contrast timing was optimized to evaluate the pulmonary arteries.    COMPARISON:  CT chest 08/06/2021, 12/07/2012    FINDINGS:  Base of Neck:  No significant abnormality.    Thoracic soft tissues:  Bilateral gynecomastia.  No acute abnormality.    Aorta: Left-sided aortic arch with normal branching pattern.  The thoracic aorta is normal in caliber without calcific atherosclerosis.    Heart: Heart is at the upper limits of normal in size.  No pericardial effusion. No coronary artery calcific atherosclerosis.    Pulmonary vasculature: The pulmonary arteries distribute normally.  Exam quality is limited by suboptimal bolus timing.  Density of the main pulmonary artery is 154 HU (good quality study is greater than 300 HU).  No evidence of acute pulmonary embolus to the level of the proximal segmental pulmonary arteries.  Evaluation of the segmental and  "more distal pulmonary arteries is limited by poor bolus timing and significant respiratory motion, particularly in the lower lobes.  There are prominent pulmonary veins.    Pastora/Mediastinum:  No hilar or mediastinal lymphadenopathy.  Azygous vein is dilated.    Airways:  Trachea is midline and proximal airways are patent without significant abnormality.    Lungs: CTA technique sacrifices pulmonary parenchymal fine detail and contrast resolution in order to optimize assessment of pulmonary arteries.  Evaluation of the pulmonary parenchyma is also limited by significant motion artifact.  Mild diffuse ground-glass attenuation of the lungs with interlobular septal thickening in the lung bases.  No large consolidation.  No pleural fluid or pneumothorax.    Esophagus: Normal in course and caliber.    Upper abdomen: No acute intra-abdominal abnormality.  Small hiatal hernia.  Nodular contour of the liver and caudate lobe hypertrophy suggestive of cirrhosis.  Splenomegaly and prominent splenic collaterals.  There are gastroesophageal varices as well.  Small gallstones layer dependently in the gallbladder.    Bones:  No acute fracture or suspicious osseous lesions.                               X-Ray Chest 1 View (Final result)  Result time 01/05/22 22:08:08    Final result by Andry Urena MD (01/05/22 22:08:08)                 Impression:      Low lung volumes with central vascular congestion and possible minimal perihilar interstitial edema.      Electronically signed by: Andry Urena MD  Date:    01/05/2022  Time:    22:08             Narrative:    EXAMINATION:  XR CHEST 1 VIEW    CLINICAL HISTORY:  Provided history is "  Shortness of breath".    TECHNIQUE:  One view of the chest.    COMPARISON:  12/02/2020.    FINDINGS:  Cardiac wires overlie the chest.  Cardiomediastinal silhouette is not significantly enlarged.  Lung volumes are low.  There is central vascular congestion and prominent perihilar interstitial " markings.  Scarring versus subsegmental atelectasis in the left lower lung zone.  No confluent area of consolidation.  No sizable pleural effusion.  No pneumothorax.  No detrimental change when compared with the prior study when allowing for differences in lung volumes.                                  Assessment/Plan:     * Acute on chronic respiratory failure with hypoxia  Patient with Hypoxic Respiratory failure which is Acute on chronic.  he is on home oxygen at 2 LPM. Supplemental oxygen was provided and noted-  .   Signs/symptoms of respiratory failure include- increased work of breathing. Contributing diagnoses includes - CHF, Pneumonia, Pulmonary Embolus and LAW Labs and images were reviewed. Patient Has not had a recent ABG. Will treat underlying causes and adjust management of respiratory failure as follows    CTA Chest negative for PE or right heart strain in patient tachycardic on arrival to ED. Tachycardia likely related to anxiety.   CXR suggestive of pulmonary edema   Hypervolemic on exam    Plan  -Started on broad spectrum abx in ED given hypoxia, elevated procal. 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 Repeat EGD in 04/2022    MELD-Na score: 14 at 7/30/2021  7:23 AM  MELD score: 14 at 7/30/2021  7:23 AM  Calculated from:  Serum Creatinine: 0.9 mg/dL (Using min of 1 mg/dL) at 7/30/2021  7:23 AM  Serum Sodium: 138 mmol/L (Using max of 137 mmol/L) at 7/30/2021  7:23 AM  Total Bilirubin: 4.0 mg/dL at 7/30/2021  7:23 AM  INR(ratio): 1.2 at 7/30/2021  7:23 AM  Age: 55 years    PLAN  - Continue  lasix, spironolactone   - Avoid hepatotoxic agents  - Monitor mental status  - CMP, INR daily  - Na restriction < 2g        Thrombocytopenia  PLT 60 on admission   Antiplatelet/anticoagulation: none   No over signs of bleeding on exam      Plan:  - CBCs daily   - Transfuse if PLT<50 with bleeding or PLT<10 without         Morbid obesity with BMI of 45.0-49.9, adult        Sarcoidosis of lung with sarcoidosis of lymph nodes  Diiagnosed via mediastinal lymph node biopsy 2012.   Has overall been asymptomatic with normal CXR and normal PFTs.   Followed by pulmonology (last seen 8/2021)  Home medications: Breo, prednisone 20mg qd, claritin, fluticasone  On supplemental oxygen at home    Plan:   -Continue home spironolactone  -Lasix IV for aggressive diuresis       VTE Risk Mitigation (From admission, onward)         Ordered     Reason for No Pharmacological VTE Prophylaxis  Once        Question:  Reasons:  Answer:  Thrombocytopenia    01/06/22 0255     IP VTE HIGH RISK PATIENT  Once         01/06/22 0255     Place sequential compression device  Until discontinued         01/06/22 0255                   Stanford Baird MD  Department of Hospital Medicine   [facility] - Emergency Dept

## 2022-01-06 NOTE — PHARMACY MED REC
"Admission Medication History     The home medication history was taken by Anu Bridges.    You may go to "Admission" then "Reconcile Home Medications" tabs to review and/or act upon these items.      The home medication list has been updated by the Pharmacy department.    Please read ALL comments highlighted in yellow.    Please address this information as you see fit.     Feel free to contact us if you have any questions or require assistance.      Medications listed below were obtained from: Patient/family     PTA Medications   Medication Sig    acetaminophen (TYLENOL) 500 MG tablet Take 3 tablets by mouth daily as needed for knee pain or headache    diclofenac sodium (VOLTAREN ARTHRITIS PAIN) 1 % Gel Apply topically twice daily as needed for knee pain    furosemide (LASIX) 40 MG tablet Take 1 tablet (40 mg total) by mouth 2 (two) times a day. (Patient taking differently: Take 40 mg once to twice daily as needed for fluid)    loratadine (CLARITIN) 10 mg tablet Take 1 tablet (10 mg total) by mouth once daily.    mupirocin (BACTROBAN) 2 % ointment Apply topically 3 (three) times daily. (Patient taking differently: Apply topically 3 (three) times daily as needed (skin infection).)    pantoprazole (PROTONIX) 40 MG tablet Take 1 tablet (40 mg total) by mouth once daily.    phenylephrine HCl (SINEX REGULAR NASL) 2 sprays by Each Nostril route once daily.    predniSONE (DELTASONE) 10 MG tablet Take 2 tablets (20 mg total) by mouth once daily. (Patient taking differently: Take 10 mg by mouth once daily.)    spironolactone (ALDACTONE) 50 MG tablet Take 2 tablets (100 mg total) by mouth once daily. DOSE CHANGE (Patient taking differently: Take 50 mg by mouth once daily.)     Potential issues to be addressed PRIOR TO DISCHARGE  Patient reported no longer taking Breo and Flokorye    Nicholas Bridges  EXT [id number]                  .          "

## 2022-01-07 VITALS
HEART RATE: 75 BPM | HEIGHT: 71 IN | WEIGHT: 315 LBS | TEMPERATURE: 98 F | BODY MASS INDEX: 44.1 KG/M2 | RESPIRATION RATE: 18 BRPM | OXYGEN SATURATION: 98 % | DIASTOLIC BLOOD PRESSURE: 65 MMHG | SYSTOLIC BLOOD PRESSURE: 139 MMHG

## 2022-01-07 LAB
ALBUMIN SERPL BCP-MCNC: 1.9 G/DL (ref 3.5–5.2)
ALP SERPL-CCNC: 74 U/L (ref 55–135)
ALT SERPL W/O P-5'-P-CCNC: 36 U/L (ref 10–44)
ANION GAP SERPL CALC-SCNC: 4 MMOL/L (ref 8–16)
AST SERPL-CCNC: 68 U/L (ref 10–40)
BACTERIA UR CULT: NORMAL
BASOPHILS # BLD AUTO: 0.02 K/UL (ref 0–0.2)
BASOPHILS NFR BLD: 0.6 % (ref 0–1.9)
BILIRUB SERPL-MCNC: 3.4 MG/DL (ref 0.1–1)
BUN SERPL-MCNC: 18 MG/DL (ref 6–20)
CALCIUM SERPL-MCNC: 7.8 MG/DL (ref 8.7–10.5)
CHLORIDE SERPL-SCNC: 108 MMOL/L (ref 95–110)
CO2 SERPL-SCNC: 26 MMOL/L (ref 23–29)
CREAT SERPL-MCNC: 0.8 MG/DL (ref 0.5–1.4)
DIFFERENTIAL METHOD: ABNORMAL
EOSINOPHIL # BLD AUTO: 0.2 K/UL (ref 0–0.5)
EOSINOPHIL NFR BLD: 5 % (ref 0–8)
ERYTHROCYTE [DISTWIDTH] IN BLOOD BY AUTOMATED COUNT: 15.9 % (ref 11.5–14.5)
EST. GFR  (AFRICAN AMERICAN): >60 ML/MIN/1.73 M^2
EST. GFR  (NON AFRICAN AMERICAN): >60 ML/MIN/1.73 M^2
GLUCOSE SERPL-MCNC: 72 MG/DL (ref 70–110)
HCT VFR BLD AUTO: 36.9 % (ref 40–54)
HGB BLD-MCNC: 12.2 G/DL (ref 14–18)
IMM GRANULOCYTES # BLD AUTO: 0.02 K/UL (ref 0–0.04)
IMM GRANULOCYTES NFR BLD AUTO: 0.6 % (ref 0–0.5)
LYMPHOCYTES # BLD AUTO: 0.7 K/UL (ref 1–4.8)
LYMPHOCYTES NFR BLD: 20.1 % (ref 18–48)
MAGNESIUM SERPL-MCNC: 1.7 MG/DL (ref 1.6–2.6)
MCH RBC QN AUTO: 34.9 PG (ref 27–31)
MCHC RBC AUTO-ENTMCNC: 33.1 G/DL (ref 32–36)
MCV RBC AUTO: 105 FL (ref 82–98)
MONOCYTES # BLD AUTO: 0.7 K/UL (ref 0.3–1)
MONOCYTES NFR BLD: 20.1 % (ref 4–15)
NEUTROPHILS # BLD AUTO: 1.7 K/UL (ref 1.8–7.7)
NEUTROPHILS NFR BLD: 53.6 % (ref 38–73)
NRBC BLD-RTO: 0 /100 WBC
PHOSPHATE SERPL-MCNC: 2.2 MG/DL (ref 2.7–4.5)
PLATELET # BLD AUTO: 47 K/UL (ref 150–450)
PLATELET BLD QL SMEAR: ABNORMAL
PMV BLD AUTO: 10.4 FL (ref 9.2–12.9)
POTASSIUM SERPL-SCNC: 3.8 MMOL/L (ref 3.5–5.1)
PROT SERPL-MCNC: 5.2 G/DL (ref 6–8.4)
RBC # BLD AUTO: 3.5 M/UL (ref 4.6–6.2)
SODIUM SERPL-SCNC: 138 MMOL/L (ref 136–145)
WBC # BLD AUTO: 3.23 K/UL (ref 3.9–12.7)

## 2022-01-07 PROCEDURE — 99217 PR OBSERVATION CARE DISCHARGE: ICD-10-PCS | Mod: ,,, | Performed by: STUDENT IN AN ORGANIZED HEALTH CARE EDUCATION/TRAINING PROGRAM

## 2022-01-07 PROCEDURE — 25000003 PHARM REV CODE 250: Performed by: STUDENT IN AN ORGANIZED HEALTH CARE EDUCATION/TRAINING PROGRAM

## 2022-01-07 PROCEDURE — 85025 COMPLETE CBC W/AUTO DIFF WBC: CPT | Performed by: STUDENT IN AN ORGANIZED HEALTH CARE EDUCATION/TRAINING PROGRAM

## 2022-01-07 PROCEDURE — 63600175 PHARM REV CODE 636 W HCPCS: Performed by: STUDENT IN AN ORGANIZED HEALTH CARE EDUCATION/TRAINING PROGRAM

## 2022-01-07 PROCEDURE — 80053 COMPREHEN METABOLIC PANEL: CPT | Performed by: STUDENT IN AN ORGANIZED HEALTH CARE EDUCATION/TRAINING PROGRAM

## 2022-01-07 PROCEDURE — 83735 ASSAY OF MAGNESIUM: CPT | Performed by: STUDENT IN AN ORGANIZED HEALTH CARE EDUCATION/TRAINING PROGRAM

## 2022-01-07 PROCEDURE — 96366 THER/PROPH/DIAG IV INF ADDON: CPT

## 2022-01-07 PROCEDURE — 36415 COLL VENOUS BLD VENIPUNCTURE: CPT | Performed by: STUDENT IN AN ORGANIZED HEALTH CARE EDUCATION/TRAINING PROGRAM

## 2022-01-07 PROCEDURE — 87040 BLOOD CULTURE FOR BACTERIA: CPT | Mod: 59 | Performed by: STUDENT IN AN ORGANIZED HEALTH CARE EDUCATION/TRAINING PROGRAM

## 2022-01-07 PROCEDURE — 96367 TX/PROPH/DG ADDL SEQ IV INF: CPT

## 2022-01-07 PROCEDURE — 99900035 HC TECH TIME PER 15 MIN (STAT)

## 2022-01-07 PROCEDURE — G0378 HOSPITAL OBSERVATION PER HR: HCPCS

## 2022-01-07 PROCEDURE — 84100 ASSAY OF PHOSPHORUS: CPT | Performed by: STUDENT IN AN ORGANIZED HEALTH CARE EDUCATION/TRAINING PROGRAM

## 2022-01-07 PROCEDURE — 27000221 HC OXYGEN, UP TO 24 HOURS

## 2022-01-07 PROCEDURE — 99217 PR OBSERVATION CARE DISCHARGE: CPT | Mod: ,,, | Performed by: STUDENT IN AN ORGANIZED HEALTH CARE EDUCATION/TRAINING PROGRAM

## 2022-01-07 PROCEDURE — 94761 N-INVAS EAR/PLS OXIMETRY MLT: CPT

## 2022-01-07 RX ORDER — PREDNISONE 10 MG/1
10 TABLET ORAL DAILY
Start: 2022-01-07 | End: 2022-05-27 | Stop reason: SDUPTHER

## 2022-01-07 RX ORDER — SPIRONOLACTONE 50 MG/1
100 TABLET, FILM COATED ORAL DAILY
Qty: 60 TABLET | Refills: 6 | Status: SHIPPED | OUTPATIENT
Start: 2022-01-07 | End: 2022-01-27

## 2022-01-07 RX ORDER — PREDNISONE 10 MG/1
10 TABLET ORAL DAILY
Status: DISCONTINUED | OUTPATIENT
Start: 2022-01-08 | End: 2022-01-07 | Stop reason: HOSPADM

## 2022-01-07 RX ORDER — PANTOPRAZOLE SODIUM 40 MG/1
40 TABLET, DELAYED RELEASE ORAL DAILY
Status: DISCONTINUED | OUTPATIENT
Start: 2022-01-07 | End: 2022-01-07 | Stop reason: HOSPADM

## 2022-01-07 RX ORDER — FUROSEMIDE 40 MG/1
40 TABLET ORAL 2 TIMES DAILY
Status: DISCONTINUED | OUTPATIENT
Start: 2022-01-07 | End: 2022-01-07 | Stop reason: HOSPADM

## 2022-01-07 RX ORDER — FUROSEMIDE 40 MG/1
40 TABLET ORAL 2 TIMES DAILY
Qty: 180 TABLET | Refills: 3 | Status: SHIPPED | OUTPATIENT
Start: 2022-01-07 | End: 2022-01-27

## 2022-01-07 RX ORDER — MAGNESIUM SULFATE HEPTAHYDRATE 40 MG/ML
2 INJECTION, SOLUTION INTRAVENOUS ONCE
Status: COMPLETED | OUTPATIENT
Start: 2022-01-07 | End: 2022-01-07

## 2022-01-07 RX ORDER — HYDROXYZINE HYDROCHLORIDE 25 MG/1
25 TABLET, FILM COATED ORAL 3 TIMES DAILY PRN
Qty: 30 TABLET | Refills: 0 | Status: ON HOLD | OUTPATIENT
Start: 2022-01-07 | End: 2024-01-09

## 2022-01-07 RX ORDER — POTASSIUM CHLORIDE 20 MEQ/1
20 TABLET, EXTENDED RELEASE ORAL ONCE
Status: COMPLETED | OUTPATIENT
Start: 2022-01-07 | End: 2022-01-07

## 2022-01-07 RX ADMIN — POTASSIUM CHLORIDE 20 MEQ: 1500 TABLET, EXTENDED RELEASE ORAL at 10:01

## 2022-01-07 RX ADMIN — CETIRIZINE HYDROCHLORIDE 10 MG: 10 TABLET, FILM COATED ORAL at 08:01

## 2022-01-07 RX ADMIN — PANTOPRAZOLE SODIUM 40 MG: 40 TABLET, DELAYED RELEASE ORAL at 12:01

## 2022-01-07 RX ADMIN — PREDNISONE 20 MG: 20 TABLET ORAL at 08:01

## 2022-01-07 RX ADMIN — HYDROXYZINE HYDROCHLORIDE 25 MG: 25 TABLET ORAL at 09:01

## 2022-01-07 RX ADMIN — SPIRONOLACTONE 100 MG: 25 TABLET ORAL at 08:01

## 2022-01-07 RX ADMIN — FUROSEMIDE 40 MG: 40 TABLET ORAL at 08:01

## 2022-01-07 RX ADMIN — MAGNESIUM SULFATE 2 G: 2 INJECTION INTRAVENOUS at 10:01

## 2022-01-07 NOTE — PLAN OF CARE
Plan of care reviewed with the patient upon admission. The patient is alert and oriented. GCS 15. Denying complaints at this time. NAEON. Independent and ambulatory. Remained free from falls and injuries throughout shift. VSS. Bed in low locked position. Call bell and personal items within reach. Will continue to monitor.

## 2022-01-07 NOTE — PLAN OF CARE
Discharge instructions and prescriptions given and explained to pt.  Pt. verbalized understanding with no further questions.  Peripheral IV D/C'd with catheter tip intact.  VS WDL.  Patient is awaiting ride home from wife.      ROAD TEST  O=SpO2 967 on RA  A=Ambulating around room and hallway  D=IV D/C'd  T=Tolerating regular diet  E=Voids  S=Performs self care independently  T=Teaching on wounds care complete N/A

## 2022-01-07 NOTE — DISCHARGE SUMMARY
[facility] - Oncology ([facility])  [facility] Medicine  Discharge Summary      Patient Name: Ashwin Peter  MRN: [id number]  Patient Class: OP- Observation  Admission Date: 1/5/2022  Hospital Length of Stay: 0 days  Discharge Date and Time:  01/07/2022 4:22 PM  Attending Physician: Anni Rivera MD   Discharging Provider: Jeff Parra MD  Primary Care Provider: Elkin Guo III, MD      HPI:   Mr. Peter is 56 M with PMHx of HLD, anxiety, morbid obesity, HLD, liver cirrhosis, portal hypertension, sarcoidosis, thrombocytopenia, lymphedema, presenting with shortness of breath. Symptoms have gradually worsened over last 3 days with associated lower extremity edema. Has not taken home furosemide in 2 days. Patient reports feeling short of breath and anxious that he attributed to a panic attack during rehab session for [facility]. Reports having previous episode 1 day prior. Staff at facility felt he should present for further evaluation. 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      * No surgery found *      Hospital Course:   Patient admitted on 1/5 for volume overload likely secondary to cirrhosis and missing home lasix doses. TTE EF 65% with no DD, BNP not elevated. He was started on diuresis with lasix 40mg IV BID with good response. On 1/7, he was weaned off oxygen and transitioned to his home lasix dose. Course complicated by anxiety and reported panic attack prior to arrival; however, SOB likely secondary to volume overload with evidence of pulm edema. He was treated with PRN atarax. He was discharged home with pysch, PCP, and hepatology follow ups.     Also, 1/2 blood culture bottles grew GPCs. No signs or symptoms of infection. Likely contaminant. Repeat BCx drawn prior to dc      Vital Signs (Most Recent):  Temp: 97.8 °F (36.6 °C) (01/07/22 1546)  Pulse: 75 (01/07/22 1546)  Resp: 18 (01/07/22  1546)  BP: 139/65 (01/07/22 1546)  SpO2: 98 % (01/07/22 1546) Vital Signs (24h Range):  Temp:  [97.2 °F (36.2 °C)-98.7 °F (37.1 °C)] 97.8 °F (36.6 °C)  Pulse:  [68-76] 75  Resp:  [16-20] 18  SpO2:  [93 %-98 %] 98 %  BP: (115-154)/(56-75) 139/65     Weight: (!) 158.6 kg (349 lb 10.4 oz)  Body mass index is 48.77 kg/m².    Intake/Output Summary (Last 24 hours) at 1/7/2022 1549  Last data filed at 1/7/2022 1400  Gross per 24 hour   Intake 500 ml   Output --   Net 500 ml      Physical Exam  Vitals and nursing note reviewed.   Constitutional:       General: He is not in acute distress.     Appearance: He is obese. He is not diaphoretic.   HENT:      Head: Normocephalic and atraumatic.      Nose: Nose normal.      Mouth/Throat:      Mouth: Mucous membranes are moist.      Pharynx: No oropharyngeal exudate or posterior oropharyngeal erythema.   Eyes:      General: No scleral icterus.     Extraocular Movements: Extraocular movements intact.      Conjunctiva/sclera: Conjunctivae normal.      Pupils: Pupils are equal, round, and reactive to light.   Cardiovascular:      Rate and Rhythm: Normal rate and regular rhythm.      Pulses: Normal pulses.      Heart sounds: Normal heart sounds. No murmur heard.  No friction rub. No gallop.    Pulmonary:      Effort: Pulmonary effort is normal.      Breath sounds: Normal breath sounds. No wheezing or rhonchi.   Abdominal:      General: Bowel sounds are normal. There is no distension.      Palpations: Abdomen is soft.   Musculoskeletal:         General: Normal range of motion.      Cervical back: Normal range of motion.      Right lower leg: 3+ Edema present.      Left lower leg: 3+ Edema present.      Comments: 2+ BLE edema   Skin:     General: Skin is warm and dry.      Capillary Refill: Capillary refill takes less than 2 seconds.      Findings: No bruising.   Neurological:      General: No focal deficit present.      Mental Status: He is alert and oriented to person, place, and time.    Psychiatric:         Mood and Affect: Mood normal.         Behavior: Behavior normal.         Thought Content: Thought content normal.         Judgment: Judgment normal.         Goals of Care Treatment Preferences:  Code Status: Full Code    Health care agent: Daysi Peter  Health care agent number:                    Consults:     * Acute on chronic respiratory failure with hypoxia  Patient with Hypoxic Respiratory failure which is Acute on chronic.  he is on home oxygen at 2 LPM. Supplemental oxygen was provided and noted-  .   Signs/symptoms of respiratory failure include- increased work of breathing. Contributing diagnoses includes - CHF, Pneumonia, Pulmonary Embolus and LAW Labs and images were reviewed. Patient Has not had a recent ABG. Will treat underlying causes and adjust management of respiratory failure as follows    CTA Chest negative for PE or right heart strain in patient tachycardic on arrival to ED. Tachycardia likely related to anxiety.   CXR suggestive of pulmonary edema   Hypervolemic on exam    Plan  -Started on broad spectrum abx in ED given hypoxia, elevated procal. 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 Repeat EGD in 04/2022    MELD-Na score: 14 at 7/30/2021  7:23 AM  MELD score: 14 at 7/30/2021  7:23 AM  Calculated from:  Serum Creatinine: 0.9 mg/dL (Using min of 1 mg/dL) at 7/30/2021  7:23 AM  Serum Sodium: 138 mmol/L (Using max of 137 mmol/L) at 7/30/2021  7:23 AM  Total Bilirubin: 4.0 mg/dL at 7/30/2021  7:23 AM  INR(ratio): 1.2 at 7/30/2021  7:23 AM  Age: 55  years    PLAN  - Continue lasix, spironolactone   - Avoid hepatotoxic agents  - Monitor mental status  - CMP, INR daily  - Na restriction < 2g        Thrombocytopenia  PLT 60 on admission   Antiplatelet/anticoagulation: none   No over signs of bleeding on exam      Plan:  - CBCs daily   - Transfuse if PLT<50 with bleeding or PLT<10 without         Sarcoidosis of lung with sarcoidosis of lymph nodes  Diiagnosed via mediastinal lymph node biopsy 2012.   Has overall been asymptomatic with normal CXR and normal PFTs.   Followed by pulmonology (last seen 8/2021)  Home medications: Breo, prednisone 20mg qd, claritin, fluticasone  On supplemental oxygen at home    Plan:   -Continue home spironolactone  -Lasix IV for aggressive diuresis         Final Active Diagnoses:    Diagnosis Date Noted POA    PRINCIPAL PROBLEM:  Acute on chronic respiratory failure with hypoxia [J96.21] 01/06/2022 Unknown    Liver cirrhosis secondary to GUAJARDO [K75.81, K74.60] 04/08/2021 Yes    Thrombocytopenia [D69.6] 12/22/2020 Yes    Morbid obesity with BMI of 45.0-49.9, adult [E66.01, Z68.42]  Not Applicable    Sarcoidosis of lung with sarcoidosis of lymph nodes [D86.2]  Yes      Problems Resolved During this Admission:       Discharged Condition: stable    Disposition: Home or Self Care    Follow Up:   Follow-up Information     Elkin Guo III, MD On 1/18/2022.    Specialty: Internal Medicine  Why: at 10:00 AM; PCP hospital follow up appointment  Contact information:  [address]  [place] [address]  [phone]                       Patient Instructions:      Ambulatory referral/consult to Internal Medicine   Standing Status: Future   Referral Priority: Routine Referral Type: Consultation   Referral Reason: Specialty Services Required   Requested Specialty: Internal Medicine   Number of Visits Requested: 1     Ambulatory referral/consult to Hepatology   Standing Status: Future   Referral Priority: Routine Referral Type: Consultation    Referral Reason: Specialty Services Required   Requested Specialty: Hepatology   Number of Visits Requested: 1     Ambulatory referral/consult to Psychiatry   Standing Status: Future   Referral Priority: Routine Referral Type: Psychiatric   Referral Reason: Specialty Services Required   Requested Specialty: Psychiatry   Number of Visits Requested: 1           Pending Diagnostic Studies:     None         Medications:  Reconciled Home Medications:      Medication List      START taking these medications    hydrOXYzine HCL 25 MG tablet  Commonly known as: ATARAX  Take 1 tablet (25 mg total) by mouth 3 (three) times daily as needed for Anxiety.        CHANGE how you take these medications    furosemide 40 MG tablet  Commonly known as: LASIX  Take 1 tablet (40 mg total) by mouth 2 (two) times a day.  What changed:   · how much to take  · how to take this  · when to take this  · additional instructions     mupirocin 2 % ointment  Commonly known as: BACTROBAN  Apply topically 3 (three) times daily.  What changed:   · when to take this  · reasons to take this     spironolactone 50 MG tablet  Commonly known as: ALDACTONE  Take 2 tablets (100 mg total) by mouth once daily. DOSE CHANGE  What changed:   · how much to take  · additional instructions        CONTINUE taking these medications    acetaminophen 500 MG tablet  Commonly known as: TYLENOL  Take 3 tablets by mouth daily as needed for knee pain or headache     BREO ELLIPTA 200-25 mcg/dose Dsdv diskus inhaler  Generic drug: fluticasone furoate-vilanteroL  Inhale 1 puff into the lungs once daily. Controller     diclofenac sodium 1 % Gel  Commonly known as: VOLTAREN  Apply topically twice daily as needed for knee pain     fluticasone propionate 50 mcg/actuation nasal spray  Commonly known as: FLONASE  1 spray (50 mcg total) by Each Nostril route once daily.     loratadine 10 mg tablet  Commonly known as: CLARITIN  Take 1 tablet (10 mg total) by mouth once daily.      pantoprazole 40 MG tablet  Commonly known as: PROTONIX  Take 1 tablet (40 mg total) by mouth once daily.     predniSONE 10 MG tablet  Commonly known as: DELTASONE  Take 1 tablet (10 mg total) by mouth once daily.     SINEX REGULAR NASL  2 sprays by Each Nostril route once daily.            Indwelling Lines/Drains at time of discharge:   Lines/Drains/Airways     None                 Time spent on the discharge of patient: 35 minutes         Jeff , MD  Department of Hospital Medicine  [facility] - Oncology ([facility])

## 2022-01-07 NOTE — PROGRESS NOTES
Home Oxygen Evaluation    Date Performed: 2022    1) Patient's Home O2 Sat on room air, while at rest: 94        If O2 sats on room air at rest are 88% or below, patient qualifies. No additional testing needed. Document N/A in steps 2 and 3. If 89% or above, complete steps 2.      2) Patient's O2 Sat on room air while exercisin        If O2 sats on room air while exercising remain 89% or above patient does not qualify, no further testing needed Document N/A in step 3. If O2 sats on room air while exercising are 88% or below, continue to step 3.      3) Patient's O2 Sat while exercising on O2:  at  LPM         (Must show improvement from #2 for patients to qualify)    If O2 sats improve on oxygen, patient qualifies for portable oxygen. If not, the patient does not qualify.

## 2022-01-07 NOTE — PLAN OF CARE
[facility] - Oncology (Hospital)  Initial Discharge Assessment       Primary Care Provider: Elkin Guo III, MD    Admission Diagnosis: Shortness of breath [R06.02]  Acute on chronic diastolic heart failure [I50.33]  Tachycardia [R00.0]  Chest pain [R07.9]  Fever, unspecified fever cause [R50.9]    Admission Date: 1/5/2022  Expected Discharge Date: 1/7/2022    [facility] [place] / Plan: [employer] [facility] / Product Type: PPO /     Extended Emergency Contact Information  Primary Emergency Contact: Daysi Peter  Address: [address] [place] [address] [place]  Mobile Phone: [phone]  Relation: Spouse    Discharge Plan A: Home with family  Discharge Plan B: Home Health      [id number] - [place], [place] - [address] AT [facility] [address]  [address] [place] [address]  Phone: [phone] Fax: [phone]      Initial Assessment (most recent)       Adult Discharge Assessment - 01/07/22 1130          Discharge Assessment    Assessment Type Discharge Planning Assessment     Confirmed/corrected address, phone number and insurance Yes     Confirmed Demographics Correct on Facesheet     Source of Information patient     Communicated ALAINA with patient/caregiver Yes     Lives With spouse   Daysi Peter ([phone])    Do you expect to return to your current living situation? Yes     Do you have help at home or someone to help you manage your care at home? Yes     Prior to hospitilization cognitive status: Alert/Oriented     Current cognitive status: Alert/Oriented     Walking or Climbing Stairs Difficulty none     Dressing/Bathing Difficulty none     Equipment Currently Used at Home CPAP     Readmission within 30 days? No     Patient currently being followed by outpatient case management? No     Do you currently have service(s) that help you manage your care at home? No     Do you take prescription medications? Yes      Do you have prescription coverage? Yes     Do you have any problems affording any of your prescribed medications? No     Is the patient taking medications as prescribed? yes     How do you get to doctors appointments? car, drives self     Are you on dialysis? No     Do you take coumadin? No     Discharge Plan A Home with family     Discharge Plan B Home Health     DME Needed Upon Discharge  none     Discharge Plan discussed with: Patient                     Patient resting quietly in bed when CM rounded. No family present. Patient was admitted with acute respiratory failure with hypoxia. Pox 96% on RA this AM.     Patient lives with his spouse, Daysi Peter, is independent of all ADLs, & denied the need for assistance with transportation at time of discharge.     Hospital follow up appointment scheduled for the patient with Dr. Sergio Guo (PCP) on 1/18/2022 at 1000.    Patient stated that he is currently participating in out-pt rehab at [facility]. CM informed the patient that he will not need new orders to resume services & can call to scheduled his next appointment. Patient verbalized understanding.       Will continue to follow.

## 2022-01-08 NOTE — PLAN OF CARE
[facility] - Oncology (Hospital)  Discharge Final Note    Primary Care Provider: Elkin Guo III, MD    Expected Discharge Date: 1/7/2022    Final Discharge Note (most recent)       Final Note - 01/08/22 0729          Final Note    Assessment Type Final Discharge Note (P)      Anticipated Discharge Disposition Home or Self Care (P)                      Important Message from Medicare             Contact Info       Elkin Guo III, MD   Specialty: Internal Medicine   Relationship: PCP - General    [address]  [place] [address]   Phone: [phone]       Next Steps: Follow up on 1/18/2022    Instructions: at 10:00 AM; PCP hospital follow up appointment

## 2022-01-09 LAB
BACTERIA BLD CULT: ABNORMAL

## 2022-01-10 ENCOUNTER — TELEPHONE (OUTPATIENT)
Dept: HEPATOLOGY | Facility: CLINIC | Age: 57
End: 2022-01-10
Payer: OTHER GOVERNMENT

## 2022-01-10 ENCOUNTER — PATIENT MESSAGE (OUTPATIENT)
Dept: REHABILITATION | Facility: HOSPITAL | Age: 57
End: 2022-01-10
Payer: OTHER GOVERNMENT

## 2022-01-10 NOTE — TELEPHONE ENCOUNTER
----- Message from Toña Jorgensen sent at 1/8/2022  3:47 PM CST -----  Regarding: DISCHARGE  EP of DR Crowder discharged with hepatology follow up.

## 2022-01-11 LAB — BACTERIA BLD CULT: NORMAL

## 2022-01-12 ENCOUNTER — PATIENT MESSAGE (OUTPATIENT)
Dept: PULMONOLOGY | Facility: CLINIC | Age: 57
End: 2022-01-12
Payer: OTHER GOVERNMENT

## 2022-01-12 LAB
BACTERIA BLD CULT: NORMAL
BACTERIA BLD CULT: NORMAL

## 2022-01-17 DIAGNOSIS — D86.2 SARCOIDOSIS OF LUNG WITH SARCOIDOSIS OF LYMPH NODES: ICD-10-CM

## 2022-01-26 ENCOUNTER — PATIENT OUTREACH (OUTPATIENT)
Dept: ADMINISTRATIVE | Facility: OTHER | Age: 57
End: 2022-01-26
Payer: OTHER GOVERNMENT

## 2022-01-26 NOTE — PROGRESS NOTES
Health Maintenance Due   Topic Date Due    Shingles Vaccine (1 of 2) Never done    COVID-19 Vaccine (3 - Booster) 08/02/2021     Updates were requested from care everywhere.  Chart was reviewed for overdue Proactive [facility] Encounters ([facility]) topics (CRS, Breast Cancer Screening, Eye exam)  Health Maintenance has been updated.  LINKS immunization registry triggered.  Immunizations were reconciled.

## 2022-01-27 ENCOUNTER — OFFICE VISIT (OUTPATIENT)
Dept: HEPATOLOGY | Facility: CLINIC | Age: 57
End: 2022-01-27
Payer: OTHER GOVERNMENT

## 2022-01-27 VITALS
RESPIRATION RATE: 18 BRPM | WEIGHT: 315 LBS | BODY MASS INDEX: 44.1 KG/M2 | HEART RATE: 76 BPM | TEMPERATURE: 98 F | DIASTOLIC BLOOD PRESSURE: 65 MMHG | SYSTOLIC BLOOD PRESSURE: 142 MMHG | OXYGEN SATURATION: 94 % | HEIGHT: 71 IN

## 2022-01-27 DIAGNOSIS — R60.0 BILATERAL LOWER EXTREMITY EDEMA: ICD-10-CM

## 2022-01-27 DIAGNOSIS — I85.10 SECONDARY ESOPHAGEAL VARICES WITHOUT BLEEDING: ICD-10-CM

## 2022-01-27 DIAGNOSIS — K74.60 LIVER CIRRHOSIS SECONDARY TO NASH: Primary | ICD-10-CM

## 2022-01-27 DIAGNOSIS — K75.81 LIVER CIRRHOSIS SECONDARY TO NASH: Primary | ICD-10-CM

## 2022-01-27 DIAGNOSIS — R60.0 LEG EDEMA: ICD-10-CM

## 2022-01-27 PROCEDURE — 99215 PR OFFICE/OUTPT VISIT, EST, LEVL V, 40-54 MIN: ICD-10-PCS | Mod: S$GLB,,, | Performed by: STUDENT IN AN ORGANIZED HEALTH CARE EDUCATION/TRAINING PROGRAM

## 2022-01-27 PROCEDURE — 99215 OFFICE O/P EST HI 40 MIN: CPT | Mod: S$GLB,,, | Performed by: STUDENT IN AN ORGANIZED HEALTH CARE EDUCATION/TRAINING PROGRAM

## 2022-01-27 PROCEDURE — 99999 PR PBB SHADOW E&M-EST. PATIENT-LVL IV: ICD-10-PCS | Mod: PBBFAC,,, | Performed by: STUDENT IN AN ORGANIZED HEALTH CARE EDUCATION/TRAINING PROGRAM

## 2022-01-27 PROCEDURE — 99999 PR PBB SHADOW E&M-EST. PATIENT-LVL IV: CPT | Mod: PBBFAC,,, | Performed by: STUDENT IN AN ORGANIZED HEALTH CARE EDUCATION/TRAINING PROGRAM

## 2022-01-27 RX ORDER — FUROSEMIDE 40 MG/1
60 TABLET ORAL DAILY
Qty: 45 TABLET | Refills: 11 | Status: ON HOLD | OUTPATIENT
Start: 2022-01-27 | End: 2022-07-25 | Stop reason: SDUPTHER

## 2022-01-27 RX ORDER — SPIRONOLACTONE 100 MG/1
100 TABLET, FILM COATED ORAL DAILY
Qty: 30 TABLET | Refills: 11 | Status: SHIPPED | OUTPATIENT
Start: 2022-01-27 | End: 2022-08-04 | Stop reason: SDUPTHER

## 2022-01-27 NOTE — LETTER
January 27, 2022        Sergio Guo III, MD  [address]  [place] [address]             Hepatology Clinic - [facility]  [address] [facility] [address]  Phone: [phone]   Patient: Ashwin Peter   MR Number: [id number]   YOB: 1965   Date of Visit: 1/27/2022       Dear Dr. Guo:    I saw your patient, Ashwin Peter, today for evaluation. Attached you will find relevant portions of my assessment and plan of care.       If you have questions, please do not hesitate to call me. I look forward to following Ashwin Peter along with you.    Sincerely,      Daniel Crowder MD            CC  No Recipients    Enclosure

## 2022-01-27 NOTE — PROGRESS NOTES
Hepatology Follow Up Note    Referring provider: No ref. provider found  PCP: Elkin Guo III, MD    Chief complaint: follow up GUAJARDO cirrhosis    HPI:  Ashwin Peter is a 56 y.o. male with history of GUAJARDO related cirrhosis who presents for scheduled follow up.    1/27/22: He was recently admitted to the hospital for acute hypoxic respiratory failure and volume overload. He briefly required supplemental O2 but was discharged on room air. He was discharged on Lasix 40 mg BID and aldactone 100 mg daily. He admits to stable lower extremity edema despite adherence with diuretics and a low-salt diet. He otherwise denies any other signs of decompensated cirrhosis including recent encephalopathy or GI bleeding.     5/3/21:  He is without complaints today.  He has not had recent hospitalizations or ED visits.  He was found to have elevated LFTs in December 2020 with both elevated bilirubin and transaminases.  He does report getting a sinus infection in October 2020 that was treated with a steroid injection and amoxicillin.  He was also found to have COVID in December 2020.  He reports compliance with diuretics without recent abdominal distension. He denies other signs of decompensated cirrhosis including no recent  encephalopathy or GI bleeding.    Past Medical History:   Diagnosis Date    Anxiety     Hyperlipidemia     Morbid obesity with BMI of 45.0-49.9, adult     Multiple pulmonary nodules     Obesity     Other cirrhosis of liver 1/13/2021    Portal hypertension 2/11/2021    Sarcoidosis of lung with sarcoidosis of lymph nodes     Splenomegaly 12/22/2020    Thrombocytopenia 12/22/2020       Past Surgical History:   Procedure Laterality Date    ANTERIOR CRUCIATE LIGAMENT REPAIR  2007    right knee    COLONOSCOPY N/A 4/8/2021    Procedure: COLONOSCOPY;  Surgeon: Jeff Olvera MD;  Location: [facility] ([address] [place] [address]);  Service: Endoscopy;  Laterality: N/A;  rectal bleeding,   2nd floor BMI 50 (354 lbs)  COVID  test on 4/5/21 at [facility]    ESOPHAGOGASTRODUODENOSCOPY N/A 4/8/2021    Procedure: EGD (ESOPHAGOGASTRODUODENOSCOPY);  Surgeon: Jeff Olvera MD;  Location: [facility] ([address] [place] [address]);  Service: Endoscopy;  Laterality: N/A;  variceal screening/ labs the am of procedure  2nd floor BMI 50 (354 lbs)    LYMPHADENECTOMY  1985    MENISCECTOMY      left knee    NASAL SEPTUM SURGERY  1985    WISDOM TOOTH EXTRACTION         Family History   Problem Relation Age of Onset    Hypertension Father     Heart attack Father 55    Diabetes Paternal Grandmother     Aneurysm Mother         eye    Dementia Mother     Colon cancer Neg Hx     Prostate cancer Neg Hx     Sarcoidosis Neg Hx        Social History     Tobacco Use    Smoking status: Never Smoker    Smokeless tobacco: Never Used   Substance Use Topics    Alcohol use: No    Drug use: No       Current Outpatient Medications   Medication Sig Dispense Refill    acetaminophen (TYLENOL) 500 MG tablet Take 3 tablets by mouth daily as needed for knee pain or headache      diclofenac sodium (VOLTAREN) 1 % Gel Apply topically twice daily as needed for knee pain      fluticasone propionate (FLONASE) 50 mcg/actuation nasal spray 1 spray (50 mcg total) by Each Nostril route once daily. 16 g 3    hydrOXYzine HCL (ATARAX) 25 MG tablet Take 1 tablet (25 mg total) by mouth 3 (three) times daily as needed for Anxiety. 30 tablet 0    loratadine (CLARITIN) 10 mg tablet Take 1 tablet (10 mg total) by mouth once daily. 90 tablet 3    mupirocin (BACTROBAN) 2 % ointment Apply topically 3 (three) times daily. (Patient taking differently: Apply topically 3 (three) times daily as needed (skin infection).) 22 g 3    pantoprazole (PROTONIX) 40 MG tablet Take 1 tablet (40 mg total) by mouth once daily. 30 tablet 5    phenylephrine HCl (SINEX REGULAR NASL) 2 sprays by Each Nostril route once daily.      predniSONE (DELTASONE) 10 MG tablet Take 1 tablet (10 mg total) by mouth once daily.  "     fluticasone furoate-vilanteroL (BREO ELLIPTA) 200-25 mcg/dose DsDv diskus inhaler Inhale 1 puff into the lungs once daily. Controller (Patient not taking: Reported on 1/27/2022) 1 each 5    furosemide (LASIX) 40 MG tablet Take 1.5 tablets (60 mg total) by mouth once daily. 45 tablet 11    spironolactone (ALDACTONE) 100 MG tablet Take 1 tablet (100 mg total) by mouth once daily. DOSE CHANGE 30 tablet 11     No current facility-administered medications for this visit.       Review of patient's allergies indicates:  No Known Allergies    Review of Systems   Constitutional: Negative for fever and weight loss.   Cardiovascular: Positive for leg swelling.   Gastrointestinal: Negative for abdominal pain, blood in stool, constipation, diarrhea, heartburn, melena, nausea and vomiting.       Vitals:    01/27/22 0845   BP: (!) 142/65   Pulse: 76   Resp: 18   Temp: 97.6 °F (36.4 °C)   TempSrc: Oral   SpO2: (!) 94%   Weight: (!) 170.2 kg (375 lb 3.6 oz)   Height: 5' 11" (1.803 m)       Physical Exam  Constitutional:       General: He is not in acute distress.     Appearance: He is not ill-appearing.   HENT:      Head: Normocephalic and atraumatic.   Eyes:      General: No scleral icterus.     Extraocular Movements: Extraocular movements intact.   Cardiovascular:      Rate and Rhythm: Normal rate and regular rhythm.   Pulmonary:      Effort: No respiratory distress.   Abdominal:      General: Bowel sounds are normal. There is no distension.      Palpations: Abdomen is soft.      Tenderness: There is no abdominal tenderness. There is no guarding.   Musculoskeletal:      Right lower leg: Edema present.      Left lower leg: Edema present.   Neurological:      Mental Status: He is alert.         LABS: I personally reviewed pertinent laboratory findings.    Lab Results   Component Value Date    WBC 3.23 (L) 01/07/2022    HGB 12.2 (L) 01/07/2022    HCT 36.9 (L) 01/07/2022     (H) 01/07/2022    PLT 47 (L) 01/07/2022 "       Lab Results   Component Value Date     01/07/2022    K 3.8 01/07/2022     01/07/2022    CO2 26 01/07/2022    BUN 18 01/07/2022    CREATININE 0.8 01/07/2022    CALCIUM 7.8 (L) 01/07/2022    ANIONGAP 4 (L) 01/07/2022    ESTGFRAFRICA >60.0 01/07/2022    EGFRNONAA >60.0 01/07/2022       Lab Results   Component Value Date    ALT 36 01/07/2022    AST 68 (H) 01/07/2022    ALKPHOS 74 01/07/2022    BILITOT 3.4 (H) 01/07/2022       Lab Results   Component Value Date    HEPAIGM Negative 12/22/2020    HEPBIGM Negative 12/22/2020    HEPBCAB Negative 12/22/2020    HEPCAB Negative 12/22/2020       Lab Results   Component Value Date    SMOOTHMUSCAB Negative 1:40 12/22/2020    CERULOPLSM 26.0 12/22/2020        MELD-Na score: 14 at 7/30/2021  7:23 AM  MELD score: 14 at 7/30/2021  7:23 AM  Calculated from:  Serum Creatinine: 0.9 mg/dL (Using min of 1 mg/dL) at 7/30/2021  7:23 AM  Serum Sodium: 138 mmol/L (Using max of 137 mmol/L) at 7/30/2021  7:23 AM  Total Bilirubin: 4.0 mg/dL at 7/30/2021  7:23 AM  INR(ratio): 1.2 at 7/30/2021  7:23 AM  Age: 55 years     IMAGING: I personally reviewed imaging studies.      Assessment:  56 y.o. male with GUAJARDO related cirrhosis. MELD-Na 14.    1. Leg edema    2. Bilateral lower extremity edema         Recommendations:  1. Cirrhosis due to GUAJARDO  - Patient counseled on diet, weight loss, and control of co-morbidities. Encouraged to lose 10% of his body weight over the next 12 months.    2. Ascites/Edema: 2 gm Na diet.  Increase Lasix to 60 mg daily and spironolactone to 150 mg daily. Encouraged to discuss referral to Cardiology with PCP given elevated CVP on TTE.     3. Encephalopathy: Not an active issue.    4. Transplant candidacy: MELD 14 primarily driven by elevated bilirubin. He otherwise has no significant complications from his cirrhosis. Some suspicion that his hyperbilirubinemia may be due to congestion related to his cardiac disease. Will closely monitor MELD and consider  transplant evaluation if it worsens or he decompensates further.    Cirrhosis HM  - HCC screening: US in 04/2021 without HCC. AFP 3.1. Given hyperbilirubinemia, will obtain MRI/MRCP in addition to repeating AFP.    - Variceal screening: EGD in 04/2021 showed grade I varices. Repeat EGD ordered for 04/2022.    - Immunizations: Recommend HAV and HBV vaccinations if not immune.    - CRC screening: Colonoscopy 04/2021 with polypectomy x1. Repeat colonoscopy in 04/2026.    Labs monthly. Return to clinic in 3 months with labs.    Case discussed with Dr. Crowder.     Marcellus Bowie MD  [employer] [facility] Fellow, PGY-4

## 2022-01-27 NOTE — PROGRESS NOTES
I have personally performed a face to face diagnostic evaluation on this patient. I have reviewed and agree with today's findings and the care plan outlined by Dr. Bowie.    My findings are as follows:  56-year-old male with GUAJARDO related cirrhosis.  He is volume overloaded with lower extremity edema that has no known history of ascites. No recent abdominal distension, encephalopathy, jaundice, GI bleeding. Recently admitted with volume overload requiring IV diuresis.  His Lasix was doubled at discharge, and he was continued on same dose spironolactone.  Despite this, he has not noticed significant improvement in his edema.  Recent echocardiogram showed normal LV systolic and diastolic function. However, he had elevated CVP and PA systolic pressure so some concern for underling cardiopulmonary disease.  Will change diuretic regimen to Lasix 60 mg daily and spironolactone 150 mg daily and repeat labs in 1-2 weeks.  He is overdue for HCC screening, so will arrange ultrasound and AFP.  EGD 04/2021 showed grade 1 esophageal varices.  Repeat EGD in 04/2022.  Ordered today.    Return to clinic in 6 months with labs and US. Return sooner if needed.    I spent a total of 40 minutes on the day of the visit. This includes face to face time and non-face to face time preparing to see the patient (eg, review of tests), obtaining and/or reviewing separately obtained history, documenting clinical information in the electronic or other health record, independently interpreting results, and communicating results to the patient/family/caregiver, or care coordination.

## 2022-02-21 ENCOUNTER — DOCUMENTATION ONLY (OUTPATIENT)
Dept: REHABILITATION | Facility: HOSPITAL | Age: 57
End: 2022-02-21
Payer: OTHER GOVERNMENT

## 2022-02-21 DIAGNOSIS — M79.89 SWELLING OF LOWER EXTREMITY: ICD-10-CM

## 2022-02-21 DIAGNOSIS — D86.2 SARCOIDOSIS OF LUNG WITH SARCOIDOSIS OF LYMPH NODES: ICD-10-CM

## 2022-02-21 DIAGNOSIS — E66.01 MORBID OBESITY WITH BMI OF 45.0-49.9, ADULT: ICD-10-CM

## 2022-02-21 DIAGNOSIS — R60.0 BILATERAL LOWER EXTREMITY EDEMA: ICD-10-CM

## 2022-02-21 DIAGNOSIS — I89.0 ACQUIRED LYMPHEDEMA OF LOWER EXTREMITY: Primary | ICD-10-CM

## 2022-02-21 NOTE — PROGRESS NOTES
[facility]  Discharge Note    Name: Ashwin Peter  Clinic Number: [id number]    Therapy Diagnosis:   Encounter Diagnoses   Name Primary?    Acquired lymphedema of lower extremity Yes    Sarcoidosis of lung with sarcoidosis of lymph nodes     Morbid obesity with BMI of 45.0-49.9, adult     Bilateral lower extremity edema     Swelling of lower extremity      Physician:Andrew Dunham MD  Physician Orders: PT Eval and Treat - lymphedema  Medical Diagnosis from Referral:   Diagnosis Description   R60.0 (ICD-10-CM) - Leg edema      Evaluation Date: 11/8/2021  Authorization Period Expiration: 12/31/22  Plan of Care Expiration: 1/31/22  Visit # / Visits authorized: 2/20    Date of Last visit: 1/5/2022  Total Visits Received: 2    ASSESSMENT      Pt was evaluated 11/8/22, cancelled 1/3/22, and on visit 1/5/22 was transferred from therapy to ED due to SOB, cough, fever, and found to have fluid volume overload.  This was change in medical status and would require medical clearance for return to therapy.   Pt has not contacted dept to resume or reschedule or new MD orders to resume.    Discharge reason: Patient is medically unstable, Patient has been hospitalized, Patient has not attended therapy since 1/5/22 and Other:  Medical change in status    Discharge FOTO Score: na    Goals: not addressed due to lack of attendance    PLAN   This patient is discharged from Physical Therapy      Maryan Justice, PT

## 2022-02-22 ENCOUNTER — TELEPHONE (OUTPATIENT)
Dept: PULMONOLOGY | Facility: CLINIC | Age: 57
End: 2022-02-22
Payer: OTHER GOVERNMENT

## 2022-02-22 NOTE — TELEPHONE ENCOUNTER
LVM for patient to return my call. I was calling to follow up with patient to see if Mr Peter is ready to schedule follow up with Dr Curran. Patient to call back to discuss.

## 2022-03-29 ENCOUNTER — TELEPHONE (OUTPATIENT)
Dept: HEPATOLOGY | Facility: CLINIC | Age: 57
End: 2022-03-29
Payer: OTHER GOVERNMENT

## 2022-03-29 ENCOUNTER — HOSPITAL ENCOUNTER (EMERGENCY)
Facility: HOSPITAL | Age: 57
Discharge: HOME | End: 2022-03-29
Attending: EMERGENCY MEDICINE
Payer: OTHER GOVERNMENT

## 2022-03-29 VITALS
TEMPERATURE: 98 F | DIASTOLIC BLOOD PRESSURE: 55 MMHG | BODY MASS INDEX: 44.1 KG/M2 | HEART RATE: 82 BPM | HEIGHT: 71 IN | RESPIRATION RATE: 18 BRPM | OXYGEN SATURATION: 98 % | SYSTOLIC BLOOD PRESSURE: 129 MMHG | WEIGHT: 315 LBS

## 2022-03-29 DIAGNOSIS — R05.9 COUGH: ICD-10-CM

## 2022-03-29 DIAGNOSIS — K92.1 MELENA: ICD-10-CM

## 2022-03-29 DIAGNOSIS — N30.01 ACUTE CYSTITIS WITH HEMATURIA: Primary | ICD-10-CM

## 2022-03-29 DIAGNOSIS — K92.2 UPPER GI BLEED: ICD-10-CM

## 2022-03-29 DIAGNOSIS — R11.10 VOMITING: ICD-10-CM

## 2022-03-29 LAB
ABO + RH BLD: NORMAL
ALBUMIN SERPL BCP-MCNC: 1.7 G/DL (ref 3.5–5.2)
ALP SERPL-CCNC: 74 U/L (ref 55–135)
ALT SERPL W/O P-5'-P-CCNC: 27 U/L (ref 10–44)
AMMONIA PLAS-SCNC: 64 UMOL/L (ref 10–50)
ANION GAP SERPL CALC-SCNC: 6 MMOL/L (ref 8–16)
AST SERPL-CCNC: 49 U/L (ref 10–40)
BACTERIA #/AREA URNS AUTO: ABNORMAL /HPF
BASOPHILS # BLD AUTO: 0.06 K/UL (ref 0–0.2)
BASOPHILS NFR BLD: 0.6 % (ref 0–1.9)
BILIRUB SERPL-MCNC: 6.1 MG/DL (ref 0.1–1)
BILIRUB UR QL STRIP: NEGATIVE
BLD GP AB SCN CELLS X3 SERPL QL: NORMAL
BUN SERPL-MCNC: 24 MG/DL (ref 6–20)
CALCIUM SERPL-MCNC: 7.7 MG/DL (ref 8.7–10.5)
CHLORIDE SERPL-SCNC: 108 MMOL/L (ref 95–110)
CLARITY UR REFRACT.AUTO: ABNORMAL
CO2 SERPL-SCNC: 22 MMOL/L (ref 23–29)
COLOR UR AUTO: ABNORMAL
CREAT SERPL-MCNC: 0.8 MG/DL (ref 0.5–1.4)
DIFFERENTIAL METHOD: ABNORMAL
EOSINOPHIL # BLD AUTO: 0.2 K/UL (ref 0–0.5)
EOSINOPHIL NFR BLD: 1.6 % (ref 0–8)
ERYTHROCYTE [DISTWIDTH] IN BLOOD BY AUTOMATED COUNT: 14.8 % (ref 11.5–14.5)
EST. GFR  (AFRICAN AMERICAN): >60 ML/MIN/1.73 M^2
EST. GFR  (NON AFRICAN AMERICAN): >60 ML/MIN/1.73 M^2
GLUCOSE SERPL-MCNC: 83 MG/DL (ref 70–110)
GLUCOSE UR QL STRIP: NEGATIVE
HCT VFR BLD AUTO: 34.4 % (ref 40–54)
HGB BLD-MCNC: 11.2 G/DL (ref 14–18)
HGB UR QL STRIP: ABNORMAL
IMM GRANULOCYTES # BLD AUTO: 0.07 K/UL (ref 0–0.04)
IMM GRANULOCYTES NFR BLD AUTO: 0.7 % (ref 0–0.5)
INR PPP: 1.4 (ref 0.8–1.2)
KETONES UR QL STRIP: NEGATIVE
LEUKOCYTE ESTERASE UR QL STRIP: ABNORMAL
LYMPHOCYTES # BLD AUTO: 1.6 K/UL (ref 1–4.8)
LYMPHOCYTES NFR BLD: 16.2 % (ref 18–48)
MAGNESIUM SERPL-MCNC: 1.5 MG/DL (ref 1.6–2.6)
MCH RBC QN AUTO: 34.6 PG (ref 27–31)
MCHC RBC AUTO-ENTMCNC: 32.6 G/DL (ref 32–36)
MCV RBC AUTO: 106 FL (ref 82–98)
MICROSCOPIC COMMENT: ABNORMAL
MONOCYTES # BLD AUTO: 0.8 K/UL (ref 0.3–1)
MONOCYTES NFR BLD: 7.9 % (ref 4–15)
NEUTROPHILS # BLD AUTO: 7.1 K/UL (ref 1.8–7.7)
NEUTROPHILS NFR BLD: 73 % (ref 38–73)
NITRITE UR QL STRIP: POSITIVE
NRBC BLD-RTO: 0 /100 WBC
PH UR STRIP: 7 [PH] (ref 5–8)
PHOSPHATE SERPL-MCNC: 2.4 MG/DL (ref 2.7–4.5)
PLATELET # BLD AUTO: 102 K/UL (ref 150–450)
PMV BLD AUTO: 11.1 FL (ref 9.2–12.9)
POTASSIUM SERPL-SCNC: 4.5 MMOL/L (ref 3.5–5.1)
PROT SERPL-MCNC: 5.4 G/DL (ref 6–8.4)
PROT UR QL STRIP: NEGATIVE
PROTHROMBIN TIME: 14 SEC (ref 9–12.5)
RBC # BLD AUTO: 3.24 M/UL (ref 4.6–6.2)
RBC #/AREA URNS AUTO: 53 /HPF (ref 0–4)
SODIUM SERPL-SCNC: 136 MMOL/L (ref 136–145)
SP GR UR STRIP: 1.01 (ref 1–1.03)
URN SPEC COLLECT METH UR: ABNORMAL
WBC # BLD AUTO: 9.76 K/UL (ref 3.9–12.7)
WBC #/AREA URNS AUTO: >100 /HPF (ref 0–5)

## 2022-03-29 PROCEDURE — 93010 EKG 12-LEAD: ICD-10-PCS | Mod: ,,, | Performed by: INTERNAL MEDICINE

## 2022-03-29 PROCEDURE — 99285 EMERGENCY DEPT VISIT HI MDM: CPT | Mod: 25

## 2022-03-29 PROCEDURE — 85025 COMPLETE CBC W/AUTO DIFF WBC: CPT | Performed by: PHYSICIAN ASSISTANT

## 2022-03-29 PROCEDURE — 96365 THER/PROPH/DIAG IV INF INIT: CPT

## 2022-03-29 PROCEDURE — 25000003 PHARM REV CODE 250: Performed by: STUDENT IN AN ORGANIZED HEALTH CARE EDUCATION/TRAINING PROGRAM

## 2022-03-29 PROCEDURE — 87088 URINE BACTERIA CULTURE: CPT | Performed by: PHYSICIAN ASSISTANT

## 2022-03-29 PROCEDURE — 93005 ELECTROCARDIOGRAM TRACING: CPT

## 2022-03-29 PROCEDURE — C9113 INJ PANTOPRAZOLE SODIUM, VIA: HCPCS | Performed by: STUDENT IN AN ORGANIZED HEALTH CARE EDUCATION/TRAINING PROGRAM

## 2022-03-29 PROCEDURE — 63600175 PHARM REV CODE 636 W HCPCS: Performed by: STUDENT IN AN ORGANIZED HEALTH CARE EDUCATION/TRAINING PROGRAM

## 2022-03-29 PROCEDURE — 80053 COMPREHEN METABOLIC PANEL: CPT | Performed by: EMERGENCY MEDICINE

## 2022-03-29 PROCEDURE — 82140 ASSAY OF AMMONIA: CPT | Performed by: EMERGENCY MEDICINE

## 2022-03-29 PROCEDURE — 83735 ASSAY OF MAGNESIUM: CPT | Performed by: EMERGENCY MEDICINE

## 2022-03-29 PROCEDURE — 87389 HIV-1 AG W/HIV-1&-2 AB AG IA: CPT | Performed by: EMERGENCY MEDICINE

## 2022-03-29 PROCEDURE — 96375 TX/PRO/DX INJ NEW DRUG ADDON: CPT

## 2022-03-29 PROCEDURE — 81001 URINALYSIS AUTO W/SCOPE: CPT | Performed by: PHYSICIAN ASSISTANT

## 2022-03-29 PROCEDURE — 86803 HEPATITIS C AB TEST: CPT | Performed by: EMERGENCY MEDICINE

## 2022-03-29 PROCEDURE — 85610 PROTHROMBIN TIME: CPT | Performed by: PHYSICIAN ASSISTANT

## 2022-03-29 PROCEDURE — 87186 SC STD MICRODIL/AGAR DIL: CPT | Performed by: PHYSICIAN ASSISTANT

## 2022-03-29 PROCEDURE — 84100 ASSAY OF PHOSPHORUS: CPT | Performed by: EMERGENCY MEDICINE

## 2022-03-29 PROCEDURE — 99284 PR EMERGENCY DEPT VISIT,LEVEL IV: ICD-10-PCS | Mod: ,,, | Performed by: EMERGENCY MEDICINE

## 2022-03-29 PROCEDURE — 87086 URINE CULTURE/COLONY COUNT: CPT | Performed by: PHYSICIAN ASSISTANT

## 2022-03-29 PROCEDURE — 87077 CULTURE AEROBIC IDENTIFY: CPT | Performed by: PHYSICIAN ASSISTANT

## 2022-03-29 PROCEDURE — 93010 ELECTROCARDIOGRAM REPORT: CPT | Mod: ,,, | Performed by: INTERNAL MEDICINE

## 2022-03-29 PROCEDURE — 99284 EMERGENCY DEPT VISIT MOD MDM: CPT | Mod: ,,, | Performed by: EMERGENCY MEDICINE

## 2022-03-29 PROCEDURE — 86901 BLOOD TYPING SEROLOGIC RH(D): CPT | Performed by: PHYSICIAN ASSISTANT

## 2022-03-29 RX ORDER — MAG HYDROX/ALUMINUM HYD/SIMETH 200-200-20
30 SUSPENSION, ORAL (FINAL DOSE FORM) ORAL ONCE
Status: COMPLETED | OUTPATIENT
Start: 2022-03-29 | End: 2022-03-29

## 2022-03-29 RX ORDER — PANTOPRAZOLE SODIUM 40 MG/10ML
80 INJECTION, POWDER, LYOPHILIZED, FOR SOLUTION INTRAVENOUS ONCE
Status: COMPLETED | OUTPATIENT
Start: 2022-03-29 | End: 2022-03-29

## 2022-03-29 RX ORDER — CEPHALEXIN 500 MG/1
500 CAPSULE ORAL 4 TIMES DAILY
Qty: 28 CAPSULE | Refills: 0 | Status: SHIPPED | OUTPATIENT
Start: 2022-03-29 | End: 2022-04-05

## 2022-03-29 RX ORDER — OMEPRAZOLE 20 MG/1
20 CAPSULE, DELAYED RELEASE ORAL DAILY
Qty: 360 CAPSULE | Refills: 0 | Status: SHIPPED | OUTPATIENT
Start: 2022-03-29 | End: 2022-08-04

## 2022-03-29 RX ORDER — ONDANSETRON 2 MG/ML
4 INJECTION INTRAMUSCULAR; INTRAVENOUS
Status: COMPLETED | OUTPATIENT
Start: 2022-03-29 | End: 2022-03-29

## 2022-03-29 RX ORDER — LIDOCAINE HYDROCHLORIDE 20 MG/ML
10 SOLUTION OROPHARYNGEAL ONCE
Status: COMPLETED | OUTPATIENT
Start: 2022-03-29 | End: 2022-03-29

## 2022-03-29 RX ADMIN — PANTOPRAZOLE SODIUM 80 MG: 40 INJECTION, POWDER, FOR SOLUTION INTRAVENOUS at 09:03

## 2022-03-29 RX ADMIN — ALUMINUM HYDROXIDE, MAGNESIUM HYDROXIDE, AND SIMETHICONE 30 ML: 200; 200; 20 SUSPENSION ORAL at 09:03

## 2022-03-29 RX ADMIN — CEFTRIAXONE 2 G: 2 INJECTION, SOLUTION INTRAVENOUS at 09:03

## 2022-03-29 RX ADMIN — ONDANSETRON 4 MG: 2 INJECTION INTRAMUSCULAR; INTRAVENOUS at 09:03

## 2022-03-29 RX ADMIN — LIDOCAINE HYDROCHLORIDE 10 ML: 20 SOLUTION ORAL; TOPICAL at 09:03

## 2022-03-29 NOTE — Clinical Note
"Ashwin Hoodony" Rome was seen and treated in our emergency department on 3/29/2022.  He may return to work on 04/01/2022.  Can return to work earlier if feeling improved.     If you have any questions or concerns, please don't hesitate to call.      Maryan Mclean MD"

## 2022-03-29 NOTE — FIRST PROVIDER EVALUATION
Emergency Department TeleTriage Encounter Note      CHIEF COMPLAINT    Chief Complaint   Patient presents with    Melena     Hx cirrhosis, blood in sputum had uri last week took antibiotics       VITAL SIGNS   Initial Vitals [03/29/22 1735]   BP Pulse Resp Temp SpO2   (!) 143/80 81 18 98.5 °F (36.9 °C) 96 %      MAP       --            ALLERGIES    Review of patient's allergies indicates:  No Known Allergies    PROVIDER TRIAGE NOTE    Patient with history of cirrhosis presents to the ER with complaint of dark urine/hematuria, cramping in low back with fever, which began 2 weeks ago. His fever returned 9 days ago and went to urgent care 1 week ago.  He was prescribed Zpac , given steroid shot and cough syrup for URI. He reports burning in epigastric area beginning 3 days ago, he has increased gas, abdominal swelling, Cloudy / smelly urine, He reports black stool yesterday.  He took pepto bismol prior to this episode.  Will order labs pending ED provider evaluation.  He denies dizziness or lightheadedness or current abdominal pain.  Will order labs pending ED provider evaluation.  He also reports a few episodes of hematemesis , small amount of blood yesterday and today (1 tsp of blood)    Initial orders will be placed and care will be transferred to an alternate provider when patient is roomed for a full evaluation. Any additional orders and the final disposition will be determined by that provider.         ORDERS  Labs Reviewed   HIV 1 / 2 ANTIBODY   HEPATITIS C ANTIBODY       ED Orders (720h ago, onward)    Start Ordered     Status Ordering Provider    03/29/22 1738 03/29/22 1738  HIV 1/2 Ag/Ab (4th Gen)  STAT         Ordered ROZ LEMUS    03/29/22 1738 03/29/22 1738  Hepatitis C Antibody  STAT         Ordered ROZ LEMUS            Virtual Visit Note: The provider triage portion of this emergency department evaluation and documentation was performed via [employer], a HIPAA-compliant telemedicine  application, in concert with a tele-presenter in the room. A face to face patient evaluation with one of my colleagues will occur once the patient is placed in an emergency department room.      DISCLAIMER: This note was prepared with [employer] voice recognition transcription software. Garbled syntax, mangled pronouns, and other bizarre constructions may be attributed to that software system.

## 2022-03-29 NOTE — TELEPHONE ENCOUNTER
Dr Daniel Crowder reviewed the message from the patient. Have Patient follow up with his PCP. This does not sound Liver related.    Call placed to the patient. Inquired how he was doing? Not to well.  Message relayed to the patient to follow up with his PCP for your symptoms. Voiced understanding.

## 2022-03-29 NOTE — TELEPHONE ENCOUNTER
----- Message from Claudia Avina sent at 3/29/2022  8:03 AM CDT -----  Regarding: appt  Patient states that last Thursday he started to see dark red urine with a foul smell.  Urine is now light yellow with the foul smell.  Patient states that every time he eats he is experiencing acid reflux, he has dark stool and this morning was coughing up blood  Patient would like to come in to see Dr. Crowder as soon as possible.      Ashwin  @  [phone]

## 2022-03-30 NOTE — DISCHARGE INSTRUCTIONS
Please call your gastroenterologist and PCP to schedule an appointment for follow up from your emergency room visit.  You should have your urine rechecked after your antibiotics are completed to make sure the infection has resolved.    Avoid taking NSAIDS, alcohol, spicy/acidic food.  These will irritate your stomach.  Return to the emergency room if you notice increasing amounts of blood in your stool, begin feeling weak/lightheaded, pass out, or have increasing amounts of abdominal pain.

## 2022-03-30 NOTE — ED PROVIDER NOTES
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    Patient noticed hematuria with bilateral flank pain/fevers about one week ago which resolved over the last few days.  Has a history of kidney stones, did not see one pass but feels similar to previous presentations.  Endorses continued "foul smelling urine". Started taking ibuprofen for these symptoms as well as one week of dry cough and began having epigastric burning that was not relieved with Pepto Bismol.  Also with a few episodes of melanotic stool.  Has had poor PO intake related to pain.  States he had an EGD in the past and was told he had some ulcers but has not been on medication for them.  He denies any diarrhea, constipation, BRBPR, continued hematuria, chest pain, productive cough, headache, fevers, significant vomiting.       Review of patient's allergies indicates:  No Known Allergies  Past Medical History:   Diagnosis Date    Anxiety     Hyperlipidemia     Morbid obesity with BMI of 45.0-49.9, adult     Multiple pulmonary nodules     Obesity     Other cirrhosis of liver 1/13/2021    Portal hypertension 2/11/2021    Sarcoidosis of lung with sarcoidosis of lymph nodes     Splenomegaly 12/22/2020    Thrombocytopenia 12/22/2020     Past Surgical History:   Procedure Laterality Date    ANTERIOR CRUCIATE LIGAMENT REPAIR  2007    right knee    COLONOSCOPY N/A 4/8/2021    Procedure: COLONOSCOPY;  Surgeon: Jeff Olvera MD;  Location: [address]);  Service: Endoscopy;  Laterality: N/A;  rectal bleeding,   2nd floor BMI 50 (354 lbs)  C[facility] 4/5/21 at [facility]    ESOPHAGOGASTRODUODENOSCOPY N/A 4/8/2021    Procedure: EGD (ESOPHAGOGASTRODUODENOSCOPY);  Surgeon: Jeff LÓPEZ" MD Wade;  Location: [facility] ([address] [place] [address]);  Service: Endoscopy;  Laterality: N/A;  variceal screening/ labs the am of procedure  2nd floor BMI 50 (354 lbs)    LYMPHADENECTOMY  1985    MENISCECTOMY      left knee    NASAL SEPTUM SURGERY  1985    WISDOM TOOTH EXTRACTION       Family History   Problem Relation Age of Onset    Hypertension Father     Heart attack Father 55    Diabetes Paternal Grandmother     Aneurysm Mother         eye    Dementia Mother     Colon cancer Neg Hx     Prostate cancer Neg Hx     Sarcoidosis Neg Hx      Social History     Tobacco Use    Smoking status: Never Smoker    Smokeless tobacco: Never Used   Substance Use Topics    Alcohol use: No    Drug use: No     Review of Systems   Constitutional: Positive for appetite change. Negative for chills and fever.   HENT: Negative for congestion and sore throat.    Eyes: Negative for pain and redness.   Respiratory: Positive for cough and shortness of breath. Negative for wheezing.    Cardiovascular: Positive for leg swelling. Negative for chest pain and palpitations.   Gastrointestinal: Positive for abdominal pain, blood in stool, nausea and vomiting. Negative for diarrhea.   Genitourinary: Positive for dysuria, frequency and hematuria. Negative for urgency.   Musculoskeletal: Negative for back pain, joint swelling and neck stiffness.   Skin: Negative for rash and wound.   Neurological: Negative for weakness and headaches.   Psychiatric/Behavioral: Negative for confusion. The patient is not nervous/anxious.        Physical Exam     Initial Vitals [03/29/22 1735]   BP Pulse Resp Temp SpO2   (!) 143/80 81 18 98.5 °F (36.9 °C) 96 %      MAP       --         Physical Exam    Constitutional: He appears well-developed and well-nourished. He is not diaphoretic. No distress.   HENT:   Head: Normocephalic and atraumatic.   Right Ear: External ear normal.   Left Ear: External ear normal.   Nose: Nose normal.   Mouth/Throat: Oropharynx is clear  and moist.   Eyes: EOM are normal. Pupils are equal, round, and reactive to light. Scleral icterus is present.   Neck: Neck supple.   Normal range of motion.  Cardiovascular: Normal rate, regular rhythm and normal heart sounds.   Pulmonary/Chest: Breath sounds normal. No respiratory distress. He has no wheezes. He has no rhonchi. He has no rales.   Abdominal: Abdomen is soft. He exhibits no distension. There is no abdominal tenderness. There is no rebound and no guarding.   Genitourinary: Rectum:      Guaiac result positive.      No anal fissure or external hemorrhoid.   Guaiac positive stool. : Acceptable.  Musculoskeletal:         General: Edema present. No tenderness. Normal range of motion.      Cervical back: Normal range of motion and neck supple.     Neurological: He is alert and oriented to person, place, and time. GCS score is 15. GCS eye subscore is 4. GCS verbal subscore is 5. GCS motor subscore is 6.   Skin: Skin is warm and dry.         ED Course   Procedures  Labs Reviewed   CBC W/ AUTO DIFFERENTIAL - Abnormal; Notable for the following components:       Result Value    RBC 3.24 (*)     Hemoglobin 11.2 (*)     Hematocrit 34.4 (*)      (*)     MCH 34.6 (*)     RDW 14.8 (*)     Platelets 102 (*)     Immature Granulocytes 0.7 (*)     Immature Grans (Abs) 0.07 (*)     Lymph % 16.2 (*)     All other components within normal limits   PROTIME-INR - Abnormal; Notable for the following components:    Prothrombin Time 14.0 (*)     INR 1.4 (*)     All other components within normal limits   URINALYSIS, REFLEX TO URINE CULTURE - Abnormal; Notable for the following components:    Appearance, UA Hazy (*)     Occult Blood UA 2+ (*)     Nitrite, UA Positive (*)     Leukocytes, UA 3+ (*)     All other components within normal limits    Narrative:     Specimen Source->Urine   URINALYSIS MICROSCOPIC - Abnormal; Notable for the following components:    RBC, UA 53 (*)     WBC, UA >100 (*)      "Bacteria Moderate (*)     All other components within normal limits    Narrative:     Specimen Source->Urine   AMMONIA - Abnormal; Notable for the following components:    Ammonia 64 (*)     All other components within normal limits   COMPREHENSIVE METABOLIC PANEL - Abnormal; Notable for the following components:    CO2 22 (*)     BUN 24 (*)     Calcium 7.7 (*)     Total Protein 5.4 (*)     Albumin 1.7 (*)     Total Bilirubin 6.1 (*)     AST 49 (*)     Anion Gap 6 (*)     All other components within normal limits   MAGNESIUM - Abnormal; Notable for the following components:    Magnesium 1.5 (*)     All other components within normal limits   PHOSPHORUS - Abnormal; Notable for the following components:    Phosphorus 2.4 (*)     All other components within normal limits   CULTURE, URINE   HIV 1 / 2 ANTIBODY   HEPATITIS C ANTIBODY   AMMONIA   POCT OCCULT BLOOD STOOL   TYPE & SCREEN          Imaging Results          X-Ray Chest AP Portable (Final result)  Result time 03/29/22 22:18:06    Final result by Andry Urena MD (03/29/22 22:18:06)                 Impression:      Stable central vascular congestion and coarsened perihilar interstitial lung markings.  No large focal consolidation or detrimental change in lung aeration when compared with 01/05/2022.      Electronically signed by: Andry Urena MD  Date:    03/29/2022  Time:    22:18             Narrative:    EXAMINATION:  XR CHEST AP PORTABLE    CLINICAL HISTORY:  Provided history is "  Cough, unspecified".    TECHNIQUE:  One view of the chest.    COMPARISON:  01/05/2022.    FINDINGS:  Cardiac wires overlie the chest.  Cardiomediastinal silhouette is borderline enlarged and similar to the prior study.  There is central vascular congestion and prominent perihilar interstitial lung markings.  No confluent area of consolidation.  No large pleural effusion.  No pneumothorax.                                 Medications   aluminum-magnesium hydroxide-simethicone " 200-200-20 mg/5 mL suspension 30 mL (30 mLs Oral Given 3/29/22 2141)     And   LIDOcaine HCl 2% oral solution 10 mL (10 mLs Oral Given 3/29/22 2141)   pantoprazole injection 80 mg (80 mg Intravenous Given 3/29/22 2140)   ondansetron injection 4 mg (4 mg Intravenous Given 3/29/22 2141)   cefTRIAXone (ROCEPHIN) 2 g/50 mL D5W IVPB (2 g Intravenous New Bag 3/29/22 2140)                 ED Course as of 03/29/22 2303 Tue Mar 29, 2022   2132 Bacteria, UA(!): Moderate [LA]   2132 WBC, UA(!): >100 [LA]   2132 NITRITE UA(!): Positive [LA]   2132 Leukocytes, UA(!): 3+ [LA]   2221 X-Ray Chest AP Portable  Stable central vascular congestion and coarsened perihilar interstitial lung markings.  No large focal consolidation or detrimental change in lung aeration when compared with 01/05/2022. [LA]   2228 Patient feels improvement in pain after GI cocktail. [LA]   2237 Ammonia(!): 64 [LA]   2302 Remainder of workup unremarkable. At this time patient is stable for discharge with strict return precautions.  He'll be given a prescription for Keflex and omeprazole.  All questions answered.  Patient states his understanding and is in agreement with plan. [LA]      ED Course User Index  [LA] Maryan Mclean MD             Clinical Impression:   Final diagnoses:  [R11.10] Vomiting  [R05.9] Cough  [N30.01] Acute cystitis with hematuria (Primary)  [K92.1] Melena  [K92.2] Upper GI bleed          ED Disposition Condition    Discharge Stable        ED Prescriptions     Medication Sig Dispense Start Date End Date Auth. Provider    cephALEXin (KEFLEX) 500 MG capsule Take 1 capsule (500 mg total) by mouth 4 (four) times daily. for 7 days 28 capsule 3/29/2022 4/5/2022 Maryan Mclean MD    omeprazole (PRILOSEC) 20 MG capsule Take 1 capsule (20 mg total) by mouth once daily. 360 capsule 3/29/2022 3/29/2023 Maryan Mclean MD        Follow-up Information     Follow up With Specialties Details Why Contact Info    Daniel Crowder MD Transplant,  Hepatology, Gastroenterology Schedule an appointment as soon as possible for a visit in 2 days To follow up from your emergency room visit [address] [facility] [address]  [phone]      Sergio Guo III, MD Internal Medicine Schedule an appointment as soon as possible for a visit in 1 week To follow up from your emergency room visit and have your urine retested for infection. [address] [facility] [address]  [phone]      [facility] - Emergency Dept Emergency Medicine Go to  As needed, If symptoms worsen [address] [facility] [id number] [phone]           Maryan Mclean MD  Resident  03/29/22 8509

## 2022-03-30 NOTE — EKG INTERPRETATIONS - EMERGENCY DEPT.
Independent interpretation by ED physician.    Normal sinus rhythm. Ventricular rate 85 bpm.  Normal axis.  Normal QRS interval.  Prolonged QTc interval (four and 56 milliseconds).  No ST segment elevation or depression.  Normal T-wave morphology. Overall impression:  Normal EKG.

## 2022-03-31 ENCOUNTER — HOSPITAL ENCOUNTER (OUTPATIENT)
Facility: HOSPITAL | Age: 57
Discharge: HOME | End: 2022-03-31
Attending: INTERNAL MEDICINE | Admitting: INTERNAL MEDICINE
Payer: OTHER GOVERNMENT

## 2022-03-31 ENCOUNTER — ANESTHESIA (OUTPATIENT)
Dept: ENDOSCOPY | Facility: HOSPITAL | Age: 57
End: 2022-03-31
Payer: OTHER GOVERNMENT

## 2022-03-31 ENCOUNTER — ANESTHESIA EVENT (OUTPATIENT)
Dept: ENDOSCOPY | Facility: HOSPITAL | Age: 57
End: 2022-03-31
Payer: OTHER GOVERNMENT

## 2022-03-31 VITALS
SYSTOLIC BLOOD PRESSURE: 128 MMHG | DIASTOLIC BLOOD PRESSURE: 55 MMHG | WEIGHT: 315 LBS | OXYGEN SATURATION: 97 % | RESPIRATION RATE: 18 BRPM | BODY MASS INDEX: 44.1 KG/M2 | HEIGHT: 71 IN | TEMPERATURE: 98 F | HEART RATE: 78 BPM

## 2022-03-31 DIAGNOSIS — I85.00 VARICES, ESOPHAGEAL: ICD-10-CM

## 2022-03-31 DIAGNOSIS — K92.1 MELENA: Primary | ICD-10-CM

## 2022-03-31 LAB
BACTERIA UR CULT: ABNORMAL
HCV AB SERPL QL IA: NEGATIVE
HIV 1+2 AB+HIV1 P24 AG SERPL QL IA: NEGATIVE

## 2022-03-31 PROCEDURE — 43270 PR EGD, FLEX, W/ABLATION, TUMOR/POLYP/LESION(S), W/ DILATION: ICD-10-PCS | Mod: ,,, | Performed by: INTERNAL MEDICINE

## 2022-03-31 PROCEDURE — 63600175 PHARM REV CODE 636 W HCPCS: Performed by: NURSE ANESTHETIST, CERTIFIED REGISTERED

## 2022-03-31 PROCEDURE — 25000003 PHARM REV CODE 250: Performed by: NURSE ANESTHETIST, CERTIFIED REGISTERED

## 2022-03-31 PROCEDURE — 43270 EGD LESION ABLATION: CPT | Performed by: INTERNAL MEDICINE

## 2022-03-31 PROCEDURE — 88305 TISSUE EXAM BY PATHOLOGIST: CPT | Mod: 26,,, | Performed by: STUDENT IN AN ORGANIZED HEALTH CARE EDUCATION/TRAINING PROGRAM

## 2022-03-31 PROCEDURE — 43270 EGD LESION ABLATION: CPT | Mod: ,,, | Performed by: INTERNAL MEDICINE

## 2022-03-31 PROCEDURE — 43239 EGD BIOPSY SINGLE/MULTIPLE: CPT | Mod: 59,,, | Performed by: INTERNAL MEDICINE

## 2022-03-31 PROCEDURE — 43239 PR EGD, FLEX, W/BIOPSY, SGL/MULTI: ICD-10-PCS | Mod: 59,,, | Performed by: INTERNAL MEDICINE

## 2022-03-31 PROCEDURE — 43239 EGD BIOPSY SINGLE/MULTIPLE: CPT | Mod: 59 | Performed by: INTERNAL MEDICINE

## 2022-03-31 PROCEDURE — D9220A PRA ANESTHESIA: ICD-10-PCS | Mod: ,,, | Performed by: ANESTHESIOLOGY

## 2022-03-31 PROCEDURE — 37000009 HC ANESTHESIA EA ADD 15 MINS: Performed by: INTERNAL MEDICINE

## 2022-03-31 PROCEDURE — D9220A PRA ANESTHESIA: Mod: ,,, | Performed by: ANESTHESIOLOGY

## 2022-03-31 PROCEDURE — 27201012 HC FORCEPS, HOT/COLD, DISP: Performed by: INTERNAL MEDICINE

## 2022-03-31 PROCEDURE — 27201038 HC PROBE, BI-POLAR: Performed by: INTERNAL MEDICINE

## 2022-03-31 PROCEDURE — 88305 TISSUE EXAM BY PATHOLOGIST: ICD-10-PCS | Mod: 26,,, | Performed by: STUDENT IN AN ORGANIZED HEALTH CARE EDUCATION/TRAINING PROGRAM

## 2022-03-31 PROCEDURE — 37000008 HC ANESTHESIA 1ST 15 MINUTES: Performed by: INTERNAL MEDICINE

## 2022-03-31 PROCEDURE — 25000003 PHARM REV CODE 250: Performed by: INTERNAL MEDICINE

## 2022-03-31 PROCEDURE — 88305 TISSUE EXAM BY PATHOLOGIST: CPT | Performed by: STUDENT IN AN ORGANIZED HEALTH CARE EDUCATION/TRAINING PROGRAM

## 2022-03-31 RX ORDER — LIDOCAINE HCL/PF 100 MG/5ML
SYRINGE (ML) INTRAVENOUS
Status: DISCONTINUED | OUTPATIENT
Start: 2022-03-31 | End: 2022-03-31

## 2022-03-31 RX ORDER — ONDANSETRON 2 MG/ML
4 INJECTION INTRAMUSCULAR; INTRAVENOUS DAILY PRN
Status: DISCONTINUED | OUTPATIENT
Start: 2022-03-31 | End: 2022-03-31 | Stop reason: HOSPADM

## 2022-03-31 RX ORDER — PROPOFOL 10 MG/ML
VIAL (ML) INTRAVENOUS
Status: DISCONTINUED | OUTPATIENT
Start: 2022-03-31 | End: 2022-03-31

## 2022-03-31 RX ORDER — SODIUM CHLORIDE 9 MG/ML
INJECTION, SOLUTION INTRAVENOUS CONTINUOUS
Status: DISCONTINUED | OUTPATIENT
Start: 2022-03-31 | End: 2022-03-31 | Stop reason: HOSPADM

## 2022-03-31 RX ORDER — PROPOFOL 10 MG/ML
VIAL (ML) INTRAVENOUS CONTINUOUS PRN
Status: DISCONTINUED | OUTPATIENT
Start: 2022-03-31 | End: 2022-03-31

## 2022-03-31 RX ADMIN — PROPOFOL 50 MG: 10 INJECTION, EMULSION INTRAVENOUS at 08:03

## 2022-03-31 RX ADMIN — SODIUM CHLORIDE: 0.9 INJECTION, SOLUTION INTRAVENOUS at 08:03

## 2022-03-31 RX ADMIN — Medication 50 MG: at 08:03

## 2022-03-31 RX ADMIN — PROPOFOL 20 MG: 10 INJECTION, EMULSION INTRAVENOUS at 08:03

## 2022-03-31 RX ADMIN — PROPOFOL 175 MCG/KG/MIN: 10 INJECTION, EMULSION INTRAVENOUS at 08:03

## 2022-03-31 RX ADMIN — GLYCOPYRROLATE 0.2 MG: 0.2 INJECTION, SOLUTION INTRAMUSCULAR; INTRAVITREAL at 08:03

## 2022-03-31 NOTE — TRANSFER OF CARE
"Anesthesia Transfer of Care Note    Patient: Ashwin Peter    Procedure(s) Performed: Procedure(s) (LRB):  EGD (ESOPHAGOGASTRODUODENOSCOPY) (N/A)    Patient location: [facility]    Anesthesia Type: general    Transport from OR: Transported from OR on room air with adequate spontaneous ventilation    Post pain: adequate analgesia    Post assessment: no apparent anesthetic complications and tolerated procedure well    Post vital signs: stable    Level of consciousness: awake    Nausea/Vomiting: no nausea/vomiting    Complications: none    Transfer of care protocol was followed      Last vitals:   Visit Vitals  BP (!) 105/56 (BP Location: Left arm, Patient Position: Lying)   Pulse 83   Temp 36.7 °C (98.1 °F) (Temporal)   Resp 17   Ht 5' 11" (1.803 m)   Wt (!) 165.6 kg (365 lb)   SpO2 99%   BMI 50.91 kg/m²     "

## 2022-03-31 NOTE — ANESTHESIA PREPROCEDURE EVALUATION
03/31/2022  Ashwin Peter is a 56 y.o., male.  Pre-operative evaluation for Procedure(s) (LRB):  EGD (ESOPHAGOGASTRODUODENOSCOPY) (N/A)        Patient Active Problem List   Diagnosis    Sarcoidosis of lung with sarcoidosis of lymph nodes    Morbid obesity with BMI of 45.0-49.9, adult    Splenomegaly    Thrombocytopenia    Other cirrhosis of liver    Bilateral lower extremity edema    Portal hypertension    Rectal bleeding    Liver cirrhosis secondary to GUAJARDO    Swelling of lower extremity    Acquired lymphedema of lower extremity    Acute on chronic respiratory failure with hypoxia       Review of patient's allergies indicates:  No Known Allergies     No current facility-administered medications on file prior to encounter.     Current Outpatient Medications on File Prior to Encounter   Medication Sig Dispense Refill    acetaminophen (TYLENOL) 500 MG tablet Take 3 tablets by mouth daily as needed for knee pain or headache      diclofenac sodium (VOLTAREN) 1 % Gel Apply topically twice daily as needed for knee pain      fluticasone furoate-vilanteroL (BREO ELLIPTA) 200-25 mcg/dose DsDv diskus inhaler Inhale 1 puff into the lungs once daily. Controller (Patient not taking: Reported on 1/27/2022) 1 each 5    fluticasone propionate (FLONASE) 50 mcg/actuation nasal spray 1 spray (50 mcg total) by Each Nostril route once daily. 16 g 3    furosemide (LASIX) 40 MG tablet Take 1.5 tablets (60 mg total) by mouth once daily. 45 tablet 11    hydrOXYzine HCL (ATARAX) 25 MG tablet Take 1 tablet (25 mg total) by mouth 3 (three) times daily as needed for Anxiety. 30 tablet 0    loratadine (CLARITIN) 10 mg tablet Take 1 tablet (10 mg total) by mouth once daily. 90 tablet 3    mupirocin (BACTROBAN) 2 % ointment Apply topically 3 (three) times daily. (Patient taking differently: Apply topically 3 (three)  times daily as needed (skin infection).) 22 g 3    pantoprazole (PROTONIX) 40 MG tablet Take 1 tablet (40 mg total) by mouth once daily. 30 tablet 5    phenylephrine HCl (SINEX REGULAR NASL) 2 sprays by Each Nostril route once daily.      predniSONE (DELTASONE) 10 MG tablet Take 1 tablet (10 mg total) by mouth once daily.      spironolactone (ALDACTONE) 100 MG tablet Take 1 tablet (100 mg total) by mouth once daily. DOSE CHANGE 30 tablet 11       Past Surgical History:   Procedure Laterality Date    ANTERIOR CRUCIATE LIGAMENT REPAIR  2007    right knee    COLONOSCOPY N/A 4/8/2021    Procedure: COLONOSCOPY;  Surgeon: Jfef Olvera MD;  Location: [facility] ENDO ([address] [place] [address]);  Service: Endoscopy;  Laterality: N/A;  rectal bleeding,   2nd floor BMI 50 (354 lbs)  COVID [facility] on 4/5/21 at [facility]    ESOPHAGOGASTRODUODENOSCOPY N/A 4/8/2021    Procedure: EGD (ESOPHAGOGASTRODUODENOSCOPY);  Surgeon: Jeff Olvera MD;  Location: [facility] ([address] [place] [address]);  Service: Endoscopy;  Laterality: N/A;  variceal screening/ labs the am of procedure  2nd floor BMI 50 (354 lbs)    LYMPHADENECTOMY  1985    MENISCECTOMY      left knee    NASAL SEPTUM SURGERY  1985    WISDOM TOOTH EXTRACTION         Social History     Socioeconomic History    Marital status:     Number of children: 2   Occupational History    Occupation: [employer] supervisor     Employer: [employer]   Tobacco Use    Smoking status: Never Smoker    Smokeless tobacco: Never Used   Substance and Sexual Activity    Alcohol use: No    Drug use: No   Social History Narrative    [place] is originally from   [place]        School at ; [employer] ; [employer]         Working ; 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         Vital Signs Range (Last 24H):         CBC:   Recent Labs     03/29/22 2001   WBC 9.76   RBC 3.24*   HGB 11.2*   HCT 34.4*   *   *   MCH 34.6*   MCHC 32.6       CMP:   Recent Labs     03/29/22  2133      K 4.5      CO2 22*   BUN 24*   CREATININE 0.8   GLU 83   MG 1.5*   PHOS 2.4*   CALCIUM 7.7*   ALBUMIN 1.7*   PROT 5.4*   ALKPHOS 74   ALT 27   AST 49*   BILITOT 6.1*       INR  Recent Labs     03/29/22 2001   INR 1.4*           Diagnostic Studies:      EKG:  Sinus rhythm with Premature atrial complexes   Low anterior forces   Abnormal ECG   When compared with ECG of 05-JAN-2022 21:43,   No significant change was found   Confirmed by MARIO PHELAN MD ([id number]) on 3/30/2022 10:34:20 AM    2D Echo:    · There is abnormal septal wall motion.  · The left ventricle is normal in size with normal systolic function.  · The estimated ejection fraction is 65%.  · Normal left ventricular diastolic function.  · Mild right ventricular enlargement with normal right ventricular systolic function.  · Elevated central venous pressure (15 mmHg).  · The estimated PA systolic pressure is 49 mmHg.  · There is pulmonary hypertension.            Pre-op Assessment    I have reviewed the Patient Summary Reports.     I have reviewed the Nursing Notes. I have reviewed the NPO Status.   I have reviewed the Medications.   Prednisone    Review of Systems  Anesthesia Hx:  No problems with previous Anesthesia  History of prior surgery of interest to airway management or planning: Denies Family Hx of Anesthesia complications.   Denies Personal Hx of Anesthesia complications.   Social:  No Alcohol Use, Non-Smoker    Hematology/Oncology:  Hematology Normal   Oncology Normal     EENT/Dental:EENT/Dental Normal   Cardiovascular:  Cardiovascular Normal     Pulmonary:   sarcoidosis   Renal/:  Renal/ Normal     Hepatic/GI:   Liver Disease, Hepatitis Esophageal varices    Musculoskeletal:  Musculoskeletal Normal    Neurological:  Neurology Normal    Endocrine:  Morbid Obesity / BMI > 40  Dermatological:  Skin Normal    Psych:  Psychiatric Normal           Physical Exam  General: Well nourished, Cooperative, Alert and Oriented    Airway:  Mallampati: III / I  Mouth Opening: Normal  TM Distance: Normal  Tongue: Normal  Neck ROM: Normal ROM    Dental:  Intact    Chest/Lungs:  Clear to auscultation, Normal Respiratory Rate    Heart:  Rate: Normal  Rhythm: Regular Rhythm  Sounds: Normal        Anesthesia Plan  Type of Anesthesia, risks & benefits discussed:    Anesthesia Type: Gen ETT, Gen Natural Airway  Intra-op Monitoring Plan: Standard ASA Monitors  Post Op Pain Control Plan:   (medical reason for not using multimodal pain management)  Induction:  IV  Informed Consent: Informed consent signed with the Patient and all parties understand the risks and agree with anesthesia plan.  All questions answered.   ASA Score: 3  Day of Surgery Review of History & Physical: H&P Update referred to the surgeon/provider.  Anesthesia Plan Notes:  Stress dose steroids if hypotensive. 100 mg hydrocortisone. Pt did not take steroids today    Ready For Surgery From Anesthesia Perspective.     .

## 2022-03-31 NOTE — H&P
Short Stay Endoscopy History and Physical    PCP - Elkin Guo III, MD  Referring Physician - Daniel Crowder MD  [address] [place] [address]    Procedure - Endoscopy  ASA - per anesthesia  Mallampati - per anesthesia  History of Anesthesia problems - no  Family history Anesthesia problems -  no   Plan of anesthesia - General    HPI  56 y.o. male  Reason for procedure:   varices    ROS:  Constitutional: No fevers, chills, No weight loss  CV: No chest pain  Pulm: No cough, No shortness of breath  GI: see HPI    Medical History:  has a past medical history of Anxiety, Hyperlipidemia, Morbid obesity with BMI of 45.0-49.9, adult, Multiple pulmonary nodules, Obesity, Other cirrhosis of liver (1/13/2021), Portal hypertension (2/11/2021), Sarcoidosis of lung with sarcoidosis of lymph nodes, Splenomegaly (12/22/2020), and Thrombocytopenia (12/22/2020).    Surgical History:  has a past surgical history that includes Lymphadenectomy (1985); Anterior cruciate ligament repair (2007); [place] tooth extraction; Meniscectomy; Nasal septum surgery (1985); Esophagogastroduodenoscopy (N/A, 4/8/2021); and Colonoscopy (N/A, 4/8/2021).    Family History: family history includes Aneurysm in his mother; Dementia in his mother; Diabetes in his paternal grandmother; Heart attack (age of onset: 55) in his father; Hypertension in his father..    Social History:  reports that he has never smoked. He has never used smokeless tobacco. He reports that he does not drink alcohol and does not use drugs.    Review of patient's allergies indicates:  No Known Allergies    Medications:   Medications Prior to Admission   Medication Sig Dispense Refill Last Dose    acetaminophen (TYLENOL) 500 MG tablet Take 3 tablets by mouth daily as needed for knee pain or headache       cephALEXin (KEFLEX) 500 MG capsule Take 1 capsule (500 mg total) by mouth 4 (four) times daily. for 7 days 28 capsule 0     diclofenac sodium (VOLTAREN) 1 % Gel Apply  topically twice daily as needed for knee pain       fluticasone furoate-vilanteroL (BREO ELLIPTA) 200-25 mcg/dose DsDv diskus inhaler Inhale 1 puff into the lungs once daily. Controller (Patient not taking: Reported on 1/27/2022) 1 each 5     fluticasone propionate (FLONASE) 50 mcg/actuation nasal spray 1 spray (50 mcg total) by Each Nostril route once daily. 16 g 3     furosemide (LASIX) 40 MG tablet Take 1.5 tablets (60 mg total) by mouth once daily. 45 tablet 11     hydrOXYzine HCL (ATARAX) 25 MG tablet Take 1 tablet (25 mg total) by mouth 3 (three) times daily as needed for Anxiety. 30 tablet 0     loratadine (CLARITIN) 10 mg tablet Take 1 tablet (10 mg total) by mouth once daily. 90 tablet 3     mupirocin (BACTROBAN) 2 % ointment Apply topically 3 (three) times daily. (Patient taking differently: Apply topically 3 (three) times daily as needed (skin infection).) 22 g 3     omeprazole (PRILOSEC) 20 MG capsule Take 1 capsule (20 mg total) by mouth once daily. 360 capsule 0     pantoprazole (PROTONIX) 40 MG tablet Take 1 tablet (40 mg total) by mouth once daily. 30 tablet 5     phenylephrine HCl (SINEX REGULAR NASL) 2 sprays by Each Nostril route once daily.       predniSONE (DELTASONE) 10 MG tablet Take 1 tablet (10 mg total) by mouth once daily.       spironolactone (ALDACTONE) 100 MG tablet Take 1 tablet (100 mg total) by mouth once daily. DOSE CHANGE 30 tablet 11        Physical Exam:    Vital Signs: There were no vitals filed for this visit.    General Appearance: Well appearing in no acute distress  Abdomen: Soft, non tender, non distended with normal bowel sounds, no masses    Labs:  Lab Results   Component Value Date    WBC 9.76 03/29/2022    HGB 11.2 (L) 03/29/2022    HCT 34.4 (L) 03/29/2022     (L) 03/29/2022    CHOL 165 12/02/2020    TRIG 77 12/02/2020    HDL 50 12/02/2020    ALT 27 03/29/2022    AST 49 (H) 03/29/2022     03/29/2022    K 4.5 03/29/2022     03/29/2022     CREATININE 0.8 03/29/2022    BUN 24 (H) 03/29/2022    CO2 22 (L) 03/29/2022    TSH 1.782 12/02/2020    PSA 0.62 07/14/2014    INR 1.4 (H) 03/29/2022    HGBA1C 4.3 12/02/2020       I have explained the risks and benefits of this endoscopic procedure to the patient including but not limited to bleeding, inflammation, infection, perforation, and death.      Troy Banks MD

## 2022-03-31 NOTE — PROVATION PATIENT INSTRUCTIONS
Discharge Summary/Instructions after an Endoscopic Procedure  Patient Name: Ashwin Peter  Patient MRN: [id number]  Patient YOB: 1965 Thursday, March 31, 2022  Jeff Olvera MD  Dear patient,  As a result of recent federal legislation (The Federal Cures Act), you may   receive lab or pathology results from your procedure in your [facility]   account before your physician is able to contact you. Your physician or   their representative will relay the results to you with their   recommendations at their soonest availability.  Thank you,  RESTRICTIONS:  During your procedure today, you received medications for sedation.  These   medications may affect your judgment, balance and coordination.  Therefore,   for 24 hours, you have the following restrictions:   - DO NOT drive a car, operate machinery, make legal/financial decisions,   sign important papers or drink alcohol.    ACTIVITY:  Today: no heavy lifting, straining or running due to procedural   sedation/anesthesia.  The following day: return to full activity including work.  DIET:  Eat and drink normally unless instructed otherwise.     TREATMENT FOR COMMON SIDE EFFECTS:  - Mild abdominal pain, nausea, belching, bloating or excessive gas:  rest,   eat lightly and use a heating pad.  - Sore Throat: treat with throat lozenges and/or gargle with warm salt   water.  - Because air was used during the procedure, expelling large amounts of air   from your rectum or belching is normal.  - If a bowel prep was taken, you may not have a bowel movement for 1-3 days.    This is normal.  SYMPTOMS TO WATCH FOR AND REPORT TO YOUR PHYSICIAN:  1. Abdominal pain or bloating, other than gas cramps.  2. Chest pain.  3. Back pain.  4. Signs of infection such as: chills or fever occurring within 24 hours   after the procedure.  5. Rectal bleeding, which would show as bright red, maroon, or black stools.   (A tablespoon of blood from the rectum is not serious, especially if    hemorrhoids are present.)  6. Vomiting.  7. Weakness or dizziness.  GO DIRECTLY TO THE NEAREST EMERGENCY ROOM IF YOU HAVE ANY OF THE FOLLOWING:      Difficulty breathing              Chills and/or fever over 101 F   Persistent vomiting and/or vomiting blood   Severe abdominal pain   Severe chest pain   Black, tarry stools   Bleeding- more than one tablespoon   Any other symptom or condition that you feel may need urgent attention  Your doctor recommends these additional instructions:  If any biopsies were taken, your doctors clinic will contact you in 1 to 2   weeks with any results.  - Patient has a contact number available for emergencies.  The signs and   symptoms of potential delayed complications were discussed with the   patient.  Return to normal activities tomorrow.  Written discharge   instructions were provided to the patient.   - Discharge patient to home.   - Resume previous diet.   - Use a proton pump inhibitor PO daily.   - Await pathology results.   - No aspirin, ibuprofen, naproxen, or other non-steroidal anti-inflammatory   drugs.   For questions, problems or results please call your physician - Jeff Olvera MD at Work:  [phone].  [facility], EMERGENCY ROOM PHONE NUMBER: [phone]  IF A COMPLICATION OR EMERGENCY SITUATION ARISES AND YOU ARE UNABLE TO REACH   YOUR PHYSICIAN - GO DIRECTLY TO THE EMERGENCY ROOM.  Jeff Olvera MD  3/31/2022 9:11:24 AM  This report has been verified and signed electronically.  Dear patient,  As a result of recent federal legislation (The Federal Cures Act), you may   receive lab or pathology results from your procedure in your [facility]   account before your physician is able to contact you. Your physician or   their representative will relay the results to you with their   recommendations at their soonest availability.  Thank you,  PROVATION

## 2022-03-31 NOTE — ANESTHESIA POSTPROCEDURE EVALUATION
Anesthesia Post Evaluation    Patient: Ashwin Peter    Procedure(s) Performed: Procedure(s) (LRB):  EGD (ESOPHAGOGASTRODUODENOSCOPY) (N/A)    Final Anesthesia Type: general      Patient location during evaluation: PACU  Patient participation: Yes- Able to Participate  Level of consciousness: awake and alert  Post-procedure vital signs: reviewed and stable  Pain management: adequate  Airway patency: patent    PONV status at discharge: No PONV  Anesthetic complications: no      Cardiovascular status: blood pressure returned to baseline  Respiratory status: unassisted, spontaneous ventilation and room air  Hydration status: euvolemic  Follow-up not needed.          Vitals Value Taken Time   /55 03/31/22 0947   Temp 36.9 °C (98.4 °F) 03/31/22 0915   Pulse 80 03/31/22 0949   Resp 18 03/31/22 0945   SpO2 98 % 03/31/22 0949   Vitals shown include unvalidated device data.      No case tracking events are documented in the log.      Pain/Akbar Score: Akbar Score: 10 (3/31/2022  9:45 AM)

## 2022-04-06 ENCOUNTER — TELEPHONE (OUTPATIENT)
Dept: GASTROENTEROLOGY | Facility: CLINIC | Age: 57
End: 2022-04-06
Payer: OTHER GOVERNMENT

## 2022-04-06 ENCOUNTER — PATIENT MESSAGE (OUTPATIENT)
Dept: GASTROENTEROLOGY | Facility: CLINIC | Age: 57
End: 2022-04-06
Payer: OTHER GOVERNMENT

## 2022-04-06 LAB
FINAL PATHOLOGIC DIAGNOSIS: NORMAL
Lab: NORMAL

## 2022-04-06 NOTE — ED NOTES
Pt care assumed. Report received by Kailee CONKLIN. Pt lying in stretcher in low and locked position and side rails raised x2. Call light, pt's belongings, and bedside table within pt's reach. Pt on continuous cardiac monitoring, pulse oximetry, and BP cycling every 30 minutes. Pt in NAD and verbalized no needs at this time. Family member x1 at bedside.       Mercedes Flap Text: The defect edges were debeveled with a #15 scalpel blade.  Given the location of the defect, shape of the defect and the proximity to free margins a Mercedes flap was deemed most appropriate.  Using a sterile surgical marker, an appropriate advancement flap was drawn incorporating the defect and placing the expected incisions within the relaxed skin tension lines where possible. The area thus outlined was incised deep to adipose tissue with a #15 scalpel blade.  The skin margins were undermined to an appropriate distance in all directions utilizing iris scissors.

## 2022-04-06 NOTE — TELEPHONE ENCOUNTER
----- Message from Jeff Olvera MD sent at 4/6/2022  2:17 PM CDT -----  Nothing concerning on biopsies.

## 2022-05-27 ENCOUNTER — OFFICE VISIT (OUTPATIENT)
Dept: PULMONOLOGY | Facility: CLINIC | Age: 57
End: 2022-05-27
Payer: OTHER GOVERNMENT

## 2022-05-27 ENCOUNTER — LAB VISIT (OUTPATIENT)
Dept: LAB | Facility: HOSPITAL | Age: 57
End: 2022-05-27
Payer: OTHER GOVERNMENT

## 2022-05-27 VITALS
HEIGHT: 71 IN | WEIGHT: 315 LBS | HEART RATE: 90 BPM | BODY MASS INDEX: 44.1 KG/M2 | OXYGEN SATURATION: 96 % | DIASTOLIC BLOOD PRESSURE: 84 MMHG | SYSTOLIC BLOOD PRESSURE: 130 MMHG

## 2022-05-27 DIAGNOSIS — J96.11 HYPOXEMIC RESPIRATORY FAILURE, CHRONIC: ICD-10-CM

## 2022-05-27 DIAGNOSIS — D86.2 SARCOIDOSIS OF LUNG WITH SARCOIDOSIS OF LYMPH NODES: ICD-10-CM

## 2022-05-27 DIAGNOSIS — E66.01 MORBID OBESITY: ICD-10-CM

## 2022-05-27 DIAGNOSIS — I27.20 PULMONARY HYPERTENSION: Primary | ICD-10-CM

## 2022-05-27 DIAGNOSIS — I27.20 PULMONARY HYPERTENSION: ICD-10-CM

## 2022-05-27 DIAGNOSIS — G47.33 OBSTRUCTIVE SLEEP APNEA: ICD-10-CM

## 2022-05-27 PROCEDURE — 99214 OFFICE O/P EST MOD 30 MIN: CPT | Mod: S$GLB,,, | Performed by: INTERNAL MEDICINE

## 2022-05-27 PROCEDURE — 99999 PR PBB SHADOW E&M-EST. PATIENT-LVL IV: ICD-10-PCS | Mod: PBBFAC,,, | Performed by: INTERNAL MEDICINE

## 2022-05-27 PROCEDURE — 86038 ANTINUCLEAR ANTIBODIES: CPT | Performed by: INTERNAL MEDICINE

## 2022-05-27 PROCEDURE — 36415 COLL VENOUS BLD VENIPUNCTURE: CPT | Performed by: INTERNAL MEDICINE

## 2022-05-27 PROCEDURE — 99214 PR OFFICE/OUTPT VISIT, EST, LEVL IV, 30-39 MIN: ICD-10-PCS | Mod: S$GLB,,, | Performed by: INTERNAL MEDICINE

## 2022-05-27 PROCEDURE — 99999 PR PBB SHADOW E&M-EST. PATIENT-LVL IV: CPT | Mod: PBBFAC,,, | Performed by: INTERNAL MEDICINE

## 2022-05-27 RX ORDER — LIDOCAINE HYDROCHLORIDE 20 MG/ML
10 SOLUTION ORAL; TOPICAL EVERY 6 HOURS
Status: ON HOLD | COMMUNITY
Start: 2022-05-03 | End: 2022-07-25 | Stop reason: HOSPADM

## 2022-05-27 RX ORDER — ALBUTEROL SULFATE 90 UG/1
AEROSOL, METERED RESPIRATORY (INHALATION)
COMMUNITY
Start: 2022-05-03 | End: 2023-07-07

## 2022-05-27 RX ORDER — PREDNISONE 10 MG/1
10 TABLET ORAL DAILY
Qty: 30 TABLET | Refills: 5 | Status: SHIPPED | OUTPATIENT
Start: 2022-05-27 | End: 2022-06-26

## 2022-05-27 RX ORDER — BENZONATATE 200 MG/1
200 CAPSULE ORAL 3 TIMES DAILY PRN
COMMUNITY
Start: 2022-05-03 | End: 2022-06-15

## 2022-05-27 NOTE — PROGRESS NOTES
[facility] [address]  Pulmonary Clinic Note    Subjective:      Reason for Visit: Routine follow up for pulmonary sarcoidosis, chronic hypoxemic respiratory failure    Ashwin Peter is a 56 y.o. male with stage I pulmonary sarcoid (biopsy of mediastinal LAD with non-necrotizing granulomas, 12/2012), chronic hypoxemic respiratory failure on home oxygen, LAW on CPAP, morbid obesity, liver cirrhosis (suspected fatty liver, not related to sarcoid) with grade I varices, BL LE lymphedema, asymptomatic Covid 12/2020, anxiety presents for follow up.     Last seen by me 8/6/2021 during which time patient was taking 20mg daily of prednisone with significant improvement in his chronic cough. Repeat PFTs at that time showed no obstruction, mild restriction and normal DLCO, though FEV1, FVC and DLCO had all decreased significantly from prior PFTs completed in 2014. He was also noted to be hypoxemic with ambulation in clinic, and home oxygen therapy was initiated.     CT chest imaging 12/2020 with no enlarged mediastinal LAD, several scattered pulmonary nodules all decreased in size from prior imaging. Repeat chest CT imaging (although CTA) completed 1/2022 noting diffuse GGO in bilateral lung bases with interlobular septal thickening, though significant motion artifact was present.    TTE 1/2022 with normal LV function, normal EF and no diastolic dysfunction. Enlarged RV with normal function and elevated PASP at 49mmHg    Recently underwent EGD 3/31/22 noting grade I varices, portal hypertensive gastropathy (negative for malignancy and H. Pylori), and several duodenal ulcers treated with cauterization.    Continues to have cough and shortness of breath. Cough is daily, dry and persistent. Bearable on 10mg prednisone daily (current dose of prednisone) but can be severe when off prednisone. Endorses dyspnea on exertion. 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 Has significant pulmonary hypertension noted on recent TTE. Denies history of autoimmune disease, joint pains or skin rashes.  Denies history of DVT/PE. Has been trying to exercise several times per week.    Past Medical History:   Diagnosis Date    Anxiety     Hyperlipidemia     Morbid obesity with BMI of 45.0-49.9, adult     Multiple pulmonary nodules     Obesity     Other cirrhosis of liver 1/13/2021    Portal hypertension 2/11/2021    Sarcoidosis of lung with sarcoidosis of lymph nodes     Splenomegaly 12/22/2020    Thrombocytopenia 12/22/2020     Past Surgical History:   Procedure Laterality Date    ANTERIOR CRUCIATE LIGAMENT REPAIR  2007    right knee    COLONOSCOPY N/A 4/8/2021    Procedure: COLONOSCOPY;  Surgeon: Jeff Olvera MD;  Location: [facility] ([address] [place] [address]);  Service: Endoscopy;  Laterality: N/A;  rectal bleeding,   2nd floor BMI 50 (354 lbs)  COVID test on 4/5/21 at [facility]    ESOPHAGOGASTRODUODENOSCOPY N/A 4/8/2021    Procedure: EGD (ESOPHAGOGASTRODUODENOSCOPY);  Surgeon: Jeff Olvera MD;  Location: [facility] ([address] [place] [address]);  Service: Endoscopy;  Laterality: N/A;  variceal screening/ labs the am of procedure  2nd floor BMI 50 (354 lbs)    ESOPHAGOGASTRODUODENOSCOPY N/A 3/31/2022    Procedure: EGD (ESOPHAGOGASTRODUODENOSCOPY);  Surgeon: Jeff Olvera MD;  [facility]  Service: Endoscopy;  Laterality: N/A;  BMI-52    Wt:375#      cirrhosis, variceal screening-labs done on 3/29-GT  vaccinated-GT    LYMPHADENECTOMY  1985    MENISCECTOMY      left knee    NASAL SEPTUM SURGERY  1985    WISDOM TOOTH EXTRACTION       Family History   Problem Relation Age of Onset    Hypertension Father     Heart attack Father 55    Diabetes Paternal Grandmother     Aneurysm Mother         eye    Dementia Mother     Colon cancer Neg Hx     Prostate cancer Neg Hx     Sarcoidosis  Neg Hx      Social History     Tobacco Use    Smoking status: Never Smoker    Smokeless tobacco: Never Used   Substance Use Topics    Alcohol use: No    Drug use: No       (Not in a hospital admission)    Review of patient's allergies indicates:  No Known Allergies     Review of Systems   Constitutional: Negative for chills, fever and weight loss.   HENT: Negative for sinus pain.    Eyes: Negative for blurred vision and double vision.   Respiratory: Positive for cough and shortness of breath. Negative for hemoptysis, sputum production, wheezing and stridor.    Cardiovascular: Positive for leg swelling. Negative for chest pain, palpitations and PND.   Gastrointestinal: Negative for constipation, diarrhea, nausea and vomiting.   Genitourinary: Negative for dysuria and hematuria.   Musculoskeletal: Negative for joint pain and myalgias.   Skin: Negative for itching and rash.   Neurological: Negative for focal weakness and weakness.       Objective:      Vital Signs (Most Recent)  Pulse: 90 (05/27/22 1431)  BP: 130/84 (05/27/22 1431)  SpO2: 96 % (05/27/22 1431)    Physical Exam  Constitutional:       General: He is not in acute distress.     Appearance: He is obese. He is not ill-appearing or diaphoretic.   HENT:      Head: Normocephalic and atraumatic.      Mouth/Throat:      Mouth: Mucous membranes are moist.      Pharynx: Oropharynx is clear.   Eyes:      Extraocular Movements: Extraocular movements intact.      Pupils: Pupils are equal, round, and reactive to light.   Cardiovascular:      Rate and Rhythm: Normal rate and regular rhythm.   Pulmonary:      Effort: Pulmonary effort is normal. No respiratory distress.      Breath sounds: Normal breath sounds. No stridor. No wheezing, rhonchi or rales.   Abdominal:      Palpations: Abdomen is soft.      Tenderness: There is no abdominal tenderness. There is no guarding.   Musculoskeletal:         General: Normal range of motion.      Cervical back: Normal range of  motion and neck supple.      Right lower leg: No edema.      Left lower leg: No edema.   Skin:     General: Skin is warm and dry.      Findings: Erythema (BL LEs) present.   Neurological:      General: No focal deficit present.      Mental Status: He is alert and oriented to person, place, and time. Mental status is at baseline.   Psychiatric:         Mood and Affect: Mood normal.         Behavior: Behavior normal.         Imaging:  EXAMINATION:  CTA CHEST NON CORONARY     CLINICAL HISTORY:  Pulmonary embolism (PE) suspected, unknown D-dimer;     TECHNIQUE:  Low dose axial images, sagittal and coronal reformations were obtained from the thoracic inlet to the lung bases following the IV administration of 100 mL of Omnipaque 350.  Contrast timing was optimized to evaluate the pulmonary arteries.     COMPARISON:  CT chest 08/06/2021, 12/07/2012     FINDINGS:  Base of Neck:  No significant abnormality.     Thoracic soft tissues:  Bilateral gynecomastia.  No acute abnormality.     Aorta: Left-sided aortic arch with normal branching pattern.  The thoracic aorta is normal in caliber without calcific atherosclerosis.     Heart: Heart is at the upper limits of normal in size.  No pericardial effusion. No coronary artery calcific atherosclerosis.     Pulmonary vasculature: The pulmonary arteries distribute normally.  Exam quality is limited by suboptimal bolus timing.  Density of the main pulmonary artery is 154 HU (good quality study is greater than 300 HU).  No evidence of acute pulmonary embolus to the level of the proximal segmental pulmonary arteries.  Evaluation of the segmental and more distal pulmonary arteries is limited by poor bolus timing and significant respiratory motion, particularly in the lower lobes.  There are prominent pulmonary veins.     Pastora/Mediastinum:  No hilar or mediastinal lymphadenopathy.  Azygous vein is dilated.     Airways:  Trachea is midline and proximal airways are patent without  significant abnormality.     Lungs: CTA technique sacrifices pulmonary parenchymal fine detail and contrast resolution in order to optimize assessment of pulmonary arteries.  Evaluation of the pulmonary parenchyma is also limited by significant motion artifact.  Mild diffuse ground-glass attenuation of the lungs with interlobular septal thickening in the lung bases.  No large consolidation.  No pleural fluid or pneumothorax.     Esophagus: Normal in course and caliber.     Upper abdomen: No acute intra-abdominal abnormality.  Small hiatal hernia.  Nodular contour of the liver and caudate lobe hypertrophy suggestive of cirrhosis.  Splenomegaly and prominent splenic collaterals.  There are gastroesophageal varices as well.  Small gallstones layer dependently in the gallbladder.     Bones:  No acute fracture or suspicious osseous lesions.     Impression:     1. No sizable pulmonary embolus identified, allowing for suboptimal bolus timing and motion limitations.  No CT findings of right heart strain.  2. Borderline cardiomegaly and probable mild diffuse interstitial pulmonary edema versus pneumonitis.  3. Cirrhosis with sequelae of portal hypertension including splenomegaly, upper abdominal collateral vessels, and gastroesophageal varices.     Electronically signed by resident: Siddharth Ashby  Date:                                            01/06/2022  Time:                                           00:10    Assessment:      56M with pulmonary sarcoid, chronic persistent cough dyspnea on exertion and hypoxemic respiratory failure. After extensive review of patient's chart and in depth history, patient's symptoms and hypoxemic respiratory failure is out of proportion to his burden of pulmonary sarcoid. Patient has minimal mediastinal LAD with little to no pulmonary parenchymal involvement. This does not explain his degree of hypoxemia. It also does not explain the level of pulmonary hypertension noted on his TTE. He has no  history or symptoms of connective tissue disease that may be contributing (such as lupus or scleroderma) and no history of DVT/PE (CTEPH). Concerned he may have underlying idiopathic PAH, which if present can be treated and hopefully improve patient's symptoms. Plan as below.    Plan:      1. Pulmonary sarcoidosis  - main symptom appears to be persistent cough  - continue prednisone 10mg qday for now; advised patient to wean down to 5mg qday if able to tolerate  - unable to transition to steroid sparing agents such as MTX due to chronic liver disease  - given chronic daily dose <20mg qday, do not feel PCP prophylaxis needed at this time  - patient's hypoxemia and pulmonary hypertension is out of proportion to burden of sarcoid, which has little to no parenchymal involvement on CT scan (has a few scattered pulmonary nodules) - see below  - repeat PFTs and 6-min walk prior to return visit  - corrected calcium 9.5 one month ago    2. Chronic hypoxemic respiratory failure  - Rx for oxygen sent again today  - highest concern is hypoxemia is related to patient's underlying pHTN - see below    3. Pulmonary hypertension  - out of proportion to sarcoid - no parenchymal involvement to diagnose as WHO class III  - normal EF, no diastolic dysfunction - unlikely related to heart disease  - no known connective tissue disease with no clinical symptoms - will obtain BRANDON today  - no known history of DVT/PE - will obtain VQ scan  - referral to PH clinic for RHC and other diagnostic work up    4. 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 Obesity  - discussed implication of weight on dyspnea and overall health  - consider referral to bariatric surgery    Patient seen and discussed with Dr. Baker. RTC in 6 months (would like to see patient earlier, however patient requesting time to get  bills/expenses and other affairs in order prior to PH evaluation; will see in 6 months once this is done and after his PH clinic visit)    Chaparro Curran MD  Pulmonary / Critical Care, PGY-5

## 2022-05-30 LAB — ANA SER QL IF: NORMAL

## 2022-06-02 ENCOUNTER — TELEPHONE (OUTPATIENT)
Dept: PULMONOLOGY | Facility: CLINIC | Age: 57
End: 2022-06-02
Payer: OTHER GOVERNMENT

## 2022-06-02 ENCOUNTER — TELEPHONE (OUTPATIENT)
Dept: TRANSPLANT | Facility: CLINIC | Age: 57
End: 2022-06-02
Payer: OTHER GOVERNMENT

## 2022-06-02 DIAGNOSIS — Z79.899 POLYPHARMACY: ICD-10-CM

## 2022-06-02 DIAGNOSIS — R06.82 TACHYPNEA: Primary | ICD-10-CM

## 2022-06-02 NOTE — TELEPHONE ENCOUNTER
Spoke with patient informing him that I received his message in regards to scheduling an appointment with Pulmonary Hypertension. I let the patient know that I would contact them so he could get scheduled. Patient Verbalized that he understands. I forwarded the messaged to  Pulmonary Hypertension in regards to patient getting a call back to schedule appointment.

## 2022-06-06 ENCOUNTER — HOSPITAL ENCOUNTER (OUTPATIENT)
Dept: PULMONOLOGY | Facility: CLINIC | Age: 57
Discharge: HOME | End: 2022-06-06
Payer: OTHER GOVERNMENT

## 2022-06-06 VITALS — BODY MASS INDEX: 44.1 KG/M2 | HEIGHT: 71 IN | WEIGHT: 315 LBS

## 2022-06-06 DIAGNOSIS — J96.11 HYPOXEMIC RESPIRATORY FAILURE, CHRONIC: ICD-10-CM

## 2022-06-06 DIAGNOSIS — D86.2 SARCOIDOSIS OF LUNG WITH SARCOIDOSIS OF LYMPH NODES: ICD-10-CM

## 2022-06-06 PROCEDURE — 94618 PULMONARY STRESS TESTING: CPT | Mod: S$GLB,,, | Performed by: INTERNAL MEDICINE

## 2022-06-06 PROCEDURE — 94618 PULMONARY STRESS TESTING: ICD-10-PCS | Mod: S$GLB,,, | Performed by: INTERNAL MEDICINE

## 2022-06-06 NOTE — PROCEDURES
Ashwin Peter is a 56 y.o.  male patient, who presents for a 6 minute walk test ordered by MD Brant.  The diagnosis is Qualify for Oxygen.  The patient's BMI is 52.8 kg/m2.  Predicted distance (lower limit of normal) is 316.03 meters.      Test Results:    The test was completed without stopping.   The total time walked was 360 seconds.  During walking, the patient reported:  Dyspnea. The patient used no assistive devices  during testing.     06/06/2022---------Distance: 335.28 meters (1100 feet)     O2 Sat % Supplemental Oxygen Heart Rate Blood Pressure Safia Scale   Pre-exercise  (Resting) 96 % Room Air 86 bpm 138/64 mmHg 0   During Exercise 89 % Room Air 108 bpm 155/72 mmHg 3   Post-exercise  (Recovery) 98 % Room Air  70 bpm   mmHg       Recovery Time: 129 seconds    Performing nurse/tech: Nimesh DURBIN      PREVIOUS STUDY:   The patient had a previous study.     8/6/2021---------Distance: 152.4 meters (500 feet)       O2 Sat % Supplemental Oxygen Heart Rate Blood Pressure Safia Scale   Pre-exercise  (Resting) 95 % Room Air 75 bpm 134/63 2   During Exercise 86 % Room Air 110 bpm 149/70 3   Post-exercise    97 % Room Air  67 bpm            CLINICAL INTERPRETATION:  Six minute walk distance is 335.28 meters (1100 feet) with moderate dyspnea.  During exercise, there was significant desaturation while breathing room air.  Blood pressure remained stable and Heart rate increased significantly with walking.  This may represent a tachycardic response to exercise.  The patient did not report non-pulmonary symptoms during exercise.  Since the previous study in August 2021, exercise capacity is significantly improved.  Based upon age and body mass index, exercise capacity is normal.

## 2022-06-15 ENCOUNTER — OFFICE VISIT (OUTPATIENT)
Dept: TRANSPLANT | Facility: CLINIC | Age: 57
End: 2022-06-15
Payer: OTHER GOVERNMENT

## 2022-06-15 ENCOUNTER — LAB VISIT (OUTPATIENT)
Dept: LAB | Facility: HOSPITAL | Age: 57
End: 2022-06-15
Attending: INTERNAL MEDICINE
Payer: OTHER GOVERNMENT

## 2022-06-15 VITALS
HEIGHT: 71 IN | BODY MASS INDEX: 44.1 KG/M2 | WEIGHT: 315 LBS | OXYGEN SATURATION: 98 % | DIASTOLIC BLOOD PRESSURE: 65 MMHG | HEART RATE: 75 BPM | SYSTOLIC BLOOD PRESSURE: 139 MMHG

## 2022-06-15 DIAGNOSIS — Z79.899 POLYPHARMACY: ICD-10-CM

## 2022-06-15 DIAGNOSIS — D86.2 SARCOIDOSIS OF LUNG WITH SARCOIDOSIS OF LYMPH NODES: ICD-10-CM

## 2022-06-15 DIAGNOSIS — K75.81 LIVER CIRRHOSIS SECONDARY TO NASH: ICD-10-CM

## 2022-06-15 DIAGNOSIS — E66.01 MORBID OBESITY WITH BMI OF 45.0-49.9, ADULT: ICD-10-CM

## 2022-06-15 DIAGNOSIS — G47.33 OSA ON CPAP: ICD-10-CM

## 2022-06-15 DIAGNOSIS — R06.82 TACHYPNEA: ICD-10-CM

## 2022-06-15 DIAGNOSIS — I27.20 PULMONARY HYPERTENSION: Primary | ICD-10-CM

## 2022-06-15 DIAGNOSIS — K74.60 LIVER CIRRHOSIS SECONDARY TO NASH: ICD-10-CM

## 2022-06-15 LAB
ALBUMIN SERPL BCP-MCNC: 2.3 G/DL (ref 3.5–5.2)
ALP SERPL-CCNC: 113 U/L (ref 55–135)
ALT SERPL W/O P-5'-P-CCNC: 33 U/L (ref 10–44)
ANION GAP SERPL CALC-SCNC: 7 MMOL/L (ref 8–16)
AST SERPL-CCNC: 63 U/L (ref 10–40)
BASOPHILS # BLD AUTO: 0.02 K/UL (ref 0–0.2)
BASOPHILS NFR BLD: 0.7 % (ref 0–1.9)
BILIRUB SERPL-MCNC: 3.1 MG/DL (ref 0.1–1)
BNP SERPL-MCNC: 91 PG/ML (ref 0–99)
BUN SERPL-MCNC: 14 MG/DL (ref 6–20)
CALCIUM SERPL-MCNC: 8 MG/DL (ref 8.7–10.5)
CHLORIDE SERPL-SCNC: 112 MMOL/L (ref 95–110)
CO2 SERPL-SCNC: 19 MMOL/L (ref 23–29)
CREAT SERPL-MCNC: 0.7 MG/DL (ref 0.5–1.4)
DIFFERENTIAL METHOD: ABNORMAL
EOSINOPHIL # BLD AUTO: 0 K/UL (ref 0–0.5)
EOSINOPHIL NFR BLD: 1 % (ref 0–8)
ERYTHROCYTE [DISTWIDTH] IN BLOOD BY AUTOMATED COUNT: 16.2 % (ref 11.5–14.5)
EST. GFR  (AFRICAN AMERICAN): >60 ML/MIN/1.73 M^2
EST. GFR  (NON AFRICAN AMERICAN): >60 ML/MIN/1.73 M^2
GLUCOSE SERPL-MCNC: 149 MG/DL (ref 70–110)
HCT VFR BLD AUTO: 35.3 % (ref 40–54)
HGB BLD-MCNC: 11.4 G/DL (ref 14–18)
IMM GRANULOCYTES # BLD AUTO: 0.01 K/UL (ref 0–0.04)
IMM GRANULOCYTES NFR BLD AUTO: 0.3 % (ref 0–0.5)
LYMPHOCYTES # BLD AUTO: 0.5 K/UL (ref 1–4.8)
LYMPHOCYTES NFR BLD: 16 % (ref 18–48)
MAGNESIUM SERPL-MCNC: 1.7 MG/DL (ref 1.6–2.6)
MCH RBC QN AUTO: 29 PG (ref 27–31)
MCHC RBC AUTO-ENTMCNC: 32.3 G/DL (ref 32–36)
MCV RBC AUTO: 90 FL (ref 82–98)
MONOCYTES # BLD AUTO: 0.3 K/UL (ref 0.3–1)
MONOCYTES NFR BLD: 9.3 % (ref 4–15)
NEUTROPHILS # BLD AUTO: 2.2 K/UL (ref 1.8–7.7)
NEUTROPHILS NFR BLD: 72.7 % (ref 38–73)
NRBC BLD-RTO: 0 /100 WBC
PLATELET # BLD AUTO: 64 K/UL (ref 150–450)
PMV BLD AUTO: ABNORMAL FL (ref 9.2–12.9)
POTASSIUM SERPL-SCNC: 4.3 MMOL/L (ref 3.5–5.1)
PROT SERPL-MCNC: 5.9 G/DL (ref 6–8.4)
RBC # BLD AUTO: 3.93 M/UL (ref 4.6–6.2)
SODIUM SERPL-SCNC: 138 MMOL/L (ref 136–145)
WBC # BLD AUTO: 3 K/UL (ref 3.9–12.7)

## 2022-06-15 PROCEDURE — 99204 PR OFFICE/OUTPT VISIT, NEW, LEVL IV, 45-59 MIN: ICD-10-PCS | Mod: S$GLB,,,

## 2022-06-15 PROCEDURE — 99204 OFFICE O/P NEW MOD 45 MIN: CPT | Mod: S$GLB,,,

## 2022-06-15 PROCEDURE — 36415 COLL VENOUS BLD VENIPUNCTURE: CPT | Performed by: INTERNAL MEDICINE

## 2022-06-15 PROCEDURE — 83735 ASSAY OF MAGNESIUM: CPT | Performed by: INTERNAL MEDICINE

## 2022-06-15 PROCEDURE — 83880 ASSAY OF NATRIURETIC PEPTIDE: CPT | Performed by: INTERNAL MEDICINE

## 2022-06-15 PROCEDURE — 99999 PR PBB SHADOW E&M-EST. PATIENT-LVL IV: CPT | Mod: PBBFAC,,,

## 2022-06-15 PROCEDURE — 80053 COMPREHEN METABOLIC PANEL: CPT | Performed by: INTERNAL MEDICINE

## 2022-06-15 PROCEDURE — 99999 PR PBB SHADOW E&M-EST. PATIENT-LVL IV: ICD-10-PCS | Mod: PBBFAC,,,

## 2022-06-15 PROCEDURE — 85025 COMPLETE CBC W/AUTO DIFF WBC: CPT | Performed by: INTERNAL MEDICINE

## 2022-06-15 NOTE — PROGRESS NOTES
"Subjective:    Patient ID:  Ashwin Peter is a 56 y.o. male who presents for evaluation of Pulmonary Hypertension.    HPI   Mr. Peter was referred by Dr. Baker (Pulmonology). He has a pertinent medical history of stage I pulmonary sarcoid (biopsy of mediastinal LAD with non-necrotizing granulomas, 12/2012), chronic hypoxemic respiratory failure on home oxygen, LAW on CPAP, morbid obesity, liver cirrhosis (suspected fatty liver, not related to sarcoid) with grade I varices, BL LE lymphedema, asymptomatic Covid 12/2020, anxiety. Dr. Baker had concerns for elevated PASP on ECHO and symptoms considerably out of proportion to his sarcoid diagnosis.     Mr. Peter feels okay today. He completed his walk and did not desaturate like he did in August, but it's borderline. He reports having a change in activity level and developing symptoms about  2 years ago after gettting COVID. He felt like it was a mild case. Was not hospitalized, but since that time has a chronic cough, leg swelling, sometimes hard to catch his breath and feels like a "lead sinker" is on his chest. He endorses consistently wearing his CPAP. He is happy he doesn't qualify for O2 today because he would not be able to do his job. 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 He says he drinks about a gallon of water a day. Takes his lasix and says it works well. He has a family history of MI/heart disease but is on no blood pressure medication, no DM. Denies N/V/F/C, lightheadedness, dizziness, PND, orthopnea, abdominal pain, abdominal pressure, chest pain, chest pressure, syncope, pre-syncope.    Discussed the pathophysiology of pulmonary hypertension, different groups, associated conditions, and diagnostic testing involved. Reviewed his ECHO with him and educated him on the right heart cath procedure. Answered all questions and concerns.    6MWT:  6MW 6/6/2022   6MWT Status completed without stopping "   Patient Reported Dyspnea   Was O2 used? No   6MW Distance walked (feet) 1100   Distance walked (meters) 335.28   Did patient stop? No   Oxygen Saturation 96   Supplemental Oxygen Room Air   Heart Rate 86   Blood Pressure 138/64   [place] Dyspnea Rating  nothing at all   Oxygen Saturation 89   Supplemental Oxygen Room Air   Heart Rate 108   Blood Pressure 155/72   [place] Dyspnea Rating  moderate   Recovery Time (seconds) 129   Oxygen Saturation 98   Supplemental Oxygen Room Air   Heart Rate 70     ECHO: 1/6/2022  · There is abnormal septal wall motion.  · The left ventricle is normal in size with normal systolic function.  · The estimated ejection fraction is 65%.  · Normal left ventricular diastolic function.  · Mild right ventricular enlargement with normal right ventricular systolic function.  · Elevated central venous pressure (15 mmHg).  · The estimated PA systolic pressure is 49 mmHg.  · There is pulmonary hypertension.    CTA CHEST: 1/6/2022  Impression:     1. No sizable pulmonary embolus identified, allowing for suboptimal bolus timing and motion limitations.  No CT findings of right heart strain.  2. Borderline cardiomegaly and probable mild diffuse interstitial pulmonary edema versus pneumonitis.  3. Cirrhosis with sequelae of portal hypertension including splenomegaly, upper abdominal collateral vessels, and gastroesophageal varices.    PFTs: 8/10/2021  Spirometry shows a reduced vital capacity suggesting restriction. Lung volumes show mild restriction is present. DLCO is  normal.      Review of Systems   Constitutional: Negative.   HENT: Negative.    Eyes: Negative.    Cardiovascular: Positive for dyspnea on exertion and leg swelling. Negative for chest pain, irregular heartbeat, near-syncope and syncope.   Respiratory: Positive for cough, sleep disturbances due to breathing (wears CPAP) and snoring. Negative for wheezing.    Endocrine: Negative.    Hematologic/Lymphatic: Negative.    Skin: Negative.   "  Musculoskeletal: Negative.    Gastrointestinal: Negative.    Genitourinary: Negative.    Neurological: Negative.    Psychiatric/Behavioral: Negative.         Objective: /65 (BP Location: Left arm, Patient Position: Sitting, BP Method: Large (Automatic))   Pulse 75   Ht 5' 11" (1.803 m)   Wt (!) 174 kg (383 lb 9.6 oz)   SpO2 98%   BMI 53.50 kg/m²       Physical Exam  Constitutional:       General: He is not in acute distress.     Appearance: Normal appearance. He is obese. He is not ill-appearing.   HENT:      Head: Normocephalic.   Eyes:      Extraocular Movements: Extraocular movements intact.      Conjunctiva/sclera: Conjunctivae normal.   Neck:      Vascular: JVD present.   Cardiovascular:      Rate and Rhythm: Normal rate and regular rhythm.      Pulses: Normal pulses.      Heart sounds: Normal heart sounds.   Pulmonary:      Effort: Pulmonary effort is normal.      Breath sounds: Normal breath sounds.   Abdominal:      Palpations: Abdomen is soft.   Musculoskeletal:         General: Normal range of motion.      Cervical back: Normal range of motion.      Right lower leg: Edema (3 +) present.      Left lower leg: Edema (3+) present.   Skin:     General: Skin is warm and dry.      Capillary Refill: Capillary refill takes less than 2 seconds.   Neurological:      Mental Status: He is oriented to person, place, and time.   Psychiatric:         Mood and Affect: Mood normal.         Behavior: Behavior normal.           Lab Results   Component Value Date    BNP 91 06/15/2022     06/15/2022    K 4.3 06/15/2022    MG 1.7 06/15/2022     (H) 06/15/2022    CO2 19 (L) 06/15/2022    BUN 14 06/15/2022    CREATININE 0.7 06/15/2022     (H) 06/15/2022    HGBA1C 4.3 12/02/2020    AST 63 (H) 06/15/2022    ALT 33 06/15/2022    ALBUMIN 2.3 (L) 06/15/2022    PROT 5.9 (L) 06/15/2022    BILITOT 3.1 (H) 06/15/2022    CHOL 165 12/02/2020    HDL 50 12/02/2020    LDLCALC 99.6 12/02/2020    TRIG 77 12/02/2020 "       Magnesium   Date Value Ref Range Status   06/15/2022 1.7 1.6 - 2.6 mg/dL Final       Lab Results   Component Value Date    WBC 3.00 (L) 06/15/2022    HGB 11.4 (L) 06/15/2022    HCT 35.3 (L) 06/15/2022    MCV 90 06/15/2022    PLT 64 (L) 06/15/2022       Lab Results   Component Value Date    INR 1.4 (H) 03/29/2022    INR 1.2 07/30/2021    INR 1.2 06/04/2021       BNP   Date Value Ref Range Status   06/15/2022 91 0 - 99 pg/mL Final     Comment:     Values of less than 100 pg/ml are consistent with non-CHF populations.   01/05/2022 130 (H) 0 - 99 pg/mL Final     Comment:     Values of less than 100 pg/ml are consistent with non-CHF populations.   08/06/2021 32 0 - 99 pg/mL Final     Comment:     Values of less than 100 pg/ml are consistent with non-CHF populations.       No results found for: LDH          Assessment:       1. Pulmonary hypertension    2. Sarcoidosis of lung with sarcoidosis of lymph nodes    3. Liver cirrhosis secondary to GUAJARDO    4. LAW on CPAP    5. Morbid obesity with BMI of 45.0-49.9, adult         Plan:        Will plan for RHC to assess pulmonary pressures and heart function, then determine next steps.    Schedule RHC with STAT CBC, CMP.    Asked Mr. Peter to try to reduce the amount of fluid he drinks daily. I understand that he will sometimes be working in the heat. He may continue to work to his tolerance and may have to take more breaks than usual.      Call our office with any questions.     Recommend 2 gram sodium restriction and 1500cc (50 oz) fluid restriction.  Encourage physical activity with graded exercise program.  Requested patient to weigh themselves daily, and to notify us if their weight increases by more than 3 lbs in 1 day or 5 lbs in 1 week.     Thank you for this referral!

## 2022-06-15 NOTE — PATIENT INSTRUCTIONS
Schedule RHC with STAT CBC, CMP.    Call Arline White, PH RN, [phone], with any questions or concerns.    Try to reduce the amount of fluid you drink daily to half a gallon which is 64 oz.    Recommend 2 gram sodium restriction and 1500cc (50 oz) fluid restriction.  Encourage physical activity with graded exercise program.    Requested patient to weigh themselves daily, and to notify us if their weight increases by more than 3 lbs in 1 day or 5 lbs in 1 week.

## 2022-06-17 PROBLEM — I27.20 PULMONARY HYPERTENSION: Status: ACTIVE | Noted: 2022-06-17

## 2022-06-17 PROBLEM — G47.33 OSA ON CPAP: Status: ACTIVE | Noted: 2022-06-17

## 2022-07-19 ENCOUNTER — PATIENT MESSAGE (OUTPATIENT)
Dept: PULMONOLOGY | Facility: CLINIC | Age: 57
End: 2022-07-19
Payer: OTHER GOVERNMENT

## 2022-07-22 ENCOUNTER — HOSPITAL ENCOUNTER (INPATIENT)
Facility: HOSPITAL | Age: 57
LOS: 3 days | Discharge: HOME | DRG: 291 | End: 2022-07-25
Attending: EMERGENCY MEDICINE | Admitting: EMERGENCY MEDICINE
Payer: OTHER GOVERNMENT

## 2022-07-22 DIAGNOSIS — K74.60 LIVER CIRRHOSIS SECONDARY TO NASH: ICD-10-CM

## 2022-07-22 DIAGNOSIS — I27.20 PULMONARY HYPERTENSION: ICD-10-CM

## 2022-07-22 DIAGNOSIS — I27.20 PULMONARY HTN: ICD-10-CM

## 2022-07-22 DIAGNOSIS — R60.0 LEG EDEMA: ICD-10-CM

## 2022-07-22 DIAGNOSIS — K75.81 LIVER CIRRHOSIS SECONDARY TO NASH: ICD-10-CM

## 2022-07-22 DIAGNOSIS — R06.02 SHORTNESS OF BREATH: ICD-10-CM

## 2022-07-22 DIAGNOSIS — E87.70 HYPERVOLEMIA, UNSPECIFIED HYPERVOLEMIA TYPE: ICD-10-CM

## 2022-07-22 DIAGNOSIS — I50.33 ACUTE ON CHRONIC DIASTOLIC HEART FAILURE: Primary | ICD-10-CM

## 2022-07-22 LAB
ALBUMIN SERPL BCP-MCNC: 2.1 G/DL (ref 3.5–5.2)
ALP SERPL-CCNC: 102 U/L (ref 55–135)
ALT SERPL W/O P-5'-P-CCNC: 32 U/L (ref 10–44)
ANION GAP SERPL CALC-SCNC: 7 MMOL/L (ref 8–16)
AST SERPL-CCNC: 56 U/L (ref 10–40)
BASOPHILS # BLD AUTO: 0.04 K/UL (ref 0–0.2)
BASOPHILS NFR BLD: 0.3 % (ref 0–1.9)
BILIRUB SERPL-MCNC: 5.4 MG/DL (ref 0.1–1)
BNP SERPL-MCNC: 153 PG/ML (ref 0–99)
BUN SERPL-MCNC: 19 MG/DL (ref 6–20)
CALCIUM SERPL-MCNC: 7.9 MG/DL (ref 8.7–10.5)
CHLORIDE SERPL-SCNC: 111 MMOL/L (ref 95–110)
CO2 SERPL-SCNC: 21 MMOL/L (ref 23–29)
CREAT SERPL-MCNC: 1.2 MG/DL (ref 0.5–1.4)
DIFFERENTIAL METHOD: ABNORMAL
EOSINOPHIL # BLD AUTO: 0.1 K/UL (ref 0–0.5)
EOSINOPHIL NFR BLD: 0.6 % (ref 0–8)
ERYTHROCYTE [DISTWIDTH] IN BLOOD BY AUTOMATED COUNT: 20.8 % (ref 11.5–14.5)
EST. GFR  (AFRICAN AMERICAN): >60 ML/MIN/1.73 M^2
EST. GFR  (NON AFRICAN AMERICAN): >60 ML/MIN/1.73 M^2
GLUCOSE SERPL-MCNC: 99 MG/DL (ref 70–110)
HCT VFR BLD AUTO: 37.1 % (ref 40–54)
HGB BLD-MCNC: 12 G/DL (ref 14–18)
IMM GRANULOCYTES # BLD AUTO: 0.16 K/UL (ref 0–0.04)
IMM GRANULOCYTES NFR BLD AUTO: 1.4 % (ref 0–0.5)
LYMPHOCYTES # BLD AUTO: 0.5 K/UL (ref 1–4.8)
LYMPHOCYTES NFR BLD: 4.3 % (ref 18–48)
MCH RBC QN AUTO: 29.7 PG (ref 27–31)
MCHC RBC AUTO-ENTMCNC: 32.3 G/DL (ref 32–36)
MCV RBC AUTO: 92 FL (ref 82–98)
MONOCYTES # BLD AUTO: 1 K/UL (ref 0.3–1)
MONOCYTES NFR BLD: 8.9 % (ref 4–15)
NEUTROPHILS # BLD AUTO: 9.9 K/UL (ref 1.8–7.7)
NEUTROPHILS NFR BLD: 84.5 % (ref 38–73)
NRBC BLD-RTO: 0 /100 WBC
PLATELET # BLD AUTO: 90 K/UL (ref 150–450)
PMV BLD AUTO: 12 FL (ref 9.2–12.9)
POTASSIUM SERPL-SCNC: 4.4 MMOL/L (ref 3.5–5.1)
PROT SERPL-MCNC: 6 G/DL (ref 6–8.4)
RBC # BLD AUTO: 4.04 M/UL (ref 4.6–6.2)
SARS-COV-2 RDRP RESP QL NAA+PROBE: NEGATIVE
SODIUM SERPL-SCNC: 139 MMOL/L (ref 136–145)
TROPONIN I SERPL DL<=0.01 NG/ML-MCNC: 0.02 NG/ML (ref 0–0.03)
WBC # BLD AUTO: 11.66 K/UL (ref 3.9–12.7)

## 2022-07-22 PROCEDURE — 93010 ELECTROCARDIOGRAM REPORT: CPT | Mod: ,,, | Performed by: INTERNAL MEDICINE

## 2022-07-22 PROCEDURE — 63600175 PHARM REV CODE 636 W HCPCS: Performed by: STUDENT IN AN ORGANIZED HEALTH CARE EDUCATION/TRAINING PROGRAM

## 2022-07-22 PROCEDURE — 25000003 PHARM REV CODE 250: Performed by: STUDENT IN AN ORGANIZED HEALTH CARE EDUCATION/TRAINING PROGRAM

## 2022-07-22 PROCEDURE — 85025 COMPLETE CBC W/AUTO DIFF WBC: CPT | Performed by: EMERGENCY MEDICINE

## 2022-07-22 PROCEDURE — 93005 ELECTROCARDIOGRAM TRACING: CPT

## 2022-07-22 PROCEDURE — 99285 EMERGENCY DEPT VISIT HI MDM: CPT | Mod: CS,,, | Performed by: PHYSICIAN ASSISTANT

## 2022-07-22 PROCEDURE — 99285 PR EMERGENCY DEPT VISIT,LEVEL V: ICD-10-PCS | Mod: CS,,, | Performed by: PHYSICIAN ASSISTANT

## 2022-07-22 PROCEDURE — 99285 EMERGENCY DEPT VISIT HI MDM: CPT | Mod: 25

## 2022-07-22 PROCEDURE — 20600001 HC STEP DOWN PRIVATE ROOM

## 2022-07-22 PROCEDURE — 99223 PR INITIAL HOSPITAL CARE,LEVL III: ICD-10-PCS | Mod: ,,, | Performed by: STUDENT IN AN ORGANIZED HEALTH CARE EDUCATION/TRAINING PROGRAM

## 2022-07-22 PROCEDURE — 93010 EKG 12-LEAD: ICD-10-PCS | Mod: ,,, | Performed by: INTERNAL MEDICINE

## 2022-07-22 PROCEDURE — 83880 ASSAY OF NATRIURETIC PEPTIDE: CPT | Performed by: EMERGENCY MEDICINE

## 2022-07-22 PROCEDURE — 63600175 PHARM REV CODE 636 W HCPCS: Performed by: PHYSICIAN ASSISTANT

## 2022-07-22 PROCEDURE — 96374 THER/PROPH/DIAG INJ IV PUSH: CPT

## 2022-07-22 PROCEDURE — 84484 ASSAY OF TROPONIN QUANT: CPT | Performed by: EMERGENCY MEDICINE

## 2022-07-22 PROCEDURE — U0002 COVID-19 LAB TEST NON-CDC: HCPCS | Performed by: EMERGENCY MEDICINE

## 2022-07-22 PROCEDURE — 99223 1ST HOSP IP/OBS HIGH 75: CPT | Mod: ,,, | Performed by: STUDENT IN AN ORGANIZED HEALTH CARE EDUCATION/TRAINING PROGRAM

## 2022-07-22 PROCEDURE — 80053 COMPREHEN METABOLIC PANEL: CPT | Performed by: EMERGENCY MEDICINE

## 2022-07-22 RX ORDER — CETIRIZINE HYDROCHLORIDE 10 MG/1
10 TABLET ORAL DAILY
Refills: 3 | Status: DISCONTINUED | OUTPATIENT
Start: 2022-07-23 | End: 2022-07-25 | Stop reason: HOSPADM

## 2022-07-22 RX ORDER — PANTOPRAZOLE SODIUM 40 MG/1
40 TABLET, DELAYED RELEASE ORAL DAILY
Refills: 0 | Status: DISCONTINUED | OUTPATIENT
Start: 2022-07-23 | End: 2022-07-25 | Stop reason: HOSPADM

## 2022-07-22 RX ORDER — HEPARIN SODIUM 5000 [USP'U]/ML
5000 INJECTION, SOLUTION INTRAVENOUS; SUBCUTANEOUS EVERY 8 HOURS
Status: DISCONTINUED | OUTPATIENT
Start: 2022-07-22 | End: 2022-07-25 | Stop reason: HOSPADM

## 2022-07-22 RX ORDER — SODIUM CHLORIDE 0.9 % (FLUSH) 0.9 %
10 SYRINGE (ML) INJECTION
Status: DISCONTINUED | OUTPATIENT
Start: 2022-07-22 | End: 2022-07-25 | Stop reason: HOSPADM

## 2022-07-22 RX ORDER — PREDNISONE 10 MG/1
10 TABLET ORAL DAILY
Status: DISCONTINUED | OUTPATIENT
Start: 2022-07-23 | End: 2022-07-25 | Stop reason: HOSPADM

## 2022-07-22 RX ORDER — FUROSEMIDE 10 MG/ML
80 INJECTION INTRAMUSCULAR; INTRAVENOUS
Status: COMPLETED | OUTPATIENT
Start: 2022-07-22 | End: 2022-07-22

## 2022-07-22 RX ORDER — HYDROXYZINE HYDROCHLORIDE 25 MG/1
25 TABLET, FILM COATED ORAL 3 TIMES DAILY PRN
Status: DISCONTINUED | OUTPATIENT
Start: 2022-07-22 | End: 2022-07-25 | Stop reason: HOSPADM

## 2022-07-22 RX ORDER — TALC
6 POWDER (GRAM) TOPICAL NIGHTLY PRN
Status: DISCONTINUED | OUTPATIENT
Start: 2022-07-22 | End: 2022-07-25 | Stop reason: HOSPADM

## 2022-07-22 RX ADMIN — FUROSEMIDE 40 MG/HR: 10 INJECTION, SOLUTION INTRAMUSCULAR; INTRAVENOUS at 05:07

## 2022-07-22 RX ADMIN — FUROSEMIDE 80 MG: 10 INJECTION, SOLUTION INTRAMUSCULAR; INTRAVENOUS at 01:07

## 2022-07-22 RX ADMIN — HEPARIN SODIUM 5000 UNITS: 5000 INJECTION INTRAVENOUS; SUBCUTANEOUS at 09:07

## 2022-07-22 RX ADMIN — FUROSEMIDE 40 MG/HR: 10 INJECTION, SOLUTION INTRAMUSCULAR; INTRAVENOUS at 06:07

## 2022-07-22 NOTE — FIRST PROVIDER EVALUATION
"Medical screening exam completed.  I have conducted a focused provider triage encounter, findings are as follows:    Brief history of present illness:  Dyspnea and leg edema for some time       Vitals:    07/22/22 1048   BP: (!) 160/85   Pulse: 96   Resp: 20   Temp: 98.6 °F (37 °C)   SpO2: 96%   Weight: (!) 167.8 kg (370 lb)   Height: 5' 11" (1.803 m)       Pertinent physical exam:  Edema to legs    Brief workup plan:  CHF order set and COVID    Preliminary workup initiated; this workup will be continued and followed by the physician or advanced practice provider that is assigned to the patient when roomed.  "

## 2022-07-22 NOTE — ASSESSMENT & PLAN NOTE
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 He otherwise has no significant complications from his cirrhosis. Some suspicion that his hyperbilirubinemia may be due to congestion related to his cardiac disease. Will closely monitor MELD and consider transplant evaluation if it worsens or he decompensates further."    - diuretic regimen per acute on chronic diastolic heart failure  - Avoid hepatotoxic agents  - Monitor mental status  - CMP, INR daily  -thrombocytopenia at baseline  -no overt ascites on exam  -PPI daily  - Na restriction < 2g    MELD-Na score: 18 at 3/29/2022  9:33 PM  MELD score: 17 at 3/29/2022  9:33 PM  Calculated from:  Serum Creatinine: 0.8 mg/dL (Using min of 1 mg/dL) at 3/29/2022  9:33 PM  Serum Sodium: 136 mmol/L at 3/29/2022  9:33 PM  Total Bilirubin: 6.1 mg/dL at 3/29/2022  9:33 PM  INR(ratio): 1.4 at 3/29/2022  8:01 PM  Age: 56 years  "

## 2022-07-22 NOTE — ASSESSMENT & PLAN NOTE
- Recent echo below  - Clinically volume overloaded on admission  - CXR notable for central pulmonary vascular congestion  - BNP elevated to 153  - Questionable compliance with home diuretic regimen  -evaluated by cardiology in ED, recommend diuresis with Lasix 80 IV mg bolus followed by Lasix drip of 40 mg/hr  - Strict I/Os  - 1.5L fluid restriction   - Daily weights; unknown dry weight  - Will continue to monitor on tele      Results for orders placed during the hospital encounter of 01/05/22    Echo    Interpretation Summary  · There is abnormal septal wall motion.  · The left ventricle is normal in size with normal systolic function.  · The estimated ejection fraction is 65%.  · Normal left ventricular diastolic function.  · Mild right ventricular enlargement with normal right ventricular systolic function.  · Elevated central venous pressure (15 mmHg).  · The estimated PA systolic pressure is 49 mmHg.  · There is pulmonary hypertension.

## 2022-07-22 NOTE — ASSESSMENT & PLAN NOTE
Body mass index is 51.6 kg/m². Morbid obesity complicates all aspects of disease management from diagnostic modalities to treatment. Weight loss encouraged and health benefits explained to patient.

## 2022-07-22 NOTE — LETTER
July 25, 2022    Ashwin Peter  [address] [facility] [address]            [address]  [facility] [address]  Phone: [phone]  Fax: [phone] July 25, 2022     Patient: Ashwin Peter   YOB: 1965   Date of Visit: 7/22/2022       To Whom It May Concern:    Please excuse Mr. Ashwin Peter from work duties from 7/22/2022 to 7/25/2022.  He was admitted under my care at [facility] during this time.    If you have any questions or concerns, please don't hesitate to call my office.        Sincerely,    MD Janet Torrez MD

## 2022-07-22 NOTE — ED TRIAGE NOTES
Ashwin Peter, a 56 y.o. male presents to the ED w/ complaint of bilateral LE edema, reports they started leaking this Tuesday 07/19. Pt takes lasix daily.    Triage note:  Chief Complaint   Patient presents with    Edema     Pt c/o bilateral leg edema with weeping.  Also c/o SOB     Review of patient's allergies indicates:  No Known Allergies  Past Medical History:   Diagnosis Date    Anxiety     Hyperlipidemia     Morbid obesity with BMI of 45.0-49.9, adult     Multiple pulmonary nodules     Obesity     Other cirrhosis of liver 1/13/2021    Portal hypertension 2/11/2021    Sarcoidosis of lung with sarcoidosis of lymph nodes     Splenomegaly 12/22/2020    Thrombocytopenia 12/22/2020

## 2022-07-22 NOTE — ASSESSMENT & PLAN NOTE
Diagnosed via mediastinal lymph node biopsy 2012.   Followed by pulmonology; last seen in clinic 5/27/22    continue prednisone 10mg qday   Supplemental oxygen via nasal cannula as needed; intermittently on oxygen prn at home

## 2022-07-22 NOTE — HPI
Ashwin Peter is a 55yo M w/ sarcoidosis of the lung with mediastinal involvement,  liver cirrhosis secondary to fatty liver w/ portal HTN, LAW on CPAP, hx dHF and hypertension who presents to the emergency department due to progressive shortness of breath and bilateral lower extremity edema. Patient has been having worsening dyspnea on exertion for the past few months however for the past 2 weeks it has progressed to the point where he has to stop after walking 20-30 feet.  Today, he notes shortness of breath with minimal movement including getting in and out of bed or just turning in bed.  He notes an associated dry cough.  He has also had several instances where he is walking, gets short of breath, and has significant nausea and chest tightness; he denies williams chest pain, palpitations, or diaphoresis during these times.  He denies any loss of consciousness or falls. These episodes and his shortness of breath usually resolve with a few minutes of rest.  He notes no wheezing, orthopnea, PND.  He is compliant with his CPAP.  He has also noticed increased swelling and onset of weeping of his lower extremities, this started about a week ago, as well increased abdominal girth as well.  He notes no loss of appetite.  He has attempted to adhere to a low-salt and fluid restriction diet.  He is only taking 50 mg of Aldactone and 40 mg of Lasix daily; he is short handed at work, having to take tasks on himself, and having to urinate frequently because of his diuretic regimen has not been easy.  Of note, patient has been undergoing outpatient workup of pulmonary hypertension; He is supposed to get a right heart catheterization in outpatient setting however he states he cannot afford the co-pay of $2000.

## 2022-07-22 NOTE — ED PROVIDER NOTES
Encounter Date: 7/22/2022       History     Chief Complaint   Patient presents with    Edema     Pt c/o bilateral leg edema with weeping.  Also c/o SOB     56-year-old male with history of hyperlipidemia, cirrhosis, sarcoidosis, recently diagnosed pulmonary hypertension presents to the ED complaining of bilateral lower extremity edema and shortness of breath that has been worsening since Tuesday.  He is on Lasix, chart states that he has 60 mg once a day but is only taking 40 mg once a day because it makes him urinate too frequently.  He is also taking spironolactone.  He has been seen by Cardiology, echo done shows pulmonary hypertension.  Plan was for RHC, but he has been unable to have this done as an outpatient yet due to $2000 co-pay.  He has report a dry cough, some posttussive emesis, chills, dyspnea on exertion.  He denies chest pain, fever, orthopnea, abdominal pain, nausea, dysuria.    The history is provided by the patient.     Review of patient's allergies indicates:  No Known Allergies  Past Medical History:   Diagnosis Date    Anxiety     Hyperlipidemia     Morbid obesity with BMI of 45.0-49.9, adult     Multiple pulmonary nodules     Obesity     Other cirrhosis of liver 1/13/2021    Portal hypertension 2/11/2021    Sarcoidosis of lung with sarcoidosis of lymph nodes     Splenomegaly 12/22/2020    Thrombocytopenia 12/22/2020     Past Surgical History:   Procedure Laterality Date    ANTERIOR CRUCIATE LIGAMENT REPAIR  2007    right knee    COLONOSCOPY N/A 4/8/2021    Procedure: COLONOSCOPY;  Surgeon: Jeff Olvera MD;  Location: [address]);  Service: Endoscopy;  Laterality: N/A;  rectal bleeding,   2nd floor BMI 50 (354 lbs)  C[facility] on 4/5/21 at [facility]    ESOPHAGOGASTRODUODENOSCOPY N/A 4/8/2021    Procedure: EGD (ESOPHAGOGASTRODUODENOSCOPY);  Surgeon: Jeff Olvera MD;  Location: [facility] ([address] [place] [address]);  Service: Endoscopy;  Laterality: N/A;  variceal screening/ labs the  am of procedure  2nd floor BMI 50 (354 lbs)    ESOPHAGOGASTRODUODENOSCOPY N/A 3/31/2022    Procedure: EGD (ESOPHAGOGASTRODUODENOSCOPY);  Surgeon: Jeff Olvera MD;  Location: [facility] ([address] [place] [address]);  Service: Endoscopy;  Laterality: N/A;  BMI-52    Wt:375#      cirrhosis, variceal screening-labs done on 3/29-GT  vaccinated-GT    LYMPHADENECTOMY  1985    MENISCECTOMY      left knee    NASAL SEPTUM SURGERY  1985    WISDOM TOOTH EXTRACTION       Family History   Problem Relation Age of Onset    Hypertension Father     Heart attack Father 55    Diabetes Paternal Grandmother     Aneurysm Mother         eye    Dementia Mother     Colon cancer Neg Hx     Prostate cancer Neg Hx     Sarcoidosis Neg Hx      Social History     Tobacco Use    Smoking status: Never Smoker    Smokeless tobacco: Never Used   Substance Use Topics    Alcohol use: No    Drug use: No     Review of Systems   Constitutional: Positive for chills. Negative for fever.   HENT: Negative for congestion and sore throat.    Respiratory: Positive for cough and shortness of breath.         No orthopnea   Cardiovascular: Positive for leg swelling. Negative for chest pain.   Gastrointestinal: Positive for vomiting (post-tussive). Negative for abdominal pain, constipation, diarrhea and nausea.   Genitourinary: Negative for dysuria and hematuria.   Musculoskeletal: Negative for back pain.   Skin: Negative for rash.   Neurological: Negative for weakness, numbness and headaches.   Psychiatric/Behavioral: Negative for confusion.       Physical Exam     Initial Vitals [07/22/22 1048]   BP Pulse Resp Temp SpO2   (!) 160/85 96 20 98.6 °F (37 °C) 96 %      MAP       --         Physical Exam    Nursing note and vitals reviewed.  Constitutional: He appears well-developed and well-nourished. He is not diaphoretic. He is Obese . No distress.   HENT:   Head: Normocephalic and atraumatic.   Neck: Neck supple.   Normal range of motion.  Cardiovascular: Normal  rate, regular rhythm and normal heart sounds. Exam reveals no gallop and no friction rub.    No murmur heard.  3+ bilateral LE edema   Pulmonary/Chest: Breath sounds normal. He has no wheezes. He has no rhonchi. He has no rales.   Abdominal: Abdomen is soft. Bowel sounds are normal. There is no abdominal tenderness. There is no rebound and no guarding.   Musculoskeletal:         General: Normal range of motion.      Cervical back: Normal range of motion and neck supple.     Neurological: He is alert and oriented to person, place, and time.   Skin: Skin is warm and dry. No rash noted. No erythema.   Psychiatric: He has a normal mood and affect.         ED Course   Procedures  Labs Reviewed   CBC W/ AUTO DIFFERENTIAL - Abnormal; Notable for the following components:       Result Value    RBC 4.04 (*)     Hemoglobin 12.0 (*)     Hematocrit 37.1 (*)     RDW 20.8 (*)     Platelets 90 (*)     Immature Granulocytes 1.4 (*)     Gran # (ANC) 9.9 (*)     Immature Grans (Abs) 0.16 (*)     Lymph # 0.5 (*)     Gran % 84.5 (*)     Lymph % 4.3 (*)     All other components within normal limits   COMPREHENSIVE METABOLIC PANEL - Abnormal; Notable for the following components:    Chloride 111 (*)     CO2 21 (*)     Calcium 7.9 (*)     Albumin 2.1 (*)     Total Bilirubin 5.4 (*)     AST 56 (*)     Anion Gap 7 (*)     All other components within normal limits   B-TYPE NATRIURETIC PEPTIDE - Abnormal; Notable for the following components:     (*)     All other components within normal limits   TROPONIN I   SARS-COV-2 RNA AMPLIFICATION, QUAL        ECG Results          EKG 12-lead (Final result)  Result time 07/22/22 15:21:28    Final result by Interface, Lab In [facility] [place] (07/22/22 15:21:28)                 Narrative:    Test Reason : R06.02,    Vent. Rate : 082 BPM     Atrial Rate : 082 BPM     P-R Int : 196 ms          QRS Dur : 084 ms      QT Int : 398 ms       P-R-T Axes : 024 039 075 degrees     QTc Int : 464 ms    Normal sinus  rhythm  Prolonged QT  Abnormal ECG  When compared with ECG of 29-MAR-2022 20:13,  Premature atrial complexes are no longer Present  Confirmed by Vasu MCKEON, Lucio ALFONSO ([id number]) on 7/22/2022 3:21:18 PM    Referred By: [email or web address]   SELF           Confirmed By:Lucio Leone MD                            Imaging Results          X-Ray Chest AP Portable (Final result)  Result time 07/22/22 13:04:52    Final result by Randal Gonzalez MD (07/22/22 13:04:52)                 Impression:      1. Central pulmonary vascular congestion and cephalization of flow without overt interstitial edema or sizable pleural effusion.      Electronically signed by: Randal Gonzalez  Date:    07/22/2022  Time:    13:04             Narrative:    EXAMINATION:  XR CHEST AP PORTABLE    CLINICAL HISTORY:  edema;    TECHNIQUE:  Single frontal portable view of the chest was performed.  Of note, images are degraded secondary to suboptimal positioning with patient imaged in exaggerated AP lordotic positioning and soft tissue x-ray beam attenuation related to patient body habitus, limiting evaluation.    COMPARISON:  Chest radiograph 03/29/2022    FINDINGS:  Cardiac silhouette is top-normal in size, not significantly changed.    Central pulmonary vascular congestion and cephalization of flow without overt interstitial edema or sizable pleural effusion.  No appreciable focal consolidation or pneumothorax.    Multilevel degenerative changes of the imaged spine.                                 Medications   sodium chloride 0.9% flush 10 mL (has no administration in time range)   melatonin tablet 6 mg (has no administration in time range)   hydrOXYzine HCL tablet 25 mg (has no administration in time range)   cetirizine tablet 10 mg (has no administration in time range)   pantoprazole EC tablet 40 mg (has no administration in time range)   sodium chloride 0.9% flush 10 mL (has no administration in time range)   heparin (porcine) injection 5,000 Units (has no  administration in time range)   furosemide (LASIX) 200 mg in dextrose 5 % 100 mL continuous infusion (conc: 2 mg/mL) (40 mg/hr Intravenous New Bag 7/22/22 1833)   predniSONE tablet 10 mg (has no administration in time range)   furosemide injection 80 mg (80 mg Intravenous Given 7/22/22 1331)     Medical Decision Making:   History:   Old Medical Records: I decided to obtain old medical records.  Old Records Summarized: records from clinic visits and records from previous admission(s).       <> Summary of Records: 6/15/22 - cardiology appt, plan for RHC.   Clinical Tests:   Lab Tests: Ordered and Reviewed  Radiological Study: Ordered and Reviewed  Medical Tests: Ordered and Reviewed  Other:   I have discussed this case with another health care provider.       <> Summary of the Discussion: Cardiology, hospital medicine       APC / Resident Notes:   56-year-old male with history of hyperlipidemia, cirrhosis, sarcoidosis, recently diagnosed pulmonary hypertension presents to the ED complaining of bilateral lower extremity edema and shortness of breath that has been worsening since Tuesday.  Vital signs stable.  3+ bilateral lower extremity edema.  Abdomen is soft, nontender.  Lungs are clear.  He was ambulated about the ED, pulse ox 97% with angulation.  Differential diagnosis includes but is not limited to fluid over load, worsening pulmonary hypertension, pneumonia, MATIAS, electrolyte abnormality.  Will check labs, chest x-ray.    Given 80 mg IV Lasix.    COVID negative. No leukocytosis. T bili elevated, ?hepatic congestion. Troponin normal. BNP elevated at 153.    CXR independently reviewed - pulmonary edema.    Discussed with cardiology on call and they evaluated in the ED. Recommend admission to  for lasix gtt. May need RHC while admitted.     Admitted to  for further management of fluid overload.                  Clinical Impression:   Final diagnoses:  [R06.02] Shortness of breath  [E87.70] Hypervolemia,  unspecified hypervolemia type (Primary)  [I27.20] Pulmonary HTN          ED Disposition Condition    Admit               Shala Bernabe PA-C  07/22/22 1906

## 2022-07-22 NOTE — SUBJECTIVE & OBJECTIVE
Past Medical History:   Diagnosis Date    Anxiety     Hyperlipidemia     Morbid obesity with BMI of 45.0-49.9, adult     Multiple pulmonary nodules     Obesity     Other cirrhosis of liver 2021    Portal hypertension 2021    Sarcoidosis of lung with sarcoidosis of lymph nodes     Splenomegaly 2020    Thrombocytopenia 2020       Past Surgical History:   Procedure Laterality Date    ANTERIOR CRUCIATE LIGAMENT REPAIR      right knee    COLONOSCOPY N/A 2021    Procedure: COLONOSCOPY;  Surgeon: Jeff Olvera MD;  [facility]  Service: Endoscopy;  Laterality: N/A;  rectal bleeding,   2nd floor BMI 50 (354 lbs)  COVID test on 21 at [facility]    ESOPHAGOGASTRODUODENOSCOPY N/A 2021    Procedure: EGD (ESOPHAGOGASTRODUODENOSCOPY);  Surgeon: Jeff Olvera MD;  [facility]);  Service: Endoscopy;  Laterality: N/A;  variceal screening/ labs the am of procedure  2nd floor BMI 50 (354 lbs)    ESOPHAGOGASTRODUODENOSCOPY N/A 3/31/2022    Procedure: EGD (ESOPHAGOGASTRODUODENOSCOPY);  Surgeon: Jeff Olvera MD;  [facility] ([facility]);  Service: Endoscopy;  Laterality: N/A;  BMI-52    Wt:375#      cirrhosis, variceal screening-labs done on 3/29-GT  vaccinated-GT    LYMPHADENECTOMY      MENISCECTOMY      left knee    NASAL SEPTUM SURGERY      WISDOM TOOTH EXTRACTION         Review of patient's allergies indicates:  No Known Allergies    No current facility-administered medications on file prior to encounter.     Current Outpatient Medications on File Prior to Encounter   Medication Sig    acetaminophen (TYLENOL) 500 MG tablet Take 3 tablets by mouth daily as needed for knee pain or headache    albuterol (PROVENTIL/VENTOLIN HFA) 90 mcg/actuation inhaler SMARTSI-2 Puff(s) By Mouth Every 4-6 Hours PRN    diclofenac sodium (VOLTAREN) 1 % Gel Apply topically twice daily as needed for knee pain    furosemide (LASIX) 40 MG tablet Take 1.5  tablets (60 mg total) by mouth once daily.    hydrOXYzine HCL (ATARAX) 25 MG tablet Take 1 tablet (25 mg total) by mouth 3 (three) times daily as needed for Anxiety.    LIDOCAINE VISCOUS 2 % solution Take 10 mLs by mouth every 6 (six) hours.    loratadine (CLARITIN) 10 mg tablet Take 1 tablet (10 mg total) by mouth once daily.    mupirocin (BACTROBAN) 2 % ointment Apply topically 3 (three) times daily. (Patient taking differently: Apply topically 3 (three) times daily as needed (skin infection).)    omeprazole (PRILOSEC) 20 MG capsule Take 1 capsule (20 mg total) by mouth once daily.    pantoprazole (PROTONIX) 40 MG tablet Take 1 tablet (40 mg total) by mouth once daily. (Patient not taking: Reported on 6/15/2022)    phenylephrine HCl (SINEX REGULAR NASL) 2 sprays by Each Nostril route once daily.    spironolactone (ALDACTONE) 100 MG tablet Take 1 tablet (100 mg total) by mouth once daily. DOSE CHANGE     Family History       Problem Relation (Age of Onset)    Aneurysm Mother    Dementia Mother    Diabetes Paternal Grandmother    Heart attack Father (55)    Hypertension Father          Tobacco Use    Smoking status: Never Smoker    Smokeless tobacco: Never Used   Substance and Sexual Activity    Alcohol use: No    Drug use: No    Sexual activity: Not on file     Review of Systems   Constitutional:  Positive for activity change and unexpected weight change. Negative for appetite change, chills, diaphoresis and fever.   HENT:  Negative for congestion, rhinorrhea, sneezing, sore throat and trouble swallowing.    Eyes:  Negative for visual disturbance.   Respiratory:  Positive for cough and shortness of breath. Negative for chest tightness and wheezing.    Cardiovascular:  Positive for leg swelling. Negative for chest pain and palpitations.   Gastrointestinal:  Positive for abdominal distention. Negative for abdominal pain, blood in stool, constipation, diarrhea, nausea and vomiting.   Genitourinary:  Negative for  difficulty urinating and dysuria.   Musculoskeletal:  Negative for arthralgias and myalgias.   Skin:  Negative for wound.   Neurological:  Negative for dizziness, light-headedness and headaches.   Psychiatric/Behavioral:  Negative for behavioral problems, confusion and decreased concentration.    Objective:     Vital Signs (Most Recent):  Temp: 96.2 °F (35.7 °C) (07/22/22 1628)  Pulse: 75 (07/22/22 1628)  Resp: 20 (07/22/22 1628)  BP: (!) 159/72 (07/22/22 1628)  SpO2: 95 % (07/22/22 1628)   Vital Signs (24h Range):  Temp:  [96.2 °F (35.7 °C)-98.6 °F (37 °C)] 96.2 °F (35.7 °C)  Pulse:  [75-96] 75  Resp:  [20] 20  SpO2:  [95 %-97 %] 95 %  BP: (159-160)/(72-85) 159/72     Weight: (!) 167.8 kg (370 lb)  Body mass index is 51.6 kg/m².    Physical Exam  Vitals and nursing note reviewed.   Constitutional:       General: He is not in acute distress.     Appearance: He is obese. He is not toxic-appearing or diaphoretic.   HENT:      Head: Normocephalic and atraumatic.      Right Ear: External ear normal.      Left Ear: External ear normal.      Nose: Nose normal.      Mouth/Throat:      Mouth: Mucous membranes are moist.      Pharynx: No oropharyngeal exudate or posterior oropharyngeal erythema.   Eyes:      General: No scleral icterus.        Right eye: No discharge.         Left eye: No discharge.      Conjunctiva/sclera: Conjunctivae normal.      Pupils: Pupils are equal, round, and reactive to light.   Cardiovascular:      Rate and Rhythm: Normal rate and regular rhythm.      Pulses: Normal pulses.      Heart sounds: Murmur heard.   Pulmonary:      Effort: Pulmonary effort is normal.      Comments: Coarse breath sounds bilaterally  Tachypnea noted with movement   Abdominal:      General: Bowel sounds are normal. There is distension.      Palpations: Abdomen is soft.      Tenderness: There is no abdominal tenderness. There is no guarding or rebound.   Musculoskeletal:      Cervical back: Normal range of motion and neck  supple.      Right lower leg: Edema present.      Left lower leg: Edema present.      Comments: Profound bilateral lower extremity up to hips   Skin:     General: Skin is warm and dry.      Capillary Refill: Capillary refill takes less than 2 seconds.   Neurological:      Mental Status: He is alert and oriented to person, place, and time.   Psychiatric:         Mood and Affect: Mood normal.         Behavior: Behavior normal.         Thought Content: Thought content normal.         CRANIAL NERVES     CN III, IV, VI   Pupils are equal, round, and reactive to light.     Significant Labs: All pertinent labs within the past 24 hours have been reviewed.    Recent Results (from the past 24 hour(s))   CBC auto differential    Collection Time: 07/22/22 11:17 AM   Result Value Ref Range    WBC 11.66 3.90 - 12.70 K/uL    RBC 4.04 (L) 4.60 - 6.20 M/uL    Hemoglobin 12.0 (L) 14.0 - 18.0 g/dL    Hematocrit 37.1 (L) 40.0 - 54.0 %    MCV 92 82 - 98 fL    MCH 29.7 27.0 - 31.0 pg    MCHC 32.3 32.0 - 36.0 g/dL    RDW 20.8 (H) 11.5 - 14.5 %    Platelets 90 (L) 150 - 450 K/uL    MPV 12.0 9.2 - 12.9 fL    Immature Granulocytes 1.4 (H) 0.0 - 0.5 %    Gran # (ANC) 9.9 (H) 1.8 - 7.7 K/uL    Immature Grans (Abs) 0.16 (H) 0.00 - 0.04 K/uL    Lymph # 0.5 (L) 1.0 - 4.8 K/uL    Mono # 1.0 0.3 - 1.0 K/uL    Eos # 0.1 0.0 - 0.5 K/uL    Baso # 0.04 0.00 - 0.20 K/uL    nRBC 0 0 /100 WBC    Gran % 84.5 (H) 38.0 - 73.0 %    Lymph % 4.3 (L) 18.0 - 48.0 %    Mono % 8.9 4.0 - 15.0 %    Eosinophil % 0.6 0.0 - 8.0 %    Basophil % 0.3 0.0 - 1.9 %    Differential Method Automated    Comprehensive metabolic panel    Collection Time: 07/22/22 11:17 AM   Result Value Ref Range    Sodium 139 136 - 145 mmol/L    Potassium 4.4 3.5 - 5.1 mmol/L    Chloride 111 (H) 95 - 110 mmol/L    CO2 21 (L) 23 - 29 mmol/L    Glucose 99 70 - 110 mg/dL    BUN 19 6 - 20 mg/dL    Creatinine 1.2 0.5 - 1.4 mg/dL    Calcium 7.9 (L) 8.7 - 10.5 mg/dL    Total Protein 6.0 6.0 - 8.4 g/dL     Albumin 2.1 (L) 3.5 - 5.2 g/dL    Total Bilirubin 5.4 (H) 0.1 - 1.0 mg/dL    Alkaline Phosphatase 102 55 - 135 U/L    AST 56 (H) 10 - 40 U/L    ALT 32 10 - 44 U/L    Anion Gap 7 (L) 8 - 16 mmol/L    eGFR if African American >60.0 >60 mL/min/1.73 m^2    eGFR if non African American >60.0 >60 mL/min/1.73 m^2   Troponin I    Collection Time: 07/22/22 11:17 AM   Result Value Ref Range    Troponin I 0.021 0.000 - 0.026 ng/mL   Brain natriuretic peptide    Collection Time: 07/22/22 11:17 AM   Result Value Ref Range     (H) 0 - 99 pg/mL   COVID-19 Rapid Screening    Collection Time: 07/22/22 11:33 AM   Result Value Ref Range    SARS-CoV-2 RNA, Amplification, Qual Negative Negative         Significant Imaging: I have reviewed all pertinent imaging results/findings within the past 24 hours.    Imaging Results              X-Ray Chest AP Portable (Final result)  Result time 07/22/22 13:04:52      Final result by Randal Gonzalez MD (07/22/22 13:04:52)                   Impression:      1. Central pulmonary vascular congestion and cephalization of flow without overt interstitial edema or sizable pleural effusion.      Electronically signed by: Randal Gonzalez  Date:    07/22/2022  Time:    13:04               Narrative:    EXAMINATION:  XR CHEST AP PORTABLE    CLINICAL HISTORY:  edema;    TECHNIQUE:  Single frontal portable view of the chest was performed.  Of note, images are degraded secondary to suboptimal positioning with patient imaged in exaggerated AP lordotic positioning and soft tissue x-ray beam attenuation related to patient body habitus, limiting evaluation.    COMPARISON:  Chest radiograph 03/29/2022    FINDINGS:  Cardiac silhouette is top-normal in size, not significantly changed.    Central pulmonary vascular congestion and cephalization of flow without overt interstitial edema or sizable pleural effusion.  No appreciable focal consolidation or pneumothorax.    Multilevel degenerative changes of the imaged  spine.

## 2022-07-22 NOTE — CONSULTS
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  Patient has been having worsening dyspnea on exertion for the past few months however for the past week has progressed to the point where he has to stop after walking 20-30 feet.  He is compliant with his home medications was totally his Lasix whereby he takes 60 mg of Lasix p.o. daily.  He has noticed weeping of his lower extremities, and increased abdominal girth as well.  He is supposed to get a right heart catheterization in outpatient setting however he states he cannot afford the co-pay of $2000.    In the ED patient appears anasarca however hypertensive.  CBC notable for mild anemia and thrombocytopenia.  CMP grossly unremarkable.  BNP mildly elevated.  Tropes negative.  Chest x-ray showing pulmonary vascular congestion with mild interstitial edema.  PA in ED asked Cardiology to see patient for possible discharge versus admission secondary to volume overload.      Medications - Outpatient  Prior to Admission medications    Medication Sig Start Date End Date Taking? Authorizing Provider   acetaminophen (TYLENOL) 500 MG tablet Take 3 tablets by mouth daily as needed for knee pain or headache    Historical Provider   albuterol (PROVENTIL/VENTOLIN HFA) 90 mcg/actuation inhaler SMARTSI-2 Puff(s) By Mouth Every 4-6 Hours PRN 5/3/22   Historical Provider   diclofenac sodium (VOLTAREN) 1 % Gel Apply topically twice daily as needed for knee pain    Historical  Provider   furosemide (LASIX) 40 MG tablet Take 1.5 tablets (60 mg total) by mouth once daily. 1/27/22 1/27/23  Daniel Crowder MD   hydrOXYzine HCL (ATARAX) 25 MG tablet Take 1 tablet (25 mg total) by mouth 3 (three) times daily as needed for Anxiety. 1/7/22   Jeff Parra MD   LIDOCAINE VISCOUS 2 % solution Take 10 mLs by mouth every 6 (six) hours. 5/3/22   Historical Provider   loratadine (CLARITIN) 10 mg tablet Take 1 tablet (10 mg total) by mouth once daily. 12/7/20 12/7/21  Chaparro Curran MD   mupirocin (BACTROBAN) 2 % ointment Apply topically 3 (three) times daily.  Patient taking differently: Apply topically 3 (three) times daily as needed (skin infection). 10/1/21   Andrew Dunham MD   omeprazole (PRILOSEC) 20 MG capsule Take 1 capsule (20 mg total) by mouth once daily. 3/29/22 3/29/23  Maryan Mclean MD   pantoprazole (PROTONIX) 40 MG tablet Take 1 tablet (40 mg total) by mouth once daily.  Patient not taking: Reported on 6/15/2022 8/6/21 8/6/22  Chaparro Curran MD   phenylephrine HCl (SINEX REGULAR NASL) 2 sprays by Each Nostril route once daily.    Historical Provider   spironolactone (ALDACTONE) 100 MG tablet Take 1 tablet (100 mg total) by mouth once daily. DOSE CHANGE 1/27/22 1/27/23  Daniel Crowder MD         Medications - Current  Scheduled Meds:  Continuous Infusions:  PRN Meds:.      Allergies  Review of patient's allergies indicates:  No Known Allergies      Past Medical History  Past Medical History:   Diagnosis Date    Anxiety     Hyperlipidemia     Morbid obesity with BMI of 45.0-49.9, adult     Multiple pulmonary nodules     Obesity     Other cirrhosis of liver 1/13/2021    Portal hypertension 2/11/2021    Sarcoidosis of lung with sarcoidosis of lymph nodes     Splenomegaly 12/22/2020    Thrombocytopenia 12/22/2020         Past Surgical History  Past Surgical History:   Procedure Laterality Date    ANTERIOR CRUCIATE LIGAMENT REPAIR  2007    right knee     COLONOSCOPY N/A 4/8/2021    Procedure: COLONOSCOPY;  Surgeon: Jeff Olvera MD;  Location: [facility] ENDO (2ND FLR);  Service: Endoscopy;  Laterality: N/A;  rectal bleeding,   2nd floor BMI 50 (354 lbs)  COVID test on 4/5/21 at [facility] -     ESOPHAGOGASTRODUODENOSCOPY N/A 4/8/2021    Procedure: EGD (ESOPHAGOGASTRODUODENOSCOPY);  Surgeon: Jeff Olvera MD;  Location: [facility] ENDO (2ND FLR);  Service: Endoscopy;  Laterality: N/A;  variceal screening/ labs the am of procedure  2nd floor BMI 50 (354 lbs)    ESOPHAGOGASTRODUODENOSCOPY N/A 3/31/2022    Procedure: EGD (ESOPHAGOGASTRODUODENOSCOPY);  Surgeon: Jeff Olvera MD;  Location: [facility] ENDO (2ND FLR);  Service: Endoscopy;  Laterality: N/A;  BMI-52    Wt:375#      cirrhosis, variceal screening-labs done on 3/29-GT  vaccinated-GT    LYMPHADENECTOMY  1985    MENISCECTOMY      left knee    NASAL SEPTUM SURGERY  1985    WISDOM TOOTH EXTRACTION           Social History  Social History     Socioeconomic History    Marital status:     Number of children: 2   Occupational History    Occupation: [employer] supervisor     Employer: [employer]   Tobacco Use    Smoking status: Never Smoker    Smokeless tobacco: Never Used   Substance and Sexual Activity    Alcohol use: No    Drug use: No   Social History Narrative    Patient is originally from   [place]        School at ; [employer]        [employer] ; [employer]         Working ; [employer]            Daysi 22 yrs         Children    Maksim Funez        Lives with     Wife         Diet/Exericse                Works as a  and .  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         ROS   Admits Denies   Constitutional  Chills, diaphoresis, malaise   Eyes  Visual changes   ENMT  Dysphagia, Epistaxis, nasal congestion, hearing loss   Cardiovascular  Chest pain, palpitations, edema   Respiratory Dyspnea Cough, wheezing    Gastrointestinal  Nausea, vomiting, constipation, diarrhea, anorexia.     Genitourinary  Dysuria, incontinence   Musculoskeletal Lower extremity edema Myalgias, joint pain, joint swelling   Integumentary  Rash, inflammation, burning   Neruo-Psychiatric  Anxiety, insomnia.  Changes in speech, strength, sensation.     Endocrine     Hematologic  Abnormal bruising, bleeding   Immunologic  Inflammation, pain at IV sites.  Pruritis.         Physical Examination         Vital Signs  Vitals  Temp: 98.6 °F (37 °C)  Pulse: 95  Heart Rate Source: SpO2  Resp: 20  SpO2: (S) 97 % (pt ambulated. O2 sats btw 95-97% on room air.)  O2 Device (Oxygen Therapy): room air  BP: (!) 160/85          24 Hour VS Range    Temp:  [98.6 °F (37 °C)]   Pulse:  [95-96]   Resp:  [20]   BP: (160)/(85)   SpO2:  [96 %-97 %]     Intake/Output Summary (Last 24 hours) at 7/22/2022 1535  Last data filed at 7/22/2022 1517  Gross per 24 hour   Intake --   Output 1700 ml   Net -1700 ml         General:  Sitting up in bed no acute distress.  Head: NCAT  Eyes: conjunctivae and lids normal, no scleral icterus, EOMI.  ENMT:  no gingival bleeding, normal oral mucosa without pallor or cyanosis.   Neck:  JVP distended tragus of the ear...  Trachea non-displaced.     Chest:  Normal respiratory effort.  Chest clear to auscultation.  No wheezes, rales, or rhonchi.  Heart:  Normal PMI, S1 S2 normal quality, splitting.  No murmurs rubs or gallops.  Peripheral pulses 2+ bilaterally in carotids and radials.  Cannot palpate DP/PT secondary to significant edema..  Abdomen:  Obese but distended, normal bowel sounds, non-tender.  Positive hepato-jugular reflux.  Extremities:  4+ edema with weeping noted in bilateral lower extremities.. Normal capillary refill.    Skin:  Warm and dry.  No cyanosis or pallor.  No ulcers, stasis.  IV sites without tenderness or inflammation.    Neurological / Psychiatric:  Oriented to person, time, and place.  No facial asymmetry, drift.   Fluent without dysarthria.  Mood euthymic, affect normal.         Data       Recent Labs   Lab 07/22/22  1117   WBC 11.66   HGB 12.0*   HCT 37.1*   PLT 90*        No results for input(s): PROTIME, INR in the last 168 hours.     Recent Labs   Lab 07/22/22  1117      K 4.4   *   CO2 21*   BUN 19   CREATININE 1.2   ANIONGAP 7*   CALCIUM 7.9*        Recent Labs   Lab 07/22/22  1117   PROT 6.0   ALBUMIN 2.1*   BILITOT 5.4*   ALKPHOS 102   AST 56*   ALT 32        Recent Labs   Lab 07/22/22  1117   TROPONINI 0.021        BNP (pg/mL)   Date Value   07/22/2022 153 (H)   06/15/2022 91   01/05/2022 130 (H)   08/06/2021 32   12/02/2020 22           Assessment & Plan     Acute on chronic diastolic heart failure:  EF 60%  -recommend admission to hospital medicine  -recommend diuresis with Lasix 80 IV mg bolus followed by Lasix drip of 40 mg/hr  -monitor urine output and electrolytes  -maintain potassium greater than 4 and magnesium greater than 2  -patient may require right heart catheterization while he is admitted.  Likely next week    Anasarca  -as above    Pulmonary hypertension  -diuresis as stated above.  -possible right heart catheterization while patient is admitted.      Signed:  Jase De Luna M.D.  Page # [phone]  Cardiovascular Fellow  [facility]

## 2022-07-22 NOTE — ASSESSMENT & PLAN NOTE
Evaluated by cardiology outpatient; per pulmonology concerns for elevated PASP on ECHO and symptoms considerably out of proportion to his sarcoid diagnosis    Cardiology following, appreciate recommendations  Diuresis per acute on chronic diastolic heart failure  Per Cardiology, possible right heart catheterization while inpatient

## 2022-07-23 LAB
ALBUMIN SERPL BCP-MCNC: 2.1 G/DL (ref 3.5–5.2)
ALBUMIN SERPL BCP-MCNC: 2.2 G/DL (ref 3.5–5.2)
ALBUMIN SERPL BCP-MCNC: 2.3 G/DL (ref 3.5–5.2)
ALP SERPL-CCNC: 100 U/L (ref 55–135)
ALP SERPL-CCNC: 99 U/L (ref 55–135)
ALT SERPL W/O P-5'-P-CCNC: 26 U/L (ref 10–44)
ALT SERPL W/O P-5'-P-CCNC: 27 U/L (ref 10–44)
ANION GAP SERPL CALC-SCNC: 10 MMOL/L (ref 8–16)
ANION GAP SERPL CALC-SCNC: 10 MMOL/L (ref 8–16)
ANION GAP SERPL CALC-SCNC: 8 MMOL/L (ref 8–16)
ANION GAP SERPL CALC-SCNC: 8 MMOL/L (ref 8–16)
AST SERPL-CCNC: 38 U/L (ref 10–40)
AST SERPL-CCNC: 39 U/L (ref 10–40)
BASOPHILS # BLD AUTO: 0.04 K/UL (ref 0–0.2)
BASOPHILS NFR BLD: 0.5 % (ref 0–1.9)
BILIRUB SERPL-MCNC: 6.3 MG/DL (ref 0.1–1)
BILIRUB SERPL-MCNC: 6.7 MG/DL (ref 0.1–1)
BUN SERPL-MCNC: 20 MG/DL (ref 6–20)
CALCIUM SERPL-MCNC: 8.3 MG/DL (ref 8.7–10.5)
CALCIUM SERPL-MCNC: 8.4 MG/DL (ref 8.7–10.5)
CALCIUM SERPL-MCNC: 9 MG/DL (ref 8.7–10.5)
CALCIUM SERPL-MCNC: 9 MG/DL (ref 8.7–10.5)
CHLORIDE SERPL-SCNC: 103 MMOL/L (ref 95–110)
CHLORIDE SERPL-SCNC: 103 MMOL/L (ref 95–110)
CHLORIDE SERPL-SCNC: 104 MMOL/L (ref 95–110)
CHLORIDE SERPL-SCNC: 106 MMOL/L (ref 95–110)
CO2 SERPL-SCNC: 26 MMOL/L (ref 23–29)
CO2 SERPL-SCNC: 28 MMOL/L (ref 23–29)
CREAT SERPL-MCNC: 1.1 MG/DL (ref 0.5–1.4)
CREAT SERPL-MCNC: 1.2 MG/DL (ref 0.5–1.4)
CREAT SERPL-MCNC: 1.5 MG/DL (ref 0.5–1.4)
CREAT SERPL-MCNC: 1.5 MG/DL (ref 0.5–1.4)
DIFFERENTIAL METHOD: ABNORMAL
EOSINOPHIL # BLD AUTO: 0.1 K/UL (ref 0–0.5)
EOSINOPHIL NFR BLD: 1.6 % (ref 0–8)
ERYTHROCYTE [DISTWIDTH] IN BLOOD BY AUTOMATED COUNT: 20.2 % (ref 11.5–14.5)
EST. GFR  (AFRICAN AMERICAN): >60 ML/MIN/1.73 M^2
EST. GFR  (NON AFRICAN AMERICAN): 52 ML/MIN/1.73 M^2
EST. GFR  (NON AFRICAN AMERICAN): 52 ML/MIN/1.73 M^2
EST. GFR  (NON AFRICAN AMERICAN): >60 ML/MIN/1.73 M^2
EST. GFR  (NON AFRICAN AMERICAN): >60 ML/MIN/1.73 M^2
GLUCOSE SERPL-MCNC: 77 MG/DL (ref 70–110)
GLUCOSE SERPL-MCNC: 82 MG/DL (ref 70–110)
GLUCOSE SERPL-MCNC: 99 MG/DL (ref 70–110)
GLUCOSE SERPL-MCNC: 99 MG/DL (ref 70–110)
HCT VFR BLD AUTO: 36.8 % (ref 40–54)
HGB BLD-MCNC: 12.2 G/DL (ref 14–18)
IMM GRANULOCYTES # BLD AUTO: 0.11 K/UL (ref 0–0.04)
IMM GRANULOCYTES NFR BLD AUTO: 1.3 % (ref 0–0.5)
INR PPP: 1.4 (ref 0.8–1.2)
LYMPHOCYTES # BLD AUTO: 0.9 K/UL (ref 1–4.8)
LYMPHOCYTES NFR BLD: 10.4 % (ref 18–48)
MAGNESIUM SERPL-MCNC: 1.6 MG/DL (ref 1.6–2.6)
MAGNESIUM SERPL-MCNC: 1.9 MG/DL (ref 1.6–2.6)
MAGNESIUM SERPL-MCNC: 1.9 MG/DL (ref 1.6–2.6)
MCH RBC QN AUTO: 29.7 PG (ref 27–31)
MCHC RBC AUTO-ENTMCNC: 33.2 G/DL (ref 32–36)
MCV RBC AUTO: 90 FL (ref 82–98)
MONOCYTES # BLD AUTO: 1 K/UL (ref 0.3–1)
MONOCYTES NFR BLD: 11.8 % (ref 4–15)
NEUTROPHILS # BLD AUTO: 6.5 K/UL (ref 1.8–7.7)
NEUTROPHILS NFR BLD: 74.4 % (ref 38–73)
NRBC BLD-RTO: 0 /100 WBC
PHOSPHATE SERPL-MCNC: 2.7 MG/DL (ref 2.7–4.5)
PHOSPHATE SERPL-MCNC: 4 MG/DL (ref 2.7–4.5)
PLATELET # BLD AUTO: 93 K/UL (ref 150–450)
PMV BLD AUTO: ABNORMAL FL (ref 9.2–12.9)
POTASSIUM SERPL-SCNC: 3.4 MMOL/L (ref 3.5–5.1)
POTASSIUM SERPL-SCNC: 3.5 MMOL/L (ref 3.5–5.1)
POTASSIUM SERPL-SCNC: 4.3 MMOL/L (ref 3.5–5.1)
POTASSIUM SERPL-SCNC: 4.3 MMOL/L (ref 3.5–5.1)
PROT SERPL-MCNC: 5.7 G/DL (ref 6–8.4)
PROT SERPL-MCNC: 5.9 G/DL (ref 6–8.4)
PROTHROMBIN TIME: 14.5 SEC (ref 9–12.5)
RBC # BLD AUTO: 4.11 M/UL (ref 4.6–6.2)
SODIUM SERPL-SCNC: 138 MMOL/L (ref 136–145)
SODIUM SERPL-SCNC: 141 MMOL/L (ref 136–145)
SODIUM SERPL-SCNC: 141 MMOL/L (ref 136–145)
SODIUM SERPL-SCNC: 142 MMOL/L (ref 136–145)
WBC # BLD AUTO: 8.68 K/UL (ref 3.9–12.7)

## 2022-07-23 PROCEDURE — 80053 COMPREHEN METABOLIC PANEL: CPT | Performed by: HOSPITALIST

## 2022-07-23 PROCEDURE — 99232 PR SUBSEQUENT HOSPITAL CARE,LEVL II: ICD-10-PCS | Mod: ,,, | Performed by: STUDENT IN AN ORGANIZED HEALTH CARE EDUCATION/TRAINING PROGRAM

## 2022-07-23 PROCEDURE — 27000221 HC OXYGEN, UP TO 24 HOURS

## 2022-07-23 PROCEDURE — 99233 PR SUBSEQUENT HOSPITAL CARE,LEVL III: ICD-10-PCS | Mod: ,,, | Performed by: INTERNAL MEDICINE

## 2022-07-23 PROCEDURE — 36415 COLL VENOUS BLD VENIPUNCTURE: CPT | Performed by: HOSPITALIST

## 2022-07-23 PROCEDURE — 63600175 PHARM REV CODE 636 W HCPCS: Performed by: HOSPITALIST

## 2022-07-23 PROCEDURE — 94761 N-INVAS EAR/PLS OXIMETRY MLT: CPT

## 2022-07-23 PROCEDURE — 63600175 PHARM REV CODE 636 W HCPCS: Performed by: STUDENT IN AN ORGANIZED HEALTH CARE EDUCATION/TRAINING PROGRAM

## 2022-07-23 PROCEDURE — 80069 RENAL FUNCTION PANEL: CPT | Performed by: STUDENT IN AN ORGANIZED HEALTH CARE EDUCATION/TRAINING PROGRAM

## 2022-07-23 PROCEDURE — 85025 COMPLETE CBC W/AUTO DIFF WBC: CPT | Performed by: STUDENT IN AN ORGANIZED HEALTH CARE EDUCATION/TRAINING PROGRAM

## 2022-07-23 PROCEDURE — 80053 COMPREHEN METABOLIC PANEL: CPT | Mod: 91 | Performed by: STUDENT IN AN ORGANIZED HEALTH CARE EDUCATION/TRAINING PROGRAM

## 2022-07-23 PROCEDURE — 94660 CPAP INITIATION&MGMT: CPT

## 2022-07-23 PROCEDURE — 83735 ASSAY OF MAGNESIUM: CPT | Mod: 91 | Performed by: STUDENT IN AN ORGANIZED HEALTH CARE EDUCATION/TRAINING PROGRAM

## 2022-07-23 PROCEDURE — 27000190 HC CPAP FULL FACE MASK W/VALVE

## 2022-07-23 PROCEDURE — 83735 ASSAY OF MAGNESIUM: CPT | Performed by: HOSPITALIST

## 2022-07-23 PROCEDURE — 25000003 PHARM REV CODE 250: Performed by: STUDENT IN AN ORGANIZED HEALTH CARE EDUCATION/TRAINING PROGRAM

## 2022-07-23 PROCEDURE — 99233 SBSQ HOSP IP/OBS HIGH 50: CPT | Mod: ,,, | Performed by: INTERNAL MEDICINE

## 2022-07-23 PROCEDURE — 20600001 HC STEP DOWN PRIVATE ROOM

## 2022-07-23 PROCEDURE — 25000003 PHARM REV CODE 250: Performed by: HOSPITALIST

## 2022-07-23 PROCEDURE — 85610 PROTHROMBIN TIME: CPT | Performed by: STUDENT IN AN ORGANIZED HEALTH CARE EDUCATION/TRAINING PROGRAM

## 2022-07-23 PROCEDURE — 99232 SBSQ HOSP IP/OBS MODERATE 35: CPT | Mod: ,,, | Performed by: STUDENT IN AN ORGANIZED HEALTH CARE EDUCATION/TRAINING PROGRAM

## 2022-07-23 PROCEDURE — 84100 ASSAY OF PHOSPHORUS: CPT | Performed by: STUDENT IN AN ORGANIZED HEALTH CARE EDUCATION/TRAINING PROGRAM

## 2022-07-23 PROCEDURE — 36415 COLL VENOUS BLD VENIPUNCTURE: CPT | Performed by: STUDENT IN AN ORGANIZED HEALTH CARE EDUCATION/TRAINING PROGRAM

## 2022-07-23 PROCEDURE — 99900035 HC TECH TIME PER 15 MIN (STAT)

## 2022-07-23 RX ORDER — MAGNESIUM SULFATE HEPTAHYDRATE 40 MG/ML
2 INJECTION, SOLUTION INTRAVENOUS ONCE
Status: COMPLETED | OUTPATIENT
Start: 2022-07-23 | End: 2022-07-24

## 2022-07-23 RX ORDER — POTASSIUM CHLORIDE 20 MEQ/1
40 TABLET, EXTENDED RELEASE ORAL ONCE
Status: COMPLETED | OUTPATIENT
Start: 2022-07-23 | End: 2022-07-23

## 2022-07-23 RX ORDER — FUROSEMIDE 10 MG/ML
80 INJECTION INTRAMUSCULAR; INTRAVENOUS 2 TIMES DAILY
Status: DISCONTINUED | OUTPATIENT
Start: 2022-07-23 | End: 2022-07-24

## 2022-07-23 RX ORDER — SPIRONOLACTONE 25 MG/1
100 TABLET ORAL DAILY
Status: DISCONTINUED | OUTPATIENT
Start: 2022-07-23 | End: 2022-07-25 | Stop reason: HOSPADM

## 2022-07-23 RX ORDER — MAGNESIUM SULFATE HEPTAHYDRATE 40 MG/ML
2 INJECTION, SOLUTION INTRAVENOUS ONCE
Status: COMPLETED | OUTPATIENT
Start: 2022-07-23 | End: 2022-07-23

## 2022-07-23 RX ORDER — CYCLOBENZAPRINE HCL 5 MG
5 TABLET ORAL 3 TIMES DAILY PRN
Status: DISCONTINUED | OUTPATIENT
Start: 2022-07-23 | End: 2022-07-23

## 2022-07-23 RX ADMIN — FUROSEMIDE 80 MG: 10 INJECTION, SOLUTION INTRAMUSCULAR; INTRAVENOUS at 12:07

## 2022-07-23 RX ADMIN — PREDNISONE 10 MG: 10 TABLET ORAL at 08:07

## 2022-07-23 RX ADMIN — CETIRIZINE HYDROCHLORIDE 10 MG: 10 TABLET, FILM COATED ORAL at 08:07

## 2022-07-23 RX ADMIN — POTASSIUM CHLORIDE 40 MEQ: 1500 TABLET, EXTENDED RELEASE ORAL at 04:07

## 2022-07-23 RX ADMIN — MAGNESIUM SULFATE HEPTAHYDRATE 2 G: 40 INJECTION, SOLUTION INTRAVENOUS at 10:07

## 2022-07-23 RX ADMIN — SPIRONOLACTONE 100 MG: 25 TABLET, FILM COATED ORAL at 12:07

## 2022-07-23 RX ADMIN — HEPARIN SODIUM 5000 UNITS: 5000 INJECTION INTRAVENOUS; SUBCUTANEOUS at 05:07

## 2022-07-23 RX ADMIN — HEPARIN SODIUM 5000 UNITS: 5000 INJECTION INTRAVENOUS; SUBCUTANEOUS at 09:07

## 2022-07-23 RX ADMIN — POTASSIUM CHLORIDE 40 MEQ: 1500 TABLET, EXTENDED RELEASE ORAL at 12:07

## 2022-07-23 RX ADMIN — POTASSIUM CHLORIDE 40 MEQ: 1500 TABLET, EXTENDED RELEASE ORAL at 08:07

## 2022-07-23 RX ADMIN — FUROSEMIDE 80 MG: 10 INJECTION, SOLUTION INTRAMUSCULAR; INTRAVENOUS at 09:07

## 2022-07-23 RX ADMIN — HEPARIN SODIUM 5000 UNITS: 5000 INJECTION INTRAVENOUS; SUBCUTANEOUS at 03:07

## 2022-07-23 RX ADMIN — PANTOPRAZOLE SODIUM 40 MG: 40 TABLET, DELAYED RELEASE ORAL at 08:07

## 2022-07-23 RX ADMIN — MAGNESIUM SULFATE HEPTAHYDRATE 2 G: 40 INJECTION, SOLUTION INTRAVENOUS at 04:07

## 2022-07-23 NOTE — ASSESSMENT & PLAN NOTE
Diagnosed via mediastinal lymph node biopsy 2012.   Followed by pulmonology; last seen in clinic 5/27/22    continue prednisone 10mg qday   Supplemental oxygen via nasal cannula as needed; intermittently on oxygen prn at home  Walk test prior to discharge

## 2022-07-23 NOTE — CONSULTS
Food & Nutrition  Education    Diet Education: Low sodium   Time Spent: 10 minutes   Learners: Patient     Nutrition Education provided with handouts. All questions and concerns answered. Dietitian's contact information provided.     Pt reports decreased appetite PTA x 1 week; UBW: 360#. Appears nourished w/ no indicators of malnutrition. Will monitor.    Follow-Up: Yes    Please re-consult as needed.    Thanks!  MS Pao, RD, LDN

## 2022-07-23 NOTE — SUBJECTIVE & OBJECTIVE
Interval History: NAEO. Patient reports persistent SOB and exertional chest tightness, but this is improved from yesterday. Reports mild improvement in leg swelling and abd distension although still present. Patient does endorse moderate cramping in the hands in setting of aggressive diuresis; this was addressed this morning with adequate electrolyte replacement.     Review of Systems   Constitutional: Positive for weight gain. Negative for chills and fever.        Weight gain on admit; now down ~13 lbs s/p diuresis   HENT:  Negative for congestion.    Eyes:  Negative for visual disturbance.   Cardiovascular:  Positive for chest pain, dyspnea on exertion and leg swelling. Negative for syncope.        Mild exertional chest tightness, improved slightly now   Respiratory:  Positive for cough and shortness of breath. Negative for wheezing.    Skin:  Negative for color change.        Lymphedema with skin irritation on bilateral LE    Musculoskeletal:  Positive for muscle cramps.   Gastrointestinal:  Positive for bloating. Negative for abdominal pain, nausea and vomiting.   Genitourinary:  Negative for dysuria.   Neurological:  Negative for headaches and light-headedness.   Psychiatric/Behavioral:  Negative for altered mental status.    Objective:     Vital Signs (Most Recent):  Temp: 99.1 °F (37.3 °C) (07/23/22 1130)  Pulse: 87 (07/23/22 1130)  Resp: 18 (07/23/22 1130)  BP: (!) 119/58 (07/23/22 1130)  SpO2: (!) 92 % (07/23/22 1130)   Vital Signs (24h Range):  Temp:  [96.2 °F (35.7 °C)-99.1 °F (37.3 °C)] 99.1 °F (37.3 °C)  Pulse:  [71-87] 87  Resp:  [18-22] 18  SpO2:  [92 %-98 %] 92 %  BP: (119-159)/(57-75) 119/58     Weight: (!) 162.3 kg (357 lb 12.9 oz)  Body mass index is 49.9 kg/m².     SpO2: (!) 92 %  O2 Device (Oxygen Therapy): room air      Intake/Output Summary (Last 24 hours) at 7/23/2022 1505  Last data filed at 7/23/2022 1400  Gross per 24 hour   Intake 1538 ml   Output 81160 ml   Net -64265 ml        Lines/Drains/Airways       Peripheral Intravenous Line  Duration                  Peripheral IV - Single Lumen 07/22/22 1118 20 G Right Hand 1 day                    Physical Exam  Constitutional:       General: He is not in acute distress.     Appearance: He is obese. He is not ill-appearing.   HENT:      Head: Normocephalic and atraumatic.      Right Ear: External ear normal.      Left Ear: External ear normal.      Nose: Nose normal.      Mouth/Throat:      Mouth: Mucous membranes are moist.      Pharynx: Oropharynx is clear.   Eyes:      General: Scleral icterus present.      Extraocular Movements: Extraocular movements intact.   Cardiovascular:      Rate and Rhythm: Normal rate and regular rhythm.      Pulses: Normal pulses.      Heart sounds: Normal heart sounds.   Pulmonary:      Effort: Pulmonary effort is normal. No respiratory distress.      Comments: Diffuse coarse crackles heard throughout bilateral lung fields; more pronounced in the lower lobes  Abdominal:      General: Bowel sounds are normal. There is distension.      Palpations: Abdomen is soft.      Tenderness: There is no abdominal tenderness. There is no guarding.   Musculoskeletal:      Cervical back: Neck supple.      Right lower leg: 3+ Edema present.      Left lower leg: 3+ Edema present.   Skin:     General: Skin is warm and dry.      Capillary Refill: Capillary refill takes less than 2 seconds.      Findings: Erythema present.      Comments: Swelling and overlying erythema over bilateral LE; hx of lymphedema   Neurological:      General: No focal deficit present.      Mental Status: He is alert and oriented to person, place, and time. Mental status is at baseline.   Psychiatric:         Mood and Affect: Mood normal.         Behavior: Behavior normal.       Significant Labs: All pertinent lab results from the last 24 hours have been reviewed.    Significant Imaging:   No new in 24 hours

## 2022-07-23 NOTE — PROGRESS NOTES
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 Patient has been having worsening dyspnea on exertion for the past few months however for the past 2 weeks it has progressed to the point where he has to stop after walking 20-30 feet.  Today, he notes shortness of breath with minimal movement including getting in and out of bed or just turning in bed.  He notes an associated dry cough.  He has also had several instances where he is walking, gets short of breath, and has significant nausea and chest tightness; he denies williams chest pain, palpitations, or diaphoresis during these times.  He denies any loss of consciousness or falls. These episodes and his shortness of breath usually resolve with a few minutes of rest.  He notes no wheezing, orthopnea, PND.  He is compliant with his CPAP.  He has also noticed increased swelling and onset of weeping of his lower extremities, this started about a week ago, as well increased abdominal girth as well.  He notes no loss of appetite.  He has attempted to adhere to a low-salt and fluid restriction diet.  He is only taking 50 mg of Aldactone and 40 mg of Lasix daily; he is short handed at work, having to take tasks on himself, and having to urinate frequently because of his diuretic regimen has not been easy.  Of note, patient has been undergoing outpatient workup of  pulmonary hypertension; He is supposed to get a right heart catheterization in outpatient setting however he states he cannot afford the co-pay of $2000.        Overview/Hospital Course:  No notes on file    Interval History:   Patient with painful hand cramping overnight; electrolytes checked with labs and replaced.  Patient denies any cramping this morning.  He notes good urine output overnight.  He notes improvement of his abdominal distension, lower extremity edema, and cough and shortness of breath.  He is still more short of breath than usual, however not as much with little movement.  He has not had any issues in terms of lightheadedness or dizziness with ambulation to restroom.    Review of Systems   Constitutional:  Positive for activity change and unexpected weight change. Negative for appetite change, chills, diaphoresis and fever.   HENT:  Negative for congestion, rhinorrhea, sneezing, sore throat and trouble swallowing.    Eyes:  Negative for visual disturbance.   Respiratory:  Positive for cough and shortness of breath. Negative for chest tightness and wheezing.    Cardiovascular:  Positive for leg swelling. Negative for chest pain and palpitations.   Gastrointestinal:  Positive for abdominal distention. Negative for abdominal pain, blood in stool, constipation, diarrhea, nausea and vomiting.   Genitourinary:  Negative for difficulty urinating and dysuria.   Musculoskeletal:  Negative for arthralgias and myalgias.   Skin:  Negative for wound.   Neurological:  Negative for dizziness, light-headedness and headaches.   Psychiatric/Behavioral:  Negative for behavioral problems, confusion and decreased concentration.      Objective:     Vital Signs (Most Recent):  Temp: 99.1 °F (37.3 °C) (07/23/22 1130)  Pulse: 87 (07/23/22 1130)  Resp: 18 (07/23/22 1130)  BP: (!) 119/58 (07/23/22 1130)  SpO2: (!) 92 % (07/23/22 1130)   Vital Signs (24h Range):  Temp:  [96.2 °F (35.7 °C)-99.1 °F (37.3 °C)] 99.1 °F (37.3  °C)  Pulse:  [71-95] 87  Resp:  [18-22] 18  SpO2:  [92 %-98 %] 92 %  BP: (119-159)/(57-75) 119/58     Weight: (!) 162.3 kg (357 lb 12.9 oz)  Body mass index is 49.9 kg/m².    Intake/Output Summary (Last 24 hours) at 7/23/2022 1142  Last data filed at 7/23/2022 1133  Gross per 24 hour   Intake 1418 ml   Output 00209 ml   Net -19238 ml      Physical Exam  Vitals and nursing note reviewed.   Constitutional:       General: He is not in acute distress.     Appearance: He is obese. He is not toxic-appearing or diaphoretic.   HENT:      Head: Normocephalic and atraumatic.      Right Ear: External ear normal.      Left Ear: External ear normal.      Nose: Nose normal.      Mouth/Throat:      Mouth: Mucous membranes are moist.      Pharynx: No oropharyngeal exudate or posterior oropharyngeal erythema.   Eyes:      General: No scleral icterus.        Right eye: No discharge.         Left eye: No discharge.      Conjunctiva/sclera: Conjunctivae normal.      Pupils: Pupils are equal, round, and reactive to light.   Cardiovascular:      Rate and Rhythm: Normal rate and regular rhythm.      Pulses: Normal pulses.      Heart sounds: Murmur heard.   Pulmonary:      Effort: Pulmonary effort is normal.      Comments: Crackles bibasilar  Tachypnea noted with movement   Abdominal:      General: Bowel sounds are normal. There is distension (improving).      Palpations: Abdomen is soft.      Tenderness: There is no abdominal tenderness. There is no guarding or rebound.   Musculoskeletal:      Cervical back: Normal range of motion and neck supple.      Right lower leg: Edema present.      Left lower leg: Edema present.      Comments: Profound bilateral lower extremity up to hips - improving   Skin:     General: Skin is warm and dry.      Capillary Refill: Capillary refill takes less than 2 seconds.   Neurological:      Mental Status: He is alert and oriented to person, place, and time.   Psychiatric:         Mood and Affect: Mood normal.          Behavior: Behavior normal.         Thought Content: Thought content normal.       Significant Labs: All pertinent labs within the past 24 hours have been reviewed.    Recent Results (from the past 24 hour(s))   Comprehensive metabolic panel    Collection Time: 07/23/22  1:10 AM   Result Value Ref Range    Sodium 142 136 - 145 mmol/L    Potassium 3.4 (L) 3.5 - 5.1 mmol/L    Chloride 106 95 - 110 mmol/L    CO2 28 23 - 29 mmol/L    Glucose 82 70 - 110 mg/dL    BUN 20 6 - 20 mg/dL    Creatinine 1.2 0.5 - 1.4 mg/dL    Calcium 8.3 (L) 8.7 - 10.5 mg/dL    Total Protein 5.9 (L) 6.0 - 8.4 g/dL    Albumin 2.2 (L) 3.5 - 5.2 g/dL    Total Bilirubin 6.3 (H) 0.1 - 1.0 mg/dL    Alkaline Phosphatase 99 55 - 135 U/L    AST 39 10 - 40 U/L    ALT 27 10 - 44 U/L    Anion Gap 8 8 - 16 mmol/L    eGFR if African American >60.0 >60 mL/min/1.73 m^2    eGFR if non African American >60.0 >60 mL/min/1.73 m^2   Magnesium    Collection Time: 07/23/22  1:10 AM   Result Value Ref Range    Magnesium 1.6 1.6 - 2.6 mg/dL   CBC Auto Differential    Collection Time: 07/23/22  7:11 AM   Result Value Ref Range    WBC 8.68 3.90 - 12.70 K/uL    RBC 4.11 (L) 4.60 - 6.20 M/uL    Hemoglobin 12.2 (L) 14.0 - 18.0 g/dL    Hematocrit 36.8 (L) 40.0 - 54.0 %    MCV 90 82 - 98 fL    MCH 29.7 27.0 - 31.0 pg    MCHC 33.2 32.0 - 36.0 g/dL    RDW 20.2 (H) 11.5 - 14.5 %    Platelets 93 (L) 150 - 450 K/uL    MPV SEE COMMENT 9.2 - 12.9 fL    Immature Granulocytes 1.3 (H) 0.0 - 0.5 %    Gran # (ANC) 6.5 1.8 - 7.7 K/uL    Immature Grans (Abs) 0.11 (H) 0.00 - 0.04 K/uL    Lymph # 0.9 (L) 1.0 - 4.8 K/uL    Mono # 1.0 0.3 - 1.0 K/uL    Eos # 0.1 0.0 - 0.5 K/uL    Baso # 0.04 0.00 - 0.20 K/uL    nRBC 0 0 /100 WBC    Gran % 74.4 (H) 38.0 - 73.0 %    Lymph % 10.4 (L) 18.0 - 48.0 %    Mono % 11.8 4.0 - 15.0 %    Eosinophil % 1.6 0.0 - 8.0 %    Basophil % 0.5 0.0 - 1.9 %    Differential Method Automated    Comprehensive Metabolic Panel    Collection Time: 07/23/22  7:11 AM    Result Value Ref Range    Sodium 138 136 - 145 mmol/L    Potassium 3.5 3.5 - 5.1 mmol/L    Chloride 104 95 - 110 mmol/L    CO2 26 23 - 29 mmol/L    Glucose 77 70 - 110 mg/dL    BUN 20 6 - 20 mg/dL    Creatinine 1.1 0.5 - 1.4 mg/dL    Calcium 8.4 (L) 8.7 - 10.5 mg/dL    Total Protein 5.7 (L) 6.0 - 8.4 g/dL    Albumin 2.1 (L) 3.5 - 5.2 g/dL    Total Bilirubin 6.7 (H) 0.1 - 1.0 mg/dL    Alkaline Phosphatase 100 55 - 135 U/L    AST 38 10 - 40 U/L    ALT 26 10 - 44 U/L    Anion Gap 8 8 - 16 mmol/L    eGFR if African American >60.0 >60 mL/min/1.73 m^2    eGFR if non African American >60.0 >60 mL/min/1.73 m^2   Magnesium    Collection Time: 07/23/22  7:11 AM   Result Value Ref Range    Magnesium 1.9 1.6 - 2.6 mg/dL   Phosphorus    Collection Time: 07/23/22  7:11 AM   Result Value Ref Range    Phosphorus 4.0 2.7 - 4.5 mg/dL   Protime-INR    Collection Time: 07/23/22  7:11 AM   Result Value Ref Range    Prothrombin Time 14.5 (H) 9.0 - 12.5 sec    INR 1.4 (H) 0.8 - 1.2         Significant Imaging: I have reviewed all pertinent imaging results/findings within the past 24 hours.    Imaging Results              X-Ray Chest AP Portable (Final result)  Result time 07/22/22 13:04:52      Final result by Randal Gonzalez MD (07/22/22 13:04:52)                   Impression:      1. Central pulmonary vascular congestion and cephalization of flow without overt interstitial edema or sizable pleural effusion.      Electronically signed by: Randal Gonzalez  Date:    07/22/2022  Time:    13:04               Narrative:    EXAMINATION:  XR CHEST AP PORTABLE    CLINICAL HISTORY:  edema;    TECHNIQUE:  Single frontal portable view of the chest was performed.  Of note, images are degraded secondary to suboptimal positioning with patient imaged in exaggerated AP lordotic positioning and soft tissue x-ray beam attenuation related to patient body habitus, limiting evaluation.    COMPARISON:  Chest radiograph 03/29/2022    FINDINGS:  Cardiac  silhouette is top-normal in size, not significantly changed.    Central pulmonary vascular congestion and cephalization of flow without overt interstitial edema or sizable pleural effusion.  No appreciable focal consolidation or pneumothorax.    Multilevel degenerative changes of the imaged spine.                                          Assessment/Plan:      * Acute on chronic diastolic heart failure  - Recent echo below, per cardiology will repeat in-pt  - Clinically volume overloaded on admission, improving w/ IV diuresis  - CXR notable for central pulmonary vascular congestion  - BNP elevated to 153  - Questionable compliance with home diuretic regimen  -evaluated by cardiology in ED, recommend diuresis with Lasix 80 IV mg bolus followed by Lasix drip of 40 mg/hr  -transition from IV Lasix drip to IV Lasix push ( 80 IV BID )  - Strict I/Os  - 1.5L fluid restriction   - Daily weights; unknown dry weight  - Will continue to monitor on tele      Results for orders placed during the hospital encounter of 01/05/22    Echo    Interpretation Summary  · There is abnormal septal wall motion.  · The left ventricle is normal in size with normal systolic function.  · The estimated ejection fraction is 65%.  · Normal left ventricular diastolic function.  · Mild right ventricular enlargement with normal right ventricular systolic function.  · Elevated central venous pressure (15 mmHg).  · The estimated PA systolic pressure is 49 mmHg.  · There is pulmonary hypertension.        Volume overload  See acute on chronic diastolic heart failure      LAW on CPAP  CPAP qhs      Pulmonary hypertension  Evaluated by cardiology outpatient; per pulmonology concerns for elevated PASP on ECHO and symptoms considerably out of proportion to his sarcoid diagnosis    Cardiology following, appreciate recommendations  Diuresis per acute on chronic diastolic heart failure  Per Cardiology, possible right heart catheterization while  "inpatient      Liver cirrhosis secondary to GUAJARDO  Compensated cirrhosis 2/2 GUAJARDO  H/o SBP: none  Chronic lower extremity edema  EGD 3/2022 w/ G1 EV and PHG  Follows with hepatology outpatient; per last clinic note Jan 2022, "MELD 14 primarily driven by elevated bilirubin. He otherwise has no significant complications from his cirrhosis. Some suspicion that his hyperbilirubinemia may be due to congestion related to his cardiac disease. Will closely monitor MELD and consider transplant evaluation if it worsens or he decompensates further."    - spironolactone 100 mg daily resumed 7/23  -Lasix per acute on chronic diastolic heart failure  - Avoid hepatotoxic agents  - Monitor mental status  - CMP, INR daily  -thrombocytopenia at baseline  -no overt ascites on exam  -PPI daily  - Na restriction < 2g    MELD-Na score: 18 at 7/23/2022  7:11 AM  MELD score: 18 at 7/23/2022  7:11 AM  Calculated from:  Serum Creatinine: 1.1 mg/dL at 7/23/2022  7:11 AM  Serum Sodium: 138 mmol/L (Using max of 137 mmol/L) at 7/23/2022  7:11 AM  Total Bilirubin: 6.7 mg/dL at 7/23/2022  7:11 AM  INR(ratio): 1.4 at 7/23/2022  7:11 AM  Age: 56 years    Portal hypertension  See liver cirrhosis      Thrombocytopenia  See liver cirrhosis      Severe obesity (BMI >= 40)  Body mass index is 51.6 kg/m². Morbid obesity complicates all aspects of disease management from diagnostic modalities to treatment. Weight loss encouraged and health benefits explained to patient.         Sarcoidosis of lung with sarcoidosis of lymph nodes  Diagnosed via mediastinal lymph node biopsy 2012.   Followed by pulmonology; last seen in clinic 5/27/22    continue prednisone 10mg qday   Supplemental oxygen via nasal cannula as needed; intermittently on oxygen prn at home  Walk test prior to discharge      VTE Risk Mitigation (From admission, onward)         Ordered     heparin (porcine) injection 5,000 Units  Every 8 hours         07/22/22 7988     IP VTE HIGH RISK PATIENT  " Once         07/22/22 1733     Place sequential compression device  Until discontinued         07/22/22 1733     Place sequential compression device  Until discontinued         07/22/22 1733                Discharge Planning   ALAINA: 7/27/2022     Code Status: Full Code   Is the patient medically ready for discharge?: No    Reason for patient still in hospital (select all that apply): Patient trending condition, Treatment, Imaging and Consult recommendations                     Janet Wallace MD  Department of Hospital Medicine   [facility] - Cardiology Stepdown

## 2022-07-23 NOTE — PLAN OF CARE
Plan of care discussed with patient. Patient is free of fall/trauma/injury. Denies CP, or SOB. Patient c/o cramps. K replaced. Lasix gtts transitioned to IVP. All questions addressed. Will continue to monitor

## 2022-07-23 NOTE — ASSESSMENT & PLAN NOTE
Acute on chronic diastolic heart failure:  EF 60%  -recommend switch from drip to Lasix IV push 80mg BID (goal -1-1.5L)  -continue aldactone 100mg  -monitor urine output and electrolytes  -maintain potassium greater than 4 and magnesium greater than 2  -consider cardiac MRI as outpatient to rule out cardiac involvement of sarcoidosis     Anasarca  -as above     Pulmonary hypertension  -diuresis as stated above.  -could consider right heart catheterization while patient is admitted; although unlikely to add benefit to current management of pulmonary HTN

## 2022-07-23 NOTE — SUBJECTIVE & OBJECTIVE
Interval History:   Patient with painful hand cramping overnight; electrolytes checked with labs and replaced.  Patient denies any cramping this morning.  He notes good urine output overnight.  He notes improvement of his abdominal distension, lower extremity edema, and cough and shortness of breath.  He is still more short of breath than usual, however not as much with little movement.  He has not had any issues in terms of lightheadedness or dizziness with ambulation to restroom.    Review of Systems   Constitutional:  Positive for activity change and unexpected weight change. Negative for appetite change, chills, diaphoresis and fever.   HENT:  Negative for congestion, rhinorrhea, sneezing, sore throat and trouble swallowing.    Eyes:  Negative for visual disturbance.   Respiratory:  Positive for cough and shortness of breath. Negative for chest tightness and wheezing.    Cardiovascular:  Positive for leg swelling. Negative for chest pain and palpitations.   Gastrointestinal:  Positive for abdominal distention. Negative for abdominal pain, blood in stool, constipation, diarrhea, nausea and vomiting.   Genitourinary:  Negative for difficulty urinating and dysuria.   Musculoskeletal:  Negative for arthralgias and myalgias.   Skin:  Negative for wound.   Neurological:  Negative for dizziness, light-headedness and headaches.   Psychiatric/Behavioral:  Negative for behavioral problems, confusion and decreased concentration.      Objective:     Vital Signs (Most Recent):  Temp: 99.1 °F (37.3 °C) (07/23/22 1130)  Pulse: 87 (07/23/22 1130)  Resp: 18 (07/23/22 1130)  BP: (!) 119/58 (07/23/22 1130)  SpO2: (!) 92 % (07/23/22 1130)   Vital Signs (24h Range):  Temp:  [96.2 °F (35.7 °C)-99.1 °F (37.3 °C)] 99.1 °F (37.3 °C)  Pulse:  [71-95] 87  Resp:  [18-22] 18  SpO2:  [92 %-98 %] 92 %  BP: (119-159)/(57-75) 119/58     Weight: (!) 162.3 kg (357 lb 12.9 oz)  Body mass index is 49.9 kg/m².    Intake/Output Summary (Last 24  hours) at 7/23/2022 1142  Last data filed at 7/23/2022 1133  Gross per 24 hour   Intake 1418 ml   Output 99251 ml   Net -10188 ml      Physical Exam  Vitals and nursing note reviewed.   Constitutional:       General: He is not in acute distress.     Appearance: He is obese. He is not toxic-appearing or diaphoretic.   HENT:      Head: Normocephalic and atraumatic.      Right Ear: External ear normal.      Left Ear: External ear normal.      Nose: Nose normal.      Mouth/Throat:      Mouth: Mucous membranes are moist.      Pharynx: No oropharyngeal exudate or posterior oropharyngeal erythema.   Eyes:      General: No scleral icterus.        Right eye: No discharge.         Left eye: No discharge.      Conjunctiva/sclera: Conjunctivae normal.      Pupils: Pupils are equal, round, and reactive to light.   Cardiovascular:      Rate and Rhythm: Normal rate and regular rhythm.      Pulses: Normal pulses.      Heart sounds: Murmur heard.   Pulmonary:      Effort: Pulmonary effort is normal.      Comments: Crackles bibasilar  Tachypnea noted with movement   Abdominal:      General: Bowel sounds are normal. There is distension (improving).      Palpations: Abdomen is soft.      Tenderness: There is no abdominal tenderness. There is no guarding or rebound.   Musculoskeletal:      Cervical back: Normal range of motion and neck supple.      Right lower leg: Edema present.      Left lower leg: Edema present.      Comments: Profound bilateral lower extremity up to hips - improving   Skin:     General: Skin is warm and dry.      Capillary Refill: Capillary refill takes less than 2 seconds.   Neurological:      Mental Status: He is alert and oriented to person, place, and time.   Psychiatric:         Mood and Affect: Mood normal.         Behavior: Behavior normal.         Thought Content: Thought content normal.       Significant Labs: All pertinent labs within the past 24 hours have been reviewed.    Recent Results (from the past  24 hour(s))   Comprehensive metabolic panel    Collection Time: 07/23/22  1:10 AM   Result Value Ref Range    Sodium 142 136 - 145 mmol/L    Potassium 3.4 (L) 3.5 - 5.1 mmol/L    Chloride 106 95 - 110 mmol/L    CO2 28 23 - 29 mmol/L    Glucose 82 70 - 110 mg/dL    BUN 20 6 - 20 mg/dL    Creatinine 1.2 0.5 - 1.4 mg/dL    Calcium 8.3 (L) 8.7 - 10.5 mg/dL    Total Protein 5.9 (L) 6.0 - 8.4 g/dL    Albumin 2.2 (L) 3.5 - 5.2 g/dL    Total Bilirubin 6.3 (H) 0.1 - 1.0 mg/dL    Alkaline Phosphatase 99 55 - 135 U/L    AST 39 10 - 40 U/L    ALT 27 10 - 44 U/L    Anion Gap 8 8 - 16 mmol/L    eGFR if African American >60.0 >60 mL/min/1.73 m^2    eGFR if non African American >60.0 >60 mL/min/1.73 m^2   Magnesium    Collection Time: 07/23/22  1:10 AM   Result Value Ref Range    Magnesium 1.6 1.6 - 2.6 mg/dL   CBC Auto Differential    Collection Time: 07/23/22  7:11 AM   Result Value Ref Range    WBC 8.68 3.90 - 12.70 K/uL    RBC 4.11 (L) 4.60 - 6.20 M/uL    Hemoglobin 12.2 (L) 14.0 - 18.0 g/dL    Hematocrit 36.8 (L) 40.0 - 54.0 %    MCV 90 82 - 98 fL    MCH 29.7 27.0 - 31.0 pg    MCHC 33.2 32.0 - 36.0 g/dL    RDW 20.2 (H) 11.5 - 14.5 %    Platelets 93 (L) 150 - 450 K/uL    MPV SEE COMMENT 9.2 - 12.9 fL    Immature Granulocytes 1.3 (H) 0.0 - 0.5 %    Gran # (ANC) 6.5 1.8 - 7.7 K/uL    Immature Grans (Abs) 0.11 (H) 0.00 - 0.04 K/uL    Lymph # 0.9 (L) 1.0 - 4.8 K/uL    Mono # 1.0 0.3 - 1.0 K/uL    Eos # 0.1 0.0 - 0.5 K/uL    Baso # 0.04 0.00 - 0.20 K/uL    nRBC 0 0 /100 WBC    Gran % 74.4 (H) 38.0 - 73.0 %    Lymph % 10.4 (L) 18.0 - 48.0 %    Mono % 11.8 4.0 - 15.0 %    Eosinophil % 1.6 0.0 - 8.0 %    Basophil % 0.5 0.0 - 1.9 %    Differential Method Automated    Comprehensive Metabolic Panel    Collection Time: 07/23/22  7:11 AM   Result Value Ref Range    Sodium 138 136 - 145 mmol/L    Potassium 3.5 3.5 - 5.1 mmol/L    Chloride 104 95 - 110 mmol/L    CO2 26 23 - 29 mmol/L    Glucose 77 70 - 110 mg/dL    BUN 20 6 - 20 mg/dL     Creatinine 1.1 0.5 - 1.4 mg/dL    Calcium 8.4 (L) 8.7 - 10.5 mg/dL    Total Protein 5.7 (L) 6.0 - 8.4 g/dL    Albumin 2.1 (L) 3.5 - 5.2 g/dL    Total Bilirubin 6.7 (H) 0.1 - 1.0 mg/dL    Alkaline Phosphatase 100 55 - 135 U/L    AST 38 10 - 40 U/L    ALT 26 10 - 44 U/L    Anion Gap 8 8 - 16 mmol/L    eGFR if African American >60.0 >60 mL/min/1.73 m^2    eGFR if non African American >60.0 >60 mL/min/1.73 m^2   Magnesium    Collection Time: 07/23/22  7:11 AM   Result Value Ref Range    Magnesium 1.9 1.6 - 2.6 mg/dL   Phosphorus    Collection Time: 07/23/22  7:11 AM   Result Value Ref Range    Phosphorus 4.0 2.7 - 4.5 mg/dL   Protime-INR    Collection Time: 07/23/22  7:11 AM   Result Value Ref Range    Prothrombin Time 14.5 (H) 9.0 - 12.5 sec    INR 1.4 (H) 0.8 - 1.2         Significant Imaging: I have reviewed all pertinent imaging results/findings within the past 24 hours.    Imaging Results              X-Ray Chest AP Portable (Final result)  Result time 07/22/22 13:04:52      Final result by Randal Gonzalez MD (07/22/22 13:04:52)                   Impression:      1. Central pulmonary vascular congestion and cephalization of flow without overt interstitial edema or sizable pleural effusion.      Electronically signed by: Randal Gonzalez  Date:    07/22/2022  Time:    13:04               Narrative:    EXAMINATION:  XR CHEST AP PORTABLE    CLINICAL HISTORY:  edema;    TECHNIQUE:  Single frontal portable view of the chest was performed.  Of note, images are degraded secondary to suboptimal positioning with patient imaged in exaggerated AP lordotic positioning and soft tissue x-ray beam attenuation related to patient body habitus, limiting evaluation.    COMPARISON:  Chest radiograph 03/29/2022    FINDINGS:  Cardiac silhouette is top-normal in size, not significantly changed.    Central pulmonary vascular congestion and cephalization of flow without overt interstitial edema or sizable pleural effusion.  No appreciable  focal consolidation or pneumothorax.    Multilevel degenerative changes of the imaged spine.

## 2022-07-23 NOTE — ASSESSMENT & PLAN NOTE
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 He otherwise has no significant complications from his cirrhosis. Some suspicion that his hyperbilirubinemia may be due to congestion related to his cardiac disease. Will closely monitor MELD and consider transplant evaluation if it worsens or he decompensates further."    - spironolactone 100 mg daily resumed 7/23  -Lasix per acute on chronic diastolic heart failure  - Avoid hepatotoxic agents  - Monitor mental status  - CMP, INR daily  -thrombocytopenia at baseline  -no overt ascites on exam  -PPI daily  - Na restriction < 2g    MELD-Na score: 18 at 7/23/2022  7:11 AM  MELD score: 18 at 7/23/2022  7:11 AM  Calculated from:  Serum Creatinine: 1.1 mg/dL at 7/23/2022  7:11 AM  Serum Sodium: 138 mmol/L (Using max of 137 mmol/L) at 7/23/2022  7:11 AM  Total Bilirubin: 6.7 mg/dL at 7/23/2022  7:11 AM  INR(ratio): 1.4 at 7/23/2022  7:11 AM  Age: 56 years  "

## 2022-07-23 NOTE — NURSING
Pt. C/o cramping in hands.  Dr. Barlow notified and orders received to stop Lasix infusion; obtain CMP and Mg levels now.

## 2022-07-23 NOTE — NURSING
Pt arrived to 319 from ER. Patient alert and oriented x4, ambulating independently, all VSS. Lasix drip infusing upon arrival as ordered. Intake/output documented. Pt instructed to use call light, bed in locked/low position.

## 2022-07-23 NOTE — PROGRESS NOTES
[facility]  Cardiology  Progress Note    Patient Name: Ashwin Peter  MRN: [id number]  Admission Date: 7/22/2022  Hospital Length of Stay: 1 days  Code Status: Full Code   Attending Physician: Janet Wallace MD   Primary Care Physician: Elkin Guo III, MD  Expected Discharge Date: 7/27/2022  Principal Problem:Acute on chronic diastolic heart failure    Subjective:     Interval History: NAEO. Patient reports persistent SOB and exertional chest tightness, but this is improved from yesterday. Reports mild improvement in leg swelling and abd distension although still present. Patient does endorse moderate cramping in the hands in setting of aggressive diuresis; this was addressed this morning with adequate electrolyte replacement.     Review of Systems   Constitutional: Positive for weight gain. Negative for chills and fever.        Weight gain on admit; now down ~13 lbs s/p diuresis   HENT:  Negative for congestion.    Eyes:  Negative for visual disturbance.   Cardiovascular:  Positive for chest pain, dyspnea on exertion and leg swelling. Negative for syncope.        Mild exertional chest tightness, improved slightly now   Respiratory:  Positive for cough and shortness of breath. Negative for wheezing.    Skin:  Negative for color change.        Lymphedema with skin irritation on bilateral LE    Musculoskeletal:  Positive for muscle cramps.   Gastrointestinal:  Positive for bloating. Negative for abdominal pain, nausea and vomiting.   Genitourinary:  Negative for dysuria.   Neurological:  Negative for headaches and light-headedness.   Psychiatric/Behavioral:  Negative for altered mental status.    Objective:     Vital Signs (Most Recent):  Temp: 99.1 °F (37.3 °C) (07/23/22 1130)  Pulse: 87 (07/23/22 1130)  Resp: 18 (07/23/22 1130)  BP: (!) 119/58 (07/23/22 1130)  SpO2: (!) 92 % (07/23/22 1130)   Vital Signs (24h Range):  Temp:  [96.2 °F (35.7 °C)-99.1 °F (37.3 °C)] 99.1 °F (37.3 °C)  Pulse:   [71-87] 87  Resp:  [18-22] 18  SpO2:  [92 %-98 %] 92 %  BP: (119-159)/(57-75) 119/58     Weight: (!) 162.3 kg (357 lb 12.9 oz)  Body mass index is 49.9 kg/m².     SpO2: (!) 92 %  O2 Device (Oxygen Therapy): room air      Intake/Output Summary (Last 24 hours) at 7/23/2022 1505  Last data filed at 7/23/2022 1400  Gross per 24 hour   Intake 1538 ml   Output 94431 ml   Net -00831 ml       Lines/Drains/Airways       Peripheral Intravenous Line  Duration                  Peripheral IV - Single Lumen 07/22/22 1118 20 G Right Hand 1 day                    Physical Exam  Constitutional:       General: He is not in acute distress.     Appearance: He is obese. He is not ill-appearing.   HENT:      Head: Normocephalic and atraumatic.      Right Ear: External ear normal.      Left Ear: External ear normal.      Nose: Nose normal.      Mouth/Throat:      Mouth: Mucous membranes are moist.      Pharynx: Oropharynx is clear.   Eyes:      General: Scleral icterus present.      Extraocular Movements: Extraocular movements intact.   Cardiovascular:      Rate and Rhythm: Normal rate and regular rhythm.      Pulses: Normal pulses.      Heart sounds: Normal heart sounds.   Pulmonary:      Effort: Pulmonary effort is normal. No respiratory distress.      Comments: Diffuse coarse crackles heard throughout bilateral lung fields; more pronounced in the lower lobes  Abdominal:      General: Bowel sounds are normal. There is distension.      Palpations: Abdomen is soft.      Tenderness: There is no abdominal tenderness. There is no guarding.   Musculoskeletal:      Cervical back: Neck supple.      Right lower leg: 3+ Edema present.      Left lower leg: 3+ Edema present.   Skin:     General: Skin is warm and dry.      Capillary Refill: Capillary refill takes less than 2 seconds.      Findings: Erythema present.      Comments: Swelling and overlying erythema over bilateral LE; hx of lymphedema   Neurological:      General: No focal deficit  present.      Mental Status: He is alert and oriented to person, place, and time. Mental status is at baseline.   Psychiatric:         Mood and Affect: Mood normal.         Behavior: Behavior normal.       Significant Labs: All pertinent lab results from the last 24 hours have been reviewed.    Significant Imaging:   No new in 24 hours    Assessment and Plan:       * Acute on chronic diastolic heart failure  Acute on chronic diastolic heart failure:  EF 60%  -recommend switch from drip to Lasix IV push 80mg BID (goal -1-1.5L)  -continue aldactone 100mg  -monitor urine output and electrolytes  -maintain potassium greater than 4 and magnesium greater than 2  -consider cardiac MRI as outpatient to rule out cardiac involvement of sarcoidosis     Anasarca  -as above     Pulmonary hypertension  -diuresis as stated above.  -could consider right heart catheterization while patient is admitted; although unlikely to add benefit to current management of pulmonary HTN        VTE Risk Mitigation (From admission, onward)         Ordered     heparin (porcine) injection 5,000 Units  Every 8 hours         07/22/22 1733     IP VTE HIGH RISK PATIENT  Once         07/22/22 1733     Place sequential compression device  Until discontinued         07/22/22 1733     Place sequential compression device  Until discontinued         07/22/22 1733              I will sign-off on this patient. Please reach out with any further questions or concerns. Thank you for this consult.     Cynthia Espitia MD  Cardiology  [facility]

## 2022-07-24 PROBLEM — E87.6 HYPOKALEMIA: Status: ACTIVE | Noted: 2022-07-24

## 2022-07-24 PROBLEM — E83.42 HYPOMAGNESEMIA: Status: ACTIVE | Noted: 2022-07-24

## 2022-07-24 LAB
ALBUMIN SERPL BCP-MCNC: 2 G/DL (ref 3.5–5.2)
ALBUMIN SERPL BCP-MCNC: 2.3 G/DL (ref 3.5–5.2)
ALP SERPL-CCNC: 96 U/L (ref 55–135)
ALT SERPL W/O P-5'-P-CCNC: 24 U/L (ref 10–44)
ANION GAP SERPL CALC-SCNC: 9 MMOL/L (ref 8–16)
ANION GAP SERPL CALC-SCNC: 9 MMOL/L (ref 8–16)
ASCENDING AORTA: 3.3 CM
AST SERPL-CCNC: 35 U/L (ref 10–40)
AV INDEX (PROSTH): 0.62
AV MEAN GRADIENT: 10 MMHG
AV PEAK GRADIENT: 14 MMHG
AV VALVE AREA: 2.54 CM2
AV VELOCITY RATIO: 0.71
BASOPHILS # BLD AUTO: 0.04 K/UL (ref 0–0.2)
BASOPHILS NFR BLD: 0.5 % (ref 0–1.9)
BILIRUB SERPL-MCNC: 5.5 MG/DL (ref 0.1–1)
BSA FOR ECHO PROCEDURE: 2.8 M2
BUN SERPL-MCNC: 21 MG/DL (ref 6–20)
BUN SERPL-MCNC: 22 MG/DL (ref 6–20)
CALCIUM SERPL-MCNC: 8.3 MG/DL (ref 8.7–10.5)
CALCIUM SERPL-MCNC: 8.7 MG/DL (ref 8.7–10.5)
CHLORIDE SERPL-SCNC: 103 MMOL/L (ref 95–110)
CHLORIDE SERPL-SCNC: 104 MMOL/L (ref 95–110)
CO2 SERPL-SCNC: 26 MMOL/L (ref 23–29)
CO2 SERPL-SCNC: 26 MMOL/L (ref 23–29)
CREAT SERPL-MCNC: 1.2 MG/DL (ref 0.5–1.4)
CREAT SERPL-MCNC: 1.3 MG/DL (ref 0.5–1.4)
CV ECHO LV RWT: 0.35 CM
DIFFERENTIAL METHOD: ABNORMAL
DOP CALC AO PEAK VEL: 1.88 M/S
DOP CALC AO VTI: 50.32 CM
DOP CALC LVOT AREA: 4.1 CM2
DOP CALC LVOT DIAMETER: 2.29 CM
DOP CALC LVOT PEAK VEL: 1.33 M/S
DOP CALC LVOT STROKE VOLUME: 127.82 CM3
DOP CALCLVOT PEAK VEL VTI: 31.05 CM
E WAVE DECELERATION TIME: 168.51 MSEC
E/A RATIO: 1.02
E/E' RATIO: 9 M/S
ECHO LV POSTERIOR WALL: 0.88 CM (ref 0.6–1.1)
EJECTION FRACTION: 65 %
EOSINOPHIL # BLD AUTO: 0.1 K/UL (ref 0–0.5)
EOSINOPHIL NFR BLD: 1.6 % (ref 0–8)
ERYTHROCYTE [DISTWIDTH] IN BLOOD BY AUTOMATED COUNT: 19.9 % (ref 11.5–14.5)
EST. GFR  (AFRICAN AMERICAN): >60 ML/MIN/1.73 M^2
EST. GFR  (AFRICAN AMERICAN): >60 ML/MIN/1.73 M^2
EST. GFR  (NON AFRICAN AMERICAN): >60 ML/MIN/1.73 M^2
EST. GFR  (NON AFRICAN AMERICAN): >60 ML/MIN/1.73 M^2
FRACTIONAL SHORTENING: 26 % (ref 28–44)
GLUCOSE SERPL-MCNC: 110 MG/DL (ref 70–110)
GLUCOSE SERPL-MCNC: 86 MG/DL (ref 70–110)
HCT VFR BLD AUTO: 37 % (ref 40–54)
HGB BLD-MCNC: 11.9 G/DL (ref 14–18)
IMM GRANULOCYTES # BLD AUTO: 0.14 K/UL (ref 0–0.04)
IMM GRANULOCYTES NFR BLD AUTO: 1.7 % (ref 0–0.5)
INR PPP: 1.3 (ref 0.8–1.2)
INTERVENTRICULAR SEPTUM: 1.03 CM (ref 0.6–1.1)
IVRT: 91.34 MSEC
LA MAJOR: 6.07 CM
LA MINOR: 6.69 CM
LA WIDTH: 4.53 CM
LEFT ATRIUM SIZE: 5.24 CM
LEFT ATRIUM VOLUME INDEX MOD: 45.1 ML/M2
LEFT ATRIUM VOLUME INDEX: 48.3 ML/M2
LEFT ATRIUM VOLUME MOD: 120 CM3
LEFT ATRIUM VOLUME: 128.42 CM3
LEFT INTERNAL DIMENSION IN SYSTOLE: 3.71 CM (ref 2.1–4)
LEFT VENTRICLE DIASTOLIC VOLUME INDEX: 44.1 ML/M2
LEFT VENTRICLE DIASTOLIC VOLUME: 117.3 ML
LEFT VENTRICLE MASS INDEX: 64 G/M2
LEFT VENTRICLE SYSTOLIC VOLUME INDEX: 21.9 ML/M2
LEFT VENTRICLE SYSTOLIC VOLUME: 58.32 ML
LEFT VENTRICULAR INTERNAL DIMENSION IN DIASTOLE: 4.98 CM (ref 3.5–6)
LEFT VENTRICULAR MASS: 169.98 G
LV LATERAL E/E' RATIO: 7.2 M/S
LV SEPTAL E/E' RATIO: 12 M/S
LYMPHOCYTES # BLD AUTO: 1.2 K/UL (ref 1–4.8)
LYMPHOCYTES NFR BLD: 14.8 % (ref 18–48)
MAGNESIUM SERPL-MCNC: 1.9 MG/DL (ref 1.6–2.6)
MCH RBC QN AUTO: 29.2 PG (ref 27–31)
MCHC RBC AUTO-ENTMCNC: 32.2 G/DL (ref 32–36)
MCV RBC AUTO: 91 FL (ref 82–98)
MONOCYTES # BLD AUTO: 1.2 K/UL (ref 0.3–1)
MONOCYTES NFR BLD: 13.9 % (ref 4–15)
MV PEAK A VEL: 1.06 M/S
MV PEAK E VEL: 1.08 M/S
MV STENOSIS PRESSURE HALF TIME: 48.87 MS
MV VALVE AREA P 1/2 METHOD: 4.5 CM2
NEUTROPHILS # BLD AUTO: 5.6 K/UL (ref 1.8–7.7)
NEUTROPHILS NFR BLD: 67.5 % (ref 38–73)
NRBC BLD-RTO: 0 /100 WBC
PHOSPHATE SERPL-MCNC: 3 MG/DL (ref 2.7–4.5)
PHOSPHATE SERPL-MCNC: 3.1 MG/DL (ref 2.7–4.5)
PISA TR MAX VEL: 3.28 M/S
PLATELET # BLD AUTO: 98 K/UL (ref 150–450)
PMV BLD AUTO: ABNORMAL FL (ref 9.2–12.9)
POTASSIUM SERPL-SCNC: 3.6 MMOL/L (ref 3.5–5.1)
POTASSIUM SERPL-SCNC: 4.3 MMOL/L (ref 3.5–5.1)
PROT SERPL-MCNC: 5.7 G/DL (ref 6–8.4)
PROTHROMBIN TIME: 13.1 SEC (ref 9–12.5)
RA MAJOR: 5.55 CM
RA PRESSURE: 8 MMHG
RA WIDTH: 3.84 CM
RBC # BLD AUTO: 4.07 M/UL (ref 4.6–6.2)
RIGHT VENTRICULAR END-DIASTOLIC DIMENSION: 4.5 CM
SINUS: 2.9 CM
SODIUM SERPL-SCNC: 138 MMOL/L (ref 136–145)
SODIUM SERPL-SCNC: 139 MMOL/L (ref 136–145)
STJ: 2.48 CM
TDI LATERAL: 0.15 M/S
TDI SEPTAL: 0.09 M/S
TDI: 0.12 M/S
TR MAX PG: 43 MMHG
TRICUSPID ANNULAR PLANE SYSTOLIC EXCURSION: 3.5 CM
TV REST PULMONARY ARTERY PRESSURE: 51 MMHG
WBC # BLD AUTO: 8.29 K/UL (ref 3.9–12.7)

## 2022-07-24 PROCEDURE — 83735 ASSAY OF MAGNESIUM: CPT | Performed by: STUDENT IN AN ORGANIZED HEALTH CARE EDUCATION/TRAINING PROGRAM

## 2022-07-24 PROCEDURE — 20600001 HC STEP DOWN PRIVATE ROOM

## 2022-07-24 PROCEDURE — 80053 COMPREHEN METABOLIC PANEL: CPT | Performed by: STUDENT IN AN ORGANIZED HEALTH CARE EDUCATION/TRAINING PROGRAM

## 2022-07-24 PROCEDURE — 27000190 HC CPAP FULL FACE MASK W/VALVE

## 2022-07-24 PROCEDURE — 99900035 HC TECH TIME PER 15 MIN (STAT)

## 2022-07-24 PROCEDURE — 99232 SBSQ HOSP IP/OBS MODERATE 35: CPT | Mod: ,,, | Performed by: STUDENT IN AN ORGANIZED HEALTH CARE EDUCATION/TRAINING PROGRAM

## 2022-07-24 PROCEDURE — 84100 ASSAY OF PHOSPHORUS: CPT | Performed by: STUDENT IN AN ORGANIZED HEALTH CARE EDUCATION/TRAINING PROGRAM

## 2022-07-24 PROCEDURE — 94660 CPAP INITIATION&MGMT: CPT

## 2022-07-24 PROCEDURE — 80069 RENAL FUNCTION PANEL: CPT | Performed by: STUDENT IN AN ORGANIZED HEALTH CARE EDUCATION/TRAINING PROGRAM

## 2022-07-24 PROCEDURE — 99232 PR SUBSEQUENT HOSPITAL CARE,LEVL II: ICD-10-PCS | Mod: ,,, | Performed by: STUDENT IN AN ORGANIZED HEALTH CARE EDUCATION/TRAINING PROGRAM

## 2022-07-24 PROCEDURE — 94761 N-INVAS EAR/PLS OXIMETRY MLT: CPT

## 2022-07-24 PROCEDURE — 63600175 PHARM REV CODE 636 W HCPCS: Performed by: STUDENT IN AN ORGANIZED HEALTH CARE EDUCATION/TRAINING PROGRAM

## 2022-07-24 PROCEDURE — 25000003 PHARM REV CODE 250: Performed by: STUDENT IN AN ORGANIZED HEALTH CARE EDUCATION/TRAINING PROGRAM

## 2022-07-24 PROCEDURE — 27000221 HC OXYGEN, UP TO 24 HOURS

## 2022-07-24 PROCEDURE — 36415 COLL VENOUS BLD VENIPUNCTURE: CPT | Performed by: STUDENT IN AN ORGANIZED HEALTH CARE EDUCATION/TRAINING PROGRAM

## 2022-07-24 PROCEDURE — 85610 PROTHROMBIN TIME: CPT | Performed by: STUDENT IN AN ORGANIZED HEALTH CARE EDUCATION/TRAINING PROGRAM

## 2022-07-24 PROCEDURE — 85025 COMPLETE CBC W/AUTO DIFF WBC: CPT | Performed by: STUDENT IN AN ORGANIZED HEALTH CARE EDUCATION/TRAINING PROGRAM

## 2022-07-24 RX ORDER — POTASSIUM CHLORIDE 20 MEQ/1
40 TABLET, EXTENDED RELEASE ORAL ONCE
Status: COMPLETED | OUTPATIENT
Start: 2022-07-24 | End: 2022-07-24

## 2022-07-24 RX ORDER — FUROSEMIDE 40 MG/1
40 TABLET ORAL 2 TIMES DAILY
Status: DISCONTINUED | OUTPATIENT
Start: 2022-07-25 | End: 2022-07-25 | Stop reason: HOSPADM

## 2022-07-24 RX ORDER — MAGNESIUM SULFATE HEPTAHYDRATE 40 MG/ML
2 INJECTION, SOLUTION INTRAVENOUS ONCE
Status: COMPLETED | OUTPATIENT
Start: 2022-07-24 | End: 2022-07-24

## 2022-07-24 RX ORDER — FUROSEMIDE 10 MG/ML
40 INJECTION INTRAMUSCULAR; INTRAVENOUS 2 TIMES DAILY
Status: DISCONTINUED | OUTPATIENT
Start: 2022-07-24 | End: 2022-07-24

## 2022-07-24 RX ORDER — ACETAMINOPHEN 325 MG/1
650 TABLET ORAL EVERY 6 HOURS PRN
Status: DISCONTINUED | OUTPATIENT
Start: 2022-07-24 | End: 2022-07-25 | Stop reason: HOSPADM

## 2022-07-24 RX ADMIN — HEPARIN SODIUM 5000 UNITS: 5000 INJECTION INTRAVENOUS; SUBCUTANEOUS at 09:07

## 2022-07-24 RX ADMIN — POTASSIUM CHLORIDE 40 MEQ: 1500 TABLET, EXTENDED RELEASE ORAL at 08:07

## 2022-07-24 RX ADMIN — PANTOPRAZOLE SODIUM 40 MG: 40 TABLET, DELAYED RELEASE ORAL at 08:07

## 2022-07-24 RX ADMIN — FUROSEMIDE 80 MG: 10 INJECTION, SOLUTION INTRAMUSCULAR; INTRAVENOUS at 08:07

## 2022-07-24 RX ADMIN — HEPARIN SODIUM 5000 UNITS: 5000 INJECTION INTRAVENOUS; SUBCUTANEOUS at 05:07

## 2022-07-24 RX ADMIN — ACETAMINOPHEN 650 MG: 325 TABLET ORAL at 09:07

## 2022-07-24 RX ADMIN — FUROSEMIDE 40 MG: 10 INJECTION, SOLUTION INTRAMUSCULAR; INTRAVENOUS at 05:07

## 2022-07-24 RX ADMIN — CETIRIZINE HYDROCHLORIDE 10 MG: 10 TABLET, FILM COATED ORAL at 08:07

## 2022-07-24 RX ADMIN — PREDNISONE 10 MG: 10 TABLET ORAL at 08:07

## 2022-07-24 RX ADMIN — HEPARIN SODIUM 5000 UNITS: 5000 INJECTION INTRAVENOUS; SUBCUTANEOUS at 02:07

## 2022-07-24 RX ADMIN — SPIRONOLACTONE 100 MG: 25 TABLET, FILM COATED ORAL at 08:07

## 2022-07-24 RX ADMIN — MAGNESIUM SULFATE HEPTAHYDRATE 2 G: 40 INJECTION, SOLUTION INTRAVENOUS at 08:07

## 2022-07-24 NOTE — ASSESSMENT & PLAN NOTE
"Evaluated by cardiology outpatient; per pulmonology concerns for elevated PASP on ECHO and symptoms considerably out of proportion to his sarcoid diagnosis    Cardiology following, appreciate recommendations  Diuresis per acute on chronic diastolic heart failure  Per Cardiology, "Pulmonary HTN due to left heart disease and lung disease. PPH unlikely." RHC unlikely tab benefit current management of pulmonary hypertension, however can consider cardiac MRI as outpatient rule out cardiac involvement of sarcoidosis          "

## 2022-07-24 NOTE — PLAN OF CARE
Plan of care discussed with patient. Patient is free of fall/trauma/injury. Denies CP, SOB, or pain/discomfort. Diuresing with IVP lasix. K and Mg replaced. All questions addressed. Will continue to monitor

## 2022-07-24 NOTE — ASSESSMENT & PLAN NOTE
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 He otherwise has no significant complications from his cirrhosis. Some suspicion that his hyperbilirubinemia may be due to congestion related to his cardiac disease. Will closely monitor MELD and consider transplant evaluation if it worsens or he decompensates further."    - spironolactone 100 mg daily resumed 7/23  -Lasix per acute on chronic diastolic heart failure  - Avoid hepatotoxic agents  - Monitor mental status  - CMP, INR daily  -thrombocytopenia at baseline  -no overt ascites on exam  -PPI daily  - Na restriction < 2g    MELD-Na score: 18 at 7/24/2022  5:27 AM  MELD score: 18 at 7/24/2022  5:27 AM  Calculated from:  Serum Creatinine: 1.2 mg/dL at 7/24/2022  5:27 AM  Serum Sodium: 139 mmol/L (Using max of 137 mmol/L) at 7/24/2022  5:27 AM  Total Bilirubin: 5.5 mg/dL at 7/24/2022  5:27 AM  INR(ratio): 1.3 at 7/24/2022  5:27 AM  Age: 56 years  "

## 2022-07-24 NOTE — PROGRESS NOTES
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 Patient has been having worsening dyspnea on exertion for the past few months however for the past 2 weeks it has progressed to the point where he has to stop after walking 20-30 feet.  Today, he notes shortness of breath with minimal movement including getting in and out of bed or just turning in bed.  He notes an associated dry cough.  He has also had several instances where he is walking, gets short of breath, and has significant nausea and chest tightness; he denies williams chest pain, palpitations, or diaphoresis during these times.  He denies any loss of consciousness or falls. These episodes and his shortness of breath usually resolve with a few minutes of rest.  He notes no wheezing, orthopnea, PND.  He is compliant with his CPAP.  He has also noticed increased swelling and onset of weeping of his lower extremities, this started about a week ago, as well increased abdominal girth as well.  He notes no loss of appetite.  He has attempted to adhere to a low-salt and fluid restriction diet.  He is only taking 50 mg of Aldactone and 40 mg of Lasix daily; he is short handed at work, having to take tasks on himself, and having to urinate frequently because of his diuretic regimen has not been easy.  Of note, patient has been undergoing outpatient workup of  pulmonary hypertension; He is supposed to get a right heart catheterization in outpatient setting however he states he cannot afford the co-pay of $2000.        Overview/Hospital Course:  No notes on file    Interval History:   Patient seen and examined at bedside.  No acute events overnight.  Patient notes intermittent cramps of hands, arms, legs.  He notes some improvement of his shortness of breath and cough.  Lower extremity edema and abdominal distension due to volume overload improving.  He has not had any issues in terms of lightheadedness or dizziness with ambulation to restroom.    Weight change: -11.3 kg (-24 lb 15.7 oz)      Review of Systems   Constitutional:  Positive for activity change and unexpected weight change. Negative for appetite change, chills, diaphoresis and fever.   HENT:  Negative for congestion, rhinorrhea, sneezing, sore throat and trouble swallowing.    Eyes:  Negative for visual disturbance.   Respiratory:  Positive for cough and shortness of breath. Negative for chest tightness and wheezing.    Cardiovascular:  Positive for leg swelling. Negative for chest pain and palpitations.   Gastrointestinal:  Positive for abdominal distention. Negative for abdominal pain, blood in stool, constipation, diarrhea, nausea and vomiting.   Genitourinary:  Negative for difficulty urinating and dysuria.   Musculoskeletal:  Negative for arthralgias and myalgias.   Skin:  Negative for wound.   Neurological:  Negative for dizziness, light-headedness and headaches.   Psychiatric/Behavioral:  Negative for behavioral problems, confusion and decreased concentration.      Objective:     Vital Signs (Most Recent):  Temp: 97.5 °F (36.4 °C) (07/24/22 0740)  Pulse: 96 (07/24/22 0740)  Resp: 18 (07/24/22 0740)  BP: 126/76 (07/24/22 0740)  SpO2: (!) 90 % (07/24/22 0740)   Vital Signs (24h Range):  Temp:  [97.5 °F (36.4 °C)-99.1 °F (37.3 °C)] 97.5 °F (36.4 °C)  Pulse:  [] 96  Resp:  [16-18] 18  SpO2:  [90 %-95  %] 90 %  BP: (101-132)/(52-76) 126/76     Weight: (!) 156.5 kg (345 lb)  Body mass index is 48.12 kg/m².    Intake/Output Summary (Last 24 hours) at 7/24/2022 1055  Last data filed at 7/24/2022 0907  Gross per 24 hour   Intake 720 ml   Output 8415 ml   Net -7695 ml        Physical Exam  Vitals and nursing note reviewed.   Constitutional:       General: He is not in acute distress.     Appearance: He is obese. He is not toxic-appearing or diaphoretic.   HENT:      Head: Normocephalic and atraumatic.      Right Ear: External ear normal.      Left Ear: External ear normal.      Nose: Nose normal.      Mouth/Throat:      Mouth: Mucous membranes are moist.      Pharynx: No oropharyngeal exudate or posterior oropharyngeal erythema.   Eyes:      General: No scleral icterus.        Right eye: No discharge.         Left eye: No discharge.      Conjunctiva/sclera: Conjunctivae normal.      Pupils: Pupils are equal, round, and reactive to light.   Cardiovascular:      Rate and Rhythm: Normal rate and regular rhythm.      Pulses: Normal pulses.      Heart sounds: Murmur heard.   Pulmonary:      Effort: Pulmonary effort is normal.      Comments: Crackles bibasilar  Tachypnea noted with movement   Abdominal:      General: Bowel sounds are normal. There is distension (improving).      Palpations: Abdomen is soft.      Tenderness: There is no abdominal tenderness. There is no guarding or rebound.   Musculoskeletal:      Cervical back: Normal range of motion and neck supple.      Right lower leg: Edema present.      Left lower leg: Edema present.      Comments: Profound bilateral lower extremity up to hips - improving   Skin:     General: Skin is warm and dry.      Capillary Refill: Capillary refill takes less than 2 seconds.   Neurological:      Mental Status: He is alert and oriented to person, place, and time.   Psychiatric:         Mood and Affect: Mood normal.         Behavior: Behavior normal.         Thought Content: Thought  content normal.       Significant Labs: All pertinent labs within the past 24 hours have been reviewed.    Recent Results (from the past 24 hour(s))   Renal function panel    Collection Time: 07/23/22  4:52 PM   Result Value Ref Range    Glucose 99 70 - 110 mg/dL    Sodium 141 136 - 145 mmol/L    Potassium 4.3 3.5 - 5.1 mmol/L    Chloride 103 95 - 110 mmol/L    CO2 28 23 - 29 mmol/L    BUN 20 6 - 20 mg/dL    Calcium 9.0 8.7 - 10.5 mg/dL    Creatinine 1.5 (H) 0.5 - 1.4 mg/dL    Albumin 2.3 (L) 3.5 - 5.2 g/dL    Phosphorus 2.7 2.7 - 4.5 mg/dL    eGFR if African American >60.0 >60 mL/min/1.73 m^2    eGFR if non African American 52.0 (A) >60 mL/min/1.73 m^2    Anion Gap 10 8 - 16 mmol/L   Basic metabolic panel    Collection Time: 07/23/22  4:52 PM   Result Value Ref Range    Sodium 141 136 - 145 mmol/L    Potassium 4.3 3.5 - 5.1 mmol/L    Chloride 103 95 - 110 mmol/L    CO2 28 23 - 29 mmol/L    Glucose 99 70 - 110 mg/dL    BUN 20 6 - 20 mg/dL    Creatinine 1.5 (H) 0.5 - 1.4 mg/dL    Calcium 9.0 8.7 - 10.5 mg/dL    Anion Gap 10 8 - 16 mmol/L    eGFR if African American >60.0 >60 mL/min/1.73 m^2    eGFR if non African American 52.0 (A) >60 mL/min/1.73 m^2   Magnesium    Collection Time: 07/23/22  4:52 PM   Result Value Ref Range    Magnesium 1.9 1.6 - 2.6 mg/dL   CBC Auto Differential    Collection Time: 07/24/22  5:27 AM   Result Value Ref Range    WBC 8.29 3.90 - 12.70 K/uL    RBC 4.07 (L) 4.60 - 6.20 M/uL    Hemoglobin 11.9 (L) 14.0 - 18.0 g/dL    Hematocrit 37.0 (L) 40.0 - 54.0 %    MCV 91 82 - 98 fL    MCH 29.2 27.0 - 31.0 pg    MCHC 32.2 32.0 - 36.0 g/dL    RDW 19.9 (H) 11.5 - 14.5 %    Platelets 98 (L) 150 - 450 K/uL    MPV SEE COMMENT 9.2 - 12.9 fL    Immature Granulocytes 1.7 (H) 0.0 - 0.5 %    Gran # (ANC) 5.6 1.8 - 7.7 K/uL    Immature Grans (Abs) 0.14 (H) 0.00 - 0.04 K/uL    Lymph # 1.2 1.0 - 4.8 K/uL    Mono # 1.2 (H) 0.3 - 1.0 K/uL    Eos # 0.1 0.0 - 0.5 K/uL    Baso # 0.04 0.00 - 0.20 K/uL    nRBC 0 0 /100  WBC    Gran % 67.5 38.0 - 73.0 %    Lymph % 14.8 (L) 18.0 - 48.0 %    Mono % 13.9 4.0 - 15.0 %    Eosinophil % 1.6 0.0 - 8.0 %    Basophil % 0.5 0.0 - 1.9 %    Differential Method Automated    Comprehensive Metabolic Panel    Collection Time: 07/24/22  5:27 AM   Result Value Ref Range    Sodium 139 136 - 145 mmol/L    Potassium 3.6 3.5 - 5.1 mmol/L    Chloride 104 95 - 110 mmol/L    CO2 26 23 - 29 mmol/L    Glucose 86 70 - 110 mg/dL    BUN 22 (H) 6 - 20 mg/dL    Creatinine 1.2 0.5 - 1.4 mg/dL    Calcium 8.3 (L) 8.7 - 10.5 mg/dL    Total Protein 5.7 (L) 6.0 - 8.4 g/dL    Albumin 2.0 (L) 3.5 - 5.2 g/dL    Total Bilirubin 5.5 (H) 0.1 - 1.0 mg/dL    Alkaline Phosphatase 96 55 - 135 U/L    AST 35 10 - 40 U/L    ALT 24 10 - 44 U/L    Anion Gap 9 8 - 16 mmol/L    eGFR if African American >60.0 >60 mL/min/1.73 m^2    eGFR if non African American >60.0 >60 mL/min/1.73 m^2   Magnesium    Collection Time: 07/24/22  5:27 AM   Result Value Ref Range    Magnesium 1.9 1.6 - 2.6 mg/dL   Phosphorus    Collection Time: 07/24/22  5:27 AM   Result Value Ref Range    Phosphorus 3.1 2.7 - 4.5 mg/dL   Protime-INR    Collection Time: 07/24/22  5:27 AM   Result Value Ref Range    Prothrombin Time 13.1 (H) 9.0 - 12.5 sec    INR 1.3 (H) 0.8 - 1.2   Echo    Collection Time: 07/24/22  7:45 AM   Result Value Ref Range    Ascending aorta 3.30 cm    STJ 2.48 cm    AV mean gradient 10 mmHg    Ao peak jean 1.88 m/s    Ao VTI 50.32 cm    IVRT 91.34 msec    IVS 1.03 0.6 - 1.1 cm    LA size 5.24 cm    Left Atrium Major Axis 6.07 cm    Left Atrium Minor Axis 6.69 cm    LVIDd 4.98 3.5 - 6.0 cm    LVIDs 3.71 2.1 - 4.0 cm    LVOT diameter 2.29 cm    LVOT peak VTI 31.05 cm    Posterior Wall 0.88 0.6 - 1.1 cm    MV Peak A Jean 1.06 m/s    E wave deceleration time 168.51 msec    MV Peak E Jean 1.08 m/s    RA Major Axis 5.55 cm    RA Width 3.84 cm    RVDD 3.95 cm    Sinus 2.90 cm    TAPSE 3.50 cm    TR Max Jean 3.28 m/s    TDI LATERAL 0.15 m/s    TDI SEPTAL 0.09  m/s    LA WIDTH 4.53 cm    MV stenosis pressure 1/2 time 48.87 ms    LV Diastolic Volume 117.30 mL    LV Systolic Volume 58.32 mL    LVOT peak agusto 1.33 m/s    LA volume (mod) 89.59 cm3    LV LATERAL E/E' RATIO 7.20 m/s    LV SEPTAL E/E' RATIO 12.00 m/s    FS 26 %    LA volume 128.42 cm3    LV mass 169.98 g    Left Ventricle Relative Wall Thickness 0.35 cm    AV valve area 2.54 cm2    AV Velocity Ratio 0.71     AV index (prosthetic) 0.62     MV valve area p 1/2 method 4.50 cm2    E/A ratio 1.02     Mean e' 0.12 m/s    LVOT area 4.1 cm2    LVOT stroke volume 127.82 cm3    AV peak gradient 14 mmHg    E/E' ratio 9.00 m/s    LV Systolic Volume Index 21.9 mL/m2    LV Diastolic Volume Index 44.10 mL/m2    LA Volume Index 48.3 mL/m2    LV Mass Index 64 g/m2    Triscuspid Valve Regurgitation Peak Gradient 43 mmHg    LA Volume Index (Mod) 33.7 mL/m2    BSA 2.8 m2         Significant Imaging: I have reviewed all pertinent imaging results/findings within the past 24 hours.    Imaging Results              X-Ray Chest AP Portable (Final result)  Result time 07/22/22 13:04:52      Final result by Randal Gonzalez MD (07/22/22 13:04:52)                   Impression:      1. Central pulmonary vascular congestion and cephalization of flow without overt interstitial edema or sizable pleural effusion.      Electronically signed by: Randal Gonzalez  Date:    07/22/2022  Time:    13:04               Narrative:    EXAMINATION:  XR CHEST AP PORTABLE    CLINICAL HISTORY:  edema;    TECHNIQUE:  Single frontal portable view of the chest was performed.  Of note, images are degraded secondary to suboptimal positioning with patient imaged in exaggerated AP lordotic positioning and soft tissue x-ray beam attenuation related to patient body habitus, limiting evaluation.    COMPARISON:  Chest radiograph 03/29/2022    FINDINGS:  Cardiac silhouette is top-normal in size, not significantly changed.    Central pulmonary vascular congestion and  cephalization of flow without overt interstitial edema or sizable pleural effusion.  No appreciable focal consolidation or pneumothorax.    Multilevel degenerative changes of the imaged spine.                                          Assessment/Plan:      * Acute on chronic diastolic heart failure  - Recent echo below, per cardiology will repeat in-pt; pending  - Clinically volume overloaded on admission, improving w/ IV diuresis, will decrease to IV 40mg bid with plans to transition to p.o. as notable urine output and patient with cramping; replacing electrolytes as well  - CXR notable for central pulmonary vascular congestion  - BNP elevated to 153  - Questionable compliance with home diuretic regimen  - Strict I/Os  - 1.5L fluid restriction   - Daily weights; unknown dry weight  - Will continue to monitor on tele      Results for orders placed during the hospital encounter of 01/05/22    Echo    Interpretation Summary  · There is abnormal septal wall motion.  · The left ventricle is normal in size with normal systolic function.  · The estimated ejection fraction is 65%.  · Normal left ventricular diastolic function.  · Mild right ventricular enlargement with normal right ventricular systolic function.  · Elevated central venous pressure (15 mmHg).  · The estimated PA systolic pressure is 49 mmHg.  · There is pulmonary hypertension.        Volume overload  See acute on chronic diastolic heart failure      Hypomagnesemia  Monitor closely in the setting of diuresis and replete as clinically indicated          Hypokalemia  Monitor closely in the setting of diuresis and replete as clinically indicated      LAW on CPAP  CPAP qhs      Pulmonary hypertension  Evaluated by cardiology outpatient; per pulmonology concerns for elevated PASP on ECHO and symptoms considerably out of proportion to his sarcoid diagnosis    Cardiology following, appreciate recommendations  Diuresis per acute on chronic diastolic heart  "failure  Per Cardiology, "Pulmonary HTN due to left heart disease and lung disease. PPH unlikely." RHC unlikely tab benefit current management of pulmonary hypertension, however can consider cardiac MRI as outpatient rule out cardiac involvement of sarcoidosis            Liver cirrhosis secondary to GUAJARDO  Compensated cirrhosis 2/2 GUAJARDO  H/o SBP: none  Chronic lower extremity edema  EGD 3/2022 w/ G1 EV and PHG  Follows with hepatology outpatient; per last clinic note Jan 2022, "MELD 14 primarily driven by elevated bilirubin. He otherwise has no significant complications from his cirrhosis. Some suspicion that his hyperbilirubinemia may be due to congestion related to his cardiac disease. Will closely monitor MELD and consider transplant evaluation if it worsens or he decompensates further."    - spironolactone 100 mg daily resumed 7/23  -Lasix per acute on chronic diastolic heart failure  - Avoid hepatotoxic agents  - Monitor mental status  - CMP, INR daily  -thrombocytopenia at baseline  -no overt ascites on exam  -PPI daily  - Na restriction < 2g    MELD-Na score: 18 at 7/24/2022  5:27 AM  MELD score: 18 at 7/24/2022  5:27 AM  Calculated from:  Serum Creatinine: 1.2 mg/dL at 7/24/2022  5:27 AM  Serum Sodium: 139 mmol/L (Using max of 137 mmol/L) at 7/24/2022  5:27 AM  Total Bilirubin: 5.5 mg/dL at 7/24/2022  5:27 AM  INR(ratio): 1.3 at 7/24/2022  5:27 AM  Age: 56 years    Portal hypertension  See liver cirrhosis      Thrombocytopenia  See liver cirrhosis      Severe obesity (BMI >= 40)  Body mass index is 51.6 kg/m². Morbid obesity complicates all aspects of disease management from diagnostic modalities to treatment. Weight loss encouraged and health benefits explained to patient.         Sarcoidosis of lung with sarcoidosis of lymph nodes  Diagnosed via mediastinal lymph node biopsy 2012.   Followed by pulmonology; last seen in clinic 5/27/22    continue prednisone 10mg qday   Supplemental oxygen via nasal cannula " as needed; intermittently on oxygen prn at home  Walk test prior to discharge      VTE Risk Mitigation (From admission, onward)         Ordered     heparin (porcine) injection 5,000 Units  Every 8 hours         07/22/22 1733     IP VTE HIGH RISK PATIENT  Once         07/22/22 1733     Place sequential compression device  Until discontinued         07/22/22 1733     Place sequential compression device  Until discontinued         07/22/22 1733                Discharge Planning   ALAINA: 7/27/2022     Code Status: Full Code   Is the patient medically ready for discharge?: No    Reason for patient still in hospital (select all that apply): Patient trending condition and Treatment                     Janet Wallace MD  Department of Hospital Medicine   [facility] - Cardiology Stepdown

## 2022-07-24 NOTE — SUBJECTIVE & OBJECTIVE
Interval History:   Patient seen and examined at bedside.  No acute events overnight.  Patient notes intermittent cramps of hands, arms, legs.  He notes some improvement of his shortness of breath and cough.  Lower extremity edema and abdominal distension due to volume overload improving.  He has not had any issues in terms of lightheadedness or dizziness with ambulation to restroom.    Weight change: -11.3 kg (-24 lb 15.7 oz)      Review of Systems   Constitutional:  Positive for activity change and unexpected weight change. Negative for appetite change, chills, diaphoresis and fever.   HENT:  Negative for congestion, rhinorrhea, sneezing, sore throat and trouble swallowing.    Eyes:  Negative for visual disturbance.   Respiratory:  Positive for cough and shortness of breath. Negative for chest tightness and wheezing.    Cardiovascular:  Positive for leg swelling. Negative for chest pain and palpitations.   Gastrointestinal:  Positive for abdominal distention. Negative for abdominal pain, blood in stool, constipation, diarrhea, nausea and vomiting.   Genitourinary:  Negative for difficulty urinating and dysuria.   Musculoskeletal:  Negative for arthralgias and myalgias.   Skin:  Negative for wound.   Neurological:  Negative for dizziness, light-headedness and headaches.   Psychiatric/Behavioral:  Negative for behavioral problems, confusion and decreased concentration.      Objective:     Vital Signs (Most Recent):  Temp: 97.5 °F (36.4 °C) (07/24/22 0740)  Pulse: 96 (07/24/22 0740)  Resp: 18 (07/24/22 0740)  BP: 126/76 (07/24/22 0740)  SpO2: (!) 90 % (07/24/22 0740)   Vital Signs (24h Range):  Temp:  [97.5 °F (36.4 °C)-99.1 °F (37.3 °C)] 97.5 °F (36.4 °C)  Pulse:  [] 96  Resp:  [16-18] 18  SpO2:  [90 %-95 %] 90 %  BP: (101-132)/(52-76) 126/76     Weight: (!) 156.5 kg (345 lb)  Body mass index is 48.12 kg/m².    Intake/Output Summary (Last 24 hours) at 7/24/2022 1052  Last data filed at 7/24/2022 0907  Gross  per 24 hour   Intake 720 ml   Output 8415 ml   Net -7695 ml        Physical Exam  Vitals and nursing note reviewed.   Constitutional:       General: He is not in acute distress.     Appearance: He is obese. He is not toxic-appearing or diaphoretic.   HENT:      Head: Normocephalic and atraumatic.      Right Ear: External ear normal.      Left Ear: External ear normal.      Nose: Nose normal.      Mouth/Throat:      Mouth: Mucous membranes are moist.      Pharynx: No oropharyngeal exudate or posterior oropharyngeal erythema.   Eyes:      General: No scleral icterus.        Right eye: No discharge.         Left eye: No discharge.      Conjunctiva/sclera: Conjunctivae normal.      Pupils: Pupils are equal, round, and reactive to light.   Cardiovascular:      Rate and Rhythm: Normal rate and regular rhythm.      Pulses: Normal pulses.      Heart sounds: Murmur heard.   Pulmonary:      Effort: Pulmonary effort is normal.      Comments: Crackles bibasilar  Tachypnea noted with movement   Abdominal:      General: Bowel sounds are normal. There is distension (improving).      Palpations: Abdomen is soft.      Tenderness: There is no abdominal tenderness. There is no guarding or rebound.   Musculoskeletal:      Cervical back: Normal range of motion and neck supple.      Right lower leg: Edema present.      Left lower leg: Edema present.      Comments: Profound bilateral lower extremity up to hips - improving   Skin:     General: Skin is warm and dry.      Capillary Refill: Capillary refill takes less than 2 seconds.   Neurological:      Mental Status: He is alert and oriented to person, place, and time.   Psychiatric:         Mood and Affect: Mood normal.         Behavior: Behavior normal.         Thought Content: Thought content normal.       Significant Labs: All pertinent labs within the past 24 hours have been reviewed.    Recent Results (from the past 24 hour(s))   Renal function panel    Collection Time: 07/23/22  4:52  PM   Result Value Ref Range    Glucose 99 70 - 110 mg/dL    Sodium 141 136 - 145 mmol/L    Potassium 4.3 3.5 - 5.1 mmol/L    Chloride 103 95 - 110 mmol/L    CO2 28 23 - 29 mmol/L    BUN 20 6 - 20 mg/dL    Calcium 9.0 8.7 - 10.5 mg/dL    Creatinine 1.5 (H) 0.5 - 1.4 mg/dL    Albumin 2.3 (L) 3.5 - 5.2 g/dL    Phosphorus 2.7 2.7 - 4.5 mg/dL    eGFR if African American >60.0 >60 mL/min/1.73 m^2    eGFR if non African American 52.0 (A) >60 mL/min/1.73 m^2    Anion Gap 10 8 - 16 mmol/L   Basic metabolic panel    Collection Time: 07/23/22  4:52 PM   Result Value Ref Range    Sodium 141 136 - 145 mmol/L    Potassium 4.3 3.5 - 5.1 mmol/L    Chloride 103 95 - 110 mmol/L    CO2 28 23 - 29 mmol/L    Glucose 99 70 - 110 mg/dL    BUN 20 6 - 20 mg/dL    Creatinine 1.5 (H) 0.5 - 1.4 mg/dL    Calcium 9.0 8.7 - 10.5 mg/dL    Anion Gap 10 8 - 16 mmol/L    eGFR if African American >60.0 >60 mL/min/1.73 m^2    eGFR if non African American 52.0 (A) >60 mL/min/1.73 m^2   Magnesium    Collection Time: 07/23/22  4:52 PM   Result Value Ref Range    Magnesium 1.9 1.6 - 2.6 mg/dL   CBC Auto Differential    Collection Time: 07/24/22  5:27 AM   Result Value Ref Range    WBC 8.29 3.90 - 12.70 K/uL    RBC 4.07 (L) 4.60 - 6.20 M/uL    Hemoglobin 11.9 (L) 14.0 - 18.0 g/dL    Hematocrit 37.0 (L) 40.0 - 54.0 %    MCV 91 82 - 98 fL    MCH 29.2 27.0 - 31.0 pg    MCHC 32.2 32.0 - 36.0 g/dL    RDW 19.9 (H) 11.5 - 14.5 %    Platelets 98 (L) 150 - 450 K/uL    MPV SEE COMMENT 9.2 - 12.9 fL    Immature Granulocytes 1.7 (H) 0.0 - 0.5 %    Gran # (ANC) 5.6 1.8 - 7.7 K/uL    Immature Grans (Abs) 0.14 (H) 0.00 - 0.04 K/uL    Lymph # 1.2 1.0 - 4.8 K/uL    Mono # 1.2 (H) 0.3 - 1.0 K/uL    Eos # 0.1 0.0 - 0.5 K/uL    Baso # 0.04 0.00 - 0.20 K/uL    nRBC 0 0 /100 WBC    Gran % 67.5 38.0 - 73.0 %    Lymph % 14.8 (L) 18.0 - 48.0 %    Mono % 13.9 4.0 - 15.0 %    Eosinophil % 1.6 0.0 - 8.0 %    Basophil % 0.5 0.0 - 1.9 %    Differential Method Automated    Comprehensive  Metabolic Panel    Collection Time: 07/24/22  5:27 AM   Result Value Ref Range    Sodium 139 136 - 145 mmol/L    Potassium 3.6 3.5 - 5.1 mmol/L    Chloride 104 95 - 110 mmol/L    CO2 26 23 - 29 mmol/L    Glucose 86 70 - 110 mg/dL    BUN 22 (H) 6 - 20 mg/dL    Creatinine 1.2 0.5 - 1.4 mg/dL    Calcium 8.3 (L) 8.7 - 10.5 mg/dL    Total Protein 5.7 (L) 6.0 - 8.4 g/dL    Albumin 2.0 (L) 3.5 - 5.2 g/dL    Total Bilirubin 5.5 (H) 0.1 - 1.0 mg/dL    Alkaline Phosphatase 96 55 - 135 U/L    AST 35 10 - 40 U/L    ALT 24 10 - 44 U/L    Anion Gap 9 8 - 16 mmol/L    eGFR if African American >60.0 >60 mL/min/1.73 m^2    eGFR if non African American >60.0 >60 mL/min/1.73 m^2   Magnesium    Collection Time: 07/24/22  5:27 AM   Result Value Ref Range    Magnesium 1.9 1.6 - 2.6 mg/dL   Phosphorus    Collection Time: 07/24/22  5:27 AM   Result Value Ref Range    Phosphorus 3.1 2.7 - 4.5 mg/dL   Protime-INR    Collection Time: 07/24/22  5:27 AM   Result Value Ref Range    Prothrombin Time 13.1 (H) 9.0 - 12.5 sec    INR 1.3 (H) 0.8 - 1.2   Echo    Collection Time: 07/24/22  7:45 AM   Result Value Ref Range    Ascending aorta 3.30 cm    STJ 2.48 cm    AV mean gradient 10 mmHg    Ao peak jean 1.88 m/s    Ao VTI 50.32 cm    IVRT 91.34 msec    IVS 1.03 0.6 - 1.1 cm    LA size 5.24 cm    Left Atrium Major Axis 6.07 cm    Left Atrium Minor Axis 6.69 cm    LVIDd 4.98 3.5 - 6.0 cm    LVIDs 3.71 2.1 - 4.0 cm    LVOT diameter 2.29 cm    LVOT peak VTI 31.05 cm    Posterior Wall 0.88 0.6 - 1.1 cm    MV Peak A Jean 1.06 m/s    E wave deceleration time 168.51 msec    MV Peak E Jean 1.08 m/s    RA Major Axis 5.55 cm    RA Width 3.84 cm    RVDD 3.95 cm    Sinus 2.90 cm    TAPSE 3.50 cm    TR Max Jean 3.28 m/s    TDI LATERAL 0.15 m/s    TDI SEPTAL 0.09 m/s    LA WIDTH 4.53 cm    MV stenosis pressure 1/2 time 48.87 ms    LV Diastolic Volume 117.30 mL    LV Systolic Volume 58.32 mL    LVOT peak jean 1.33 m/s    LA volume (mod) 89.59 cm3    LV LATERAL E/E'  RATIO 7.20 m/s    LV SEPTAL E/E' RATIO 12.00 m/s    FS 26 %    LA volume 128.42 cm3    LV mass 169.98 g    Left Ventricle Relative Wall Thickness 0.35 cm    AV valve area 2.54 cm2    AV Velocity Ratio 0.71     AV index (prosthetic) 0.62     MV valve area p 1/2 method 4.50 cm2    E/A ratio 1.02     Mean e' 0.12 m/s    LVOT area 4.1 cm2    LVOT stroke volume 127.82 cm3    AV peak gradient 14 mmHg    E/E' ratio 9.00 m/s    LV Systolic Volume Index 21.9 mL/m2    LV Diastolic Volume Index 44.10 mL/m2    LA Volume Index 48.3 mL/m2    LV Mass Index 64 g/m2    Triscuspid Valve Regurgitation Peak Gradient 43 mmHg    LA Volume Index (Mod) 33.7 mL/m2    BSA 2.8 m2         Significant Imaging: I have reviewed all pertinent imaging results/findings within the past 24 hours.    Imaging Results              X-Ray Chest AP Portable (Final result)  Result time 07/22/22 13:04:52      Final result by Randal Gonzalez MD (07/22/22 13:04:52)                   Impression:      1. Central pulmonary vascular congestion and cephalization of flow without overt interstitial edema or sizable pleural effusion.      Electronically signed by: Randal Gonzalez  Date:    07/22/2022  Time:    13:04               Narrative:    EXAMINATION:  XR CHEST AP PORTABLE    CLINICAL HISTORY:  edema;    TECHNIQUE:  Single frontal portable view of the chest was performed.  Of note, images are degraded secondary to suboptimal positioning with patient imaged in exaggerated AP lordotic positioning and soft tissue x-ray beam attenuation related to patient body habitus, limiting evaluation.    COMPARISON:  Chest radiograph 03/29/2022    FINDINGS:  Cardiac silhouette is top-normal in size, not significantly changed.    Central pulmonary vascular congestion and cephalization of flow without overt interstitial edema or sizable pleural effusion.  No appreciable focal consolidation or pneumothorax.    Multilevel degenerative changes of the imaged spine.

## 2022-07-24 NOTE — ASSESSMENT & PLAN NOTE
- Recent echo below, per cardiology will repeat in-pt; pending  - Clinically volume overloaded on admission, improving w/ IV diuresis, will decrease to IV 40mg bid with plans to transition to p.o. as notable urine output and patient with cramping; replacing electrolytes as well  - CXR notable for central pulmonary vascular congestion  - BNP elevated to 153  - Questionable compliance with home diuretic regimen  - Strict I/Os  - 1.5L fluid restriction   - Daily weights; unknown dry weight  - Will continue to monitor on tele      Results for orders placed during the hospital encounter of 01/05/22    Echo    Interpretation Summary  · There is abnormal septal wall motion.  · The left ventricle is normal in size with normal systolic function.  · The estimated ejection fraction is 65%.  · Normal left ventricular diastolic function.  · Mild right ventricular enlargement with normal right ventricular systolic function.  · Elevated central venous pressure (15 mmHg).  · The estimated PA systolic pressure is 49 mmHg.  · There is pulmonary hypertension.

## 2022-07-25 VITALS
SYSTOLIC BLOOD PRESSURE: 130 MMHG | WEIGHT: 315 LBS | HEART RATE: 83 BPM | RESPIRATION RATE: 16 BRPM | BODY MASS INDEX: 44.1 KG/M2 | TEMPERATURE: 98 F | HEIGHT: 71 IN | OXYGEN SATURATION: 94 % | DIASTOLIC BLOOD PRESSURE: 57 MMHG

## 2022-07-25 PROBLEM — E87.6 HYPOKALEMIA: Status: RESOLVED | Noted: 2022-07-24 | Resolved: 2022-07-25

## 2022-07-25 PROBLEM — E83.42 HYPOMAGNESEMIA: Status: RESOLVED | Noted: 2022-07-24 | Resolved: 2022-07-25

## 2022-07-25 LAB
ALBUMIN SERPL BCP-MCNC: 2.2 G/DL (ref 3.5–5.2)
ANION GAP SERPL CALC-SCNC: 11 MMOL/L (ref 8–16)
BUN SERPL-MCNC: 21 MG/DL (ref 6–20)
CALCIUM SERPL-MCNC: 8.5 MG/DL (ref 8.7–10.5)
CHLORIDE SERPL-SCNC: 102 MMOL/L (ref 95–110)
CO2 SERPL-SCNC: 26 MMOL/L (ref 23–29)
CREAT SERPL-MCNC: 1.1 MG/DL (ref 0.5–1.4)
EST. GFR  (AFRICAN AMERICAN): >60 ML/MIN/1.73 M^2
EST. GFR  (NON AFRICAN AMERICAN): >60 ML/MIN/1.73 M^2
GLUCOSE SERPL-MCNC: 142 MG/DL (ref 70–110)
INR PPP: 1.3 (ref 0.8–1.2)
PHOSPHATE SERPL-MCNC: 2.7 MG/DL (ref 2.7–4.5)
POTASSIUM SERPL-SCNC: 3.6 MMOL/L (ref 3.5–5.1)
PROTHROMBIN TIME: 13.3 SEC (ref 9–12.5)
SODIUM SERPL-SCNC: 139 MMOL/L (ref 136–145)

## 2022-07-25 PROCEDURE — 80069 RENAL FUNCTION PANEL: CPT | Performed by: STUDENT IN AN ORGANIZED HEALTH CARE EDUCATION/TRAINING PROGRAM

## 2022-07-25 PROCEDURE — 99239 HOSP IP/OBS DSCHRG MGMT >30: CPT | Mod: ,,, | Performed by: STUDENT IN AN ORGANIZED HEALTH CARE EDUCATION/TRAINING PROGRAM

## 2022-07-25 PROCEDURE — 85610 PROTHROMBIN TIME: CPT | Performed by: STUDENT IN AN ORGANIZED HEALTH CARE EDUCATION/TRAINING PROGRAM

## 2022-07-25 PROCEDURE — 25000003 PHARM REV CODE 250: Performed by: STUDENT IN AN ORGANIZED HEALTH CARE EDUCATION/TRAINING PROGRAM

## 2022-07-25 PROCEDURE — 36415 COLL VENOUS BLD VENIPUNCTURE: CPT | Performed by: STUDENT IN AN ORGANIZED HEALTH CARE EDUCATION/TRAINING PROGRAM

## 2022-07-25 PROCEDURE — 63600175 PHARM REV CODE 636 W HCPCS: Performed by: STUDENT IN AN ORGANIZED HEALTH CARE EDUCATION/TRAINING PROGRAM

## 2022-07-25 PROCEDURE — 99239 PR HOSPITAL DISCHARGE DAY,>30 MIN: ICD-10-PCS | Mod: ,,, | Performed by: STUDENT IN AN ORGANIZED HEALTH CARE EDUCATION/TRAINING PROGRAM

## 2022-07-25 RX ORDER — POTASSIUM CHLORIDE 750 MG/1
10 TABLET, EXTENDED RELEASE ORAL DAILY
Qty: 30 TABLET | Refills: 1 | Status: SHIPPED | OUTPATIENT
Start: 2022-07-25 | End: 2022-07-25 | Stop reason: HOSPADM

## 2022-07-25 RX ORDER — PREDNISONE 10 MG/1
10 TABLET ORAL DAILY
Start: 2022-07-26 | End: 2022-08-30 | Stop reason: SDUPTHER

## 2022-07-25 RX ORDER — FUROSEMIDE 40 MG/1
40 TABLET ORAL 2 TIMES DAILY
Start: 2022-07-25 | End: 2022-08-08 | Stop reason: ALTCHOICE

## 2022-07-25 RX ADMIN — PREDNISONE 10 MG: 10 TABLET ORAL at 08:07

## 2022-07-25 RX ADMIN — SPIRONOLACTONE 100 MG: 25 TABLET, FILM COATED ORAL at 08:07

## 2022-07-25 RX ADMIN — FUROSEMIDE 40 MG: 40 TABLET ORAL at 08:07

## 2022-07-25 RX ADMIN — HEPARIN SODIUM 5000 UNITS: 5000 INJECTION INTRAVENOUS; SUBCUTANEOUS at 05:07

## 2022-07-25 RX ADMIN — PANTOPRAZOLE SODIUM 40 MG: 40 TABLET, DELAYED RELEASE ORAL at 08:07

## 2022-07-25 RX ADMIN — CETIRIZINE HYDROCHLORIDE 10 MG: 10 TABLET, FILM COATED ORAL at 08:07

## 2022-07-25 NOTE — ASSESSMENT & PLAN NOTE
Body mass index is 48.12 kg/m². Morbid obesity complicates all aspects of disease management from diagnostic modalities to treatment. Weight loss encouraged and health benefits explained to patient.

## 2022-07-25 NOTE — ASSESSMENT & PLAN NOTE
"Evaluated by cardiology outpatient; per pulmonology concerns for elevated PASP on ECHO and symptoms considerably out of proportion to his sarcoid diagnosis    Cardiology following, appreciate recommendations  Diuresis per acute on chronic diastolic heart failure  Per Cardiology, "Pulmonary HTN due to left heart disease and lung disease. PPH unlikely." RHC unlikely tab benefit current management of pulmonary hypertension, however can consider cardiac MRI as outpatient rule out cardiac involvement of sarcoidosis.  Continue follow-up with outpatient Cardiology and pulmonology.          "

## 2022-07-25 NOTE — ASSESSMENT & PLAN NOTE
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 He otherwise has no significant complications from his cirrhosis. Some suspicion that his hyperbilirubinemia may be due to congestion related to his cardiac disease. Will closely monitor MELD and consider transplant evaluation if it worsens or he decompensates further."    - spironolactone 100 mg daily resumed 7/23  -Lasix per acute on chronic diastolic heart failure  - Avoid hepatotoxic agents  -thrombocytopenia at baseline  -no overt ascites on exam  -PPI daily  - Na restriction < 2g  -continue follow-up with outpatient hepatology    MELD-Na score: 17 at 7/25/2022  9:12 AM  MELD score: 17 at 7/25/2022  9:12 AM  Calculated from:  Serum Creatinine: 1.1 mg/dL at 7/25/2022  9:12 AM  Serum Sodium: 139 mmol/L (Using max of 137 mmol/L) at 7/25/2022  9:12 AM  Total Bilirubin: 5.5 mg/dL at 7/24/2022  5:27 AM  INR(ratio): 1.3 at 7/25/2022  5:22 AM  Age: 56 years  "

## 2022-07-25 NOTE — NURSING
Pt AAO, VSS. Upon discharge, all PIV's were removed and AVS reviewed with pt. All questions were answered. Pt left via ambulatory with hospital staff.

## 2022-07-25 NOTE — ASSESSMENT & PLAN NOTE
Diagnosed via mediastinal lymph node biopsy 2012.   Followed by pulmonology; last seen in clinic 5/27/22    continue prednisone 10mg qday   Walk test completed prior to discharge; no supplemental oxygen needs at home

## 2022-07-25 NOTE — DISCHARGE SUMMARY
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 Patient has been having worsening dyspnea on exertion for the past few months however for the past 2 weeks it has progressed to the point where he has to stop after walking 20-30 feet.  Today, he notes shortness of breath with minimal movement including getting in and out of bed or just turning in bed.  He notes an associated dry cough.  He has also had several instances where he is walking, gets short of breath, and has significant nausea and chest tightness; he denies williams chest pain, palpitations, or diaphoresis during these times.  He denies any loss of consciousness or falls. These episodes and his shortness of breath usually resolve with a few minutes of rest.  He notes no wheezing, orthopnea, PND.  He is compliant with his CPAP.  He has also noticed increased swelling and onset of weeping of his lower extremities, this started about a week ago, as well increased abdominal girth as well.  He notes no loss of appetite.  He has attempted to adhere to a low-salt and fluid restriction diet.  He is only taking 50 mg of Aldactone and 40 mg of Lasix daily; he is short handed at work, having to take tasks on himself, and having to urinate frequently because of his diuretic regimen has  not been easy.  Of note, patient has been undergoing outpatient workup of pulmonary hypertension; He is supposed to get a right heart catheterization in outpatient setting however he states he cannot afford the co-pay of $2000.        * No surgery found *            Goals of Care Treatment Preferences:  Code Status: Full Code    Health care agent: Daysi Peter  Health care agent number:                    Consults:   Consults (From admission, onward)        Status Ordering Provider     Inpatient consult to Social Work/Case Management  Once        Provider:  (Not yet assigned)    Acknowledged VANESSA JACOBSON     Inpatient consult to Registered Dietitian/Nutritionist  Once        Provider:  (Not yet assigned)    Completed VANESSA JACOBSON     Inpatient consult to Cardiology  Once        Provider:  (Not yet assigned)    Completed HEATHER MILI          [facility] Course:     * Acute on chronic diastolic heart failure  - Recent echo below, per cardiology will repeat in-pt; results also below and stable  - on admit, CXR notable for central pulmonary vascular congestion,  BNP elevated to 153  - Questionable compliance with home diuretic regimen      Mr. Peter was aggressively diuresed with IV Lasix (net neg 21L) with significant improvement of his symptoms which included shortness of breath at rest and with exertion.  Electrolytes were repleted as needed.  He is medically stable to discharge home on 07/25.  He will continue Lasix 40 mg b.i.d. with follow-up renal function panel.  He was counseled extensively on adhering to his Lasix regimen, daily weights, low-salt diet, fluid restriction and voiced understanding.  Return precautions advised and patient in agreement with discharge plan.  Patient to continue follow-up with PCP, Cardiology, pulmonology, hepatology.        Results for orders placed during the hospital encounter of 01/05/22    Echo    Interpretation Summary  · There is abnormal septal wall motion.  · The  "left ventricle is normal in size with normal systolic function.  · The estimated ejection fraction is 65%.  · Normal left ventricular diastolic function.  · Mild right ventricular enlargement with normal right ventricular systolic function.  · Elevated central venous pressure (15 mmHg).  · The estimated PA systolic pressure is 49 mmHg.  · There is pulmonary hypertension.    Results for orders placed during the hospital encounter of 07/22/22    Echo    Interpretation Summary  · The left ventricle is normal in size with normal systolic function.  · The estimated ejection fraction is 65%.  · Indeterminate left ventricular diastolic function.  · Moderate right ventricular enlargement with mildly to moderately reduced right ventricular systolic function.  · The estimated PA systolic pressure is 51 mmHg.  · Intermediate central venous pressure (8 mmHg).  · There is pulmonary hypertension.  · Moderate left atrial enlargement.      Pulmonary hypertension  Evaluated by cardiology outpatient; per pulmonology concerns for elevated PASP on ECHO and symptoms considerably out of proportion to his sarcoid diagnosis    Cardiology following, appreciate recommendations  Diuresis per acute on chronic diastolic heart failure  Per Cardiology, "Pulmonary HTN due to left heart disease and lung disease. PPH unlikely." RHC unlikely tab benefit current management of pulmonary hypertension, however can consider cardiac MRI as outpatient rule out cardiac involvement of sarcoidosis.  Continue follow-up with outpatient Cardiology and pulmonology.            Liver cirrhosis secondary to GUAJARDO  Compensated cirrhosis 2/2 GUAJARDO  H/o SBP: none  Chronic lower extremity edema  EGD 3/2022 w/ G1 EV and PHG  Follows with hepatology outpatient; per last clinic note Jan 2022, "MELD 14 primarily driven by elevated bilirubin. He otherwise has no significant complications from his cirrhosis. Some suspicion that his hyperbilirubinemia may be due to congestion " "related to his cardiac disease. Will closely monitor MELD and consider transplant evaluation if it worsens or he decompensates further."    - spironolactone 100 mg daily resumed 7/23  -Lasix per acute on chronic diastolic heart failure  - Avoid hepatotoxic agents  -thrombocytopenia at baseline  -no overt ascites on exam  -PPI daily  - Na restriction < 2g  -continue follow-up with outpatient hepatology    MELD-Na score: 17 at 7/25/2022  9:12 AM  MELD score: 17 at 7/25/2022  9:12 AM  Calculated from:  Serum Creatinine: 1.1 mg/dL at 7/25/2022  9:12 AM  Serum Sodium: 139 mmol/L (Using max of 137 mmol/L) at 7/25/2022  9:12 AM  Total Bilirubin: 5.5 mg/dL at 7/24/2022  5:27 AM  INR(ratio): 1.3 at 7/25/2022  5:22 AM  Age: 56 years    Thrombocytopenia  See liver cirrhosis      Severe obesity (BMI >= 40)  Body mass index is 48.12 kg/m². Morbid obesity complicates all aspects of disease management from diagnostic modalities to treatment. Weight loss encouraged and health benefits explained to patient.         Sarcoidosis of lung with sarcoidosis of lymph nodes  Diagnosed via mediastinal lymph node biopsy 2012.   Followed by pulmonology; last seen in clinic 5/27/22    continue prednisone 10mg qday   Walk test completed prior to discharge; no supplemental oxygen needs at home      07/25/2022  Vitals:    07/25/22 0000 07/25/22 0742 07/25/22 0806 07/25/22 1223   BP: 124/73 124/73  (!) 130/57   BP Location: Right arm   Right arm   Patient Position: Lying   Lying   Pulse: 92 97 107 83   Resp: 18 18  16   Temp: 98.2 °F (36.8 °C) 97.8 °F (36.6 °C)  97.8 °F (36.6 °C)   TempSrc: Oral   Oral   SpO2: 97% (!) 94%  (!) 94%   Weight:       Height:         Physical Exam  Vitals and nursing note reviewed.   Constitutional:       General: He is not in acute distress.     Appearance: He is obese. He is not toxic-appearing or diaphoretic.   HENT:      Head: Normocephalic and atraumatic.      Right Ear: External ear normal.      Left Ear: External " ear normal.      Nose: Nose normal.      Mouth/Throat:      Mouth: Mucous membranes are moist.      Pharynx: No oropharyngeal exudate or posterior oropharyngeal erythema.   Eyes:      General: No scleral icterus.        Right eye: No discharge.         Left eye: No discharge.      Conjunctiva/sclera: Conjunctivae normal.      Pupils: Pupils are equal, round, and reactive to light.   Cardiovascular:      Rate and Rhythm: Normal rate and regular rhythm.      Pulses: Normal pulses.      Heart sounds: Murmur heard.   Pulmonary:      Effort: Pulmonary effort is normal.      Comments: Crackles bibasilar improved  Abdominal:      General: Bowel sounds are normal. There is distension (improved).      Palpations: Abdomen is soft.      Tenderness: There is no abdominal tenderness. There is no guarding or rebound.   Musculoskeletal:      Cervical back: Normal range of motion and neck supple.      Right lower leg: Edema present.      Left lower leg: Edema present.      Comments: bilateral lower extremity  - improved  Skin:     General: Skin is warm and dry.      Capillary Refill: Capillary refill takes less than 2 seconds.   Neurological:      Mental Status: He is alert and oriented to person, place, and time.   Psychiatric:         Mood and Affect: Mood normal.         Behavior: Behavior normal.         Thought Content: Thought content normal.        Final Active Diagnoses:    Diagnosis Date Noted POA    PRINCIPAL PROBLEM:  Acute on chronic diastolic heart failure [I50.33] 07/22/2022 Yes    Volume overload [E87.70] 07/22/2022 Yes    LAW on CPAP [G47.33, Z99.89] 06/17/2022 Not Applicable    Pulmonary hypertension [I27.20] 06/17/2022 Yes    Liver cirrhosis secondary to GUAJARDO [K75.81, K74.60] 04/08/2021 Yes    Portal hypertension [K76.6] 02/11/2021 Yes    Thrombocytopenia [D69.6] 12/22/2020 Yes    Severe obesity (BMI >= 40) [E66.01]  Yes    Sarcoidosis of lung with sarcoidosis of lymph nodes [D86.2]  Yes      Problems  Resolved During this Admission:    Diagnosis Date Noted Date Resolved POA    Hypokalemia [E87.6] 07/24/2022 07/25/2022 Yes    Hypomagnesemia [E83.42] 07/24/2022 07/25/2022 Yes       Discharged Condition: stable    Disposition:     Follow Up:    Patient Instructions:      RENAL FUNCTION PANEL   Standing Status: Future Standing Exp. Date: 09/23/23     Ambulatory referral/consult to Hepatology   Standing Status: Future   Referral Priority: Urgent Referral Type: Consultation   Referral Reason: Specialty Services Required   Requested Specialty: Hepatology   Number of Visits Requested: 1     Ambulatory referral/consult to Cardiology   Standing Status: Future   Referral Priority: Urgent Referral Type: Consultation   Referral Reason: Specialty Services Required   Requested Specialty: Cardiology     Diet Cardiac   Order Comments: Less than 2g salt daily  No more than 1.8 L of fluids daily     Notify your health care provider if you experience any of the following:  difficulty breathing or increased cough     Notify your health care provider if you experience any of the following:  severe persistent headache     Notify your health care provider if you experience any of the following:  persistent dizziness, light-headedness, or visual disturbances     Notify your health care provider if you experience any of the following:  increased confusion or weakness     Activity as tolerated       Significant Diagnostic Studies: Labs:   CMP   Recent Labs   Lab 07/24/22  0527 07/24/22  1607 07/25/22  0912    138 139   K 3.6 4.3 3.6    103 102   CO2 26 26 26   GLU 86 110 142*   BUN 22* 21* 21*   CREATININE 1.2 1.3 1.1   CALCIUM 8.3* 8.7 8.5*   PROT 5.7*  --   --    ALBUMIN 2.0* 2.3* 2.2*   BILITOT 5.5*  --   --    ALKPHOS 96  --   --    AST 35  --   --    ALT 24  --   --    ANIONGAP 9 9 11   ESTGFRAFRICA >60.0 >60.0 >60.0   EGFRNONAA >60.0 >60.0 >60.0   , CBC   Recent Labs   Lab 07/24/22  0527   WBC 8.29   HGB 11.9*   HCT  37.0*   PLT 98*   , INR   Lab Results   Component Value Date    INR 1.3 (H) 2022    INR 1.3 (H) 2022    INR 1.4 (H) 2022   , Troponin   Recent Labs   Lab 22  1117   TROPONINI 0.021    and A1C: No results for input(s): HGBA1C in the last 4320 hours.  Microbiology:   Blood Culture   Lab Results   Component Value Date    LABBLOO No growth after 5 days. 2022       Pending Diagnostic Studies:     None         Medications:  Reconciled Home Medications:      Medication List      START taking these medications    predniSONE 10 MG tablet  Commonly known as: DELTASONE  Take 1 tablet (10 mg total) by mouth once daily.  Start taking on: 2022        CHANGE how you take these medications    furosemide 40 MG tablet  Commonly known as: LASIX  Take 1 tablet (40 mg total) by mouth 2 (two) times a day.  What changed:   · how much to take  · when to take this     mupirocin 2 % ointment  Commonly known as: BACTROBAN  Apply topically 3 (three) times daily.  What changed:   · when to take this  · reasons to take this        CONTINUE taking these medications    acetaminophen 500 MG tablet  Commonly known as: TYLENOL  Take 3 tablets by mouth daily as needed for knee pain or headache     albuterol 90 mcg/actuation inhaler  Commonly known as: PROVENTIL/VENTOLIN HFA  SMARTSI-2 Puff(s) By Mouth Every 4-6 Hours PRN     diclofenac sodium 1 % Gel  Commonly known as: VOLTAREN  Apply topically twice daily as needed for knee pain     hydrOXYzine HCL 25 MG tablet  Commonly known as: ATARAX  Take 1 tablet (25 mg total) by mouth 3 (three) times daily as needed for Anxiety.     loratadine 10 mg tablet  Commonly known as: CLARITIN  Take 1 tablet (10 mg total) by mouth once daily.     omeprazole 20 MG capsule  Commonly known as: PRILOSEC  Take 1 capsule (20 mg total) by mouth once daily.     SINEX REGULAR NASL  2 sprays by Each Nostril route once daily.     spironolactone 100 MG tablet  Commonly known as:  ALDACTONE  Take 1 tablet (100 mg total) by mouth once daily. DOSE CHANGE        STOP taking these medications    LIDOcaine VISCOUS 2 % Soln  Generic drug: LIDOcaine HCl 2%     pantoprazole 40 MG tablet  Commonly known as: PROTONIX            Indwelling Lines/Drains at time of discharge:   Lines/Drains/Airways     None                 Time spent on the discharge of patient: 40 minutes         Janet Wallace MD  Department of Hospital Medicine  [facility] - Cardiology Stepdown

## 2022-07-25 NOTE — PLAN OF CARE
[facility] - Cardiology Stepdown  Discharge Final Note    Primary Care Provider: Elkin Guo III, MD    Expected Discharge Date: 7/25/2022    Final Discharge Note (most recent)     Final Note - 07/25/22 1328        Final Note    Assessment Type Final Discharge Note     Anticipated Discharge Disposition Home or Self Care     Hospital Resources/Appts/Education Provided Appointments scheduled and added to AVS               Nataliya Rodriguez LMSW [facility]  [id number]      Future Appointments   Date Time Provider Department Center   8/4/2022 10:00 AM Sergio Guo III, MD [facility]   8/8/2022  9:20 AM Estefanía Baumann MD [facility] CARDIO [facility]   8/30/2022  9:30 AM Raoul Rae MD [facility] PULMSVC [facility]

## 2022-07-25 NOTE — NURSING
Home Oxygen Evaluation    Date Performed:     1) Patient's Home O2 Sat on room air, while at rest: 99%        If O2 sats on room air at rest are 88% or below, patient qualifies. No additional testing needed. Document N/A in steps 2 and 3. If 89% or above, complete steps 2.      2) Patient's O2 Sat on room air while exercisin        If O2 sats on room air while exercising remain 89% or above patient does not qualify, no further testing needed Document N/A in step 3. If O2 sats on room air while exercising are 88% or below, continue to step 3.      3) Patient's O2 Sat while exercising on O2:      N/A     (Must show improvement from #2 for patients to qualify)    If O2 sats improve on oxygen, patient qualifies for portable oxygen. If not, the patient does not qualify.

## 2022-07-25 NOTE — PLAN OF CARE
Patient has been scheduled for a [facility] Follow Up with Elkin Guo III, MD on Thursday Aug 4, 2022 at 10:00 AM.    Please arrive approximately 15 minutes before your scheduled appointment time and ensure that you have a valid government issued ID and your insurance card.     For your safety, masks are required. Visit [email or web address] to learn more.    Please park in surface [facility] and check in at the .     [facility] Internal Medicine/Pediatrics  [address] [facility] [address]   [place] [address] [phone]    And;    An Established Patient Visit with Raoul Rae MD on Tuesday Aug 30, 2022 at 9:30 AM. *Patient has been placed on a wait list for a sooner appointment*    Please arrive approximately 15 minutes before your scheduled appointment time and ensure that you have a valid government issued ID and your insurance card.    For your safety, masks are required. Visit [email or web address] to learn more.    Main Building, 9th Floor   Please park in [facility] and use Clinic elevator   [facility] 9th Fl  [address] [facility] [id number] [phone]    And lastly;    New Patient with Estefanía Baumann MD on Monday Aug 8, 2022 at 9:20 AM.  Please arrive approximately 15 minutes before your scheduled appointment time and ensure that you have a valid government issued ID and your insurance card.     Cardiology Services Clinics - 3rd floor   [facility] - Cardiology - 3rd Fl  [address] [facility] [id number] [phone]                          Andrei Nugent    Case Management  Ext. [phone]

## 2022-07-25 NOTE — PLAN OF CARE
[facility] - Cardiology Stepdown  Initial Discharge Assessment       Primary Care Provider: Elkin Guo III, MD    Admission Diagnosis: Shortness of breath [R06.02]  Pulmonary HTN [I27.20]  Hypervolemia, unspecified hypervolemia type [E87.70]    Admission Date: 7/22/2022  Expected Discharge Date: 7/25/2022    Discharge Barriers Identified: None    Payor: [employer] [place] / Plan: [employer] / Product Type: PPO /     Extended Emergency Contact Information  Primary Emergency Contact: Daysi Peter  Address: [address] [place] [address] [place]  Mobile Phone: [phone]  Relation: Spouse    Discharge Plan A: Home with family  Discharge Plan B: Home      [id number] - [place] - [address] [facility] [facility] AT [facility] & [address] [address]  Phone: [phone] Fax: [phone]      Initial Assessment (most recent)     Adult Discharge Assessment - 07/25/22 1000        Discharge Assessment    Assessment Type Discharge Planning Assessment     Confirmed/corrected address, phone number and insurance Yes     Confirmed Demographics Correct on Facesheet     Source of Information patient     Communicated ALAINA with patient/caregiver Yes     Lives With spouse     Do you expect to return to your current living situation? Yes     Do you have help at home or someone to help you manage your care at home? Yes     Who are your caregiver(s) and their phone number(s)? spouse Daysi Peter     Prior to hospitilization cognitive status: Alert/Oriented     Current cognitive status: Alert/Oriented     Walking or Climbing Stairs Difficulty none     Dressing/Bathing Difficulty none     Equipment Currently Used at Home none     Readmission within 30 days? No     Patient currently being followed by outpatient case management? No     Do you currently have service(s) that help you manage your care at home? No     Do you take prescription  medications? Yes     Do you have prescription coverage? Yes     Coverage GEHA     Do you have any problems affording any of your prescribed medications? No     Is the patient taking medications as prescribed? yes     Who is going to help you get home at discharge? spouse     How do you get to doctors appointments? car, drives self     Are you on dialysis? No     Do you take coumadin? No     Discharge Plan A Home with family     Discharge Plan B Home     DME Needed Upon Discharge  none     Discharge Plan discussed with: Patient     Discharge Barriers Identified None               Pt admitted 7/22/2022 11:18 AM    For Shortness of breath [R06.02]  Pulmonary HTN [I27.20]  Hypervolemia, unspecified hypervolemia type [E87.70]       DPEA completed with pt who lives at home with spouse. Pt denies any need of HHC or any other resources at time. Plan is home no needs.    Discharge packet provided to pt, all questions answered prior to leaving room and contact number provided to reach CM.      PCP is Elkin Guo III, MD  [phone] (p)  [phone] (f)    Future Appointments   Date Time Provider Department Center   8/4/2022 10:00 AM Sergio Guo III, MD [facility]   8/8/2022  9:20 AM Estefanía Baumann MD [facility] CARDIO [facility]   8/30/2022  9:30 AM Raoul Rae MD [facility] PULMSVC [facility], JOSN, RN   Case Management [phone]            '

## 2022-07-25 NOTE — ASSESSMENT & PLAN NOTE
- Recent echo below, per cardiology will repeat in-pt; results also below and stable  - on admit, CXR notable for central pulmonary vascular congestion,  BNP elevated to 153  - Questionable compliance with home diuretic regimen      Mr. Peter was aggressively diuresed with IV Lasix (net neg 21L) with significant improvement of his symptoms which included shortness of breath at rest and with exertion.  Electrolytes were repleted as needed.  He is medically stable to discharge home on 07/25.  He will continue Lasix 40 mg b.i.d. with follow-up renal function panel.  He was counseled extensively on adhering to his Lasix regimen, daily weights, low-salt diet, fluid restriction and voiced understanding.  Return precautions advised and patient in agreement with discharge plan.  Patient to continue follow-up with PCP, Cardiology, pulmonology, hepatology.        Results for orders placed during the hospital encounter of 01/05/22    Echo    Interpretation Summary  · There is abnormal septal wall motion.  · The left ventricle is normal in size with normal systolic function.  · The estimated ejection fraction is 65%.  · Normal left ventricular diastolic function.  · Mild right ventricular enlargement with normal right ventricular systolic function.  · Elevated central venous pressure (15 mmHg).  · The estimated PA systolic pressure is 49 mmHg.  · There is pulmonary hypertension.    Results for orders placed during the hospital encounter of 07/22/22    Echo    Interpretation Summary  · The left ventricle is normal in size with normal systolic function.  · The estimated ejection fraction is 65%.  · Indeterminate left ventricular diastolic function.  · Moderate right ventricular enlargement with mildly to moderately reduced right ventricular systolic function.  · The estimated PA systolic pressure is 51 mmHg.  · Intermediate central venous pressure (8 mmHg).  · There is pulmonary hypertension.  · Moderate left atrial  enlargement.

## 2022-07-26 ENCOUNTER — PATIENT OUTREACH (OUTPATIENT)
Dept: ADMINISTRATIVE | Facility: CLINIC | Age: 57
End: 2022-07-26
Payer: OTHER GOVERNMENT

## 2022-07-26 DIAGNOSIS — I50.32 CHRONIC DIASTOLIC HEART FAILURE: Primary | ICD-10-CM

## 2022-07-26 NOTE — PROGRESS NOTES
C3  nurse spoke with Ashwin Peter  for a TCC post hospital discharge follow up call. The patient has a scheduled HOSFU appointment with HALI Vicente on 08/01/2022 @ [facility].

## 2022-07-31 ENCOUNTER — PATIENT MESSAGE (OUTPATIENT)
Dept: HEPATOLOGY | Facility: CLINIC | Age: 57
End: 2022-07-31
Payer: OTHER GOVERNMENT

## 2022-08-01 ENCOUNTER — OFFICE VISIT (OUTPATIENT)
Dept: HOME HEALTH SERVICES | Facility: CLINIC | Age: 57
End: 2022-08-01
Payer: OTHER GOVERNMENT

## 2022-08-01 DIAGNOSIS — I50.32 CHRONIC DIASTOLIC HEART FAILURE: Primary | ICD-10-CM

## 2022-08-01 PROCEDURE — 99443 PR PHYSICIAN TELEPHONE EVALUATION 21-30 MIN: ICD-10-PCS | Mod: 95,,, | Performed by: NURSE PRACTITIONER

## 2022-08-01 PROCEDURE — 99443 PR PHYSICIAN TELEPHONE EVALUATION 21-30 MIN: CPT | Mod: 95,,, | Performed by: NURSE PRACTITIONER

## 2022-08-01 NOTE — PROGRESS NOTES
[facility]  Established Patient - Audio Only Post-Hospitalization Telehealth Visit with [facility] [place] [facility]    Visit Date: 8/1/22   Encounter Provider: Cass Vicente NP  PCP:  Elkin Guo III, MD    PRESENTING HISTORY      Patient ID: Ashwin Peter     Consult Requested By:  No ref. 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 Patient has been having worsening dyspnea on exertion for the past few months however for the past 2 weeks it has progressed to the point where he has to stop after walking 20-30 feet.  Today, he notes shortness of breath with minimal movement including getting in and out of bed or just turning in bed.  He notes an associated dry cough.  He has also had several instances where he is walking, gets short of breath, and has significant nausea and chest tightness; he denies williams chest pain, palpitations, or diaphoresis during these times.  He denies any loss of consciousness or falls. These episodes and his shortness of breath usually resolve with a few minutes of rest.  He notes no wheezing, orthopnea, PND.  He is compliant with his CPAP.  He has also noticed increased swelling and onset of weeping of his lower extremities, this started about a week ago, as well increased abdominal girth as well.  He notes no loss of appetite.  He has attempted to adhere to a low-salt and fluid restriction diet.  He is only taking 50 mg of Aldactone and 40 mg of Lasix daily; he is short  handed at work, having to take tasks on himself, and having to urinate frequently because of his diuretic regimen has not been easy.  Of note, patient has been undergoing outpatient workup of pulmonary hypertension; He is supposed to get a right heart catheterization in outpatient setting however he states he cannot afford the co-pay of $2000.     _______________________________    Date of Service: 8/1/22        The patient location is: HOME  The chief complaint leading to consultation is: routine care for evaluation and management of chronic medial issues and medication review.     Visit type: Virtual visit with audio only (telephone)     The reason for the audio only service rather than synchronous audio and video virtual visit was related to technical difficulties or patient preference/necessity.     Each patient to whom I provide medical services by telemedicine is:  (1) informed of the relationship between the physician and patient and the respective role of any other health care provider with respect to management of the patient; and (2) notified that they may decline to receive medical services by telemedicine and may withdraw from such care at any time. Patient verbally consented to receive this service via voice-only telephone call.     Subjective:   Today:  Mr. Ashwin Peter is a 56 y.o. male being evaluated by telephone today for  transitional care visit to the home environment post-discharge from inpatient hospitalization encounter described above. Patient presents at baseline state of health as reported by patient and caregiver. Denies any acute issues, concerns or complaints to address on today's visit. Reports taking all medications as prescribed. No other needs identified at this time. Risks of environmental exposure to coronavirus discussed including: social distancing, hand hygiene, and limiting departures from the home for necessities only. Reports understanding and willingness to  comevaluation and management of chronic medical issues and medication review. Patient denies any chest pain, SOB, nausea, vomiting, diarrhea, constipation, fever, chills, cough, known COVID exposure or illness. Reports taking all medications as prescribed. No other acute needs identified at this time. The patient is provided contact information to call to discuss any presenting concerns, questions or complaints until our next visit. Refills for all maintenance medications are confirmed on file at the patient's pharrmacy of choice.     OBJECTIVE:   Vital Signs: None Taken for this visit      Physical Exam   Laboratory  Lab Results   Component Value Date    WBC 8.29 07/24/2022    HGB 11.9 (L) 07/24/2022    HCT 37.0 (L) 07/24/2022    MCV 91 07/24/2022    PLT 98 (L) 07/24/2022     Lab Results   Component Value Date    INR 1.3 (H) 07/25/2022    INR 1.3 (H) 07/24/2022    INR 1.4 (H) 07/23/2022     Lab Results   Component Value Date    HGBA1C 4.3 12/02/2020     No results for input(s): POCTGLUCOSE in the last 72 hours.    TRANSITION OF CARE:     Family and/or Caretaker present at visit?  No.  Diagnostic tests reviewed/disposition: No diagnosic tests pending after this hospitalization.  Disease/illness education: Importance of Compliance with all prescribed medications and treatments, COVID Precautions/Social Distancing/Mask Use  Home health/community services discussion/referrals: Patient does not have home health established from hospital visit.  They do not need home health.  If needed, we will set up home health for the patient.   Establishment or re-establishment of referral orders for community resources: No other necessary community resources.   Discussion with other health care providers: No discussion with other health care providers necessary.     Transition of Care Visit:  I have reviewed and updated the history and problem list. I have reconciled the medication list. I have discussed the hospitalization and  current medical issues, prognosis and plans with the patient/family.     Medications Reconciliation:   I have reconciled the patient's home medications and discharge medications with the patient/family. I have updated all changes. Refer to After-Visit Medication List.    Discharge plans, follow-up instructions, future appointments, provider contact information, indicators to seek emergency treatment and encouragement to call for any questions, concerns or clarification of the patient's plan of care explained to patient and/or caregiver(s), whom confirm understanding of provided information and endorse willingness to comply.     Assessment:   Problem List Items Addressed on this Visit:  No diagnosis found.  Plan:   No diagnosis found.      CHF  The current medical regimen is effective;  continue present plan and medications.     - Cards follow up outpatient    Patient Instructions Given:  - Continue all medications, treatments and therapies as ordered.   - Follow all instructions, recommendations as discussed.  - Maintain Safety Precautions at all times.  - Attend all medical appointments as scheduled.  - For worsening symptoms: call Primary Care Physician or Nurse Practitioner.  - For emergencies, call 911 or immediately report to the nearest emergency room.    After Visit Medication List :     Medication List          Accurate as of August 1, 2022  9:20 AM. If you have any questions, ask your nurse or doctor.            CHANGE how you take these medications    mupirocin 2 % ointment  Commonly known as: BACTROBAN  Apply topically 3 (three) times daily.  What changed:   · when to take this  · reasons to take this        CONTINUE taking these medications    acetaminophen 500 MG tablet  Commonly known as: TYLENOL     albuterol 90 mcg/actuation inhaler  Commonly known as: PROVENTIL/VENTOLIN HFA     diclofenac sodium 1 % Gel  Commonly known as: VOLTAREN     furosemide 40 MG tablet  Commonly known as: LASIX  Take 1 tablet  (40 mg total) by mouth 2 (two) times a day.     hydrOXYzine HCL 25 MG tablet  Commonly known as: ATARAX  Take 1 tablet (25 mg total) by mouth 3 (three) times daily as needed for Anxiety.     loratadine 10 mg tablet  Commonly known as: CLARITIN  Take 1 tablet (10 mg total) by mouth once daily.     omeprazole 20 MG capsule  Commonly known as: PRILOSEC  Take 1 capsule (20 mg total) by mouth once daily.     predniSONE 10 MG tablet  Commonly known as: DELTASONE  Take 1 tablet (10 mg total) by mouth once daily.     SINEX REGULAR NASL     spironolactone 100 MG tablet  Commonly known as: ALDACTONE  Take 1 tablet (100 mg total) by mouth once daily. DOSE CHANGE          Future Appointments   Date Time Provider Department [place]   8/4/2022 10:00 AM Sergio Guo III, MD [facility]   8/8/2022  9:20 AM Estefanía Baumann MD [facility] CARDIO [facility]   8/8/2022  2:00 PM LAB, APPOINTMENT [facility] LAB VNP [facility]   8/8/2022  2:30 PM Kailee Tejeda PA-C [facility]   8/8/2022  2:30 PM [facility] HEART FAILURE NURSE [facility]   8/30/2022  9:30 AM Raoul Rae MD [facility] PULMSVC [facility]     Risks of environmental exposure to coronavirus discussed including: social distancing, hand hygiene, and limiting departures from the home for necessities only. Reports understanding and willingness to comply.     Signature:    Cass Vicente, MSN, APRN, FNP-C  [facility]     Total time for this telephone encounter was 25 minutes. The following issues were discussed: primary and secondary diagnoses, co-morbidities, prescribed medications, treatment modalities, importance of compliance with medical advice and directives for follow-up care.    This service was not originating from a related E/M service provided within the previous 7 days nor will  to an E/M service or procedure within the next 24 hours or my soonest available appointment.  Prevailing standard of care was able to be met in this  audio-only visit.

## 2022-08-04 ENCOUNTER — PATIENT MESSAGE (OUTPATIENT)
Dept: INTERNAL MEDICINE | Facility: CLINIC | Age: 57
End: 2022-08-04

## 2022-08-04 ENCOUNTER — OFFICE VISIT (OUTPATIENT)
Dept: INTERNAL MEDICINE | Facility: CLINIC | Age: 57
End: 2022-08-04
Payer: OTHER GOVERNMENT

## 2022-08-04 VITALS — DIASTOLIC BLOOD PRESSURE: 66 MMHG | SYSTOLIC BLOOD PRESSURE: 100 MMHG | OXYGEN SATURATION: 96 % | HEART RATE: 77 BPM

## 2022-08-04 DIAGNOSIS — D69.6 THROMBOCYTOPENIA: ICD-10-CM

## 2022-08-04 DIAGNOSIS — R60.0 BILATERAL LOWER EXTREMITY EDEMA: ICD-10-CM

## 2022-08-04 DIAGNOSIS — G47.33 OSA ON CPAP: ICD-10-CM

## 2022-08-04 DIAGNOSIS — D86.2 SARCOIDOSIS OF LUNG WITH SARCOIDOSIS OF LYMPH NODES: Primary | ICD-10-CM

## 2022-08-04 DIAGNOSIS — I27.20 PULMONARY HYPERTENSION: ICD-10-CM

## 2022-08-04 DIAGNOSIS — I50.33 ACUTE ON CHRONIC DIASTOLIC HEART FAILURE: ICD-10-CM

## 2022-08-04 DIAGNOSIS — E66.01 CLASS 3 SEVERE OBESITY DUE TO EXCESS CALORIES WITH SERIOUS COMORBIDITY AND BODY MASS INDEX (BMI) OF 45.0 TO 49.9 IN ADULT: ICD-10-CM

## 2022-08-04 DIAGNOSIS — R16.1 SPLENOMEGALY: ICD-10-CM

## 2022-08-04 DIAGNOSIS — K76.6 PORTAL HYPERTENSION: ICD-10-CM

## 2022-08-04 DIAGNOSIS — K74.69 OTHER CIRRHOSIS OF LIVER: ICD-10-CM

## 2022-08-04 PROCEDURE — 99495 TRANSJ CARE MGMT MOD F2F 14D: CPT | Mod: S$GLB,,, | Performed by: INTERNAL MEDICINE

## 2022-08-04 PROCEDURE — 99495 TCM SERVICES (MODERATE COMPLEXITY): ICD-10-PCS | Mod: S$GLB,,, | Performed by: INTERNAL MEDICINE

## 2022-08-04 PROCEDURE — 99999 PR PBB SHADOW E&M-EST. PATIENT-LVL IV: CPT | Mod: PBBFAC,,, | Performed by: INTERNAL MEDICINE

## 2022-08-04 PROCEDURE — 99999 PR PBB SHADOW E&M-EST. PATIENT-LVL IV: ICD-10-PCS | Mod: PBBFAC,,, | Performed by: INTERNAL MEDICINE

## 2022-08-04 RX ORDER — SPIRONOLACTONE 100 MG/1
100 TABLET, FILM COATED ORAL DAILY
Qty: 90 TABLET | Refills: 11
Start: 2022-08-04 | End: 2022-08-30

## 2022-08-04 NOTE — PATIENT INSTRUCTIONS
We were happy to see you today          Please return to clinic in    Follow up for 4 months Office Visit no juan luis .        Extra resources          Please see the list of foods high in potassium     Food Sources  Potassium is widely available in many foods, especially fruits and vegetables. Leafy greens, beans, nuts, dairy foods, and starchy vegetables like winter squash are rich sources.    Dried fruits (raisins, apricots)  Beans, lentils  Potatoes  Winter squash (acorn, butternut)  Spinach, broccoli  Beet greens  Avocado  Bananas  Cantaloupe  Oranges, orange juice  Coconut water  Tomatoes  Dairy and plant milks (soy, almond)  Yogurt  Cashews, almonds  Chicken  [place]

## 2022-08-04 NOTE — PROGRESS NOTES
Assessment:       1. Sarcoidosis of lung with sarcoidosis of lymph nodes    2. Splenomegaly    3. Other cirrhosis of liver    4. Thrombocytopenia    5. Portal hypertension    6. LAW on CPAP    7. Acute on chronic diastolic heart failure    8. Pulmonary hypertension    9. Bilateral lower extremity edema    10. Class 3 severe obesity due to excess calories with serious comorbidity and body mass index (BMI) of 45.0 to 49.9 in adult        Plan:         Ashwin was seen today for hospital follow up.    Diagnoses and all orders for this visit:    Sarcoidosis of lung with sarcoidosis of lymph nodes    Splenomegaly    Other cirrhosis of liver    Thrombocytopenia    Portal hypertension    LAW on CPAP    Acute on chronic diastolic heart failure    Pulmonary hypertension    Bilateral lower extremity edema  -     spironolactone (ALDACTONE) 100 MG tablet; Take 1 tablet (100 mg total) by mouth once daily. DOSE CHANGE    Class 3 severe obesity due to excess calories with serious comorbidity and body mass index (BMI) of 45.0 to 49.9 in adult          Subjective:       Patient ID: Ashwin Peter is a 56 y.o. male.    Chief Complaint: Hospital Follow Up    Hospital follow up           Hospital Discharge Date: 7/26/22  Hospital Follow up within two days of discharge: yes      Family and/or Caretaker present at visit? no  Diagnostic tests reviewed/disposition: No diagnosic tests pending after this hospitalization.  Disease/illness education: yes  Home health/community services discussion/referrals: Patient does not have home health established from hospital visit.  They do not need home health.  If needed, we will set up home health for the patient.   Establishment or re-establishment of referral orders for community resources: No other necessary community resources.   Discussion with other health care providers: No discussion with other health care providers necessary.       Admin/Discharge:   Patient Class: IP-  Inpatient  Admission Date: 2022  Hospital Length of Stay: 3 days  Discharge Date and Time:  2022 1:29 PM    Reason for admission:   ADHF  Brief History:   progressive shortness of breath and bilateral lower extremity edema.      Pertinent Lab:    BNP elevated to 153    Pertinent Imaging;  CXR notable for central pulmonary vascular congestion    Discharge Plan;   Fu pcp, cards      Discharge pertinent meds:    START taking these medications    predniSONE 10 MG tablet  Commonly known as: DELTASONE  Take 1 tablet (10 mg total) by mouth once daily.  Start taking on: 2022          CHANGE how you take these medications    furosemide 40 MG tablet  Commonly known as: LASIX  Take 1 tablet (40 mg total) by mouth 2 (two) times a day.  What changed:   · how much to take  · when to take this      mupirocin 2 % ointment  Commonly known as: BACTROBAN  Apply topically 3 (three) times daily.  What changed:   · when to take this  · reasons to take this          CONTINUE taking these medications    acetaminophen 500 MG tablet  Commonly known as: TYLENOL  Take 3 tablets by mouth daily as needed for knee pain or headache      albuterol 90 mcg/actuation inhaler  Commonly known as: PROVENTIL/VENTOLIN HFA  SMARTSI-2 Puff(s) By Mouth Every 4-6 Hours PRN      diclofenac sodium 1 % Gel  Commonly known as: VOLTAREN  Apply topically twice daily as needed for knee pain      hydrOXYzine HCL 25 MG tablet  Commonly known as: ATARAX  Take 1 tablet (25 mg total) by mouth 3 (three) times daily as needed for Anxiety.      loratadine 10 mg tablet  Commonly known as: CLARITIN  Take 1 tablet (10 mg total) by mouth once daily.      omeprazole 20 MG capsule  Commonly known as: PRILOSEC  Take 1 capsule (20 mg total) by mouth once daily.      SINEX REGULAR NASL  2 sprays by Each Nostril route once daily.      spironolactone 100 MG tablet  Commonly known as: ALDACTONE  Take 1 tablet (100 mg total) by mouth once daily. DOSE CHANGE           STOP taking these medications    LIDOcaine VISCOUS 2 % Soln  Generic drug: LIDOcaine HCl 2%      pantoprazole 40 MG tablet  Commonly known as: PROTONIX            Labs/Imaging pending at the time of discharged:   None    Since Discharge:   Feeling much better, breathing better and swelling is down . Still has leg cramps       HPI    Review of Systems   All other systems reviewed and are negative.            Health Maintenance Due   Topic Date Due    Shingles Vaccine (1 of 2) Never done    COVID-19 Vaccine (3 - Booster) 07/02/2021         Objective:     /66 (BP Location: Right arm, Patient Position: Sitting, BP Method: Large (Manual))   Pulse 77   SpO2 96%     Vitals 7/24/2022 7/24/2022 7/23/2022 7/22/2022 6/15/2022   Height 71 - - 71 71   Weight (lbs) 345 345.02 357.81 370 383.6   BMI (kg/m2) 48.1 48.1 49.9 51.6 53.5                Physical Exam  Nursing note reviewed.   Constitutional:       General: He is not in acute distress.     Appearance: Normal appearance. He is not ill-appearing, toxic-appearing or diaphoretic.   HENT:      Head: Normocephalic.   Eyes:      Conjunctiva/sclera: Conjunctivae normal.   Cardiovascular:      Rate and Rhythm: Normal rate.      Heart sounds: No murmur heard.    No friction rub.   Pulmonary:      Effort: Pulmonary effort is normal. No respiratory distress.   Musculoskeletal:      Right lower leg: Edema present.      Left lower leg: Edema present.   Neurological:      General: No focal deficit present.      Mental Status: He is alert and oriented to person, place, and time.   Psychiatric:         Mood and Affect: Mood normal.         Behavior: Behavior normal.         Thought Content: Thought content normal.         Judgment: Judgment normal.             Future Appointments   Date Time Provider Department Center   8/8/2022  9:20 AM Estefanía Baumann MD [facility] CARDIO [address]   8/8/2022  2:00 PM LAB, APPOINTMENT [facility] LAB VNP [facility]   8/8/2022  2:30 PM Kailee  NATY Tejeda [facility]C [facility]   2022  2:30 PM [facility] HEART FAILURE NURSE [facility]   2022  9:30 AM Raoul Rae MD [facility] PULMSVC [facility]   2022  2:20 PM Sergio Guo III, MD [facility]         Medication List with Changes/Refills   Current Medications    ACETAMINOPHEN (TYLENOL) 500 MG TABLET    Take 3 tablets by mouth daily as needed for knee pain or headache    ALBUTEROL (PROVENTIL/VENTOLIN HFA) 90 MCG/ACTUATION INHALER    SMARTSI-2 Puff(s) By Mouth Every 4-6 Hours PRN    DICLOFENAC SODIUM (VOLTAREN) 1 % GEL    Apply topically twice daily as needed for knee pain    FUROSEMIDE (LASIX) 40 MG TABLET    Take 1 tablet (40 mg total) by mouth 2 (two) times a day.    HYDROXYZINE HCL (ATARAX) 25 MG TABLET    Take 1 tablet (25 mg total) by mouth 3 (three) times daily as needed for Anxiety.    PHENYLEPHRINE HCL (SINEX REGULAR NASL)    2 sprays by Each Nostril route once daily.    PREDNISONE (DELTASONE) 10 MG TABLET    Take 1 tablet (10 mg total) by mouth once daily.   Changed and/or Refilled Medications    Modified Medication Previous Medication    SPIRONOLACTONE (ALDACTONE) 100 MG TABLET spironolactone (ALDACTONE) 100 MG tablet       Take 1 tablet (100 mg total) by mouth once daily. DOSE CHANGE    Take 1 tablet (100 mg total) by mouth once daily. DOSE CHANGE   Discontinued Medications    LORATADINE (CLARITIN) 10 MG TABLET    Take 1 tablet (10 mg total) by mouth once daily.    MUPIROCIN (BACTROBAN) 2 % OINTMENT    Apply topically 3 (three) times daily.    OMEPRAZOLE (PRILOSEC) 20 MG CAPSULE    Take 1 capsule (20 mg total) by mouth once daily.         Disclaimer:  This note has been generated using voice-recognition software. There may be grammatical or spelling errors that have been missed during proof-reading

## 2022-08-05 ENCOUNTER — TELEPHONE (OUTPATIENT)
Dept: CARDIOLOGY | Facility: CLINIC | Age: 57
End: 2022-08-05
Payer: OTHER GOVERNMENT

## 2022-08-05 NOTE — TELEPHONE ENCOUNTER
PT called to reschedule appt with [facility] next week as he already has cardiology follow up on same day.   Pt reports he is unable to make frwquent appts as he is unable to take off of work.  After lengthy planning conversation with pt, pt would like to see cardiology 8/8/22 and [facility] 8/30/22.  Plan will be for pt to call if he needs sooner appt.  Will complete weekly check ins.  Denies interest in virtual appt.     Pt  encouraged to call [facility] navigator at [phone] with any questions problems or concerns.  Pt  verbalized understanding and in agreement of plan.

## 2022-08-08 ENCOUNTER — LAB VISIT (OUTPATIENT)
Dept: LAB | Facility: HOSPITAL | Age: 57
End: 2022-08-08
Payer: OTHER GOVERNMENT

## 2022-08-08 ENCOUNTER — TELEPHONE (OUTPATIENT)
Dept: CARDIOLOGY | Facility: HOSPITAL | Age: 57
End: 2022-08-08
Payer: OTHER GOVERNMENT

## 2022-08-08 ENCOUNTER — OFFICE VISIT (OUTPATIENT)
Dept: CARDIOLOGY | Facility: CLINIC | Age: 57
End: 2022-08-08
Payer: OTHER GOVERNMENT

## 2022-08-08 VITALS
WEIGHT: 315 LBS | OXYGEN SATURATION: 96 % | BODY MASS INDEX: 44.1 KG/M2 | HEIGHT: 71 IN | HEART RATE: 82 BPM | SYSTOLIC BLOOD PRESSURE: 113 MMHG | DIASTOLIC BLOOD PRESSURE: 53 MMHG

## 2022-08-08 DIAGNOSIS — K76.6 PORTAL HYPERTENSION: ICD-10-CM

## 2022-08-08 DIAGNOSIS — K74.60 LIVER CIRRHOSIS SECONDARY TO NASH: ICD-10-CM

## 2022-08-08 DIAGNOSIS — I50.33 ACUTE ON CHRONIC DIASTOLIC HEART FAILURE: Primary | ICD-10-CM

## 2022-08-08 DIAGNOSIS — G47.33 OSA ON CPAP: ICD-10-CM

## 2022-08-08 DIAGNOSIS — E87.70 HYPERVOLEMIA, UNSPECIFIED HYPERVOLEMIA TYPE: ICD-10-CM

## 2022-08-08 DIAGNOSIS — R60.0 LEG EDEMA: ICD-10-CM

## 2022-08-08 DIAGNOSIS — I50.32 CHRONIC DIASTOLIC HEART FAILURE: ICD-10-CM

## 2022-08-08 DIAGNOSIS — I27.20 PULMONARY HYPERTENSION: ICD-10-CM

## 2022-08-08 DIAGNOSIS — R06.02 SHORTNESS OF BREATH: ICD-10-CM

## 2022-08-08 DIAGNOSIS — I50.33 ACUTE ON CHRONIC DIASTOLIC HEART FAILURE: ICD-10-CM

## 2022-08-08 DIAGNOSIS — K75.81 LIVER CIRRHOSIS SECONDARY TO NASH: ICD-10-CM

## 2022-08-08 DIAGNOSIS — R09.02 HYPOXIA: ICD-10-CM

## 2022-08-08 LAB
ANION GAP SERPL CALC-SCNC: 7 MMOL/L (ref 8–16)
BNP SERPL-MCNC: 21 PG/ML (ref 0–99)
BUN SERPL-MCNC: 27 MG/DL (ref 6–20)
CALCIUM SERPL-MCNC: 8.9 MG/DL (ref 8.7–10.5)
CHLORIDE SERPL-SCNC: 99 MMOL/L (ref 95–110)
CO2 SERPL-SCNC: 25 MMOL/L (ref 23–29)
CREAT SERPL-MCNC: 1.3 MG/DL (ref 0.5–1.4)
EST. GFR  (NO RACE VARIABLE): >60 ML/MIN/1.73 M^2
GLUCOSE SERPL-MCNC: 89 MG/DL (ref 70–110)
MAGNESIUM SERPL-MCNC: 1.8 MG/DL (ref 1.6–2.6)
PHOSPHATE SERPL-MCNC: 2.8 MG/DL (ref 2.7–4.5)
POTASSIUM SERPL-SCNC: 4.8 MMOL/L (ref 3.5–5.1)
SODIUM SERPL-SCNC: 131 MMOL/L (ref 136–145)

## 2022-08-08 PROCEDURE — 83735 ASSAY OF MAGNESIUM: CPT | Performed by: STUDENT IN AN ORGANIZED HEALTH CARE EDUCATION/TRAINING PROGRAM

## 2022-08-08 PROCEDURE — 99214 OFFICE O/P EST MOD 30 MIN: CPT | Mod: S$GLB,,, | Performed by: STUDENT IN AN ORGANIZED HEALTH CARE EDUCATION/TRAINING PROGRAM

## 2022-08-08 PROCEDURE — 84100 ASSAY OF PHOSPHORUS: CPT | Performed by: STUDENT IN AN ORGANIZED HEALTH CARE EDUCATION/TRAINING PROGRAM

## 2022-08-08 PROCEDURE — 80048 BASIC METABOLIC PNL TOTAL CA: CPT | Performed by: STUDENT IN AN ORGANIZED HEALTH CARE EDUCATION/TRAINING PROGRAM

## 2022-08-08 PROCEDURE — 99999 PR PBB SHADOW E&M-EST. PATIENT-LVL IV: CPT | Mod: PBBFAC,,, | Performed by: STUDENT IN AN ORGANIZED HEALTH CARE EDUCATION/TRAINING PROGRAM

## 2022-08-08 PROCEDURE — 99999 PR PBB SHADOW E&M-EST. PATIENT-LVL IV: ICD-10-PCS | Mod: PBBFAC,,, | Performed by: STUDENT IN AN ORGANIZED HEALTH CARE EDUCATION/TRAINING PROGRAM

## 2022-08-08 PROCEDURE — 36415 COLL VENOUS BLD VENIPUNCTURE: CPT | Performed by: STUDENT IN AN ORGANIZED HEALTH CARE EDUCATION/TRAINING PROGRAM

## 2022-08-08 PROCEDURE — 99214 PR OFFICE/OUTPT VISIT, EST, LEVL IV, 30-39 MIN: ICD-10-PCS | Mod: S$GLB,,, | Performed by: STUDENT IN AN ORGANIZED HEALTH CARE EDUCATION/TRAINING PROGRAM

## 2022-08-08 PROCEDURE — 83880 ASSAY OF NATRIURETIC PEPTIDE: CPT | Performed by: STUDENT IN AN ORGANIZED HEALTH CARE EDUCATION/TRAINING PROGRAM

## 2022-08-08 RX ORDER — TORSEMIDE 20 MG/1
40 TABLET ORAL DAILY
Qty: 60 TABLET | Refills: 11 | Status: SHIPPED | OUTPATIENT
Start: 2022-08-08 | End: 2022-08-30

## 2022-08-08 RX ORDER — DAPAGLIFLOZIN 10 MG/1
10 TABLET, FILM COATED ORAL DAILY
Qty: 90 TABLET | Refills: 3 | Status: ON HOLD | OUTPATIENT
Start: 2022-08-08 | End: 2024-01-09

## 2022-08-08 RX ORDER — TORSEMIDE 20 MG/1
20 TABLET ORAL DAILY
Qty: 30 TABLET | Refills: 11 | Status: SHIPPED | OUTPATIENT
Start: 2022-08-08 | End: 2022-08-08

## 2022-08-08 NOTE — TELEPHONE ENCOUNTER
Labs reviewed, will switch to Torsemide 40mg daily with 20mg prn for weight gain. Given diuretic effect, will begin Farxega 10mg daily for HFpEF indications and send to speciality pharmacy.     Estefanía Baumann, PGY6  Cardiovascular Disease  [facility]

## 2022-08-08 NOTE — PROGRESS NOTES
A/ Plan:   #HfpEF  - labwork today   - if above okay will plan to switch to torsemide 60mg daily and begin farxega 10mg daily  - once euvolemic, plan for CAD evaluation and repeat RVSP assessment with DSE    #Suspected pHTN  - no formal diagnosis with previous RHC  - Suspect if pHTN truly present,  likelymultifactorial due to mixed WHO group III (LAW> Sarcoid) and II (Elevated LA pressures due to longstanding HtN)  -RHC referral once euvolemic (also pending SW visit as could not afford copay last time)  - [facility] would not provided functional assay of intracardiac pressures and thus can not provide diagnosis of pHTN    #Pulmonary sarcoidosis  - if present, cardiac manifestations are restrictive physiology, arrhythmias or high degree AV block, aneurysms and non coronary RWMA  - if concern for cardiac involvement would evaluate with [facility] (discussed with patient but due to concern of cost would like to hold off for now and revisit at later time)    RTC 3 mos with labwork.    Estefanía Baumann, PGY6  Cardiovascular Disease  [facility]    Subjective:     HPI:   Ahswin Mueller Peter 56yr male who presents as new patient for recent hospitalization with following problem list:    ·  GUAJARDO cirrhosis  · Obesity  · Pulmonary sarcoidosis  · LAW on CPAP  · HFpEF      Inpatient course was notable for significant diuresis of 21 L after questionable adherence to prescribed diuretics due to ployuria interfering with job requirements. Inpatient TTE once euvolemic notable for preserved EF and Mild RV enlargement and mildy reduced systolic function and indeterminate diastolic dysfunction w/ PASP 51. Inpatient pulm consulted in regards to sarcoidosis and concern of pHTN with overall assessment that his HF findings are out of proportion to severity of pulmonary involvement. He was planning to get outpatient RHC but could not afford co pay and was referred to social work at last pcp visit. Since being discharged  two weeks ago, he  has gained 4 lbs ( currently 329 lbs (unknown dry weight but estimated at 322- 325 lbs) on lasix 40mg BID. He is trying to reduce portion sizes and limit fluid/Na intake but admits it has been difficult. He currently has NYHA Class III symptoms which has been stable since discharge. He complains of frequent leg cramping with lasix but is taking as prescribed.       Cardiac Studies:  TTE 7/24/22  · The left ventricle is normal in size with normal systolic function.  · The estimated ejection fraction is 65%.  · Indeterminate left ventricular diastolic function.  · Moderate right ventricular enlargement with mildly to moderately reduced right ventricular systolic function.  · The estimated PA systolic pressure is 51 mmHg.  · Intermediate central venous pressure (8 mmHg).  · There is pulmonary hypertension.  · Moderate left atrial enlargement.      ROS:  Constitution: Negative for fever, chills, weight loss or gain.   HENT: Negative for sore throat, rhinorrhea, or headache.  Eyes: Negative for blurred or double vision.   Cardiovascular: - for Exertional chest pain  Pulmonary: - for Dyspnea on exertion  Gastrointestinal: Negative for abdominal pain, nausea, vomiting, or diarrhea. Negative for melena/hematochezia.  : Negative for dysuria.   Neurological: Negative for focal weakness or sensory changes.  Skin: No bleeding or bruising      Past Medical History:   Diagnosis Date    Anxiety     Hyperlipidemia     Morbid obesity with BMI of 45.0-49.9, adult     Multiple pulmonary nodules     Obesity     Other cirrhosis of liver 1/13/2021    Portal hypertension 2/11/2021    Sarcoidosis of lung with sarcoidosis of lymph nodes     Splenomegaly 12/22/2020    Thrombocytopenia 12/22/2020       Past Surgical History:   Procedure Laterality Date    ANTERIOR CRUCIATE LIGAMENT REPAIR  2007    right knee    COLONOSCOPY N/A 4/8/2021    Procedure: COLONOSCOPY;  Surgeon: Jeff Olvera MD;  Location: [facility] (2ND FLR);   Service: Endoscopy;  Laterality: N/A;  rectal bleeding,   2nd floor BMI 50 (354 lbs)  COVID test on 4/5/21 at [facility]    ESOPHAGOGASTRODUODENOSCOPY N/A 4/8/2021    Procedure: EGD (ESOPHAGOGASTRODUODENOSCOPY);  Surgeon: Jeff Olvera MD;  Location: [facility] ENDO ([facility] FLR);  Service: Endoscopy;  Laterality: N/A;  variceal screening/ labs the am of procedure  2nd floor BMI 50 (354 lbs)    ESOPHAGOGASTRODUODENOSCOPY N/A 3/31/2022    Procedure: EGD (ESOPHAGOGASTRODUODENOSCOPY);  Surgeon: Jeff Olvera MD;  [facility]  Service: Endoscopy;  Laterality: N/A;  BMI-52    Wt:375#      cirrhosis, variceal screening-labs done on 3/29-GT  vaccinated-GT    LYMPHADENECTOMY  1985    MENISCECTOMY      left knee    NASAL SEPTUM SURGERY  1985    WISDOM TOOTH EXTRACTION         Social History     Tobacco Use    Smoking status: Never Smoker    Smokeless tobacco: Never Used   Substance Use Topics    Alcohol use: No    Drug use: No       Family History   Problem Relation Age of Onset    Hypertension Father     Heart attack Father 55    Diabetes Paternal Grandmother     Aneurysm Mother         eye    Dementia Mother     Colon cancer Neg Hx     Prostate cancer Neg Hx     Sarcoidosis Neg Hx        Current Outpatient Medications   Medication Sig    acetaminophen (TYLENOL) 500 MG tablet Take 3 tablets by mouth daily as needed for knee pain or headache    diclofenac sodium (VOLTAREN) 1 % Gel Apply topically twice daily as needed for knee pain    furosemide (LASIX) 40 MG tablet Take 1 tablet (40 mg total) by mouth 2 (two) times a day.    hydrOXYzine HCL (ATARAX) 25 MG tablet Take 1 tablet (25 mg total) by mouth 3 (three) times daily as needed for Anxiety.    phenylephrine HCl (SINEX REGULAR NASL) 2 sprays by Each Nostril route once daily.    predniSONE (DELTASONE) 10 MG tablet Take 1 tablet (10 mg total) by mouth once daily.    spironolactone (ALDACTONE) 100 MG tablet Take 1 tablet (100 mg total)  by mouth once daily. DOSE CHANGE    albuterol (PROVENTIL/VENTOLIN HFA) 90 mcg/actuation inhaler SMARTSI-2 Puff(s) By Mouth Every 4-6 Hours PRN     No current facility-administered medications for this visit.       Review of patient's allergies indicates:  No Known Allergies    Objective:   Vitals:  Vitals:    22 0930   BP: (!) 113/53   Pulse: 82         Physical Exam  Constitutional: No distress, obese, conversant  HEENT: Sclera anicteric, PERRLA, EOMI  Neck: + JVD, no masses, good movement  CV: RRR, S1 and S2 normal, no additional heart sounds or murmurs. Pulses 2+ and equal bilaterally in radial arteries, Son's normal on right. Distal pulses are 2+ and equal in the femoral, DP and PT areas bilaterally  Pulm: decreased breath sounds  GI: Abdomen soft, non-tender, good bowel sounds  Extremities: Both extremities intact and grossly normal, skin is warm, significant edema to proximal tibia.   Skin: No ecchymosis, erythema, or ulcers  Psych: AOx3, appropriate affect  Neuro: CNII-XII intact, no focal deficits        Chemistry        Component Value Date/Time     2022 0912    K 3.6 2022 0912     2022 0912    CO2 26 2022 0912    BUN 21 (H) 2022 0912    CREATININE 1.1 2022 0912     (H) 2022 0912        Component Value Date/Time    CALCIUM 8.5 (L) 2022 0912    ALKPHOS 96 2022 0527    AST 35 2022 0527    ALT 24 2022 0527    BILITOT 5.5 (H) 2022 0527    ESTGFRAFRICA >60.0 2022 0912    EGFRNONAA >60.0 2022 0912            Lab Results   Component Value Date    CHOL 165 2020    CHOL 246 (H) 2014    CHOL 239 (H) 10/03/2009     Lab Results   Component Value Date    HDL 50 2020    HDL 43 2014    HDL 34 (L) 10/03/2009     Lab Results   Component Value Date    LDLCALC 99.6 2020    LDLCALC 173.6 (H) 2014    LDLCALC 164.8 (H) 10/03/2009     Lab Results   Component Value Date    TRIG 77  12/02/2020    TRIG 147 07/14/2014    TRIG 201 (H) 10/03/2009     Lab Results   Component Value Date    CHOLHDL 30.3 12/02/2020    CHOLHDL 17.5 (L) 07/14/2014    CHOLHDL 14.2 (L) 10/03/2009         Lab Results   Component Value Date     07/25/2022    K 3.6 07/25/2022     07/25/2022    CO2 26 07/25/2022    BUN 21 (H) 07/25/2022    CREATININE 1.1 07/25/2022     (H) 07/25/2022    HGBA1C 4.3 12/02/2020    MG 1.9 07/24/2022    AST 35 07/24/2022    ALT 24 07/24/2022    ALBUMIN 2.2 (L) 07/25/2022    PROT 5.7 (L) 07/24/2022    BILITOT 5.5 (H) 07/24/2022    WBC 8.29 07/24/2022    HGB 11.9 (L) 07/24/2022    HCT 37.0 (L) 07/24/2022    MCV 91 07/24/2022    PLT 98 (L) 07/24/2022    INR 1.3 (H) 07/25/2022    PSA 0.62 07/14/2014    TSH 1.782 12/02/2020    CHOL 165 12/02/2020    HDL 50 12/02/2020    LDLCALC 99.6 12/02/2020    TRIG 77 12/02/2020

## 2022-08-08 NOTE — PROGRESS NOTES
I have personally taken the history and examined this patient and agree with the Fellow's note as stated above.    Says he's now much more careful with his diet - salt and portions.  If that's the case, then his 4 lb wt gain means he needs more diuretics/day - agree with rec

## 2022-08-09 ENCOUNTER — TELEPHONE (OUTPATIENT)
Dept: CARDIOLOGY | Facility: CLINIC | Age: 57
End: 2022-08-09
Payer: OTHER GOVERNMENT

## 2022-08-10 VITALS — BODY MASS INDEX: 44.1 KG/M2 | HEIGHT: 71 IN | WEIGHT: 315 LBS

## 2022-08-11 NOTE — PATIENT INSTRUCTIONS
Instructions:  - [facility]ition[facility] to schedule home follow-up visit with patient as needed.  - Continue all medications, treatments and therapies as ordered.   - Follow all instructions, recommendations as discussed.  - Maintain Safety Precautions at all times.  - Attend all medical appointments as scheduled.  - For worsening symptoms: call Primary Care Physician or Nurse Practitioner.  - For emergencies, call 911 or immediately report to the nearest emergency room.  - Limit Risks of environmental exposure to coronavirus/COVID-19 as discussed including: social distancing, hand hygiene, and limiting departures from the home for necessities only.

## 2022-08-12 ENCOUNTER — TELEPHONE (OUTPATIENT)
Dept: CARDIOLOGY | Facility: CLINIC | Age: 57
End: 2022-08-12
Payer: OTHER GOVERNMENT

## 2022-08-12 NOTE — TELEPHONE ENCOUNTER
"[facility] weekly phone follow up / triage call completed.     TCC RN Navigator spoke with patient    Current Patient reported weight: 326 lb     Pt reports the following:  []  Shortness of Breath with Activity  []  Shortness of Breath at rest   []  Fatigue  []  Edema   [] Chest pain or tightness  [] Weight Increase since discharge  [] None of the above  States swelling is down and fatigue due to working outside    Pt reports using "Daily weight and symptom tracker".     Pt reports being in the green (color) Zone. If in yellow/red, reminded that they should be calling [facility] today or now.     Medications:    Medication compliance reviewed with pt.  Pt reports having medication list available and has all medications at home for use per list.     Education:   Confirmed pt still has "[facility] Home Care Guide"  .     Reminded of key points as listed below.      Recommend 2 -3 gram sodium restriction and 1500 cc-2000 cc fluid restriction.   Encourage physical activity with graded exercise program.   Requested patient to weigh themselves daily, and to notify us if their weight increases by more than 3 lbs in 1 day or 5 lbs in 3 days.   o Reminded to use "Daily weight and symptom tracker".  Even if pt does not have a scale, to use symptom tracker.       Watch for these Signs and Symptoms: If any of these occur, contact H[facility] immediately:   Increase in shortness of breath with movement   Increase in swelling in your legs and ankles   Weight gain of more than 3 pounds in a day or 5 pounds in 3 days.   Difficulty breathing when you are lying down   Worsening fatigue or tiredness   Stomach bloating, a full feeling or a loss of appetite   Increased coughing--especially when you are lying down      Pt was able to verbalize back to RN in their own words correct diet/fluid restrictions, necessity for exercise, warning signs and symptoms, when and how to " contact their [facility] team.      Pt reminded of upcoming appointment.  PT reports they will attend. Appointments for pulmonary and Heart failure on 8/30/22      Pt reminded of how and when to contact [facility]:  [phone] (Mon-Fri, 8a-5p) & for urgent issues on the weekend to page the Heart Transplant MD on call.  Pt also encouraged utilize [facility] messaging as well.      Pt  verbalized understanding and in agreement of plan.       Will follow up with pt at next clinic visit and RN navigator available for pt questions, issues or concerns.

## 2022-08-16 ENCOUNTER — TELEPHONE (OUTPATIENT)
Dept: CARDIOLOGY | Facility: CLINIC | Age: 57
End: 2022-08-16
Payer: OTHER GOVERNMENT

## 2022-08-19 ENCOUNTER — TELEPHONE (OUTPATIENT)
Dept: CARDIOLOGY | Facility: CLINIC | Age: 57
End: 2022-08-19
Payer: OTHER GOVERNMENT

## 2022-08-23 ENCOUNTER — TELEPHONE (OUTPATIENT)
Dept: CARDIOLOGY | Facility: CLINIC | Age: 57
End: 2022-08-23
Payer: OTHER GOVERNMENT

## 2022-08-23 NOTE — TELEPHONE ENCOUNTER
"[facility]) weekly phone follow up / triage call completed.     TCC RN Navigator spoke with patient    Current Patient reported weight: 329 lb     Pt reports the following:  []  Shortness of Breath with Activity  []  Shortness of Breath at rest   []  Fatigue  []  Edema   [] Chest pain or tightness  [] Weight Increase since discharge  [x] None of the above    Pt reports using "Daily weight and symptom tracker".     Pt reports being in the green(color) Zone. If in yellow/red, reminded that they should be calling [facility] today or now.     Medications:    Medication compliance reviewed with pt.  Pt reports having medication list available and has all medications at home for use per list.     Education:   Confirmed pt still has "[facility] Home Care Guide"  .     Reminded of key points as listed below.      Recommend 2 -3 gram sodium restriction and 1500 cc-2000 cc fluid restriction.   Encourage physical activity with graded exercise program.   Requested patient to weigh themselves daily, and to notify us if their weight increases by more than 3 lbs in 1 day or 5 lbs in 3 days.   o Reminded to use "Daily weight and symptom tracker".  Even if pt does not have a scale, to use symptom tracker.   States since he works outside, is limiting fluid intake to 1800-2000cc due to heat    Watch for these Signs and Symptoms: If any of these occur, contact [facility] immediately:   Increase in shortness of breath with movement   Increase in swelling in your legs and ankles   Weight gain of more than 3 pounds in a day or 5 pounds in 3 days.   Difficulty breathing when you are lying down   Worsening fatigue or tiredness   Stomach bloating, a full feeling or a loss of appetite   Increased coughing--especially when you are lying down      Pt was able to verbalize back to RN in their own words correct diet/fluid restrictions, necessity for exercise, warning signs and " symptoms, when and how to contact their [facility] team.      Pt reminded of upcoming appointment.  PT reports they will attend.       Pt reminded of how and when to contact [facility]:  [phone] (Mon-Fri, 8a-5p) & for urgent issues on the weekend to page the Heart Transplant MD on call.  Pt also encouraged utilize [facility] messaging as well.      Pt  verbalized understanding and in agreement of plan.       Will follow up with pt at next clinic visit and RN navigator available for pt questions, issues or concerns.

## 2022-08-28 ENCOUNTER — PATIENT MESSAGE (OUTPATIENT)
Dept: HEPATOLOGY | Facility: CLINIC | Age: 57
End: 2022-08-28
Payer: OTHER GOVERNMENT

## 2022-08-29 ENCOUNTER — TELEPHONE (OUTPATIENT)
Dept: HEPATOLOGY | Facility: CLINIC | Age: 57
End: 2022-08-29
Payer: OTHER GOVERNMENT

## 2022-08-29 NOTE — TELEPHONE ENCOUNTER
MA contacted pt will link lab appt to labs pt will do on tomorrow pt also wanted to know if provider can see him either tomorrow around 4:00 4:30p or next Friday September 9th the earlier the better , also wanted to know if he will need any other testing ?

## 2022-08-29 NOTE — TELEPHONE ENCOUNTER
----- Message from Scout Jose sent at 8/29/2022 10:18 AM CDT -----  Regarding: call back  Pt returning miss call to Danny    Call

## 2022-08-30 ENCOUNTER — HOSPITAL ENCOUNTER (OUTPATIENT)
Dept: PULMONOLOGY | Facility: CLINIC | Age: 57
Discharge: HOME | End: 2022-08-30
Payer: OTHER GOVERNMENT

## 2022-08-30 ENCOUNTER — OFFICE VISIT (OUTPATIENT)
Dept: PULMONOLOGY | Facility: CLINIC | Age: 57
End: 2022-08-30
Payer: OTHER GOVERNMENT

## 2022-08-30 ENCOUNTER — OFFICE VISIT (OUTPATIENT)
Dept: CARDIOLOGY | Facility: CLINIC | Age: 57
End: 2022-08-30
Payer: OTHER GOVERNMENT

## 2022-08-30 ENCOUNTER — LAB VISIT (OUTPATIENT)
Dept: LAB | Facility: HOSPITAL | Age: 57
End: 2022-08-30
Attending: STUDENT IN AN ORGANIZED HEALTH CARE EDUCATION/TRAINING PROGRAM
Payer: OTHER GOVERNMENT

## 2022-08-30 ENCOUNTER — PATIENT MESSAGE (OUTPATIENT)
Dept: HEPATOLOGY | Facility: CLINIC | Age: 57
End: 2022-08-30
Payer: OTHER GOVERNMENT

## 2022-08-30 VITALS
DIASTOLIC BLOOD PRESSURE: 69 MMHG | BODY MASS INDEX: 44.1 KG/M2 | WEIGHT: 315 LBS | HEIGHT: 71 IN | SYSTOLIC BLOOD PRESSURE: 139 MMHG | BODY MASS INDEX: 44.1 KG/M2 | HEART RATE: 73 BPM | HEIGHT: 71 IN | WEIGHT: 315 LBS

## 2022-08-30 VITALS
HEART RATE: 80 BPM | HEIGHT: 71 IN | WEIGHT: 315 LBS | DIASTOLIC BLOOD PRESSURE: 60 MMHG | BODY MASS INDEX: 44.1 KG/M2 | SYSTOLIC BLOOD PRESSURE: 119 MMHG | OXYGEN SATURATION: 98 %

## 2022-08-30 DIAGNOSIS — K75.81 LIVER CIRRHOSIS SECONDARY TO NASH: ICD-10-CM

## 2022-08-30 DIAGNOSIS — I89.0 ACQUIRED LYMPHEDEMA OF LOWER EXTREMITY: ICD-10-CM

## 2022-08-30 DIAGNOSIS — E66.01 SEVERE OBESITY (BMI >= 40): ICD-10-CM

## 2022-08-30 DIAGNOSIS — D86.9 SARCOIDOSIS, UNSPECIFIED: Primary | ICD-10-CM

## 2022-08-30 DIAGNOSIS — I27.20 PULMONARY HYPERTENSION: ICD-10-CM

## 2022-08-30 DIAGNOSIS — D86.9 SARCOIDOSIS, UNSPECIFIED: ICD-10-CM

## 2022-08-30 DIAGNOSIS — R79.89 ELEVATED LFTS: ICD-10-CM

## 2022-08-30 DIAGNOSIS — I50.32 CHRONIC DIASTOLIC HEART FAILURE: Primary | ICD-10-CM

## 2022-08-30 DIAGNOSIS — K74.60 LIVER CIRRHOSIS SECONDARY TO NASH: ICD-10-CM

## 2022-08-30 DIAGNOSIS — G47.33 OSA ON CPAP: ICD-10-CM

## 2022-08-30 DIAGNOSIS — I50.32 CHRONIC DIASTOLIC HEART FAILURE: ICD-10-CM

## 2022-08-30 DIAGNOSIS — D86.2 SARCOIDOSIS OF LUNG WITH SARCOIDOSIS OF LYMPH NODES: ICD-10-CM

## 2022-08-30 DIAGNOSIS — E66.01 MORBID OBESITY WITH BODY MASS INDEX (BMI) OF 45.0 TO 49.9 IN ADULT: ICD-10-CM

## 2022-08-30 LAB
ALBUMIN SERPL BCP-MCNC: 2.1 G/DL (ref 3.5–5.2)
ALBUMIN SERPL BCP-MCNC: 2.2 G/DL (ref 3.5–5.2)
ALP SERPL-CCNC: 101 U/L (ref 55–135)
ALP SERPL-CCNC: 109 U/L (ref 55–135)
ALT SERPL W/O P-5'-P-CCNC: 23 U/L (ref 10–44)
ALT SERPL W/O P-5'-P-CCNC: 24 U/L (ref 10–44)
ANION GAP SERPL CALC-SCNC: 8 MMOL/L (ref 8–16)
ANION GAP SERPL CALC-SCNC: 9 MMOL/L (ref 8–16)
AST SERPL-CCNC: 36 U/L (ref 10–40)
AST SERPL-CCNC: 36 U/L (ref 10–40)
BASOPHILS # BLD AUTO: 0.02 K/UL (ref 0–0.2)
BASOPHILS # BLD AUTO: 0.04 K/UL (ref 0–0.2)
BASOPHILS NFR BLD: 0.2 % (ref 0–1.9)
BASOPHILS NFR BLD: 0.5 % (ref 0–1.9)
BILIRUB SERPL-MCNC: 4.5 MG/DL (ref 0.1–1)
BILIRUB SERPL-MCNC: 4.5 MG/DL (ref 0.1–1)
BNP SERPL-MCNC: 110 PG/ML (ref 0–99)
BUN SERPL-MCNC: 23 MG/DL (ref 6–20)
BUN SERPL-MCNC: 24 MG/DL (ref 6–20)
CALCIUM SERPL-MCNC: 8 MG/DL (ref 8.7–10.5)
CALCIUM SERPL-MCNC: 8 MG/DL (ref 8.7–10.5)
CHLORIDE SERPL-SCNC: 106 MMOL/L (ref 95–110)
CHLORIDE SERPL-SCNC: 106 MMOL/L (ref 95–110)
CO2 SERPL-SCNC: 25 MMOL/L (ref 23–29)
CO2 SERPL-SCNC: 25 MMOL/L (ref 23–29)
CREAT SERPL-MCNC: 1.3 MG/DL (ref 0.5–1.4)
CREAT SERPL-MCNC: 1.3 MG/DL (ref 0.5–1.4)
DIFFERENTIAL METHOD: ABNORMAL
DIFFERENTIAL METHOD: ABNORMAL
EOSINOPHIL # BLD AUTO: 0.1 K/UL (ref 0–0.5)
EOSINOPHIL # BLD AUTO: 0.1 K/UL (ref 0–0.5)
EOSINOPHIL NFR BLD: 0.8 % (ref 0–8)
EOSINOPHIL NFR BLD: 1 % (ref 0–8)
ERYTHROCYTE [DISTWIDTH] IN BLOOD BY AUTOMATED COUNT: 18.2 % (ref 11.5–14.5)
ERYTHROCYTE [DISTWIDTH] IN BLOOD BY AUTOMATED COUNT: 18.2 % (ref 11.5–14.5)
EST. GFR  (NO RACE VARIABLE): >60 ML/MIN/1.73 M^2
EST. GFR  (NO RACE VARIABLE): >60 ML/MIN/1.73 M^2
FERRITIN SERPL-MCNC: 184 NG/ML (ref 20–300)
GLUCOSE SERPL-MCNC: 172 MG/DL (ref 70–110)
GLUCOSE SERPL-MCNC: 175 MG/DL (ref 70–110)
HCT VFR BLD AUTO: 35.6 % (ref 40–54)
HCT VFR BLD AUTO: 36.1 % (ref 40–54)
HGB BLD-MCNC: 11.7 G/DL (ref 14–18)
HGB BLD-MCNC: 11.8 G/DL (ref 14–18)
IMM GRANULOCYTES # BLD AUTO: 0.06 K/UL (ref 0–0.04)
IMM GRANULOCYTES # BLD AUTO: 0.06 K/UL (ref 0–0.04)
IMM GRANULOCYTES NFR BLD AUTO: 0.7 % (ref 0–0.5)
IMM GRANULOCYTES NFR BLD AUTO: 0.7 % (ref 0–0.5)
INR PPP: 1.3 (ref 0.8–1.2)
IRON SERPL-MCNC: 55 UG/DL (ref 45–160)
LYMPHOCYTES # BLD AUTO: 0.7 K/UL (ref 1–4.8)
LYMPHOCYTES # BLD AUTO: 0.7 K/UL (ref 1–4.8)
LYMPHOCYTES NFR BLD: 7.9 % (ref 18–48)
LYMPHOCYTES NFR BLD: 8 % (ref 18–48)
MAGNESIUM SERPL-MCNC: 1.9 MG/DL (ref 1.6–2.6)
MCH RBC QN AUTO: 31.9 PG (ref 27–31)
MCH RBC QN AUTO: 32.1 PG (ref 27–31)
MCHC RBC AUTO-ENTMCNC: 32.4 G/DL (ref 32–36)
MCHC RBC AUTO-ENTMCNC: 33.1 G/DL (ref 32–36)
MCV RBC AUTO: 97 FL (ref 82–98)
MCV RBC AUTO: 98 FL (ref 82–98)
MONOCYTES # BLD AUTO: 0.7 K/UL (ref 0.3–1)
MONOCYTES # BLD AUTO: 0.7 K/UL (ref 0.3–1)
MONOCYTES NFR BLD: 8.3 % (ref 4–15)
MONOCYTES NFR BLD: 8.4 % (ref 4–15)
NEUTROPHILS # BLD AUTO: 7 K/UL (ref 1.8–7.7)
NEUTROPHILS # BLD AUTO: 7 K/UL (ref 1.8–7.7)
NEUTROPHILS NFR BLD: 81.7 % (ref 38–73)
NEUTROPHILS NFR BLD: 81.8 % (ref 38–73)
NRBC BLD-RTO: 0 /100 WBC
NRBC BLD-RTO: 0 /100 WBC
PHOSPHATE SERPL-MCNC: 2.4 MG/DL (ref 2.7–4.5)
PLATELET # BLD AUTO: 131 K/UL (ref 150–450)
PLATELET # BLD AUTO: 133 K/UL (ref 150–450)
PMV BLD AUTO: 11.5 FL (ref 9.2–12.9)
PMV BLD AUTO: 11.8 FL (ref 9.2–12.9)
POTASSIUM SERPL-SCNC: 2.9 MMOL/L (ref 3.5–5.1)
POTASSIUM SERPL-SCNC: 2.9 MMOL/L (ref 3.5–5.1)
PROT SERPL-MCNC: 5.8 G/DL (ref 6–8.4)
PROT SERPL-MCNC: 5.9 G/DL (ref 6–8.4)
PROTHROMBIN TIME: 13 SEC (ref 9–12.5)
RBC # BLD AUTO: 3.67 M/UL (ref 4.6–6.2)
RBC # BLD AUTO: 3.68 M/UL (ref 4.6–6.2)
SATURATED IRON: 20 % (ref 20–50)
SODIUM SERPL-SCNC: 139 MMOL/L (ref 136–145)
SODIUM SERPL-SCNC: 140 MMOL/L (ref 136–145)
T4 FREE SERPL-MCNC: 0.98 NG/DL (ref 0.71–1.51)
TOTAL IRON BINDING CAPACITY: 278 UG/DL (ref 250–450)
TRANSFERRIN SERPL-MCNC: 188 MG/DL (ref 200–375)
TSH SERPL DL<=0.005 MIU/L-ACNC: 1.38 UIU/ML (ref 0.4–4)
WBC # BLD AUTO: 8.51 K/UL (ref 3.9–12.7)
WBC # BLD AUTO: 8.58 K/UL (ref 3.9–12.7)

## 2022-08-30 PROCEDURE — 99999 PR PBB SHADOW E&M-EST. PATIENT-LVL V: CPT | Mod: PBBFAC,,,

## 2022-08-30 PROCEDURE — 83880 ASSAY OF NATRIURETIC PEPTIDE: CPT

## 2022-08-30 PROCEDURE — 82728 ASSAY OF FERRITIN: CPT

## 2022-08-30 PROCEDURE — 85025 COMPLETE CBC W/AUTO DIFF WBC: CPT

## 2022-08-30 PROCEDURE — 80321 ALCOHOLS BIOMARKERS 1OR 2: CPT | Performed by: STUDENT IN AN ORGANIZED HEALTH CARE EDUCATION/TRAINING PROGRAM

## 2022-08-30 PROCEDURE — 99999 PR PBB SHADOW E&M-EST. PATIENT-LVL V: ICD-10-PCS | Mod: PBBFAC,,,

## 2022-08-30 PROCEDURE — 82105 ALPHA-FETOPROTEIN SERUM: CPT | Performed by: STUDENT IN AN ORGANIZED HEALTH CARE EDUCATION/TRAINING PROGRAM

## 2022-08-30 PROCEDURE — 99214 OFFICE O/P EST MOD 30 MIN: CPT | Mod: 25,S$GLB,, | Performed by: INTERNAL MEDICINE

## 2022-08-30 PROCEDURE — 99999 PR PBB SHADOW E&M-EST. PATIENT-LVL III: ICD-10-PCS | Mod: PBBFAC,,, | Performed by: INTERNAL MEDICINE

## 2022-08-30 PROCEDURE — 80053 COMPREHEN METABOLIC PANEL: CPT | Mod: 91 | Performed by: STUDENT IN AN ORGANIZED HEALTH CARE EDUCATION/TRAINING PROGRAM

## 2022-08-30 PROCEDURE — 84439 ASSAY OF FREE THYROXINE: CPT

## 2022-08-30 PROCEDURE — 94618 PULMONARY STRESS TESTING: CPT | Mod: S$GLB,,, | Performed by: INTERNAL MEDICINE

## 2022-08-30 PROCEDURE — 85025 COMPLETE CBC W/AUTO DIFF WBC: CPT | Mod: 91 | Performed by: STUDENT IN AN ORGANIZED HEALTH CARE EDUCATION/TRAINING PROGRAM

## 2022-08-30 PROCEDURE — 36415 COLL VENOUS BLD VENIPUNCTURE: CPT

## 2022-08-30 PROCEDURE — 84100 ASSAY OF PHOSPHORUS: CPT

## 2022-08-30 PROCEDURE — 99214 PR OFFICE/OUTPT VISIT, EST, LEVL IV, 30-39 MIN: ICD-10-PCS | Mod: S$GLB,,,

## 2022-08-30 PROCEDURE — 84443 ASSAY THYROID STIM HORMONE: CPT

## 2022-08-30 PROCEDURE — 85610 PROTHROMBIN TIME: CPT | Performed by: STUDENT IN AN ORGANIZED HEALTH CARE EDUCATION/TRAINING PROGRAM

## 2022-08-30 PROCEDURE — 83735 ASSAY OF MAGNESIUM: CPT

## 2022-08-30 PROCEDURE — 84466 ASSAY OF TRANSFERRIN: CPT

## 2022-08-30 PROCEDURE — 99214 PR OFFICE/OUTPT VISIT, EST, LEVL IV, 30-39 MIN: ICD-10-PCS | Mod: 25,S$GLB,, | Performed by: INTERNAL MEDICINE

## 2022-08-30 PROCEDURE — 99999 PR PBB SHADOW E&M-EST. PATIENT-LVL III: CPT | Mod: PBBFAC,,, | Performed by: INTERNAL MEDICINE

## 2022-08-30 PROCEDURE — 80053 COMPREHEN METABOLIC PANEL: CPT

## 2022-08-30 PROCEDURE — 94618 PULMONARY STRESS TESTING: ICD-10-PCS | Mod: S$GLB,,, | Performed by: INTERNAL MEDICINE

## 2022-08-30 PROCEDURE — 99214 OFFICE O/P EST MOD 30 MIN: CPT | Mod: S$GLB,,,

## 2022-08-30 RX ORDER — SPIRONOLACTONE 25 MG/1
25 TABLET ORAL DAILY
Qty: 30 TABLET | Refills: 11 | Status: SHIPPED | OUTPATIENT
Start: 2022-08-30 | End: 2023-07-07 | Stop reason: SDUPTHER

## 2022-08-30 RX ORDER — TORSEMIDE 20 MG/1
40 TABLET ORAL DAILY
Qty: 90 TABLET | Refills: 11 | Status: SHIPPED | OUTPATIENT
Start: 2022-08-30 | End: 2022-08-30

## 2022-08-30 RX ORDER — PREDNISONE 10 MG/1
10 TABLET ORAL DAILY
Qty: 90 TABLET | Refills: 3
Start: 2022-08-30 | End: 2023-03-27 | Stop reason: SDUPTHER

## 2022-08-30 RX ORDER — TORSEMIDE 20 MG/1
60 TABLET ORAL DAILY
Qty: 90 TABLET | Refills: 11 | Status: SHIPPED | OUTPATIENT
Start: 2022-08-30 | End: 2022-09-02 | Stop reason: SDUPTHER

## 2022-08-30 NOTE — PROGRESS NOTES
Subjective:       Patient ID: Ashwin Peter is a 57 y.o. male.    Chief Complaint: Pulmonary Hypertension    HPI:   Ashwin Peter is a 57 y.o. male new to me last seen by Dr. Curran on 5/27/22 with stage I pulmonary sarcoid (biopsy of mediastinal LAD with non-necrotizing granulomas, 12/2012), chronic hypoxemic respiratory failure on home oxygen, LAW on CPAP, morbid obesity, liver cirrhosis (suspected fatty liver, not related to sarcoid) with grade I varices, BL LE lymphedema, asymptomatic Covid 12/2020, who presents for hospital follow up.    Patient was hospitalized from 7/22-25 for decompensated heart failure. Significantly improved w/ 21L diuresis. He did not require supplemental oxygen at discharge. Remains on 10mg of prednisone. Was seen by Cardiology for pulm HTN. [place] it was WHO Group II/III and didn't think a RHC would add anything. Consider outpatient cardiac MRI.    Currently, patient reports his weight is stable. Cough is unchanged. Daily, dry. Does not tolerate weaning pred. Denies rashes, palpitations, f/c/ns. Last corrected calcium was 9.4. Reports rare use of JACOB.    Denies rashes, myalgias, arthralgias, hair/nail changes, dysphagia, eye changes, raynauds, mucosal ulcerations    Exposures:  Tobacco- Denies  Inhalational Agents- Denies      Review of Systems   Constitutional:  Negative for fever, chills, weight loss, activity change, fatigue and night sweats.   HENT:  Negative for postnasal drip, rhinorrhea, voice change and congestion.    Respiratory:  Positive for cough, shortness of breath and dyspnea on extertion. Negative for hemoptysis, sputum production, wheezing and somnolence.    Cardiovascular:  Negative for chest pain.   Gastrointestinal:  Negative for acid reflux.       Social History     Tobacco Use    Smoking status: Never     Passive exposure: Never    Smokeless tobacco: Never   Substance Use Topics    Alcohol use: No       Review of patient's allergies indicates:  No Known  Allergies  Past Medical History:   Diagnosis Date    Anxiety     Hyperlipidemia     Morbid obesity with BMI of 45.0-49.9, adult     Multiple pulmonary nodules     Obesity     Other cirrhosis of liver 2021    Portal hypertension 2021    Sarcoidosis of lung with sarcoidosis of lymph nodes     Splenomegaly 2020    Thrombocytopenia 2020     Past Surgical History:   Procedure Laterality Date    ANTERIOR CRUCIATE LIGAMENT REPAIR      right knee    COLONOSCOPY N/A 2021    Procedure: COLONOSCOPY;  Surgeon: Jeff Olvera MD;  Location: [facility] ENDO (2ND FLR);  Service: Endoscopy;  Laterality: N/A;  rectal bleeding,   2nd floor BMI 50 (354 lbs)  COVID test on 21 at [facility]    ESOPHAGOGASTRODUODENOSCOPY N/A 2021    Procedure: EGD (ESOPHAGOGASTRODUODENOSCOPY);  Surgeon: Jeff Olvera MD;  Location: [facility] ENDO (2ND FLR);  Service: Endoscopy;  Laterality: N/A;  variceal screening/ labs the am of procedure  2nd floor BMI 50 (354 lbs)    ESOPHAGOGASTRODUODENOSCOPY N/A 3/31/2022    Procedure: EGD (ESOPHAGOGASTRODUODENOSCOPY);  Surgeon: Jeff Olvera MD;  Location: [facility] ENDO (2ND FLR);  Service: Endoscopy;  Laterality: N/A;  BMI-52    Wt:375#      cirrhosis, variceal screening-labs done on 3/29-GT  vaccinated-GT    LYMPHADENECTOMY      MENISCECTOMY      left knee    NASAL SEPTUM SURGERY      WISDOM TOOTH EXTRACTION       Current Outpatient Medications on File Prior to Visit   Medication Sig    acetaminophen (TYLENOL) 500 MG tablet Take 3 tablets by mouth daily as needed for knee pain or headache    albuterol (PROVENTIL/VENTOLIN HFA) 90 mcg/actuation inhaler SMARTSI-2 Puff(s) By Mouth Every 4-6 Hours PRN    dapagliflozin (FARXIGA) 10 mg tablet Take 1 tablet (10 mg total) by mouth once daily.    diclofenac sodium (VOLTAREN) 1 % Gel Apply topically twice daily as needed for knee pain    hydrOXYzine HCL (ATARAX) 25 MG tablet Take 1 tablet (25 mg total) by mouth 3 (three) times  "daily as needed for Anxiety.    phenylephrine HCl (SINEX REGULAR NASL) 2 sprays by Each Nostril route once daily.    [DISCONTINUED] pantoprazole (PROTONIX) 40 MG tablet Take 1 tablet (40 mg total) by mouth once daily. (Patient not taking: Reported on 6/15/2022)     No current facility-administered medications on file prior to visit.       Objective:      Vitals:    08/30/22 0942   BP: 119/60   BP Location: Right arm   Patient Position: Sitting   Pulse: 80   SpO2: 98%   Weight: (!) 150.4 kg (331 lb 9.2 oz)   Height: 5' 11" (1.803 m)     Physical Exam   Constitutional: He is oriented to person, place, and time. He appears well-developed and well-nourished. He is obese.   HENT:   Nose: No mucosal edema.   Mouth/Throat: Oropharynx is clear and moist. Mallampati Score: III.   Neck: No tracheal deviation present.   Cardiovascular: Normal rate, regular rhythm and intact distal pulses.   Pulmonary/Chest: Normal expansion, symmetric chest wall expansion, effort normal and breath sounds normal. No respiratory distress. He has no wheezes. He has no rhonchi. He has no rales.   Abdominal: He exhibits no distension.   Musculoskeletal:         General: Edema present.      Cervical back: Neck supple.   Lymphadenopathy: No supraclavicular adenopathy is present.     He has no cervical adenopathy.   Neurological: He is alert and oriented to person, place, and time.   Skin: Skin is warm and dry. No cyanosis or erythema. Nails show no clubbing.   Psychiatric: He has a normal mood and affect.   Nursing note and vitals reviewed.    Personal Diagnostic Review    Laboratory:  8/30/22- corrected calcium 9.4      CT Chest 1/6/22- Images personally reviewed and compared to priors. I agree w/ the radiologist who notes;  1. No sizable pulmonary embolus identified, allowing for suboptimal bolus timing and motion limitations.  No CT findings of right heart strain.  2. Borderline cardiomegaly and probable mild diffuse interstitial pulmonary edema " versus pneumonitis.  3. Cirrhosis with sequelae of portal hypertension including splenomegaly, upper abdominal collateral vessels, and gastroesophageal varices.    CXR 7/22/22- Images personally reviewed and compared to priors. I agree w/ the radiologist who notes;  1. Central pulmonary vascular congestion and cephalization of flow without overt interstitial edema or sizable pleural effusion.       PFTs 8/10/21  Mild restriction    08/30/2022---------Distance: 345.03 meters (1132 feet)       O2 Sat % Supplemental Oxygen Heart Rate Blood Pressure Safia Scale   Pre-exercise  (Resting) 96 % Room Air 76 bpm 137/65 mmHg 1   During Exercise 91 % Room Air 89 bpm 168/71 mmHg 3   Post-exercise  (Recovery) 96 % Room Air  85 bpm 155/70 mmHg        Recovery Time: 213 seconds    TTE 7/24/22  The left ventricle is normal in size with normal systolic function.  The estimated ejection fraction is 65%.  Indeterminate left ventricular diastolic function.  Moderate right ventricular enlargement with mildly to moderately reduced right ventricular systolic function.  The estimated PA systolic pressure is 51 mmHg.  Intermediate central venous pressure (8 mmHg).  There is pulmonary hypertension.  Moderate left atrial enlargement.  No flowsheet data found.        Assessment:     Problem List Items Addressed This Visit          Pulmonary    Sarcoidosis of lung with sarcoidosis of lymph nodes     Pulmonary sarcoidosis- biopsy of mediastinal LAD with non-necrotizing granulomas, 12/2012  - main symptom appears to be persistent cough  - continue prednisone 10mg qday for now; advised patient to wean down to 5mg qday if able to tolerate  - unable to transition to steroid sparing agents such as MTX due to chronic liver disease  - given chronic daily dose <20mg qday, do not feel PCP prophylaxis needed at this time    - 6 MWT today w/o desaturation  - corrected calcium 9.4 today             Pulmonary hypertension     WHO Group II/III 2/2 LAW, heart  failure, morbid obesity. No desaturation today            Immunology/Multi System    Sarcoidosis, unspecified - Primary    Relevant Medications    predniSONE (DELTASONE) 10 MG tablet    Other Relevant Orders    Stress test, pulmonary (Completed)       Endocrine    Morbid obesity with body mass index (BMI) of 45.0 to 49.9 in adult     Strongly encouraged diet exercise. Consider bariatrics referral            Other    LAW on CPAP     Cont nightly compliance. F/u in sleep clinic.

## 2022-08-30 NOTE — PROGRESS NOTES
[facility] Provider Note (Initial Clinic) Consult Note    Date of original referral: 7/22/22  Age: 57 y.o.  Gender: male  Ethnicity: White  Number of admissions for CHF within the preceding year: 1   Duration of CHF: diagnosed recent admission  Type of Congestive Heart Failure: Diastolic   Etiology: unspecified  Social history: denies smoking history, alcohol use, or illicit drug use   Enrolled in Infusion suite: no    Diagnostic Labs:   EKG - 07/22/2022  CXR - 07/22/2022  ECHO - 07/24/2022  Stress test -   Stress echo -   Pharmacologic stress -   Cardiac catheterization -    Cardiac MRI -     Lab Results   Component Value Date     (L) 08/08/2022     07/25/2022    K 4.8 08/08/2022    K 3.6 07/25/2022    CL 99 08/08/2022     07/25/2022    CO2 25 08/08/2022    CO2 26 07/25/2022    GLU 89 08/08/2022     (H) 07/25/2022    BUN 27 (H) 08/08/2022    BUN 21 (H) 07/25/2022    CREATININE 1.3 08/08/2022    CREATININE 1.1 07/25/2022    CALCIUM 8.9 08/08/2022    CALCIUM 8.5 (L) 07/25/2022    PROT 5.7 (L) 07/24/2022    PROT 5.7 (L) 07/23/2022    ALBUMIN 2.2 (L) 07/25/2022    ALBUMIN 2.3 (L) 07/24/2022    BILITOT 5.5 (H) 07/24/2022    BILITOT 6.7 (H) 07/23/2022    ALKPHOS 96 07/24/2022    ALKPHOS 100 07/23/2022    AST 35 07/24/2022    AST 38 07/23/2022    ALT 24 07/24/2022    ALT 26 07/23/2022    ANIONGAP 7 (L) 08/08/2022    ANIONGAP 11 07/25/2022    ESTGFRAFRICA >60.0 07/25/2022    ESTGFRAFRICA >60.0 07/24/2022    EGFRNONAA >60.0 07/25/2022    EGFRNONAA >60.0 07/24/2022       Lab Results   Component Value Date    WBC 8.29 07/24/2022    WBC 8.68 07/23/2022    RBC 4.07 (L) 07/24/2022    RBC 4.11 (L) 07/23/2022    HGB 11.9 (L) 07/24/2022    HGB 12.2 (L) 07/23/2022    HCT 37.0 (L) 07/24/2022    HCT 36.8 (L) 07/23/2022    MCV 91 07/24/2022    MCV 90 07/23/2022    MCH 29.2 07/24/2022    MCH 29.7 07/23/2022    MCHC 32.2 07/24/2022    MCHC 33.2 07/23/2022    RDW 19.9 (H) 07/24/2022    RDW 20.2 (H) 07/23/2022    PLT  98 (L) 2022    PLT 93 (L) 2022    MPV SEE COMMENT 2022    MPV SEE COMMENT 2022    IMMGR 1.7 (H) 2022    IMMGR 1.3 (H) 2022    IGABS 0.14 (H) 2022    IGABS 0.11 (H) 2022    LYMPH 1.2 2022    LYMPH 14.8 (L) 2022    MONO 1.2 (H) 2022    MONO 13.9 2022    EOS 0.1 2022    EOS 0.1 2022    BASO 0.04 2022    BASO 0.04 2022    NRBC 0 2022    NRBC 0 2022    GRAN 5.6 2022    GRAN 67.5 2022    EOSINOPHIL 1.6 2022    EOSINOPHIL 1.6 2022    BASOPHIL 0.5 2022    BASOPHIL 0.5 2022    PLTEST Decreased (A) 2022    PLTEST Decreased (A) 2022    ANISO Slight 2022       Lab Results   Component Value Date    BNP 21 2022     (H) 2022    MG 1.8 2022    MG 1.9 2022    PHOS 2.8 2022    PHOS 2.7 2022    TROPONINI 0.021 2022    HGBA1C 4.3 2020    TSH 1.782 2020    TSH 2.924 2014       Lab Results   Component Value Date    IRON 172 (H) 2020    TIBC 283 2020    FERRITIN 148 2020    CHOL 165 2020    TRIG 77 2020    HDL 50 2020    LDLCALC 99.6 2020    CHOLHDL 30.3 2020    TOTALCHOLEST 3.3 2020    NONHDLCHOL 115 2020    COLORU Rufina 2022    APPEARANCEUA Hazy (A) 2022    PHUR 7.0 2022    SPECGRAV 1.015 2022    PROTEINUA Negative 2022    GLUCUA Negative 2022    KETONESU Negative 2022    BILIRUBINUA Negative 2022    OCCULTUA 2+ (A) 2022    NITRITE Positive (A) 2022    LEUKOCYTESUR 3+ (A) 2022       No implanted cardiac devices    Current Outpatient Medications on File Prior to Visit   Medication Sig Dispense Refill    acetaminophen (TYLENOL) 500 MG tablet Take 3 tablets by mouth daily as needed for knee pain or headache      albuterol (PROVENTIL/VENTOLIN HFA) 90 mcg/actuation inhaler SMARTSI-2 Puff(s)  "By Mouth Every 4-6 Hours PRN      dapagliflozin (FARXIGA) 10 mg tablet Take 1 tablet (10 mg total) by mouth once daily. 90 tablet 3    diclofenac sodium (VOLTAREN) 1 % Gel Apply topically twice daily as needed for knee pain      hydrOXYzine HCL (ATARAX) 25 MG tablet Take 1 tablet (25 mg total) by mouth 3 (three) times daily as needed for Anxiety. 30 tablet 0    phenylephrine HCl (SINEX REGULAR NASL) 2 sprays by Each Nostril route once daily.      predniSONE (DELTASONE) 10 MG tablet Take 1 tablet (10 mg total) by mouth once daily.      spironolactone (ALDACTONE) 100 MG tablet Take 1 tablet (100 mg total) by mouth once daily. DOSE CHANGE 90 tablet 11    torsemide (DEMADEX) 20 MG Tab Take 2 tablets (40 mg total) by mouth once daily. If weight gain of greater than 3lbs in one day or 5lbs in one week, okay to take additional torsemide 20mg as needed. 60 tablet 11    [DISCONTINUED] pantoprazole (PROTONIX) 40 MG tablet Take 1 tablet (40 mg total) by mouth once daily. (Patient not taking: Reported on 6/15/2022) 30 tablet 5     No current facility-administered medications on file prior to visit.         HPI:  Patient unable to quantify distance walked before SOB. Completed 6MWT prior to clinic visit with mild SOB towards end and pace normal   Patient sleeps on 1 number of pillows   Patient wakes up SOB, has to get out of bed, associated cough, sputum- denies PND (compliant with CPAP), endorses intermittent dry cough over the past weekend with dust exposure   Palpitations - denies   Dizzy, light-headed, pre-syncope or syncope- endorses brief dizziness/feeling "off balanced" with standing from bent position, denies pre-syncope/syncope   Since discharge frequency of performing weights, home weight and weight change- performing daily weights. Reports prior to admission 370lbs. Now weight stabilized at 325-330lbs  Other information felt pertinent to HPI: Mr. Ashwin Peter is a 57 yo male with a PMHx of pulmonary sarcoidosis " with mediastinal involvement, liver cirrhosis 2/2 fatty liver w/ portal HTN, LAW on CPAP, chronic diastolic CHF, HTN, and PH who presents to first [facility] visit following recent admission for ADHF 1 month ago. Since discharge, pt followed with [facility]. Per notes, ongoing discussions regarding [facility], although suspected PH likely multifactorial due to WHO group III (LAW, pulmonary sarcoid) and WHO group II (chronic diastolic CHF). During [facility] visit, discussion regarding [facility] for assessment of cardiac sarcoid, but patient opted to hold off for now given cost.      PHYSICAL:   Vitals:    08/30/22 1134   BP: 139/69   Pulse: 73      Wt Readings from Last 3 Encounters:   08/30/22 (!) 150.1 kg (330 lb 14.4 oz)   08/30/22 (!) 150.4 kg (331 lb 9.2 oz)   08/08/22 (!) 150.8 kg (332 lb 7.3 oz)       JVD: no   Heart rhythm: regular  Cardiac murmur: Yes - systolic murmur best heard at LUSB    S3: no  S4: no  Lungs:  faint crackles in posterior lung bases  Hepatojugular reflux: no  Edema: yes, 2+ BLE edema      Echo 7/24/22:  The left ventricle is normal in size with normal systolic function.  The estimated ejection fraction is 65%.  Indeterminate left ventricular diastolic function.  Moderate right ventricular enlargement with mildly to moderately reduced right ventricular systolic function.  The estimated PA systolic pressure is 51 mmHg.  Intermediate central venous pressure (8 mmHg).  There is pulmonary hypertension.  Moderate left atrial enlargement.     ASSESSMENT: Chronic diastolic HF    PLAN:      Patient Instructions:   Instruct the patient to notify this clinic if HH, a physician or an advanced care provider wants to change medication one of their HF medications   Activity and Diet restrictions:   Recommend 2-3 gram sodium restriction and 1500cc- 2000cc fluid restriction.  Encourage physical activity with graded exercise program.  Requested patient to weigh themselves daily, and to notify us if their weight increases by  more than 3 lbs in 1 day or 5 lbs in 3 days.    Assigned dry weight on home scale: 325lbs  Medication changes (include current dose and changed dose): NYHA Class II symptoms. Minimal appreciable fluid volume on exam, BNP uptrending. Start aldactone 25mg daily for additional diuresis and K supplementation with repeat labs next week. He reports taking torsemide 40mg in the morning and 20mg in the afternoon, med detail updated. Consider starting low dose ACEi/ARB in future for additional BP management as needed.    Upcoming labs and date anticipated: pt now outside [facility] window. Plan for repeat labs next week with resuming aldactone with tentative d/c at that time    Kailee Tejeda PA-C

## 2022-08-30 NOTE — PROGRESS NOTES
LMSW met with pt to discuss barriers towards treatment. Pt has access to transportation, housing, food, utilities, insurance benefits, medication, and has no need for HME. Pt reports no needs at this time.

## 2022-08-31 LAB — AFP SERPL-MCNC: 2.5 NG/ML (ref 0–8.4)

## 2022-08-31 NOTE — PROCEDURES
Ashwin Peter is a 57 y.o.  male patient, who presents for a 6 minute walk test ordered by MD Delma.  The diagnosis is Shortness of Breath.  The patient's BMI is 45.3 kg/m2.  Predicted distance (lower limit of normal) is 351.17 meters.      Test Results:    The test was completed without stopping.  The total time walked was 360 seconds.  During walking, the patient reported:  Dyspnea.  The patient used no assistive devices during testing.     08/30/2022---------Distance: 345.03 meters (1132 feet)     O2 Sat % Supplemental Oxygen Heart Rate Blood Pressure Safia Scale   Pre-exercise  (Resting) 96 % Room Air 76 bpm 137/65 mmHg 1   During Exercise 91 % Room Air 89 bpm 168/71 mmHg 3   Post-exercise  (Recovery) 96 % Room Air  85 bpm 155/70 mmHg      Recovery Time: 213 seconds    Performing nurse/tech: Jimmie DURBIN      PREVIOUS STUDY:   06/06/2022---------Distance: 335.28 meters (1100 feet)       O2 Sat % Supplemental Oxygen Heart Rate Blood Pressure Safia Scale   Pre-exercise  (Resting) 96 % Room Air 86 bpm 138/64 mmHg 0   During Exercise 89 % Room Air 108 bpm 155/72 mmHg 3   Post-exercise  (Recovery) 98 % Room Air  70 bpm   mmHg        CLINICAL INTERPRETATION:  Six minute walk distance is 345.03 meters (1132 feet) with moderate dyspnea.  During exercise, there was significant desaturation while breathing room air.  Blood pressure increased significantly and Heart rate remained stable with walking.  The patient did not report non-pulmonary symptoms during exercise.  Since the previous study in June 2022, exercise capacity is unchanged.  Based upon age and body mass index, exercise capacity is less than predicted.

## 2022-09-02 LAB
PETH 16:0/18.1 (POPETH): <10 NG/ML
PETH 16:0/18.2 (PLPETH): <10 NG/ML

## 2022-09-06 ENCOUNTER — TELEPHONE (OUTPATIENT)
Dept: CARDIOLOGY | Facility: CLINIC | Age: 57
End: 2022-09-06
Payer: OTHER GOVERNMENT

## 2022-09-09 ENCOUNTER — TELEPHONE (OUTPATIENT)
Dept: CARDIOLOGY | Facility: CLINIC | Age: 57
End: 2022-09-09
Payer: OTHER GOVERNMENT

## 2022-09-09 ENCOUNTER — LAB VISIT (OUTPATIENT)
Dept: LAB | Facility: HOSPITAL | Age: 57
End: 2022-09-09
Attending: STUDENT IN AN ORGANIZED HEALTH CARE EDUCATION/TRAINING PROGRAM
Payer: OTHER GOVERNMENT

## 2022-09-09 DIAGNOSIS — I50.32 CHRONIC DIASTOLIC HEART FAILURE: ICD-10-CM

## 2022-09-09 LAB
ALBUMIN SERPL BCP-MCNC: 2.3 G/DL (ref 3.5–5.2)
ALP SERPL-CCNC: 115 U/L (ref 55–135)
ALT SERPL W/O P-5'-P-CCNC: 26 U/L (ref 10–44)
ANION GAP SERPL CALC-SCNC: 9 MMOL/L (ref 8–16)
AST SERPL-CCNC: 50 U/L (ref 10–40)
BILIRUB SERPL-MCNC: 4.8 MG/DL (ref 0.1–1)
BUN SERPL-MCNC: 19 MG/DL (ref 6–20)
CALCIUM SERPL-MCNC: 8.2 MG/DL (ref 8.7–10.5)
CHLORIDE SERPL-SCNC: 103 MMOL/L (ref 95–110)
CO2 SERPL-SCNC: 25 MMOL/L (ref 23–29)
CREAT SERPL-MCNC: 1.3 MG/DL (ref 0.5–1.4)
EST. GFR  (NO RACE VARIABLE): >60 ML/MIN/1.73 M^2
GLUCOSE SERPL-MCNC: 164 MG/DL (ref 70–110)
POTASSIUM SERPL-SCNC: 3.4 MMOL/L (ref 3.5–5.1)
PROT SERPL-MCNC: 5.8 G/DL (ref 6–8.4)
SODIUM SERPL-SCNC: 137 MMOL/L (ref 136–145)

## 2022-09-09 PROCEDURE — 36415 COLL VENOUS BLD VENIPUNCTURE: CPT

## 2022-09-09 PROCEDURE — 80053 COMPREHEN METABOLIC PANEL: CPT

## 2022-09-09 NOTE — TELEPHONE ENCOUNTER
"[facility]    Attempted to call pt to complete 1 week "check in"  and discharge phone call. Unable to reach pt at listed phone numbers.  Was able to leave message on voicemail encouraging pt to return call with [facility] phone number.     Will continue to try to reach patient.    "

## 2022-09-09 NOTE — TELEPHONE ENCOUNTER
"[facility]) DISCHARGE VISIT - PHONE     Called and spoke to pt.     Most Recent Hospital Discharge Date: 7/25/22  Last admission Diagnosis/chief complaint:SOB/ BLE        Pt reports the following:  []  Shortness of Breath with activity  []  Shortness of Breath at rest   []  Fatigue  []  Edema   [] Chest pain or tightness  [] Weight Increase since discharge  [x] None of the above    Medications:   Medication reconciliation completed today per RN.  Pt reports having all medications available and understands how to take them appropriately. Reminded pt to call prior to making any changes to medications.     Education:   [x] Confirmed pt still has  "[facility] Home Care Guide" .   Reviewed key points as listed below.     Recommend 2 gram sodium restriction and 1500cc fluid restriction.  Encourage physical activity with graded exercise program.  Requested patient to weigh themselves daily, and to notify us if their weight increases by more than 3 lbs in 1 day or 5 lbs in 3 days.     [x] Reviewed completed "Daily Weight and Symptom Tracker".  Reviewed with patient when and how to call [facility] according to "Yellow Zone" and "Red Zone".       Watch for these Signs and Symptoms: If any of these occur, contact [facility] immediately:   Increase in shortness of breath with movement   Increase in swelling in your legs and ankles   Weight gain of more than 3 pounds in a night or 5 pounds in 3 days.   Difficulty breathing when you are lying down   Worsening fatigue or tiredness   Stomach bloating, a full feeling or a loss of appetite   Increased coughing--especially when you are lying down    MyChart and Care Companion:   Patient active on myChart? Yes, patient uses regularly.    [facility] Program Plan:  Pt has successfully completed [facility] program.  Pt care to be transferred to [facility]/ PCP for long term care.     Pt educated on how to call their offices and how to " call [facility] On call in the event of an after hour issue.    PT reminded to continue to follow recommendations made during the [facility] program to include monitoring daily weights, taking medications according to list, following up to appointments per provider recommendations, stop smoking/ start exercising and following a heart friendly low salt, low fluid diet.      Pt was able to verbalize back to RN in their own words correct diet/fluid restrictions, necessity for exercise, warning signs and symptoms, when and how to contact their  Long term care team .      Plan:         [x]  Discussed upcoming appointments and/or plan for follow-up care with his/her PCP/Cardiology. MA to schedule appt.    Electronic hand off completed : Per Epic    Please refer to provider note for additional details and assessment.

## 2022-09-14 ENCOUNTER — PATIENT MESSAGE (OUTPATIENT)
Dept: CARDIOLOGY | Facility: CLINIC | Age: 57
End: 2022-09-14
Payer: OTHER GOVERNMENT

## 2022-09-14 ENCOUNTER — HOSPITAL ENCOUNTER (OUTPATIENT)
Dept: RADIOLOGY | Facility: HOSPITAL | Age: 57
Discharge: HOME | End: 2022-09-14
Attending: STUDENT IN AN ORGANIZED HEALTH CARE EDUCATION/TRAINING PROGRAM
Payer: OTHER GOVERNMENT

## 2022-09-14 ENCOUNTER — PATIENT MESSAGE (OUTPATIENT)
Dept: HEPATOLOGY | Facility: CLINIC | Age: 57
End: 2022-09-14
Payer: OTHER GOVERNMENT

## 2022-09-14 DIAGNOSIS — R79.89 ELEVATED LFTS: ICD-10-CM

## 2022-09-14 DIAGNOSIS — K75.81 LIVER CIRRHOSIS SECONDARY TO NASH: ICD-10-CM

## 2022-09-14 DIAGNOSIS — K74.60 LIVER CIRRHOSIS SECONDARY TO NASH: ICD-10-CM

## 2022-09-14 PROCEDURE — 76705 ECHO EXAM OF ABDOMEN: CPT | Mod: 26,,, | Performed by: RADIOLOGY

## 2022-09-14 PROCEDURE — 76705 ECHO EXAM OF ABDOMEN: CPT | Mod: TC

## 2022-09-14 PROCEDURE — 76705 US ABDOMEN LIMITED: ICD-10-PCS | Mod: 26,,, | Performed by: RADIOLOGY

## 2022-09-14 RX ORDER — POTASSIUM CHLORIDE 750 MG/1
10 TABLET, EXTENDED RELEASE ORAL DAILY
Qty: 90 TABLET | Refills: 3 | Status: SHIPPED | OUTPATIENT
Start: 2022-09-14 | End: 2023-10-25

## 2022-09-18 PROBLEM — R09.02 HYPOXIA: Status: RESOLVED | Noted: 2022-08-08 | Resolved: 2022-09-18

## 2022-09-18 PROBLEM — J96.21 ACUTE ON CHRONIC RESPIRATORY FAILURE WITH HYPOXIA: Status: RESOLVED | Noted: 2022-01-06 | Resolved: 2022-09-18

## 2022-09-18 NOTE — ASSESSMENT & PLAN NOTE
Pulmonary sarcoidosis- biopsy of mediastinal LAD with non-necrotizing granulomas, 12/2012  - main symptom appears to be persistent cough  - continue prednisone 10mg qday for now; advised patient to wean down to 5mg qday if able to tolerate  - unable to transition to steroid sparing agents such as MTX due to chronic liver disease  - given chronic daily dose <20mg qday, do not feel PCP prophylaxis needed at this time    - 6 MWT today w/o desaturation  - corrected calcium 9.4 today

## 2022-09-20 ENCOUNTER — TELEPHONE (OUTPATIENT)
Dept: HEPATOLOGY | Facility: CLINIC | Age: 57
End: 2022-09-20
Payer: OTHER GOVERNMENT

## 2022-09-20 NOTE — TELEPHONE ENCOUNTER
Received hepatology referral but pt is already established with Dr. Crowder. Please contact pt to schedule f/u appt with Dr. Crowder. Labs and US already done recently    Thanks

## 2022-09-27 ENCOUNTER — PATIENT MESSAGE (OUTPATIENT)
Dept: CARDIOLOGY | Facility: CLINIC | Age: 57
End: 2022-09-27
Payer: OTHER GOVERNMENT

## 2022-09-27 DIAGNOSIS — Z79.899 POLYPHARMACY: Primary | ICD-10-CM

## 2022-10-17 ENCOUNTER — LAB VISIT (OUTPATIENT)
Dept: LAB | Facility: HOSPITAL | Age: 57
End: 2022-10-17
Payer: OTHER GOVERNMENT

## 2022-10-17 ENCOUNTER — OFFICE VISIT (OUTPATIENT)
Dept: CARDIOLOGY | Facility: CLINIC | Age: 57
End: 2022-10-17
Payer: OTHER GOVERNMENT

## 2022-10-17 VITALS
DIASTOLIC BLOOD PRESSURE: 60 MMHG | BODY MASS INDEX: 44.1 KG/M2 | SYSTOLIC BLOOD PRESSURE: 137 MMHG | HEIGHT: 71 IN | WEIGHT: 315 LBS | HEART RATE: 83 BPM

## 2022-10-17 DIAGNOSIS — D86.9 SARCOIDOSIS, UNSPECIFIED: ICD-10-CM

## 2022-10-17 DIAGNOSIS — I89.0 ACQUIRED LYMPHEDEMA OF LOWER EXTREMITY: ICD-10-CM

## 2022-10-17 DIAGNOSIS — K74.60 LIVER CIRRHOSIS SECONDARY TO NASH: ICD-10-CM

## 2022-10-17 DIAGNOSIS — K75.81 LIVER CIRRHOSIS SECONDARY TO NASH: ICD-10-CM

## 2022-10-17 DIAGNOSIS — I27.20 PULMONARY HYPERTENSION: ICD-10-CM

## 2022-10-17 DIAGNOSIS — E66.01 MORBID OBESITY WITH BODY MASS INDEX (BMI) OF 45.0 TO 49.9 IN ADULT: ICD-10-CM

## 2022-10-17 DIAGNOSIS — I50.32 CHRONIC DIASTOLIC HEART FAILURE: Primary | ICD-10-CM

## 2022-10-17 DIAGNOSIS — I50.32 CHRONIC DIASTOLIC HEART FAILURE: ICD-10-CM

## 2022-10-17 DIAGNOSIS — I50.33 ACUTE ON CHRONIC DIASTOLIC HEART FAILURE: ICD-10-CM

## 2022-10-17 DIAGNOSIS — E66.01 SEVERE OBESITY (BMI >= 40): ICD-10-CM

## 2022-10-17 DIAGNOSIS — G47.33 OSA ON CPAP: ICD-10-CM

## 2022-10-17 DIAGNOSIS — K76.6 PORTAL HYPERTENSION: ICD-10-CM

## 2022-10-17 LAB
ALBUMIN SERPL BCP-MCNC: 2.4 G/DL (ref 3.5–5.2)
ALP SERPL-CCNC: 109 U/L (ref 55–135)
ALT SERPL W/O P-5'-P-CCNC: 30 U/L (ref 10–44)
ANION GAP SERPL CALC-SCNC: 7 MMOL/L (ref 8–16)
AST SERPL-CCNC: 58 U/L (ref 10–40)
BILIRUB SERPL-MCNC: 4.3 MG/DL (ref 0.1–1)
BUN SERPL-MCNC: 14 MG/DL (ref 6–20)
CALCIUM SERPL-MCNC: 8.3 MG/DL (ref 8.7–10.5)
CHLORIDE SERPL-SCNC: 107 MMOL/L (ref 95–110)
CHOLEST SERPL-MCNC: 198 MG/DL (ref 120–199)
CHOLEST/HDLC SERPL: 4.8 {RATIO} (ref 2–5)
CO2 SERPL-SCNC: 28 MMOL/L (ref 23–29)
CREAT SERPL-MCNC: 1 MG/DL (ref 0.5–1.4)
EST. GFR  (NO RACE VARIABLE): >60 ML/MIN/1.73 M^2
ESTIMATED AVG GLUCOSE: 74 MG/DL (ref 68–131)
GLUCOSE SERPL-MCNC: 86 MG/DL (ref 70–110)
HBA1C MFR BLD: 4.2 % (ref 4–5.6)
HDLC SERPL-MCNC: 41 MG/DL (ref 40–75)
HDLC SERPL: 20.7 % (ref 20–50)
LDLC SERPL CALC-MCNC: 136.8 MG/DL (ref 63–159)
MAGNESIUM SERPL-MCNC: 1.8 MG/DL (ref 1.6–2.6)
NONHDLC SERPL-MCNC: 157 MG/DL
PHOSPHATE SERPL-MCNC: 2.3 MG/DL (ref 2.7–4.5)
POTASSIUM SERPL-SCNC: 3.5 MMOL/L (ref 3.5–5.1)
PROT SERPL-MCNC: 5.9 G/DL (ref 6–8.4)
SODIUM SERPL-SCNC: 142 MMOL/L (ref 136–145)
TRIGL SERPL-MCNC: 101 MG/DL (ref 30–150)

## 2022-10-17 PROCEDURE — 36415 COLL VENOUS BLD VENIPUNCTURE: CPT | Performed by: STUDENT IN AN ORGANIZED HEALTH CARE EDUCATION/TRAINING PROGRAM

## 2022-10-17 PROCEDURE — 99214 OFFICE O/P EST MOD 30 MIN: CPT | Mod: GC,S$GLB,, | Performed by: INTERNAL MEDICINE

## 2022-10-17 PROCEDURE — 99214 PR OFFICE/OUTPT VISIT, EST, LEVL IV, 30-39 MIN: ICD-10-PCS | Mod: GC,S$GLB,, | Performed by: INTERNAL MEDICINE

## 2022-10-17 PROCEDURE — 99999 PR PBB SHADOW E&M-EST. PATIENT-LVL IV: CPT | Mod: PBBFAC,,, | Performed by: STUDENT IN AN ORGANIZED HEALTH CARE EDUCATION/TRAINING PROGRAM

## 2022-10-17 PROCEDURE — 99999 PR PBB SHADOW E&M-EST. PATIENT-LVL IV: ICD-10-PCS | Mod: PBBFAC,,, | Performed by: STUDENT IN AN ORGANIZED HEALTH CARE EDUCATION/TRAINING PROGRAM

## 2022-10-17 PROCEDURE — 83036 HEMOGLOBIN GLYCOSYLATED A1C: CPT | Performed by: STUDENT IN AN ORGANIZED HEALTH CARE EDUCATION/TRAINING PROGRAM

## 2022-10-17 PROCEDURE — 80061 LIPID PANEL: CPT | Performed by: STUDENT IN AN ORGANIZED HEALTH CARE EDUCATION/TRAINING PROGRAM

## 2022-10-17 PROCEDURE — 83735 ASSAY OF MAGNESIUM: CPT | Performed by: STUDENT IN AN ORGANIZED HEALTH CARE EDUCATION/TRAINING PROGRAM

## 2022-10-17 PROCEDURE — 80053 COMPREHEN METABOLIC PANEL: CPT | Performed by: STUDENT IN AN ORGANIZED HEALTH CARE EDUCATION/TRAINING PROGRAM

## 2022-10-17 PROCEDURE — 83880 ASSAY OF NATRIURETIC PEPTIDE: CPT | Performed by: STUDENT IN AN ORGANIZED HEALTH CARE EDUCATION/TRAINING PROGRAM

## 2022-10-17 PROCEDURE — 84100 ASSAY OF PHOSPHORUS: CPT | Performed by: STUDENT IN AN ORGANIZED HEALTH CARE EDUCATION/TRAINING PROGRAM

## 2022-10-17 NOTE — PROGRESS NOTES
A/ Plan:   #HfpEF  - NYHA Class II   - labwork today including BMP, BNP, lipid panel and a1c  - continue torsemide 60mg daily, farxega 10mg daily+ K 10 meq for now  - CAD evaluation and repeat RVSP assessment with DSE  - re evaluate entresto 24-26 pending above labwork    #Suspected pHTN  #LAW  #Chronic hypoxemic RF  Assessment: Suspect if pHTN truly present,  likely multifactorial due to mixed WHO group III (LAW>>>Sarcoid) and II (Elevated LA pressures due to longstanding HtN), currently stable (WHO II symptoms).  - no formal diagnosis with previous RHC  - defer RHC at this time after discussion with HF/pulm.  - weight loss   - CPAP, re referred to sleep clinic    #Pulmonary sarcoidosis  - unknown if cardiac involvement as no prior CMRI or FDG PET or cardiac biopsy, hold off for now until stress test completed  - follows with pulm.     RTC in 3 mos.    Estefanía Baumann, PGY6  Cardiovascular Disease  [facility]    Subjective:     HPI:   Ashwin Mueller [place] 56yr male who presents as new patient for recent hospitalization with following problem list:    GUAJARDO cirrhosis with grade I/varices/portal HTN  Obesity  Pulmonary sarcoidosis (mediastonal lymph node biopsy (12/12) with non necrotizing granulomas)-not on biologic agent  LAW- adherent on CPAP  HFpEF     Inpatient TTE notable for preserved EF and Mild RV enlargement and mildy reduced systolic function and indeterminate diastolic dysfunction w/ PASP 51. Inpatient pulm consulted in regards to sarcoidosis and concern of pHTN with overall assessment that his HF findings are out of proportion to severity of pulmonary involvement. He was planning to get outpatient RHC but could not afford co pay and was referred to social work at last pcp visit. He is trying to reduce portion sizes and limit fluid/Na intake but admits it has been difficult. He currently has NYHA Class III symptoms which has been stable since discharge. He complains of frequent leg cramping  with lasix but is taking as prescribed.     Interval History: Recently saw [facility] and was started on aldactone 25mg daily. They are not pursuing RHC at this time as they believe likely not PAH/WHO group I. He has not been able to afford CMRI to further evaluate for cardiac involvement. Currently taking torsemide 40mg AM and 20mg prn. Hypokalemia on torsemide 40/20 . Switched from lasix to torsemide 40mg daily and 20mg prn with hypokalemia on labwork. In terms of weight, weighs himself every morning and watching fluid restriction, (at dry weight 325-330 lbs). Last saw pulm. 8/22 where he was not started on biologic agents due to underlying cirrhosis and decreased prednisone 5 mg daily, primary symptom cough however currently denies. NYHA/WHO Class II symptoms, can traverse 2- 3 flight of stairs, stays active by walking dogs. Plans on traveling with wife in November to [place] [employer] and [place].     Cardiac Studies:  TTE 7/24/22  The left ventricle is normal in size with normal systolic function.  The estimated ejection fraction is 65%.  Indeterminate left ventricular diastolic function.  Moderate right ventricular enlargement with mildly to moderately reduced right ventricular systolic function.  The estimated PA systolic pressure is 51 mmHg.  Intermediate central venous pressure (8 mmHg).  There is pulmonary hypertension.  Moderate left atrial enlargement.    6MWT 8/22--------Distance: 345.03 meters (1132 feet)        O2 Sat % Supplemental Oxygen Heart Rate Blood Pressure Safia Scale   Pre-exercise  (Resting) 96 % Room Air 76 bpm 137/65 mmHg 1   During Exercise 91 % Room Air 89 bpm 168/71 mmHg 3   Post-exercise  (Recovery) 96 % Room Air  85 bpm 155/70 mmHg        Recovery Time: 213 seconds      06/06/2022---------Distance: 335.28 meters (1100 feet)    O2 Sat % Supplemental Oxygen Heart Rate Blood Pressure Safia Scale   Pre-exercise  (Resting) 96 % Room Air 86 bpm 138/64 mmHg 0   During Exercise 89 % Room Air 108 bpm 155/72 mmHg  3   Post-exercise  (Recovery) 98 % Room Air  70 bpm   mmHg       PFT 2014: FEV1/FVC 86. Normal spirometry. Normal diffusion capacity.       LHC/stress test: none    ROS:  Constitution: Negative for fever, chills, weight loss or gain.   HENT: Negative for sore throat, rhinorrhea, or headache.  Eyes: Negative for blurred or double vision.   Cardiovascular: - for Exertional chest pain  Pulmonary: - for Dyspnea on exertion  Gastrointestinal: Negative for abdominal pain, nausea, vomiting, or diarrhea. Negative for melena/hematochezia.  : Negative for dysuria.   Neurological: Negative for focal weakness or sensory changes.  Skin: No bleeding or bruising      Past Medical History:   Diagnosis Date    Anxiety     Hyperlipidemia     Morbid obesity with BMI of 45.0-49.9, adult     Multiple pulmonary nodules     Obesity     Other cirrhosis of liver 1/13/2021    Portal hypertension 2/11/2021    Sarcoidosis of lung with sarcoidosis of lymph nodes     Splenomegaly 12/22/2020    Thrombocytopenia 12/22/2020       Past Surgical History:   Procedure Laterality Date    ANTERIOR CRUCIATE LIGAMENT REPAIR  2007    right knee    COLONOSCOPY N/A 4/8/2021    Procedure: COLONOSCOPY;  Surgeon: Jeff Olvera MD;  Location: [address]);  Service: Endoscopy;  Laterality: N/A;  rectal bleeding,   2nd floor BMI 50 (354 lbs)  C[facility] test on 4/5/21 at [facility]    ESOPHAGOGASTRODUODENOSCOPY N/A 4/8/2021    Procedure: EGD (ESOPHAGOGASTRODUODENOSCOPY);  Surgeon: Jeff Olvera MD;  Location: [facility] ([address] [place] [address]);  Service: Endoscopy;  Laterality: N/A;  variceal screening/ labs the am of procedure  2nd floor BMI 50 (354 lbs)    ESOPHAGOGASTRODUODENOSCOPY N/A 3/31/2022    Procedure: EGD (ESOPHAGOGASTRODUODENOSCOPY);  Surgeon: Jeff Olvera MD;  Location: [facility] ([address] [place] [address]);  Service: Endoscopy;  Laterality: N/A;  BMI-52    Wt:375#      cirrhosis, variceal screening-labs done on 3/29-GT  vaccinated-GT    LYMPHADENECTOMY  1985     MENISCECTOMY      left knee    NASAL SEPTUM SURGERY      WISDOM TOOTH EXTRACTION         Social History     Tobacco Use    Smoking status: Never     Passive exposure: Never    Smokeless tobacco: Never   Substance Use Topics    Alcohol use: No    Drug use: No       Family History   Problem Relation Age of Onset    Hypertension Father     Heart attack Father 55    Diabetes Paternal Grandmother     Aneurysm Mother         eye    Dementia Mother     Colon cancer Neg Hx     Prostate cancer Neg Hx     Sarcoidosis Neg Hx        Current Outpatient Medications   Medication Sig    acetaminophen (TYLENOL) 500 MG tablet Take 3 tablets by mouth daily as needed for knee pain or headache    albuterol (PROVENTIL/VENTOLIN HFA) 90 mcg/actuation inhaler SMARTSI-2 Puff(s) By Mouth Every 4-6 Hours PRN    dapagliflozin (FARXIGA) 10 mg tablet Take 1 tablet (10 mg total) by mouth once daily.    diclofenac sodium (VOLTAREN) 1 % Gel Apply topically twice daily as needed for knee pain    hydrOXYzine HCL (ATARAX) 25 MG tablet Take 1 tablet (25 mg total) by mouth 3 (three) times daily as needed for Anxiety.    phenylephrine HCl (SINEX REGULAR NASL) 2 sprays by Each Nostril route once daily.    potassium chloride (KLOR-CON) 10 MEQ TbSR Take 1 tablet (10 mEq total) by mouth once daily.    predniSONE (DELTASONE) 10 MG tablet Take 1 tablet (10 mg total) by mouth once daily.    spironolactone (ALDACTONE) 25 MG tablet Take 1 tablet (25 mg total) by mouth once daily.    torsemide (DEMADEX) 20 MG Tab Take 3 tablets (60 mg total) by mouth once daily. Take torsemide 40mg (2 tablets) in the morning and 20mg (1 tablet) in the afternoon     No current facility-administered medications for this visit.       Review of patient's allergies indicates:  No Known Allergies    Objective:   Vitals:  There were no vitals filed for this visit.  Vitals:    10/17/22 0952   BP: 137/60   Pulse: 83           Physical Exam  Constitutional: No distress, obese,  conversant  HEENT: Sclera anicteric, PERRLA, EOMI  Neck: + JVD, no masses, good movement  CV: RRR, S1 and S2 normal, no additional heart sounds or murmurs. Pulses 2+ and equal bilaterally in radial arteries, Son's normal on right. Distal pulses are 2+ and equal in the femoral, DP and PT areas bilaterally  Pulm: decreased breath sounds  GI: Abdomen soft, non-tender, good bowel sounds  Extremities: Both extremities intact and grossly normal, skin is warm, significant edema to distal tibia venous stasis changes.   Skin: No ecchymosis, erythema, or ulcers  Psych: AOx3, appropriate affect  Neuro: CNII-XII intact, no focal deficits        Chemistry        Component Value Date/Time     09/09/2022 0854    K 3.4 (L) 09/09/2022 0854     09/09/2022 0854    CO2 25 09/09/2022 0854    BUN 19 09/09/2022 0854    CREATININE 1.3 09/09/2022 0854     (H) 09/09/2022 0854        Component Value Date/Time    CALCIUM 8.2 (L) 09/09/2022 0854    ALKPHOS 115 09/09/2022 0854    AST 50 (H) 09/09/2022 0854    ALT 26 09/09/2022 0854    BILITOT 4.8 (H) 09/09/2022 0854    ESTGFRAFRICA >60.0 07/25/2022 0912    EGFRNONAA >60.0 07/25/2022 0912            Lab Results   Component Value Date    CHOL 165 12/02/2020    CHOL 246 (H) 07/14/2014    CHOL 239 (H) 10/03/2009     Lab Results   Component Value Date    HDL 50 12/02/2020    HDL 43 07/14/2014    HDL 34 (L) 10/03/2009     Lab Results   Component Value Date    LDLCALC 99.6 12/02/2020    LDLCALC 173.6 (H) 07/14/2014    LDLCALC 164.8 (H) 10/03/2009     Lab Results   Component Value Date    TRIG 77 12/02/2020    TRIG 147 07/14/2014    TRIG 201 (H) 10/03/2009     Lab Results   Component Value Date    CHOLHDL 30.3 12/02/2020    CHOLHDL 17.5 (L) 07/14/2014    CHOLHDL 14.2 (L) 10/03/2009         Lab Results   Component Value Date     09/09/2022    K 3.4 (L) 09/09/2022     09/09/2022    CO2 25 09/09/2022    BUN 19 09/09/2022    CREATININE 1.3 09/09/2022     (H)  09/09/2022    HGBA1C 4.3 12/02/2020    MG 1.9 08/30/2022    AST 50 (H) 09/09/2022    ALT 26 09/09/2022    ALBUMIN 2.3 (L) 09/09/2022    PROT 5.8 (L) 09/09/2022    BILITOT 4.8 (H) 09/09/2022    WBC 8.58 08/30/2022    WBC 8.51 08/30/2022    HGB 11.8 (L) 08/30/2022    HGB 11.7 (L) 08/30/2022    HCT 35.6 (L) 08/30/2022    HCT 36.1 (L) 08/30/2022    MCV 97 08/30/2022    MCV 98 08/30/2022     (L) 08/30/2022     (L) 08/30/2022    INR 1.3 (H) 08/30/2022    PSA 0.62 07/14/2014    TSH 1.383 08/30/2022    CHOL 165 12/02/2020    HDL 50 12/02/2020    LDLCALC 99.6 12/02/2020    TRIG 77 12/02/2020     EGD 3/22  Impression:            - Grade I esophageal varices.                          - Portal hypertensive gastropathy, biopsied.                          - Duodenal ulcers with pigmented material, treated                          with bipolar cautery.

## 2022-10-18 LAB — BNP SERPL-MCNC: 52 PG/ML (ref 0–99)

## 2022-10-21 ENCOUNTER — HOSPITAL ENCOUNTER (OUTPATIENT)
Dept: CARDIOLOGY | Facility: HOSPITAL | Age: 57
Discharge: HOME | End: 2022-10-21
Attending: STUDENT IN AN ORGANIZED HEALTH CARE EDUCATION/TRAINING PROGRAM
Payer: OTHER GOVERNMENT

## 2022-10-21 VITALS — BODY MASS INDEX: 44.1 KG/M2 | HEIGHT: 71 IN | WEIGHT: 315 LBS

## 2022-10-21 DIAGNOSIS — I50.32 CHRONIC DIASTOLIC HEART FAILURE: ICD-10-CM

## 2022-10-21 LAB
ASCENDING AORTA: 2.99 CM
BSA FOR ECHO PROCEDURE: 2.72 M2
CV ECHO LV RWT: 0.34 CM
CV STRESS BASE HR: 79 BPM
DIASTOLIC BLOOD PRESSURE: 56 MMHG
DOP CALC LVOT AREA: 4.2 CM2
DOP CALC LVOT DIAMETER: 2.3 CM
DOP CALC LVOT PEAK VEL: 1.51 M/S
DOP CALC LVOT STROKE VOLUME: 157.63 CM3
DOP CALCLVOT PEAK VEL VTI: 37.96 CM
E WAVE DECELERATION TIME: 317.63 MSEC
E/A RATIO: 0.99
E/E' RATIO: 12.38 M/S
ECHO LV POSTERIOR WALL: 0.99 CM (ref 0.6–1.1)
EJECTION FRACTION: 65 %
FRACTIONAL SHORTENING: 30 % (ref 28–44)
INTERVENTRICULAR SEPTUM: 0.99 CM (ref 0.6–1.1)
IVRT: 68.51 MSEC
LA MAJOR: 6.98 CM
LA MINOR: 7.12 CM
LA WIDTH: 4.91 CM
LEFT ATRIUM SIZE: 5.27 CM
LEFT ATRIUM VOLUME INDEX: 59.9 ML/M2
LEFT ATRIUM VOLUME: 155.04 CM3
LEFT INTERNAL DIMENSION IN SYSTOLE: 4.01 CM (ref 2.1–4)
LEFT VENTRICLE DIASTOLIC VOLUME INDEX: 63.26 ML/M2
LEFT VENTRICLE DIASTOLIC VOLUME: 163.84 ML
LEFT VENTRICLE MASS INDEX: 88 G/M2
LEFT VENTRICLE SYSTOLIC VOLUME INDEX: 27.1 ML/M2
LEFT VENTRICLE SYSTOLIC VOLUME: 70.25 ML
LEFT VENTRICULAR INTERNAL DIMENSION IN DIASTOLE: 5.76 CM (ref 3.5–6)
LEFT VENTRICULAR MASS: 227.39 G
LV LATERAL E/E' RATIO: 9.9 M/S
LV SEPTAL E/E' RATIO: 16.5 M/S
MV A" WAVE DURATION": 10.56 MSEC
MV PEAK A VEL: 1 M/S
MV PEAK E VEL: 0.99 M/S
MV STENOSIS PRESSURE HALF TIME: 92.11 MS
MV VALVE AREA P 1/2 METHOD: 2.39 CM2
OHS CV CPX 1 MINUTE RECOVERY HEART RATE: 113 BPM
OHS CV CPX 85 PERCENT MAX PREDICTED HEART RATE MALE: 140
OHS CV CPX ESTIMATED METS: 8
OHS CV CPX MAX PREDICTED HEART RATE: 165
OHS CV CPX PATIENT IS FEMALE: 0
OHS CV CPX PATIENT IS MALE: 1
OHS CV CPX PEAK DIASTOLIC BLOOD PRESSURE: 38 MMHG
OHS CV CPX PEAK HEAR RATE: 142 BPM
OHS CV CPX PEAK RATE PRESSURE PRODUCT: ABNORMAL
OHS CV CPX PEAK SYSTOLIC BLOOD PRESSURE: 192 MMHG
OHS CV CPX PERCENT MAX PREDICTED HEART RATE ACHIEVED: 86
OHS CV CPX RATE PRESSURE PRODUCT PRESENTING: 9322
PISA TR MAX VEL: 3 M/S
PULM VEIN S/D RATIO: 0.77
PV PEAK D VEL: 0.66 M/S
PV PEAK S VEL: 0.51 M/S
RA MAJOR: 5.87 CM
RA PRESSURE: 8 MMHG
RA WIDTH: 5.8 CM
RIGHT VENTRICULAR END-DIASTOLIC DIMENSION: 5.07 CM
RV TISSUE DOPPLER FREE WALL SYSTOLIC VELOCITY 1 (APICAL 4 CHAMBER VIEW): 16.89 CM/S
SINUS: 3.12 CM
STJ: 2.69 CM
STRESS ECHO POST EXERCISE DUR MIN: 5 MINUTES
STRESS ECHO POST EXERCISE DUR SEC: 21 SECONDS
SYSTOLIC BLOOD PRESSURE: 118 MMHG
TDI LATERAL: 0.1 M/S
TDI SEPTAL: 0.06 M/S
TDI: 0.08 M/S
TR MAX PG: 36 MMHG
TRICUSPID ANNULAR PLANE SYSTOLIC EXCURSION: 3.73 CM
TV REST PULMONARY ARTERY PRESSURE: 44 MMHG

## 2022-10-21 PROCEDURE — 93351 STRESS TTE COMPLETE: CPT | Mod: 26,,, | Performed by: INTERNAL MEDICINE

## 2022-10-21 PROCEDURE — 93351 STRESS TTE COMPLETE: CPT

## 2022-10-21 PROCEDURE — 93351 STRESS ECHO (CUPID ONLY): ICD-10-PCS | Mod: 26,,, | Performed by: INTERNAL MEDICINE

## 2022-10-24 ENCOUNTER — TELEPHONE (OUTPATIENT)
Dept: CARDIOLOGY | Facility: HOSPITAL | Age: 57
End: 2022-10-24
Payer: OTHER GOVERNMENT

## 2022-10-24 DIAGNOSIS — E83.39 HYPOPHOSPHATEMIA: Primary | ICD-10-CM

## 2022-10-24 NOTE — TELEPHONE ENCOUNTER
Attempted to contact patient due to low phos. hos sent to pharm on file with plan to repeat P in two weeks.

## 2022-10-25 ENCOUNTER — OFFICE VISIT (OUTPATIENT)
Dept: HEPATOLOGY | Facility: CLINIC | Age: 57
End: 2022-10-25
Payer: OTHER GOVERNMENT

## 2022-10-25 ENCOUNTER — PATIENT MESSAGE (OUTPATIENT)
Dept: CARDIOLOGY | Facility: CLINIC | Age: 57
End: 2022-10-25
Payer: OTHER GOVERNMENT

## 2022-10-25 VITALS
TEMPERATURE: 97 F | HEIGHT: 71 IN | SYSTOLIC BLOOD PRESSURE: 126 MMHG | HEART RATE: 74 BPM | WEIGHT: 315 LBS | RESPIRATION RATE: 18 BRPM | DIASTOLIC BLOOD PRESSURE: 60 MMHG | BODY MASS INDEX: 44.1 KG/M2 | OXYGEN SATURATION: 96 %

## 2022-10-25 DIAGNOSIS — K74.60 LIVER CIRRHOSIS SECONDARY TO NASH: Primary | ICD-10-CM

## 2022-10-25 DIAGNOSIS — K75.81 LIVER CIRRHOSIS SECONDARY TO NASH: Primary | ICD-10-CM

## 2022-10-25 DIAGNOSIS — R79.89 ELEVATED LFTS: ICD-10-CM

## 2022-10-25 DIAGNOSIS — I85.10 SECONDARY ESOPHAGEAL VARICES WITHOUT BLEEDING: ICD-10-CM

## 2022-10-25 PROCEDURE — 99214 OFFICE O/P EST MOD 30 MIN: CPT | Mod: S$GLB,,, | Performed by: STUDENT IN AN ORGANIZED HEALTH CARE EDUCATION/TRAINING PROGRAM

## 2022-10-25 PROCEDURE — 99214 PR OFFICE/OUTPT VISIT, EST, LEVL IV, 30-39 MIN: ICD-10-PCS | Mod: S$GLB,,, | Performed by: STUDENT IN AN ORGANIZED HEALTH CARE EDUCATION/TRAINING PROGRAM

## 2022-10-25 PROCEDURE — 99999 PR PBB SHADOW E&M-EST. PATIENT-LVL IV: CPT | Mod: PBBFAC,,, | Performed by: STUDENT IN AN ORGANIZED HEALTH CARE EDUCATION/TRAINING PROGRAM

## 2022-10-25 PROCEDURE — 99999 PR PBB SHADOW E&M-EST. PATIENT-LVL IV: ICD-10-PCS | Mod: PBBFAC,,, | Performed by: STUDENT IN AN ORGANIZED HEALTH CARE EDUCATION/TRAINING PROGRAM

## 2022-10-25 NOTE — PROGRESS NOTES
Hepatology Follow Up Note    Referring provider: No ref. provider found  PCP: Elkin Guo III, MD    Chief complaint: follow up GUAJARDO cirrhosis    HPI:  Ashwin Peter is a 57 y.o. male with history of GUAJARDO related cirrhosis who presents for scheduled follow up.    10/25/22:  He is without complaints today.  He was hospitalized in July 2022 with volume overload treated with IV Lasix.  On discharge he was switched to oral torsemide and spironolactone.  He reports overall doing well since.  Is compliant with diuretics without recent abdominal distention.  He denies other signs of decompensated cirrhosis including no recent encephalopathy, jaundice, GI bleeding.    1/27/22 (Dr. Bowie): He was recently admitted to the hospital for acute hypoxic respiratory failure and volume overload. He briefly required supplemental O2 but was discharged on room air. He was discharged on Lasix 40 mg BID and aldactone 100 mg daily. He admits to stable lower extremity edema despite adherence with diuretics and a low-salt diet. He otherwise denies any other signs of decompensated cirrhosis including recent encephalopathy or GI bleeding.     5/3/21:  He is without complaints today.  He has not had recent hospitalizations or ED visits.  He was found to have elevated LFTs in December 2020 with both elevated bilirubin and transaminases.  He does report getting a sinus infection in October 2020 that was treated with a steroid injection and amoxicillin.  He was also found to have COVID in December 2020.  He reports compliance with diuretics without recent abdominal distension. He denies other signs of decompensated cirrhosis including no recent  encephalopathy or GI bleeding.    Past Medical History:   Diagnosis Date    Anxiety     Hyperlipidemia     Morbid obesity with BMI of 45.0-49.9, adult     Multiple pulmonary nodules     Obesity     Other cirrhosis of liver 1/13/2021    Portal hypertension 2/11/2021    Sarcoidosis of lung with  sarcoidosis of lymph nodes     Splenomegaly 2020    Thrombocytopenia 2020       Past Surgical History:   Procedure Laterality Date    ANTERIOR CRUCIATE LIGAMENT REPAIR  2007    right knee    COLONOSCOPY N/A 2021    Procedure: COLONOSCOPY;  Surgeon: Jeff Olvera MD;  Location: [facility] ENDO (2ND FLR);  Service: Endoscopy;  Laterality: N/A;  rectal bleeding,   2nd floor BMI 50 (354 lbs)  COVID test on 21 at [facility]    ESOPHAGOGASTRODUODENOSCOPY N/A 2021    Procedure: EGD (ESOPHAGOGASTRODUODENOSCOPY);  Surgeon: Jeff Olvera MD;  Location: [facility] ENDO (2ND FLR);  Service: Endoscopy;  Laterality: N/A;  variceal screening/ labs the am of procedure  2nd floor BMI 50 (354 lbs)    ESOPHAGOGASTRODUODENOSCOPY N/A 3/31/2022    Procedure: EGD (ESOPHAGOGASTRODUODENOSCOPY);  Surgeon: Jeff Olvera MD;  Location: [facility] ENDO (2ND FLR);  Service: Endoscopy;  Laterality: N/A;  BMI-52    Wt:375#      cirrhosis, variceal screening-labs done on 3/29-GT  vaccinated-GT    LYMPHADENECTOMY      MENISCECTOMY      left knee    NASAL SEPTUM SURGERY      WISDOM TOOTH EXTRACTION         Family History   Problem Relation Age of Onset    Hypertension Father     Heart attack Father 55    Diabetes Paternal Grandmother     Aneurysm Mother         eye    Dementia Mother     Colon cancer Neg Hx     Prostate cancer Neg Hx     Sarcoidosis Neg Hx        Social History     Tobacco Use    Smoking status: Never     Passive exposure: Never    Smokeless tobacco: Never   Substance Use Topics    Alcohol use: No    Drug use: No       Current Outpatient Medications   Medication Sig Dispense Refill    acetaminophen (TYLENOL) 500 MG tablet Take 3 tablets by mouth daily as needed for knee pain or headache      albuterol (PROVENTIL/VENTOLIN HFA) 90 mcg/actuation inhaler SMARTSI-2 Puff(s) By Mouth Every 4-6 Hours PRN      dapagliflozin (FARXIGA) 10 mg tablet Take 1 tablet (10 mg total) by mouth once daily. 90 tablet 3     "diclofenac sodium (VOLTAREN) 1 % Gel Apply topically twice daily as needed for knee pain      hydrOXYzine HCL (ATARAX) 25 MG tablet Take 1 tablet (25 mg total) by mouth 3 (three) times daily as needed for Anxiety. 30 tablet 0    phenylephrine HCl (SINEX REGULAR NASL) 2 sprays by Each Nostril route once daily.      potassium phosphate, monobasic, (K-PHOS) 500 mg TbSO Take 2 tablets (1,000 mg total) by mouth once daily. 60 tablet 11    predniSONE (DELTASONE) 10 MG tablet Take 1 tablet (10 mg total) by mouth once daily. 90 tablet 3    spironolactone (ALDACTONE) 25 MG tablet Take 1 tablet (25 mg total) by mouth once daily. 30 tablet 11    torsemide (DEMADEX) 20 MG Tab Take 3 tablets (60 mg total) by mouth once daily. Take torsemide 40mg (2 tablets) in the morning and 20mg (1 tablet) in the afternoon 90 tablet 11    potassium chloride (KLOR-CON) 10 MEQ TbSR Take 1 tablet (10 mEq total) by mouth once daily. (Patient not taking: Reported on 10/25/2022) 90 tablet 3     No current facility-administered medications for this visit.       Review of patient's allergies indicates:  No Known Allergies    Review of Systems   Constitutional:  Negative for fever and weight loss.   Gastrointestinal:  Negative for abdominal pain, blood in stool, constipation, diarrhea, heartburn, melena, nausea and vomiting.     Vitals:    10/25/22 1554   BP: 126/60   Pulse: 74   Resp: 18   Temp: 97.4 °F (36.3 °C)   TempSrc: Oral   SpO2: 96%   Weight: (!) 151.1 kg (333 lb 1.8 oz)   Height: 5' 11" (1.803 m)       Physical Exam  Vitals reviewed.   Constitutional:       General: He is not in acute distress.     Appearance: He is not ill-appearing.   HENT:      Head: Normocephalic and atraumatic.   Eyes:      General: Scleral icterus present.      Extraocular Movements: Extraocular movements intact.   Cardiovascular:      Rate and Rhythm: Normal rate and regular rhythm.   Pulmonary:      Effort: Pulmonary effort is normal. No respiratory distress. "   Abdominal:      General: Bowel sounds are normal. There is no distension.      Palpations: Abdomen is soft.      Tenderness: There is no abdominal tenderness. There is no guarding or rebound.   Skin:     Coloration: Skin is not jaundiced.   Neurological:      Mental Status: He is alert.       LABS: I personally reviewed pertinent laboratory findings.    Lab Results   Component Value Date    WBC 8.58 08/30/2022    WBC 8.51 08/30/2022    HGB 11.8 (L) 08/30/2022    HGB 11.7 (L) 08/30/2022    HCT 35.6 (L) 08/30/2022    HCT 36.1 (L) 08/30/2022    MCV 97 08/30/2022    MCV 98 08/30/2022     (L) 08/30/2022     (L) 08/30/2022       Lab Results   Component Value Date     10/17/2022    K 3.5 10/17/2022     10/17/2022    CO2 28 10/17/2022    BUN 14 10/17/2022    CREATININE 1.0 10/17/2022    CALCIUM 8.3 (L) 10/17/2022    ANIONGAP 7 (L) 10/17/2022    ESTGFRAFRICA >60.0 07/25/2022    EGFRNONAA >60.0 07/25/2022       Lab Results   Component Value Date    ALT 30 10/17/2022    AST 58 (H) 10/17/2022    ALKPHOS 109 10/17/2022    BILITOT 4.3 (H) 10/17/2022       Lab Results   Component Value Date    HEPAIGM Negative 12/22/2020    HEPBIGM Negative 12/22/2020    HEPBCAB Negative 12/22/2020    HEPCAB Negative 03/29/2022       Lab Results   Component Value Date    SMOOTHMUSCAB Negative 1:40 12/22/2020    CERULOPLSM 26.0 12/22/2020      MELD-Na score: 18 at 8/30/2022  8:45 AM  MELD score: 18 at 8/30/2022  8:45 AM  Calculated from:  Serum Creatinine: 1.3 mg/dL at 8/30/2022  8:45 AM  Serum Sodium: 140 mmol/L (Using max of 137 mmol/L) at 8/30/2022  8:45 AM  Total Bilirubin: 4.5 mg/dL at 8/30/2022  8:45 AM  INR(ratio): 1.3 at 8/30/2022  8:45 AM  Age: 57 years    IMAGING: I personally reviewed imaging studies.    Assessment:  57 y.o. male with GUAJARDO related cirrhosis. MELD-Na 15.    1. Liver cirrhosis secondary to GUAJARDO    2. Secondary esophageal varices without bleeding    3. Elevated LFTs        Recommendations:  1.  Cirrhosis due to GUAJARDO  - Patient counseled on diet, weight loss, and control of co-morbidities. Encouraged to lose 10% of his body weight over the next 12 months.    2. Ascites/Edema: 2 gm Na diet.  Diuretics per cardiology. Currently torsemide 60 mg daily and spironolactone 25 mg daily.    3. Encephalopathy: Not an active issue.    4. Transplant candidacy: MELD 18 primarily driven by elevated bilirubin. He otherwise has no significant complications from his cirrhosis. Will consider transplant evaluation if MELD remains >15. He would require RHC for transplant evaluation given echo with persistently elevated PASP.    Cirrhosis HM  - HCC screening: US in 09/2022 without HCC. AFP 2.2. Repeat US and AFP every 6 months.    - Variceal screening: EGD in 03/2022 showed grade I varices. Repeat EGD in 03/2023.    - Immunizations: Recommend HAV and HBV vaccinations if not immune.    - CRC screening: Colonoscopy 04/2021 with polypectomy x1. Repeat colonoscopy in 04/2026.    Labs in 1 month. Return to clinic in 2 months with labs.    Daniel Crowder MD  Staff Physician  Hepatology and Liver Transplant  [facility]  [facility]

## 2022-11-07 ENCOUNTER — LAB VISIT (OUTPATIENT)
Dept: LAB | Facility: HOSPITAL | Age: 57
End: 2022-11-07
Payer: OTHER GOVERNMENT

## 2022-11-07 DIAGNOSIS — E83.39 HYPOPHOSPHATEMIA: ICD-10-CM

## 2022-11-07 LAB — PHOSPHATE SERPL-MCNC: 2.8 MG/DL (ref 2.7–4.5)

## 2022-11-07 PROCEDURE — 84100 ASSAY OF PHOSPHORUS: CPT | Performed by: STUDENT IN AN ORGANIZED HEALTH CARE EDUCATION/TRAINING PROGRAM

## 2022-11-17 ENCOUNTER — LAB VISIT (OUTPATIENT)
Dept: LAB | Facility: HOSPITAL | Age: 57
End: 2022-11-17
Attending: STUDENT IN AN ORGANIZED HEALTH CARE EDUCATION/TRAINING PROGRAM
Payer: OTHER GOVERNMENT

## 2022-11-17 DIAGNOSIS — K74.60 LIVER CIRRHOSIS SECONDARY TO NASH: ICD-10-CM

## 2022-11-17 DIAGNOSIS — K75.81 LIVER CIRRHOSIS SECONDARY TO NASH: ICD-10-CM

## 2022-11-17 LAB
AFP SERPL-MCNC: 2.1 NG/ML (ref 0–8.4)
ALBUMIN SERPL BCP-MCNC: 2.6 G/DL (ref 3.5–5.2)
ALP SERPL-CCNC: 119 U/L (ref 55–135)
ALT SERPL W/O P-5'-P-CCNC: 25 U/L (ref 10–44)
ANION GAP SERPL CALC-SCNC: 9 MMOL/L (ref 8–16)
AST SERPL-CCNC: 46 U/L (ref 10–40)
BASOPHILS # BLD AUTO: 0.02 K/UL (ref 0–0.2)
BASOPHILS NFR BLD: 0.4 % (ref 0–1.9)
BILIRUB SERPL-MCNC: 3.4 MG/DL (ref 0.1–1)
BUN SERPL-MCNC: 17 MG/DL (ref 6–20)
CALCIUM SERPL-MCNC: 8.8 MG/DL (ref 8.7–10.5)
CHLORIDE SERPL-SCNC: 106 MMOL/L (ref 95–110)
CO2 SERPL-SCNC: 27 MMOL/L (ref 23–29)
CREAT SERPL-MCNC: 1.3 MG/DL (ref 0.5–1.4)
DIFFERENTIAL METHOD: ABNORMAL
EOSINOPHIL # BLD AUTO: 0.1 K/UL (ref 0–0.5)
EOSINOPHIL NFR BLD: 1.3 % (ref 0–8)
ERYTHROCYTE [DISTWIDTH] IN BLOOD BY AUTOMATED COUNT: 15.6 % (ref 11.5–14.5)
EST. GFR  (NO RACE VARIABLE): >60 ML/MIN/1.73 M^2
GLUCOSE SERPL-MCNC: 63 MG/DL (ref 70–110)
HCT VFR BLD AUTO: 37.7 % (ref 40–54)
HGB BLD-MCNC: 12 G/DL (ref 14–18)
IMM GRANULOCYTES # BLD AUTO: 0.02 K/UL (ref 0–0.04)
IMM GRANULOCYTES NFR BLD AUTO: 0.4 % (ref 0–0.5)
INR PPP: 1.2 (ref 0.8–1.2)
LYMPHOCYTES # BLD AUTO: 0.8 K/UL (ref 1–4.8)
LYMPHOCYTES NFR BLD: 14.9 % (ref 18–48)
MCH RBC QN AUTO: 31.5 PG (ref 27–31)
MCHC RBC AUTO-ENTMCNC: 31.8 G/DL (ref 32–36)
MCV RBC AUTO: 99 FL (ref 82–98)
MONOCYTES # BLD AUTO: 0.8 K/UL (ref 0.3–1)
MONOCYTES NFR BLD: 14.3 % (ref 4–15)
NEUTROPHILS # BLD AUTO: 3.7 K/UL (ref 1.8–7.7)
NEUTROPHILS NFR BLD: 68.7 % (ref 38–73)
NRBC BLD-RTO: 0 /100 WBC
PLATELET # BLD AUTO: 118 K/UL (ref 150–450)
PMV BLD AUTO: 12 FL (ref 9.2–12.9)
POTASSIUM SERPL-SCNC: 3.8 MMOL/L (ref 3.5–5.1)
PROT SERPL-MCNC: 6 G/DL (ref 6–8.4)
PROTHROMBIN TIME: 12.8 SEC (ref 9–12.5)
RBC # BLD AUTO: 3.81 M/UL (ref 4.6–6.2)
SODIUM SERPL-SCNC: 142 MMOL/L (ref 136–145)
WBC # BLD AUTO: 5.31 K/UL (ref 3.9–12.7)

## 2022-11-17 PROCEDURE — 85025 COMPLETE CBC W/AUTO DIFF WBC: CPT | Performed by: STUDENT IN AN ORGANIZED HEALTH CARE EDUCATION/TRAINING PROGRAM

## 2022-11-17 PROCEDURE — 85610 PROTHROMBIN TIME: CPT | Performed by: STUDENT IN AN ORGANIZED HEALTH CARE EDUCATION/TRAINING PROGRAM

## 2022-11-17 PROCEDURE — 80321 ALCOHOLS BIOMARKERS 1OR 2: CPT | Performed by: STUDENT IN AN ORGANIZED HEALTH CARE EDUCATION/TRAINING PROGRAM

## 2022-11-17 PROCEDURE — 36415 COLL VENOUS BLD VENIPUNCTURE: CPT | Performed by: STUDENT IN AN ORGANIZED HEALTH CARE EDUCATION/TRAINING PROGRAM

## 2022-11-17 PROCEDURE — 82105 ALPHA-FETOPROTEIN SERUM: CPT | Performed by: STUDENT IN AN ORGANIZED HEALTH CARE EDUCATION/TRAINING PROGRAM

## 2022-11-17 PROCEDURE — 80053 COMPREHEN METABOLIC PANEL: CPT | Performed by: STUDENT IN AN ORGANIZED HEALTH CARE EDUCATION/TRAINING PROGRAM

## 2022-11-23 LAB
CLINICAL BIOCHEMIST REVIEW: NORMAL
PETH 16:0/18.1 (POPETH): <10 NG/ML
PETH 16:0/18.2 (PLPETH): <10 NG/ML

## 2022-12-20 ENCOUNTER — OFFICE VISIT (OUTPATIENT)
Dept: SLEEP MEDICINE | Facility: CLINIC | Age: 57
End: 2022-12-20
Payer: OTHER GOVERNMENT

## 2022-12-20 VITALS
WEIGHT: 315 LBS | DIASTOLIC BLOOD PRESSURE: 68 MMHG | HEART RATE: 82 BPM | BODY MASS INDEX: 47.04 KG/M2 | SYSTOLIC BLOOD PRESSURE: 125 MMHG

## 2022-12-20 DIAGNOSIS — G47.33 OSA ON CPAP: Primary | ICD-10-CM

## 2022-12-20 DIAGNOSIS — D86.2 SARCOIDOSIS OF LUNG WITH SARCOIDOSIS OF LYMPH NODES: ICD-10-CM

## 2022-12-20 DIAGNOSIS — I50.32 CHRONIC DIASTOLIC HEART FAILURE: ICD-10-CM

## 2022-12-20 DIAGNOSIS — I27.20 PULMONARY HYPERTENSION: ICD-10-CM

## 2022-12-20 PROCEDURE — 99204 OFFICE O/P NEW MOD 45 MIN: CPT | Mod: S$GLB,,, | Performed by: NURSE PRACTITIONER

## 2022-12-20 PROCEDURE — 99204 PR OFFICE/OUTPT VISIT, NEW, LEVL IV, 45-59 MIN: ICD-10-PCS | Mod: S$GLB,,, | Performed by: NURSE PRACTITIONER

## 2022-12-20 PROCEDURE — 99999 PR PBB SHADOW E&M-EST. PATIENT-LVL III: ICD-10-PCS | Mod: PBBFAC,,, | Performed by: NURSE PRACTITIONER

## 2022-12-20 PROCEDURE — 99999 PR PBB SHADOW E&M-EST. PATIENT-LVL III: CPT | Mod: PBBFAC,,, | Performed by: NURSE PRACTITIONER

## 2022-12-20 NOTE — PROGRESS NOTES
Referred by Dr Baumann  CHIEF COMPLAINT:LAW    HISTORY OF PRESENT ILLNESS:He was diagnosed with sleep apnea by split-study done at [facility] 3/7/03 revealing RDI 68.5/low sat 85%, titrated cpap 11cm. Had titration 2014 with effective setting of 12cm. Uses cpap qhs, can't sleep w/o it.Using Wisp mask. Snoring and apneic pauses resolved.Machine outdated old model. Does not meet criteria for home O2 prn on 6MWT's. Active lifestyle/busy.   Pulm HTN 2021 Echo est PA systolic pressure 44mmHg, EF 65%  BP stable    Interrogation- avg 5:36h/n AHI 0.9, 24/30d>4hr, 0% leak and 0% PB    FAMILY HISTORY: No known sleep disorders.   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 Remains adherent with cpap, benefits from therapy. AHI<5. Eligible new machine.   Pulmonary HTN, pulmonary sarcoidosis    PLAN:   1. NEW machine cpap 12cm via TH SDME. RTC b/ 31-90d after setup adherence   2. Discussed control of LAW, attempt obtain 1h more sleeptime  See cards as advised HTN mgt/continue meds    Thank you for allowing me the opportunity to participate in the care of your patient

## 2023-01-20 ENCOUNTER — PATIENT MESSAGE (OUTPATIENT)
Dept: INTERNAL MEDICINE | Facility: CLINIC | Age: 58
End: 2023-01-20
Payer: OTHER GOVERNMENT

## 2023-01-24 ENCOUNTER — LAB VISIT (OUTPATIENT)
Dept: LAB | Facility: HOSPITAL | Age: 58
End: 2023-01-24
Attending: STUDENT IN AN ORGANIZED HEALTH CARE EDUCATION/TRAINING PROGRAM
Payer: OTHER GOVERNMENT

## 2023-01-24 ENCOUNTER — OFFICE VISIT (OUTPATIENT)
Dept: HEPATOLOGY | Facility: CLINIC | Age: 58
End: 2023-01-24
Payer: OTHER GOVERNMENT

## 2023-01-24 VITALS
DIASTOLIC BLOOD PRESSURE: 63 MMHG | BODY MASS INDEX: 44.1 KG/M2 | WEIGHT: 315 LBS | SYSTOLIC BLOOD PRESSURE: 135 MMHG | HEART RATE: 68 BPM | HEIGHT: 71 IN | OXYGEN SATURATION: 96 % | TEMPERATURE: 98 F

## 2023-01-24 DIAGNOSIS — K75.81 LIVER CIRRHOSIS SECONDARY TO NASH: ICD-10-CM

## 2023-01-24 DIAGNOSIS — K75.81 LIVER CIRRHOSIS SECONDARY TO NASH: Primary | ICD-10-CM

## 2023-01-24 DIAGNOSIS — I85.10 SECONDARY ESOPHAGEAL VARICES WITHOUT BLEEDING: ICD-10-CM

## 2023-01-24 DIAGNOSIS — R79.89 ELEVATED LFTS: ICD-10-CM

## 2023-01-24 DIAGNOSIS — K74.60 LIVER CIRRHOSIS SECONDARY TO NASH: Primary | ICD-10-CM

## 2023-01-24 DIAGNOSIS — K74.60 LIVER CIRRHOSIS SECONDARY TO NASH: ICD-10-CM

## 2023-01-24 LAB
AFP SERPL-MCNC: <2 NG/ML (ref 0–8.4)
ALBUMIN SERPL BCP-MCNC: 2.7 G/DL (ref 3.5–5.2)
ALP SERPL-CCNC: 125 U/L (ref 55–135)
ALT SERPL W/O P-5'-P-CCNC: 38 U/L (ref 10–44)
ANION GAP SERPL CALC-SCNC: 9 MMOL/L (ref 8–16)
AST SERPL-CCNC: 71 U/L (ref 10–40)
BASOPHILS # BLD AUTO: 0.02 K/UL (ref 0–0.2)
BASOPHILS NFR BLD: 0.5 % (ref 0–1.9)
BILIRUB SERPL-MCNC: 3.8 MG/DL (ref 0.1–1)
BUN SERPL-MCNC: 15 MG/DL (ref 6–20)
CALCIUM SERPL-MCNC: 8.3 MG/DL (ref 8.7–10.5)
CHLORIDE SERPL-SCNC: 107 MMOL/L (ref 95–110)
CO2 SERPL-SCNC: 22 MMOL/L (ref 23–29)
CREAT SERPL-MCNC: 0.9 MG/DL (ref 0.5–1.4)
DIFFERENTIAL METHOD: ABNORMAL
EOSINOPHIL # BLD AUTO: 0.1 K/UL (ref 0–0.5)
EOSINOPHIL NFR BLD: 1.2 % (ref 0–8)
ERYTHROCYTE [DISTWIDTH] IN BLOOD BY AUTOMATED COUNT: 17.7 % (ref 11.5–14.5)
EST. GFR  (NO RACE VARIABLE): >60 ML/MIN/1.73 M^2
GLUCOSE SERPL-MCNC: 109 MG/DL (ref 70–110)
HCT VFR BLD AUTO: 41.7 % (ref 40–54)
HGB BLD-MCNC: 12.7 G/DL (ref 14–18)
IMM GRANULOCYTES # BLD AUTO: 0.02 K/UL (ref 0–0.04)
IMM GRANULOCYTES NFR BLD AUTO: 0.5 % (ref 0–0.5)
INR PPP: 1.3 (ref 0.8–1.2)
LYMPHOCYTES # BLD AUTO: 0.5 K/UL (ref 1–4.8)
LYMPHOCYTES NFR BLD: 12.2 % (ref 18–48)
MCH RBC QN AUTO: 29.4 PG (ref 27–31)
MCHC RBC AUTO-ENTMCNC: 30.5 G/DL (ref 32–36)
MCV RBC AUTO: 97 FL (ref 82–98)
MONOCYTES # BLD AUTO: 0.4 K/UL (ref 0.3–1)
MONOCYTES NFR BLD: 8.6 % (ref 4–15)
NEUTROPHILS # BLD AUTO: 3.2 K/UL (ref 1.8–7.7)
NEUTROPHILS NFR BLD: 77 % (ref 38–73)
NRBC BLD-RTO: 0 /100 WBC
PLATELET # BLD AUTO: 85 K/UL (ref 150–450)
PMV BLD AUTO: ABNORMAL FL (ref 9.2–12.9)
POTASSIUM SERPL-SCNC: 4 MMOL/L (ref 3.5–5.1)
PROT SERPL-MCNC: 6.3 G/DL (ref 6–8.4)
PROTHROMBIN TIME: 12.8 SEC (ref 9–12.5)
RBC # BLD AUTO: 4.32 M/UL (ref 4.6–6.2)
SODIUM SERPL-SCNC: 138 MMOL/L (ref 136–145)
WBC # BLD AUTO: 4.19 K/UL (ref 3.9–12.7)

## 2023-01-24 PROCEDURE — 99999 PR PBB SHADOW E&M-EST. PATIENT-LVL IV: ICD-10-PCS | Mod: PBBFAC,,, | Performed by: STUDENT IN AN ORGANIZED HEALTH CARE EDUCATION/TRAINING PROGRAM

## 2023-01-24 PROCEDURE — 82105 ALPHA-FETOPROTEIN SERUM: CPT | Performed by: STUDENT IN AN ORGANIZED HEALTH CARE EDUCATION/TRAINING PROGRAM

## 2023-01-24 PROCEDURE — 80321 ALCOHOLS BIOMARKERS 1OR 2: CPT | Performed by: STUDENT IN AN ORGANIZED HEALTH CARE EDUCATION/TRAINING PROGRAM

## 2023-01-24 PROCEDURE — 99214 OFFICE O/P EST MOD 30 MIN: CPT | Mod: S$GLB,,, | Performed by: STUDENT IN AN ORGANIZED HEALTH CARE EDUCATION/TRAINING PROGRAM

## 2023-01-24 PROCEDURE — 36415 COLL VENOUS BLD VENIPUNCTURE: CPT | Performed by: STUDENT IN AN ORGANIZED HEALTH CARE EDUCATION/TRAINING PROGRAM

## 2023-01-24 PROCEDURE — 85025 COMPLETE CBC W/AUTO DIFF WBC: CPT | Performed by: STUDENT IN AN ORGANIZED HEALTH CARE EDUCATION/TRAINING PROGRAM

## 2023-01-24 PROCEDURE — 99999 PR PBB SHADOW E&M-EST. PATIENT-LVL IV: CPT | Mod: PBBFAC,,, | Performed by: STUDENT IN AN ORGANIZED HEALTH CARE EDUCATION/TRAINING PROGRAM

## 2023-01-24 PROCEDURE — 85610 PROTHROMBIN TIME: CPT | Performed by: STUDENT IN AN ORGANIZED HEALTH CARE EDUCATION/TRAINING PROGRAM

## 2023-01-24 PROCEDURE — 99214 PR OFFICE/OUTPT VISIT, EST, LEVL IV, 30-39 MIN: ICD-10-PCS | Mod: S$GLB,,, | Performed by: STUDENT IN AN ORGANIZED HEALTH CARE EDUCATION/TRAINING PROGRAM

## 2023-01-24 PROCEDURE — 80053 COMPREHEN METABOLIC PANEL: CPT | Performed by: STUDENT IN AN ORGANIZED HEALTH CARE EDUCATION/TRAINING PROGRAM

## 2023-01-24 NOTE — PROGRESS NOTES
Hepatology Follow Up Note    Referring provider: No ref. provider found  PCP: Elkin Guo III, MD    Chief complaint: follow up GUAJARDO cirrhosis    HPI:  Ashwin Peter is a 57 y.o. male with history of GUAJARDO related cirrhosis who presents for scheduled follow up.    1/24/23: He is without complaints today. No recent hospitalizations or ED visits. Compliant with diuretics without recent abdominal distention.  He denies other signs of decompensated cirrhosis including no recent encephalopathy, jaundice, GI bleeding.    10/25/22:  He is without complaints today.  He was hospitalized in July 2022 with volume overload treated with IV Lasix.  On discharge he was switched to oral torsemide and spironolactone.  He reports overall doing well since.  Is compliant with diuretics without recent abdominal distention.  He denies other signs of decompensated cirrhosis including no recent encephalopathy, jaundice, GI bleeding.    1/27/22 (Dr. Bowie): He was recently admitted to the hospital for acute hypoxic respiratory failure and volume overload. He briefly required supplemental O2 but was discharged on room air. He was discharged on Lasix 40 mg BID and aldactone 100 mg daily. He admits to stable lower extremity edema despite adherence with diuretics and a low-salt diet. He otherwise denies any other signs of decompensated cirrhosis including recent encephalopathy or GI bleeding.     5/3/21:  He is without complaints today.  He has not had recent hospitalizations or ED visits.  He was found to have elevated LFTs in December 2020 with both elevated bilirubin and transaminases.  He does report getting a sinus infection in October 2020 that was treated with a steroid injection and amoxicillin.  He was also found to have COVID in December 2020.  He reports compliance with diuretics without recent abdominal distension. He denies other signs of decompensated cirrhosis including no recent  encephalopathy or GI bleeding.    Past  Medical History:   Diagnosis Date    Anxiety     Hyperlipidemia     Morbid obesity with BMI of 45.0-49.9, adult     Multiple pulmonary nodules     Obesity     Other cirrhosis of liver 1/13/2021    Portal hypertension 2/11/2021    Sarcoidosis of lung with sarcoidosis of lymph nodes     Splenomegaly 12/22/2020    Thrombocytopenia 12/22/2020       Past Surgical History:   Procedure Laterality Date    ANTERIOR CRUCIATE LIGAMENT REPAIR  2007    right knee    COLONOSCOPY N/A 4/8/2021    Procedure: COLONOSCOPY;  Surgeon: Jeff Olvera MD;  Location: [facility] ENDO (2ND FLR);  Service: Endoscopy;  Laterality: N/A;  rectal bleeding,   2nd floor BMI 50 (354 lbs)  COVID test on 4/5/21 at [facility]    ESOPHAGOGASTRODUODENOSCOPY N/A 4/8/2021    Procedure: EGD (ESOPHAGOGASTRODUODENOSCOPY);  Surgeon: Jeff Olvera MD;  Location: [facility] ENDO (2ND FLR);  Service: Endoscopy;  Laterality: N/A;  variceal screening/ labs the am of procedure  2nd floor BMI 50 (354 lbs)    ESOPHAGOGASTRODUODENOSCOPY N/A 3/31/2022    Procedure: EGD (ESOPHAGOGASTRODUODENOSCOPY);  Surgeon: Jeff Olvera MD;  Location: [facility] ENDO ([facility] FLR);  Service: Endoscopy;  Laterality: N/A;  BMI-52    Wt:375#      cirrhosis, variceal screening-labs done on 3/29-GT  vaccinated-GT    LYMPHADENECTOMY  1985    MENISCECTOMY      left knee    NASAL SEPTUM SURGERY  1985    WISDOM TOOTH EXTRACTION         Family History   Problem Relation Age of Onset    Hypertension Father     Heart attack Father 55    Diabetes Paternal Grandmother     Aneurysm Mother         eye    Dementia Mother     Colon cancer Neg Hx     Prostate cancer Neg Hx     Sarcoidosis Neg Hx        Social History     Tobacco Use    Smoking status: Never     Passive exposure: Never    Smokeless tobacco: Never   Substance Use Topics    Alcohol use: No    Drug use: No       Current Outpatient Medications   Medication Sig Dispense Refill    acetaminophen (TYLENOL) 500 MG tablet Take 3 tablets by mouth daily as  "needed for knee pain or headache      albuterol (PROVENTIL/VENTOLIN HFA) 90 mcg/actuation inhaler SMARTSI-2 Puff(s) By Mouth Every 4-6 Hours PRN      dapagliflozin (FARXIGA) 10 mg tablet Take 1 tablet (10 mg total) by mouth once daily. 90 tablet 3    diclofenac sodium (VOLTAREN) 1 % Gel Apply topically twice daily as needed for knee pain      hydrOXYzine HCL (ATARAX) 25 MG tablet Take 1 tablet (25 mg total) by mouth 3 (three) times daily as needed for Anxiety. 30 tablet 0    phenylephrine HCl (SINEX REGULAR NASL) 2 sprays by Each Nostril route once daily.      potassium chloride (KLOR-CON) 10 MEQ TbSR Take 1 tablet (10 mEq total) by mouth once daily. (Patient not taking: Reported on 10/25/2022) 90 tablet 3    potassium phosphate, monobasic, (K-PHOS) 500 mg TbSO Take 2 tablets (1,000 mg total) by mouth once daily. 60 tablet 11    predniSONE (DELTASONE) 10 MG tablet Take 1 tablet (10 mg total) by mouth once daily. 90 tablet 3    spironolactone (ALDACTONE) 25 MG tablet Take 1 tablet (25 mg total) by mouth once daily. 30 tablet 11    torsemide (DEMADEX) 20 MG Tab Take 3 tablets (60 mg total) by mouth once daily. Take torsemide 40mg (2 tablets) in the morning and 20mg (1 tablet) in the afternoon 90 tablet 11     No current facility-administered medications for this visit.       Review of patient's allergies indicates:  No Known Allergies    Review of Systems   Constitutional:  Negative for fever and weight loss.   Gastrointestinal:  Negative for abdominal pain, blood in stool, constipation, diarrhea, heartburn, melena, nausea and vomiting.     Vitals:    23 1330   BP: 135/63   Pulse: 68   Temp: 97.8 °F (36.6 °C)   TempSrc: Oral   SpO2: 96%   Weight: (!) 156 kg (343 lb 14.7 oz)   Height: 5' 11" (1.803 m)       Physical Exam  Vitals reviewed.   Constitutional:       General: He is not in acute distress.     Appearance: He is not ill-appearing.   HENT:      Head: Normocephalic and atraumatic.   Eyes:      General: " Scleral icterus present.      Extraocular Movements: Extraocular movements intact.   Cardiovascular:      Rate and Rhythm: Normal rate and regular rhythm.   Pulmonary:      Effort: Pulmonary effort is normal. No respiratory distress.   Abdominal:      General: Bowel sounds are normal. There is no distension.      Palpations: Abdomen is soft.      Tenderness: There is no abdominal tenderness. There is no guarding or rebound.   Skin:     Coloration: Skin is not jaundiced.   Neurological:      Mental Status: He is alert.       LABS: I personally reviewed pertinent laboratory findings.    Lab Results   Component Value Date    WBC 4.19 01/24/2023    HGB 12.7 (L) 01/24/2023    HCT 41.7 01/24/2023    MCV 97 01/24/2023    PLT 85 (L) 01/24/2023       Lab Results   Component Value Date     01/24/2023    K 4.0 01/24/2023     01/24/2023    CO2 22 (L) 01/24/2023    BUN 15 01/24/2023    CREATININE 0.9 01/24/2023    CALCIUM 8.3 (L) 01/24/2023    ANIONGAP 9 01/24/2023    ESTGFRAFRICA >60.0 07/25/2022    EGFRNONAA >60.0 07/25/2022       Lab Results   Component Value Date    ALT 38 01/24/2023    AST 71 (H) 01/24/2023    ALKPHOS 125 01/24/2023    BILITOT 3.8 (H) 01/24/2023       Lab Results   Component Value Date    HEPAIGM Negative 12/22/2020    HEPBIGM Negative 12/22/2020    HEPBCAB Negative 12/22/2020    HEPCAB Negative 03/29/2022       Lab Results   Component Value Date    SMOOTHMUSCAB Negative 1:40 12/22/2020    CERULOPLSM 26.0 12/22/2020      MELD-Na score: 14 at 1/24/2023 12:28 PM  MELD score: 14 at 1/24/2023 12:28 PM  Calculated from:  Serum Creatinine: 0.9 mg/dL (Using min of 1 mg/dL) at 1/24/2023 12:28 PM  Serum Sodium: 138 mmol/L (Using max of 137 mmol/L) at 1/24/2023 12:28 PM  Total Bilirubin: 3.8 mg/dL at 1/24/2023 12:28 PM  INR(ratio): 1.3 at 1/24/2023 12:28 PM  Age: 57 years    IMAGING: I personally reviewed imaging studies.    Assessment:  57 y.o. male with GUAJARDO related cirrhosis. MELD-Na 14.    1. Liver  cirrhosis secondary to GUAJARDO    2. Secondary esophageal varices without bleeding    3. Elevated LFTs        Recommendations:  1. Cirrhosis due to GUAJARDO  - Patient counseled on diet, weight loss, and control of co-morbidities. Encouraged to lose 10% of his body weight over the next 12 months.    2. Ascites/Edema: 2 gm Na diet.  Diuretics per cardiology. Currently torsemide 60 mg daily and spironolactone 25 mg daily.    3. Encephalopathy: Not an active issue.    4. Transplant candidacy: MELD 14 primarily driven by indirect hyperbilirubinemia. He otherwise has no significant complications from his cirrhosis. Will consider transplant evaluation if MELD remains >15 and/or worsening decompensations. He would require RHC for transplant evaluation given echo with persistently elevated PASP.    Cirrhosis HM  - HCC screening: US in 09/2022 without HCC. AFP 2.2. Repeat US and AFP every 6 months.    - Variceal screening: EGD in 03/2022 showed grade I varices. Repeat EGD in 03/2023. Ordered today.    - Immunizations: Recommend HAV and HBV vaccinations if not immune.    - CRC screening: Colonoscopy 04/2021 with polypectomy x1. Repeat colonoscopy in 04/2026.    Return to clinic in 2 months with labs and US.    I spent a total of 30 minutes on the day of the visit. This includes face to face time and non-face to face time preparing to see the patient (eg, review of tests), obtaining and/or reviewing separately obtained history, documenting clinical information in the electronic or other health record, independently interpreting results, and communicating results to the patient/family/caregiver, or care coordination.    Daniel Crowder MD  Staff Physician  Hepatology and Liver Transplant  [facility]  [facility] [place]

## 2023-01-27 LAB
CLINICAL BIOCHEMIST REVIEW: NORMAL
PLPETH BLD-MCNC: <10 NG/ML
POPETH BLD-MCNC: <10 NG/ML

## 2023-03-02 ENCOUNTER — CLINICAL SUPPORT (OUTPATIENT)
Dept: ENDOSCOPY | Facility: HOSPITAL | Age: 58
End: 2023-03-02
Attending: STUDENT IN AN ORGANIZED HEALTH CARE EDUCATION/TRAINING PROGRAM
Payer: OTHER GOVERNMENT

## 2023-03-02 VITALS — BODY MASS INDEX: 44.1 KG/M2 | HEIGHT: 71 IN | WEIGHT: 315 LBS

## 2023-03-02 DIAGNOSIS — I85.10 SECONDARY ESOPHAGEAL VARICES WITHOUT BLEEDING: ICD-10-CM

## 2023-03-02 DIAGNOSIS — K75.81 LIVER CIRRHOSIS SECONDARY TO NASH: ICD-10-CM

## 2023-03-02 DIAGNOSIS — K74.60 LIVER CIRRHOSIS SECONDARY TO NASH: ICD-10-CM

## 2023-04-21 ENCOUNTER — ANESTHESIA (OUTPATIENT)
Dept: ENDOSCOPY | Facility: HOSPITAL | Age: 58
End: 2023-04-21
Payer: OTHER GOVERNMENT

## 2023-04-21 ENCOUNTER — ANESTHESIA EVENT (OUTPATIENT)
Dept: ENDOSCOPY | Facility: HOSPITAL | Age: 58
End: 2023-04-21
Payer: OTHER GOVERNMENT

## 2023-04-21 ENCOUNTER — HOSPITAL ENCOUNTER (OUTPATIENT)
Facility: HOSPITAL | Age: 58
Discharge: HOME | End: 2023-04-21
Attending: INTERNAL MEDICINE | Admitting: INTERNAL MEDICINE
Payer: OTHER GOVERNMENT

## 2023-04-21 VITALS
DIASTOLIC BLOOD PRESSURE: 61 MMHG | BODY MASS INDEX: 44.1 KG/M2 | OXYGEN SATURATION: 97 % | TEMPERATURE: 98 F | SYSTOLIC BLOOD PRESSURE: 136 MMHG | HEART RATE: 81 BPM | WEIGHT: 315 LBS | HEIGHT: 71 IN | RESPIRATION RATE: 16 BRPM

## 2023-04-21 DIAGNOSIS — K62.5 RECTAL BLEEDING: Primary | ICD-10-CM

## 2023-04-21 DIAGNOSIS — R06.02 SHORTNESS OF BREATH: ICD-10-CM

## 2023-04-21 DIAGNOSIS — K74.60 CIRRHOSIS OF LIVER: ICD-10-CM

## 2023-04-21 PROCEDURE — 25000003 PHARM REV CODE 250: Performed by: INTERNAL MEDICINE

## 2023-04-21 PROCEDURE — 25000003 PHARM REV CODE 250: Performed by: REGISTERED NURSE

## 2023-04-21 PROCEDURE — 37000009 HC ANESTHESIA EA ADD 15 MINS: Performed by: INTERNAL MEDICINE

## 2023-04-21 PROCEDURE — 82941 ASSAY OF GASTRIN: CPT | Performed by: INTERNAL MEDICINE

## 2023-04-21 PROCEDURE — 99900035 HC TECH TIME PER 15 MIN (STAT)

## 2023-04-21 PROCEDURE — 63600175 PHARM REV CODE 636 W HCPCS: Performed by: REGISTERED NURSE

## 2023-04-21 PROCEDURE — 43235 EGD DIAGNOSTIC BRUSH WASH: CPT | Mod: ,,, | Performed by: INTERNAL MEDICINE

## 2023-04-21 PROCEDURE — 43235 EGD DIAGNOSTIC BRUSH WASH: CPT | Performed by: INTERNAL MEDICINE

## 2023-04-21 PROCEDURE — 43235 PR EGD, FLEX, DIAGNOSTIC: ICD-10-PCS | Mod: ,,, | Performed by: INTERNAL MEDICINE

## 2023-04-21 PROCEDURE — 37000008 HC ANESTHESIA 1ST 15 MINUTES: Performed by: INTERNAL MEDICINE

## 2023-04-21 PROCEDURE — D9220A PRA ANESTHESIA: ICD-10-PCS | Mod: CRNA,,, | Performed by: REGISTERED NURSE

## 2023-04-21 PROCEDURE — D9220A PRA ANESTHESIA: Mod: CRNA,,, | Performed by: REGISTERED NURSE

## 2023-04-21 PROCEDURE — 94761 N-INVAS EAR/PLS OXIMETRY MLT: CPT

## 2023-04-21 PROCEDURE — D9220A PRA ANESTHESIA: ICD-10-PCS | Mod: ANES,,, | Performed by: ANESTHESIOLOGY

## 2023-04-21 PROCEDURE — D9220A PRA ANESTHESIA: Mod: ANES,,, | Performed by: ANESTHESIOLOGY

## 2023-04-21 RX ORDER — ONDANSETRON 2 MG/ML
4 INJECTION INTRAMUSCULAR; INTRAVENOUS DAILY PRN
Status: DISCONTINUED | OUTPATIENT
Start: 2023-04-21 | End: 2023-05-01 | Stop reason: HOSPADM

## 2023-04-21 RX ORDER — MIDAZOLAM HYDROCHLORIDE 1 MG/ML
INJECTION INTRAMUSCULAR; INTRAVENOUS
Status: DISCONTINUED | OUTPATIENT
Start: 2023-04-21 | End: 2023-04-21

## 2023-04-21 RX ORDER — KETAMINE HCL IN 0.9 % NACL 50 MG/5 ML
SYRINGE (ML) INTRAVENOUS
Status: DISCONTINUED | OUTPATIENT
Start: 2023-04-21 | End: 2023-04-21

## 2023-04-21 RX ORDER — PROPOFOL 10 MG/ML
VIAL (ML) INTRAVENOUS
Status: DISCONTINUED | OUTPATIENT
Start: 2023-04-21 | End: 2023-04-21

## 2023-04-21 RX ORDER — LIDOCAINE HYDROCHLORIDE 20 MG/ML
INJECTION INTRAVENOUS
Status: DISCONTINUED | OUTPATIENT
Start: 2023-04-21 | End: 2023-04-21

## 2023-04-21 RX ORDER — PROPOFOL 10 MG/ML
VIAL (ML) INTRAVENOUS CONTINUOUS PRN
Status: DISCONTINUED | OUTPATIENT
Start: 2023-04-21 | End: 2023-04-21

## 2023-04-21 RX ORDER — FENTANYL CITRATE 50 UG/ML
25 INJECTION, SOLUTION INTRAMUSCULAR; INTRAVENOUS EVERY 5 MIN PRN
Status: DISCONTINUED | OUTPATIENT
Start: 2023-04-21 | End: 2023-05-01 | Stop reason: HOSPADM

## 2023-04-21 RX ORDER — SODIUM CHLORIDE 9 MG/ML
INJECTION, SOLUTION INTRAVENOUS CONTINUOUS
Status: DISCONTINUED | OUTPATIENT
Start: 2023-04-21 | End: 2023-05-01 | Stop reason: HOSPADM

## 2023-04-21 RX ADMIN — MIDAZOLAM HYDROCHLORIDE 2 MG: 1 INJECTION INTRAMUSCULAR; INTRAVENOUS at 11:04

## 2023-04-21 RX ADMIN — Medication 20 MG: at 11:04

## 2023-04-21 RX ADMIN — PROPOFOL 50 MCG/KG/MIN: 10 INJECTION, EMULSION INTRAVENOUS at 11:04

## 2023-04-21 RX ADMIN — SODIUM CHLORIDE: 0.9 INJECTION, SOLUTION INTRAVENOUS at 11:04

## 2023-04-21 RX ADMIN — LIDOCAINE HYDROCHLORIDE 40 MG: 20 INJECTION INTRAVENOUS at 11:04

## 2023-04-21 RX ADMIN — Medication 30 MG: at 11:04

## 2023-04-21 RX ADMIN — GLYCOPYRROLATE 0.2 MG: 0.2 INJECTION, SOLUTION INTRAMUSCULAR; INTRAVENOUS at 11:04

## 2023-04-21 NOTE — PROVATION PATIENT INSTRUCTIONS
Discharge Summary/Instructions after an Endoscopic Procedure  Patient Name: Ashwin Peter  Patient MRN: [id number]  Patient YOB: 1965 Friday, April 21, 2023  Christopher Britton MD  Dear patient,  As a result of recent federal legislation (The Federal Cures Act), you may   receive lab or pathology results from your procedure in your [facility]   account before your physician is able to contact you. Your physician or   their representative will relay the results to you with their   recommendations at their soonest availability.  Thank you,  RESTRICTIONS:  During your procedure today, you received medications for sedation.  These   medications may affect your judgment, balance and coordination.  Therefore,   for 24 hours, you have the following restrictions:   - DO NOT drive a car, operate machinery, make legal/financial decisions,   sign important papers or drink alcohol.    ACTIVITY:  Today: no heavy lifting, straining or running due to procedural   sedation/anesthesia.  The following day: return to full activity including work.  DIET:  Eat and drink normally unless instructed otherwise.     TREATMENT FOR COMMON SIDE EFFECTS:  - Mild abdominal pain, nausea, belching, bloating or excessive gas:  rest,   eat lightly and use a heating pad.  - Sore Throat: treat with throat lozenges and/or gargle with warm salt   water.  - Because air was used during the procedure, expelling large amounts of air   from your rectum or belching is normal.  - If a bowel prep was taken, you may not have a bowel movement for 1-3 days.    This is normal.  SYMPTOMS TO WATCH FOR AND REPORT TO YOUR PHYSICIAN:  1. Abdominal pain or bloating, other than gas cramps.  2. Chest pain.  3. Back pain.  4. Signs of infection such as: chills or fever occurring within 24 hours   after the procedure.  5. Rectal bleeding, which would show as bright red, maroon, or black stools.   (A tablespoon of blood from the rectum is not serious, especially if    Post Acute Facility Update     *The information contained in this note was received during a weekly care coordination call with the post acute facility*    Current Facility: [address] [place] [address] ([facility])  Anticipated Discharge Date: 8/30/2022    Facility Nursing Update: No current updates   Facility Social Work Update: Plan to return home with family support. Bundle Program Status: none  Upper Extremity Assistance: Moderate Assistance   Lower Extremity Assistance: Maximum Assistance  Bed Mobility: Moderate Assistance   Transfers: Dependent  Ambulation: Dependent  How far (in feet) is the patient ambulating? Not walking  Device Used:   Barriers to Discharge: PT recommending a [place] lift for transfers, will discuss with .     ISIDRO Dickerson  [facility] Team  hemorrhoids are present.)  6. Vomiting.  7. Weakness or dizziness.  GO DIRECTLY TO THE NEAREST EMERGENCY ROOM IF YOU HAVE ANY OF THE FOLLOWING:      Difficulty breathing              Chills and/or fever over 101 F   Persistent vomiting and/or vomiting blood   Severe abdominal pain   Severe chest pain   Black, tarry stools   Bleeding- more than one tablespoon   Any other symptom or condition that you feel may need urgent attention  Your doctor recommends these additional instructions:  If any biopsies were taken, your doctors clinic will contact you in 1 to 2   weeks with any results.  - Discharge patient to home.   - Resume previous diet.   - Continue present medications.   - Repeat upper endoscopy in 1 year for surveillance.   - Consider checking fasting gastrin level today to rule out ZES  For questions, problems or results please call your physician - Christopher Britton MD at Work:  [phone].  [facility], EMERGENCY ROOM PHONE NUMBER: [phone]  IF A COMPLICATION OR EMERGENCY SITUATION ARISES AND YOU ARE UNABLE TO REACH   YOUR PHYSICIAN - GO DIRECTLY TO THE EMERGENCY ROOM.  Christopher Britton MD  4/21/2023 11:21:25 AM  This report has been verified and signed electronically.  Dear patient,  As a result of recent federal legislation (The Federal Cures Act), you may   receive lab or pathology results from your procedure in your [facility]   account before your physician is able to contact you. Your physician or   their representative will relay the results to you with their   recommendations at their soonest availability.  Thank you,  PROVATION

## 2023-04-21 NOTE — TRANSFER OF CARE
"Anesthesia Transfer of Care Note    Patient: Ashwin Peter    Procedure(s) Performed: Procedure(s) (LRB):  EGD (ESOPHAGOGASTRODUODENOSCOPY) (N/A)    Patient location: PACU    Anesthesia Type: general    Transport from OR: Transported from OR on 2-3 L/min O2 by NC with adequate spontaneous ventilation    Post pain: adequate analgesia    Post assessment: no apparent anesthetic complications and tolerated procedure well    Post vital signs: stable    Level of consciousness: awake and alert    Nausea/Vomiting: no nausea/vomiting    Complications: none    Transfer of care protocol was followedComments: Nurse at bedside, VSS, spont reg resp noted      Last vitals:   Visit Vitals  BP (!) 148/65 (BP Location: Left arm, Patient Position: Lying)   Pulse 97   Temp 37 °C (98.6 °F)   Resp (!) 22   Ht 5' 11" (1.803 m)   Wt (!) 154.2 kg (340 lb)   SpO2 (!) 94%   BMI 47.42 kg/m²     "

## 2023-04-21 NOTE — ANESTHESIA PREPROCEDURE EVALUATION
2023  Ashwin Peter is a 57 y.o., male here for EGD.    Pre-operative evaluation for Procedure(s) (LRB):  EGD (ESOPHAGOGASTRODUODENOSCOPY) (N/A)        Patient Active Problem List   Diagnosis    Sarcoidosis of lung with sarcoidosis of lymph nodes    Severe obesity (BMI >= 40)    Splenomegaly    Thrombocytopenia    Other cirrhosis of liver    Leg edema    Portal hypertension    Rectal bleeding    Liver cirrhosis secondary to GUAJARDO    Swelling of lower extremity    Acquired lymphedema of lower extremity    Pulmonary hypertension    LAW on CPAP    Hypervolemia    Acute on chronic diastolic heart failure    Shortness of breath    Sarcoidosis, unspecified       Review of patient's allergies indicates:  No Known Allergies    No current facility-administered medications on file prior to encounter.     Current Outpatient Medications on File Prior to Encounter   Medication Sig Dispense Refill    acetaminophen (TYLENOL) 500 MG tablet Take 3 tablets by mouth daily as needed for knee pain or headache      albuterol (PROVENTIL/VENTOLIN HFA) 90 mcg/actuation inhaler SMARTSI-2 Puff(s) By Mouth Every 4-6 Hours PRN      dapagliflozin (FARXIGA) 10 mg tablet Take 1 tablet (10 mg total) by mouth once daily. 90 tablet 3    diclofenac sodium (VOLTAREN) 1 % Gel Apply topically twice daily as needed for knee pain      hydrOXYzine HCL (ATARAX) 25 MG tablet Take 1 tablet (25 mg total) by mouth 3 (three) times daily as needed for Anxiety. 30 tablet 0    phenylephrine HCl (SINEX REGULAR NASL) 2 sprays by Each Nostril route once daily.      potassium chloride (KLOR-CON) 10 MEQ TbSR Take 1 tablet (10 mEq total) by mouth once daily. (Patient not taking: Reported on 10/25/2022) 90 tablet 3    spironolactone (ALDACTONE) 25 MG tablet Take 1 tablet (25 mg total) by mouth once daily. 30 tablet 11     torsemide (DEMADEX) 20 MG Tab Take 3 tablets (60 mg total) by mouth once daily. Take torsemide 40mg (2 tablets) in the morning and 20mg (1 tablet) in the afternoon 90 tablet 11    [DISCONTINUED] pantoprazole (PROTONIX) 40 MG tablet Take 1 tablet (40 mg total) by mouth once daily. (Patient not taking: Reported on 6/15/2022) 30 tablet 5       Past Surgical History:   Procedure Laterality Date    ANTERIOR CRUCIATE LIGAMENT REPAIR  2007    right knee    COLONOSCOPY N/A 4/8/2021    Procedure: COLONOSCOPY;  Surgeon: Jeff Olvera MD;  [facility]  Service: Endoscopy;  Laterality: N/A;  rectal bleeding,   2nd floor BMI 50 (354 lbs)  C[facility] on 4/5/21 at [facility]    ESOPHAGOGASTRODUODENOSCOPY N/A 4/8/2021    Procedure: EGD (ESOPHAGOGASTRODUODENOSCOPY);  Surgeon: Jeff Olvera MD;  Location: [facility] ENDO ([facility] FLR);  Service: Endoscopy;  Laterality: N/A;  variceal screening/ labs the am of procedure  2nd floor BMI 50 (354 lbs)    ESOPHAGOGASTRODUODENOSCOPY N/A 3/31/2022    Procedure: EGD (ESOPHAGOGASTRODUODENOSCOPY);  Surgeon: Jeff Olvera MD;  Location: [facility] ENDO ([facility] FLR);  Service: Endoscopy;  Laterality: N/A;  BMI-52    Wt:375#      cirrhosis, variceal screening-labs done on 3/29-GT  vaccinated-GT    LYMPHADENECTOMY  1985    MENISCECTOMY      left knee    NASAL SEPTUM SURGERY  1985    WISDOM TOOTH EXTRACTION         Social History     Socioeconomic History    Marital status:     Number of children: 2   Occupational History    Occupation: [employer] supervisor     Employer: [employer]   Tobacco Use    Smoking status: Never     Passive exposure: Never    Smokeless tobacco: Never   Substance and Sexual Activity    Alcohol use: No    Drug use: No   Social History Narrative    Patient is originally from   [place]        School at ; [employer] ; [employer]         Working ; [employer] [place] [employer]            Daysi  22 yrs         Children    Maksim Funez        Lives with     Wife         Lynne/Exerjacobe                Works as a  and .  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         CBC: No results for input(s): WBC, RBC, HGB, HCT, PLT, MCV, MCH, MCHC in the last 72 hours.    CMP: No results for input(s): NA, K, CL, CO2, BUN, CREATININE, GLU, MG, PHOS, CALCIUM, ALBUMIN, PROT, ALKPHOS, ALT, AST, BILITOT in the last 72 hours.    INR  No results for input(s): PT, INR, PROTIME, APTT in the last 72 hours.            2D Echo:  No results found for this or any previous visit.        Pre-op Assessment    I have reviewed the Patient Summary Reports.     I have reviewed the Nursing Notes.    I have reviewed the Medications.     Review of Systems  Anesthesia Hx:  No problems with previous Anesthesia    Hematology/Oncology:  Hematology Normal   Oncology Normal     EENT/Dental:EENT/Dental Normal   Cardiovascular:  Cardiovascular Normal     Pulmonary:   Sleep Apnea, CPAP    Renal/:  Renal/ Normal     Hepatic/GI:   Liver Disease, Hepatitis    Musculoskeletal:  Musculoskeletal Normal    Neurological:  Neurology Normal    Endocrine:  Endocrine Normal    Dermatological:  Skin Normal    Psych:  Psychiatric Normal           Physical Exam  General: Well nourished    Airway:  Mallampati: IV   Mouth Opening: Normal  TM Distance: Normal  Tongue: Normal  Neck ROM: Normal ROM    Dental:  Intact    Chest/Lungs:  Clear to auscultation, Normal Respiratory Rate    Heart:  Rate: Normal  Rhythm: Regular Rhythm  Sounds: Normal        Anesthesia Plan  Type of Anesthesia, risks & benefits discussed:    Anesthesia Type: Gen Natural Airway  Intra-op Monitoring Plan: Standard ASA Monitors  Post Op Pain Control Plan: multimodal analgesia  Induction:  IV  Informed Consent: Informed consent signed with the Patient and all parties understand the risks and agree with anesthesia plan.  All questions answered.   ASA Score:  3  Anesthesia Plan Notes: Plan for general natural airway    Ready For Surgery From Anesthesia Perspective.     .

## 2023-04-21 NOTE — ANESTHESIA POSTPROCEDURE EVALUATION
Anesthesia Post Evaluation    Patient: Ashwin Peter    Procedure(s) Performed: Procedure(s) (LRB):  EGD (ESOPHAGOGASTRODUODENOSCOPY) (N/A)    [facility] Anesthesia Post Op Evaluation      Vitals Value Taken Time   /61 04/21/23 1201   Temp 36.4 °C (97.5 °F) 04/21/23 1125   Pulse 76 04/21/23 1218   Resp 25 04/21/23 1214   SpO2 95 % 04/21/23 1218   Vitals shown include unvalidated device data.      Event Time   Out of Recovery 12:26:00         Pain/Akbar Score: Akbar Score: 10 (4/21/2023 11:30 AM)

## 2023-04-21 NOTE — NURSING TRANSFER
Nursing Transfer Note      4/21/2023     Reason patient is being transferred: post EGD for discharge home    Transfer To: hospital main entrance, [facility]    Transfer via wheelchair    Transfer with : none, patient changed to his own clothes and shoes    Transported by : PCT    Medicines sent: none    Any special needs or follow-up needed: discharge instruction discussed with patient and his wife, verbalized understanding    Chart send with patient: No    Notified: spouse, at bedside will drive patient home    Patient reassessed at: 4-21-23

## 2023-04-21 NOTE — ANESTHESIA POSTPROCEDURE EVALUATION
Anesthesia Post Evaluation    Patient: Ashwin Peter    Procedure(s) Performed: Procedure(s) (LRB):  EGD (ESOPHAGOGASTRODUODENOSCOPY) (N/A)    Final Anesthesia Type: general      Patient location during evaluation: PACU  Patient participation: Yes- Able to Participate  Level of consciousness: awake and alert  Post-procedure vital signs: reviewed and stable  Pain management: adequate  Airway patency: patent    PONV status at discharge: No PONV  Anesthetic complications: no      Cardiovascular status: blood pressure returned to baseline  Respiratory status: unassisted  Hydration status: euvolemic  Follow-up not needed.          Vitals Value Taken Time   /61 04/21/23 1201   Temp 36.4 °C (97.5 °F) 04/21/23 1125   Pulse 76 04/21/23 1218   Resp 25 04/21/23 1214   SpO2 95 % 04/21/23 1218   Vitals shown include unvalidated device data.      Event Time   Out of Recovery 12:26:00         Pain/Akbar Score: Akbar Score: 10 (4/21/2023 11:30 AM)

## 2023-04-21 NOTE — H&P
Short Stay Endoscopy   Pre-Procedure Note    PCP - Elkin Guo III, MD  Referring Physician - PRE-ADMIT, ENDO -[facility]  No address on file    Procedure - Endoscopy    ASA - per anesthesia  Mallampati - per anesthesia    HPI  57 y.o. male scheduled for endoscopy for evaluation of    1. Rectal bleeding    2. Shortness of breath    3. Cirrhosis of liver         Medical History:  has a past medical history of Anxiety, Hyperlipidemia, Morbid obesity with BMI of 45.0-49.9, adult, Multiple pulmonary nodules, Obesity, Other cirrhosis of liver (2021), Portal hypertension (2021), Sarcoidosis of lung with sarcoidosis of lymph nodes, Splenomegaly (2020), and Thrombocytopenia (2020).    Surgical History:  has a past surgical history that includes Lymphadenectomy (); Anterior cruciate ligament repair (); [place] tooth extraction; Meniscectomy; Nasal septum surgery (); Esophagogastroduodenoscopy (N/A, 2021); Colonoscopy (N/A, 2021); and Esophagogastroduodenoscopy (N/A, 3/31/2022).    Family History: family history includes Aneurysm in his mother; Dementia in his mother; Diabetes in his paternal grandmother; Heart attack (age of onset: 55) in his father; Hypertension in his father..    Social History:  reports that he has never smoked. He has never been exposed to tobacco smoke. He has never used smokeless tobacco. He reports that he does not drink alcohol and does not use drugs.    Review of patient's allergies indicates:  No Known Allergies    Medications:   Medications Prior to Admission   Medication Sig Dispense Refill Last Dose    acetaminophen (TYLENOL) 500 MG tablet Take 3 tablets by mouth daily as needed for knee pain or headache       albuterol (PROVENTIL/VENTOLIN HFA) 90 mcg/actuation inhaler SMARTSI-2 Puff(s) By Mouth Every 4-6 Hours PRN   More than a month    dapagliflozin (FARXIGA) 10 mg tablet Take 1 tablet (10 mg total) by mouth once daily. 90 tablet 3      diclofenac sodium (VOLTAREN) 1 % Gel Apply topically twice daily as needed for knee pain       hydrOXYzine HCL (ATARAX) 25 MG tablet Take 1 tablet (25 mg total) by mouth 3 (three) times daily as needed for Anxiety. 30 tablet 0 More than a month    phenylephrine HCl (SINEX REGULAR NASL) 2 sprays by Each Nostril route once daily.       potassium chloride (KLOR-CON) 10 MEQ TbSR Take 1 tablet (10 mEq total) by mouth once daily. (Patient not taking: Reported on 10/25/2022) 90 tablet 3     potassium phosphate, monobasic, (K-PHOS) 500 mg TbSO Take 2 tablets (1,000 mg total) by mouth once daily. 60 tablet 11     predniSONE (DELTASONE) 10 MG tablet Take 1 tablet (10 mg total) by mouth once daily. 90 tablet 3     spironolactone (ALDACTONE) 25 MG tablet Take 1 tablet (25 mg total) by mouth once daily. 30 tablet 11     torsemide (DEMADEX) 20 MG Tab Take 3 tablets (60 mg total) by mouth once daily. Take torsemide 40mg (2 tablets) in the morning and 20mg (1 tablet) in the afternoon 90 tablet 11 4/19/2023         Labs:  Lab Results   Component Value Date    WBC 4.19 01/24/2023    HGB 12.7 (L) 01/24/2023    HCT 41.7 01/24/2023    PLT 85 (L) 01/24/2023    CHOL 198 10/17/2022    TRIG 101 10/17/2022    HDL 41 10/17/2022    ALT 38 01/24/2023    AST 71 (H) 01/24/2023     01/24/2023    K 4.0 01/24/2023     01/24/2023    CREATININE 0.9 01/24/2023    BUN 15 01/24/2023    CO2 22 (L) 01/24/2023    TSH 1.383 08/30/2022    PSA 0.62 07/14/2014    INR 1.3 (H) 01/24/2023    HGBA1C 4.2 10/17/2022       Risks and benefits of this endoscopic procedure, including but not limited to bleeding, inflammation, infection, perforation, and death, explained to the patient prior to procedure      Christopher Britton MD

## 2023-04-24 LAB — GASTRIN SERPL-MCNC: 15 PG/ML

## 2023-05-26 ENCOUNTER — HOSPITAL ENCOUNTER (OUTPATIENT)
Dept: RADIOLOGY | Facility: HOSPITAL | Age: 58
Discharge: HOME | End: 2023-05-26
Attending: STUDENT IN AN ORGANIZED HEALTH CARE EDUCATION/TRAINING PROGRAM
Payer: OTHER GOVERNMENT

## 2023-05-26 ENCOUNTER — OFFICE VISIT (OUTPATIENT)
Dept: HEPATOLOGY | Facility: CLINIC | Age: 58
End: 2023-05-26
Payer: OTHER GOVERNMENT

## 2023-05-26 VITALS
SYSTOLIC BLOOD PRESSURE: 129 MMHG | HEIGHT: 71 IN | OXYGEN SATURATION: 93 % | WEIGHT: 315 LBS | RESPIRATION RATE: 20 BRPM | TEMPERATURE: 98 F | DIASTOLIC BLOOD PRESSURE: 61 MMHG | BODY MASS INDEX: 44.1 KG/M2 | HEART RATE: 72 BPM

## 2023-05-26 DIAGNOSIS — K75.81 LIVER CIRRHOSIS SECONDARY TO NASH: Primary | ICD-10-CM

## 2023-05-26 DIAGNOSIS — I85.10 SECONDARY ESOPHAGEAL VARICES WITHOUT BLEEDING: ICD-10-CM

## 2023-05-26 DIAGNOSIS — K74.60 LIVER CIRRHOSIS SECONDARY TO NASH: ICD-10-CM

## 2023-05-26 DIAGNOSIS — K75.81 LIVER CIRRHOSIS SECONDARY TO NASH: ICD-10-CM

## 2023-05-26 DIAGNOSIS — K74.60 LIVER CIRRHOSIS SECONDARY TO NASH: Primary | ICD-10-CM

## 2023-05-26 DIAGNOSIS — R79.89 ELEVATED LFTS: ICD-10-CM

## 2023-05-26 PROCEDURE — 99214 PR OFFICE/OUTPT VISIT, EST, LEVL IV, 30-39 MIN: ICD-10-PCS | Mod: S$GLB,,, | Performed by: STUDENT IN AN ORGANIZED HEALTH CARE EDUCATION/TRAINING PROGRAM

## 2023-05-26 PROCEDURE — 99999 PR PBB SHADOW E&M-EST. PATIENT-LVL IV: CPT | Mod: PBBFAC,,, | Performed by: STUDENT IN AN ORGANIZED HEALTH CARE EDUCATION/TRAINING PROGRAM

## 2023-05-26 PROCEDURE — 76705 ECHO EXAM OF ABDOMEN: CPT | Mod: TC

## 2023-05-26 PROCEDURE — 76705 US ABDOMEN LIMITED: ICD-10-PCS | Mod: 26,,, | Performed by: STUDENT IN AN ORGANIZED HEALTH CARE EDUCATION/TRAINING PROGRAM

## 2023-05-26 PROCEDURE — 99214 OFFICE O/P EST MOD 30 MIN: CPT | Mod: S$GLB,,, | Performed by: STUDENT IN AN ORGANIZED HEALTH CARE EDUCATION/TRAINING PROGRAM

## 2023-05-26 PROCEDURE — 99999 PR PBB SHADOW E&M-EST. PATIENT-LVL IV: ICD-10-PCS | Mod: PBBFAC,,, | Performed by: STUDENT IN AN ORGANIZED HEALTH CARE EDUCATION/TRAINING PROGRAM

## 2023-05-26 PROCEDURE — 76705 ECHO EXAM OF ABDOMEN: CPT | Mod: 26,,, | Performed by: STUDENT IN AN ORGANIZED HEALTH CARE EDUCATION/TRAINING PROGRAM

## 2023-05-26 NOTE — PROGRESS NOTES
Hepatology Follow Up Note    Referring provider: No ref. provider found  PCP: Elkin Guo III, MD    Chief complaint: follow up GUAJARDO cirrhosis    HPI:  Ashwin Peter is a 57 y.o. male with history of GUAJARDO related cirrhosis who presents for scheduled follow up.    5/26/23: He is without complaints today. No recent hospitalizations or ED visits. Compliant with diuretics without recent abdominal distention though does report lower extremity edema.  He denies other signs of decompensated cirrhosis including no recent encephalopathy, jaundice, GI bleeding.    1/24/23: He is without complaints today. No recent hospitalizations or ED visits. Compliant with diuretics without recent abdominal distention.  He denies other signs of decompensated cirrhosis including no recent encephalopathy, jaundice, GI bleeding.    10/25/22:  He is without complaints today.  He was hospitalized in July 2022 with volume overload treated with IV Lasix.  On discharge he was switched to oral torsemide and spironolactone.  He reports overall doing well since.  Is compliant with diuretics without recent abdominal distention.  He denies other signs of decompensated cirrhosis including no recent encephalopathy, jaundice, GI bleeding.    1/27/22 (Dr. Bowie): He was recently admitted to the hospital for acute hypoxic respiratory failure and volume overload. He briefly required supplemental O2 but was discharged on room air. He was discharged on Lasix 40 mg BID and aldactone 100 mg daily. He admits to stable lower extremity edema despite adherence with diuretics and a low-salt diet. He otherwise denies any other signs of decompensated cirrhosis including recent encephalopathy or GI bleeding.     5/3/21:  He is without complaints today.  He has not had recent hospitalizations or ED visits.  He was found to have elevated LFTs in December 2020 with both elevated bilirubin and transaminases.  He does report getting a sinus infection in October 2020  that was treated with a steroid injection and amoxicillin.  He was also found to have COVID in December 2020.  He reports compliance with diuretics without recent abdominal distension. He denies other signs of decompensated cirrhosis including no recent  encephalopathy or GI bleeding.    Past Medical History:   Diagnosis Date    Anxiety     Hyperlipidemia     Morbid obesity with BMI of 45.0-49.9, adult     Multiple pulmonary nodules     Obesity     Other cirrhosis of liver 1/13/2021    Portal hypertension 2/11/2021    Sarcoidosis of lung with sarcoidosis of lymph nodes     Splenomegaly 12/22/2020    Thrombocytopenia 12/22/2020       Past Surgical History:   Procedure Laterality Date    ANTERIOR CRUCIATE LIGAMENT REPAIR  2007    right knee    COLONOSCOPY N/A 4/8/2021    Procedure: COLONOSCOPY;  Surgeon: Jeff Olvera MD;  Location: [facility] ENDO (2ND FLR);  Service: Endoscopy;  Laterality: N/A;  rectal bleeding,   2nd floor BMI 50 (354 lbs)  COVID test on 4/5/21 at [facility]    ESOPHAGOGASTRODUODENOSCOPY N/A 4/8/2021    Procedure: EGD (ESOPHAGOGASTRODUODENOSCOPY);  Surgeon: Jeff Olvera MD;  [facility]  Service: Endoscopy;  Laterality: N/A;  variceal screening/ labs the am of procedure  2nd floor BMI 50 (354 lbs)    ESOPHAGOGASTRODUODENOSCOPY N/A 3/31/2022    Procedure: EGD (ESOPHAGOGASTRODUODENOSCOPY);  Surgeon: Jeff Olvera MD;  Location: [facility] ENDO (2ND FLR);  Service: Endoscopy;  Laterality: N/A;  BMI-52    Wt:375#      cirrhosis, variceal screening-labs done on 3/29-GT  vaccinated-GT    ESOPHAGOGASTRODUODENOSCOPY N/A 4/21/2023    Procedure: EGD (ESOPHAGOGASTRODUODENOSCOPY);  Surgeon: Christopher Britton MD;  Location: [facility] ENDO (2ND FLR);  Service: Endoscopy;  Laterality: N/A;  pt requested Friday due to work schedule  ECHO PA 44-51  BMI 47.97 Wt 343 lbs  inst portal-RB  last CBC PT/INR 1/24/23 4/17/23- Precall confirmed.    LYMPHADENECTOMY  1985    MENISCECTOMY      left knee     NASAL SEPTUM SURGERY  1985    WISDOM TOOTH EXTRACTION         Family History   Problem Relation Age of Onset    Hypertension Father     Heart attack Father 55    Diabetes Paternal Grandmother     Aneurysm Mother         eye    Dementia Mother     Colon cancer Neg Hx     Prostate cancer Neg Hx     Sarcoidosis Neg Hx        Social History     Tobacco Use    Smoking status: Never     Passive exposure: Never    Smokeless tobacco: Never   Substance Use Topics    Alcohol use: No    Drug use: No       Current Outpatient Medications   Medication Sig Dispense Refill    acetaminophen (TYLENOL) 500 MG tablet Take 3 tablets by mouth daily as needed for knee pain or headache      albuterol (PROVENTIL/VENTOLIN HFA) 90 mcg/actuation inhaler SMARTSI-2 Puff(s) By Mouth Every 4-6 Hours PRN      dapagliflozin (FARXIGA) 10 mg tablet Take 1 tablet (10 mg total) by mouth once daily. 90 tablet 3    diclofenac sodium (VOLTAREN) 1 % Gel Apply topically twice daily as needed for knee pain      hydrOXYzine HCL (ATARAX) 25 MG tablet Take 1 tablet (25 mg total) by mouth 3 (three) times daily as needed for Anxiety. 30 tablet 0    phenylephrine HCl (SINEX REGULAR NASL) 2 sprays by Each Nostril route once daily.      potassium chloride (KLOR-CON) 10 MEQ TbSR Take 1 tablet (10 mEq total) by mouth once daily. (Patient not taking: Reported on 10/25/2022) 90 tablet 3    potassium phosphate, monobasic, (K-PHOS) 500 mg TbSO Take 2 tablets (1,000 mg total) by mouth once daily. 60 tablet 11    predniSONE (DELTASONE) 10 MG tablet Take 1 tablet (10 mg total) by mouth once daily. 90 tablet 3    spironolactone (ALDACTONE) 25 MG tablet Take 1 tablet (25 mg total) by mouth once daily. 30 tablet 11    torsemide (DEMADEX) 20 MG Tab Take 3 tablets (60 mg total) by mouth once daily. Take torsemide 40mg (2 tablets) in the morning and 20mg (1 tablet) in the afternoon 90 tablet 11     No current facility-administered medications for this visit.       Review of  "patient's allergies indicates:  No Known Allergies    Review of Systems   Constitutional:  Negative for fever and weight loss.   Gastrointestinal:  Negative for abdominal pain, blood in stool, constipation, diarrhea, heartburn, melena, nausea and vomiting.     Vitals:    05/26/23 0939   BP: 129/61   Pulse: 72   Resp: 20   Temp: 97.7 °F (36.5 °C)   TempSrc: Oral   SpO2: (!) 93%   Weight: (!) 151.1 kg (333 lb 1.8 oz)   Height: 5' 11" (1.803 m)       Physical Exam  Vitals reviewed.   Constitutional:       General: He is not in acute distress.     Appearance: He is not ill-appearing.   HENT:      Head: Normocephalic and atraumatic.   Eyes:      General: Scleral icterus present.      Extraocular Movements: Extraocular movements intact.   Cardiovascular:      Rate and Rhythm: Normal rate and regular rhythm.   Pulmonary:      Effort: Pulmonary effort is normal. No respiratory distress.   Abdominal:      General: Bowel sounds are normal. There is no distension.      Palpations: Abdomen is soft.      Tenderness: There is no abdominal tenderness. There is no guarding or rebound.   Skin:     Coloration: Skin is not jaundiced.   Neurological:      Mental Status: He is alert.       LABS: I personally reviewed pertinent laboratory findings.    Lab Results   Component Value Date    WBC 4.19 01/24/2023    HGB 12.7 (L) 01/24/2023    HCT 41.7 01/24/2023    MCV 97 01/24/2023    PLT 85 (L) 01/24/2023       Lab Results   Component Value Date     01/24/2023    K 4.0 01/24/2023     01/24/2023    CO2 22 (L) 01/24/2023    BUN 15 01/24/2023    CREATININE 0.9 01/24/2023    CALCIUM 8.3 (L) 01/24/2023    ANIONGAP 9 01/24/2023    ESTGFRAFRICA >60.0 07/25/2022    EGFRNONAA >60.0 07/25/2022       Lab Results   Component Value Date    ALT 38 01/24/2023    AST 71 (H) 01/24/2023    ALKPHOS 125 01/24/2023    BILITOT 3.8 (H) 01/24/2023       Lab Results   Component Value Date    HEPAIGM Negative 12/22/2020    HEPBIGM Negative 12/22/2020    " HEPBCAB Negative 12/22/2020    HEPCAB Negative 03/29/2022       Lab Results   Component Value Date    SMOOTHMUSCAB Negative 1:40 12/22/2020    CERULOPLSM 26.0 12/22/2020      MELD-Na: 14 at 1/24/2023 12:28 PM  MELD: 14 at 1/24/2023 12:28 PM  Calculated from:  Serum Creatinine: 0.9 mg/dL (Using min of 1 mg/dL) at 1/24/2023 12:28 PM  Serum Sodium: 138 mmol/L (Using max of 137 mmol/L) at 1/24/2023 12:28 PM  Total Bilirubin: 3.8 mg/dL at 1/24/2023 12:28 PM  INR(ratio): 1.3 at 1/24/2023 12:28 PM      IMAGING: I personally reviewed imaging studies.    Assessment:  57 y.o. male with GUAJARDO related cirrhosis. MELD-Na 14.    1. Liver cirrhosis secondary to GUAJARDO    2. Secondary esophageal varices without bleeding    3. Elevated LFTs        Recommendations:  1. Cirrhosis due to GUAJARDO  - Patient counseled on diet, weight loss, and control of co-morbidities. Encouraged to lose 10% of his body weight over the next 12 months.    2. Ascites/Edema: 2 gm Na diet.  Diuretics per cardiology. Currently torsemide 60 mg daily and spironolactone 25 mg daily.    3. Encephalopathy: Not an active issue.    4. Transplant candidacy: MELD 14 primarily driven by indirect hyperbilirubinemia. He otherwise has no significant complications from his cirrhosis. Will consider transplant evaluation if MELD >15 and/or worsening decompensations. He would require RHC for transplant evaluation given echo with persistently elevated PASP.    Cirrhosis HM  - HCC screening: US and AFP today pending. Repeat US and AFP every 6 months.    - Variceal screening: EGD in 04/2023 showed small varices. Repeat EGD in 04/2024.    - Immunizations: Recommend HAV and HBV vaccinations if not immune.    - CRC screening: Colonoscopy 04/2021 with polypectomy x1. Repeat colonoscopy in 04/2026.    Labs in 3 months. Return to clinic in 6 months with labs and US.    I spent a total of 30 minutes on the day of the visit. This includes face to face time and non-face to face time preparing  to see the patient (eg, review of tests), obtaining and/or reviewing separately obtained history, documenting clinical information in the electronic or other health record, independently interpreting results, and communicating results to the patient/family/caregiver, or care coordination.    Daniel Crowder MD  Staff Physician  Hepatology and Liver Transplant  [facility]  [facility] [place]

## 2023-06-04 DIAGNOSIS — D86.9 SARCOIDOSIS, UNSPECIFIED: ICD-10-CM

## 2023-06-06 RX ORDER — PREDNISONE 10 MG/1
10 TABLET ORAL DAILY
Qty: 90 TABLET | Refills: 3
Start: 2023-06-06 | End: 2023-07-07

## 2023-06-08 ENCOUNTER — TELEPHONE (OUTPATIENT)
Dept: HEPATOLOGY | Facility: CLINIC | Age: 58
End: 2023-06-08
Payer: OTHER GOVERNMENT

## 2023-06-08 NOTE — TELEPHONE ENCOUNTER
----- Message from Daniel Crowder MD sent at 6/8/2023  2:26 PM CDT -----  Mr. Peter did not view portal message about results. Please let him know that his ultrasound showed no signs of liver cancer.

## 2023-06-26 ENCOUNTER — PATIENT MESSAGE (OUTPATIENT)
Dept: CARDIOLOGY | Facility: CLINIC | Age: 58
End: 2023-06-26
Payer: OTHER GOVERNMENT

## 2023-06-26 ENCOUNTER — PATIENT MESSAGE (OUTPATIENT)
Dept: PULMONOLOGY | Facility: CLINIC | Age: 58
End: 2023-06-26
Payer: OTHER GOVERNMENT

## 2023-07-06 DIAGNOSIS — G47.33 OSA ON CPAP: ICD-10-CM

## 2023-07-06 DIAGNOSIS — D86.9 SARCOIDOSIS, UNSPECIFIED: ICD-10-CM

## 2023-07-06 DIAGNOSIS — I27.20 PULMONARY HYPERTENSION: Primary | ICD-10-CM

## 2023-07-07 ENCOUNTER — HOSPITAL ENCOUNTER (OUTPATIENT)
Dept: PULMONOLOGY | Facility: CLINIC | Age: 58
Discharge: HOME | End: 2023-07-07
Payer: OTHER GOVERNMENT

## 2023-07-07 ENCOUNTER — OFFICE VISIT (OUTPATIENT)
Dept: PULMONOLOGY | Facility: CLINIC | Age: 58
End: 2023-07-07
Payer: OTHER GOVERNMENT

## 2023-07-07 VITALS
HEART RATE: 78 BPM | BODY MASS INDEX: 42.38 KG/M2 | SYSTOLIC BLOOD PRESSURE: 134 MMHG | HEIGHT: 71 IN | DIASTOLIC BLOOD PRESSURE: 62 MMHG | WEIGHT: 302.69 LBS | OXYGEN SATURATION: 95 %

## 2023-07-07 VITALS — BODY MASS INDEX: 42.38 KG/M2 | WEIGHT: 302.69 LBS | HEIGHT: 71 IN

## 2023-07-07 DIAGNOSIS — E66.01 SEVERE OBESITY (BMI >= 40): ICD-10-CM

## 2023-07-07 DIAGNOSIS — D86.9 SARCOIDOSIS, UNSPECIFIED: ICD-10-CM

## 2023-07-07 DIAGNOSIS — I27.20 PULMONARY HYPERTENSION: ICD-10-CM

## 2023-07-07 DIAGNOSIS — G47.33 OSA ON CPAP: ICD-10-CM

## 2023-07-07 DIAGNOSIS — D86.2 SARCOIDOSIS OF LUNG WITH SARCOIDOSIS OF LYMPH NODES: Primary | ICD-10-CM

## 2023-07-07 LAB
DLCO SINGLE BREATH LLN: 24.05
DLCO SINGLE BREATH PRE REF: 74.2 %
DLCO SINGLE BREATH REF: 30.98
DLCOC SBVA LLN: 3.07
DLCOC SBVA REF: 4.23
DLCOC SINGLE BREATH LLN: 24.05
DLCOC SINGLE BREATH REF: 30.98
DLCOCSBVAULN: 5.38
DLCOCSINGLEBREATHULN: 37.91
DLCOSINGLEBREATHULN: 37.91
DLCOVA LLN: 3.07
DLCOVA PRE REF: 89.2 %
DLCOVA PRE: 3.77 ML/(MIN*MMHG*L) (ref 3.07–5.38)
DLCOVA REF: 4.23
DLCOVAULN: 5.38
ERV LLN: -16448.72
ERV PRE REF: 91 %
ERV REF: 1.28
ERVULN: ABNORMAL
FEF 25 75 LLN: 1.7
FEF 25 75 PRE REF: 104.9 %
FEF 25 75 REF: 3.32
FEV05 LLN: 1.86
FEV05 REF: 2.99
FEV1 FVC LLN: 67
FEV1 FVC PRE REF: 102.5 %
FEV1 FVC REF: 78
FEV1 LLN: 2.95
FEV1 PRE REF: 87.7 %
FEV1 REF: 3.86
FRCPLETH LLN: 2.64
FRCPLETH PREREF: 86.1 %
FRCPLETH REF: 3.62
FRCPLETHULN: 4.61
FVC LLN: 3.83
FVC PRE REF: 85.3 %
FVC REF: 4.96
IVC PRE: 4.23 L (ref 3.83–6.11)
IVC SINGLE BREATH LLN: 3.83
IVC SINGLE BREATH PRE REF: 85.3 %
IVC SINGLE BREATH REF: 4.96
IVCSINGLEBREATHULN: 6.11
LLN IC: -9999996.47
PEF LLN: 7.4
PEF PRE REF: 98.2 %
PEF REF: 9.78
PHYSICIAN COMMENT: ABNORMAL
PRE DLCO: 23 ML/(MIN*MMHG) (ref 24.05–37.91)
PRE ERV: 1.17 L (ref -16448.72–16451.28)
PRE FEF 25 75: 3.48 L/S (ref 1.7–5.47)
PRE FET 100: 6.89 SEC
PRE FEV05 REF: 96.7 %
PRE FEV1 FVC: 79.91 % (ref 66.5–87.89)
PRE FEV1: 3.38 L (ref 2.95–4.71)
PRE FEV5: 2.89 L (ref 1.86–4.13)
PRE FRC PL: 3.12 L (ref 2.64–4.61)
PRE FVC: 4.23 L (ref 3.83–6.11)
PRE IC: 3.23 L (ref -9999996.47–#######.####)
PRE PEF: 9.61 L/S (ref 7.4–12.17)
PRE REF IC: 91.4 %
PRE RV: 1.95 L (ref 1.67–3.02)
PRE TLC: 6.35 L (ref 6.18–8.48)
RAW PRE REF: 78.2 %
RAW PRE: 2.39 CMH2O*S/L (ref 3.06–3.06)
RAW REF: 3.06
REF IC: 3.53
RV LLN: 1.67
RV PRE REF: 83.4 %
RV REF: 2.34
RVTLC LLN: 26
RVTLC PRE REF: 86.9 %
RVTLC PRE: 30.79 % (ref 26.43–44.39)
RVTLC REF: 35
RVTLCULN: 44
RVULN: 3.02
SGAW PRE REF: 128.5 %
SGAW PRE: 0.11 1/(CMH2O*S) (ref 0.08–0.08)
SGAW REF: 0.08
TLC LLN: 6.18
TLC PRE REF: 86.6 %
TLC REF: 7.33
TLC ULN: 8.48
ULN IC: ABNORMAL
VA PRE: 6.1 L (ref 7.18–7.18)
VA SINGLE BREATH LLN: 7.18
VA SINGLE BREATH PRE REF: 85 %
VA SINGLE BREATH REF: 7.18
VASINGLEBREATHULN: 7.18
VC LLN: 3.83
VC PRE REF: 88.5 %
VC PRE: 4.39 L (ref 3.83–6.11)
VC REF: 4.96
VC ULN: 6.11

## 2023-07-07 PROCEDURE — 99214 PR OFFICE/OUTPT VISIT, EST, LEVL IV, 30-39 MIN: ICD-10-PCS | Mod: 25,S$GLB,, | Performed by: INTERNAL MEDICINE

## 2023-07-07 PROCEDURE — 94010 BREATHING CAPACITY TEST: ICD-10-PCS | Mod: S$GLB,,, | Performed by: INTERNAL MEDICINE

## 2023-07-07 PROCEDURE — 94010 BREATHING CAPACITY TEST: CPT | Mod: S$GLB,,, | Performed by: INTERNAL MEDICINE

## 2023-07-07 PROCEDURE — 94729 PR C02/MEMBANE DIFFUSE CAPACITY: ICD-10-PCS | Mod: S$GLB,,, | Performed by: INTERNAL MEDICINE

## 2023-07-07 PROCEDURE — 99999 PR PBB SHADOW E&M-EST. PATIENT-LVL IV: ICD-10-PCS | Mod: PBBFAC,,, | Performed by: INTERNAL MEDICINE

## 2023-07-07 PROCEDURE — 94618 PULMONARY STRESS TESTING: ICD-10-PCS | Mod: S$GLB,,, | Performed by: INTERNAL MEDICINE

## 2023-07-07 PROCEDURE — 94726 PLETHYSMOGRAPHY LUNG VOLUMES: CPT | Mod: S$GLB,,, | Performed by: INTERNAL MEDICINE

## 2023-07-07 PROCEDURE — 94618 PULMONARY STRESS TESTING: CPT | Mod: S$GLB,,, | Performed by: INTERNAL MEDICINE

## 2023-07-07 PROCEDURE — 94726 PULM FUNCT TST PLETHYSMOGRAP: ICD-10-PCS | Mod: S$GLB,,, | Performed by: INTERNAL MEDICINE

## 2023-07-07 PROCEDURE — 99214 OFFICE O/P EST MOD 30 MIN: CPT | Mod: 25,S$GLB,, | Performed by: INTERNAL MEDICINE

## 2023-07-07 PROCEDURE — 99999 PR PBB SHADOW E&M-EST. PATIENT-LVL IV: CPT | Mod: PBBFAC,,, | Performed by: INTERNAL MEDICINE

## 2023-07-07 PROCEDURE — 94729 DIFFUSING CAPACITY: CPT | Mod: S$GLB,,, | Performed by: INTERNAL MEDICINE

## 2023-07-07 RX ORDER — PREDNISONE 5 MG/1
5 TABLET ORAL DAILY
Qty: 45 TABLET | Refills: 3 | Status: ON HOLD | OUTPATIENT
Start: 2023-07-07 | End: 2023-11-08 | Stop reason: HOSPADM

## 2023-07-07 RX ORDER — TRAMADOL HYDROCHLORIDE 50 MG/1
50 TABLET ORAL EVERY 6 HOURS PRN
Status: ON HOLD | COMMUNITY
Start: 2023-06-23 | End: 2023-11-08 | Stop reason: HOSPADM

## 2023-07-07 NOTE — PROCEDURES
Ashwin Peter is a 57 y.o.  male patient, who presents for a 6 minute walk test ordered by MD Kyra.  The diagnosis is Sarcoidosis; Pulmonary Hypertension.  The patient's BMI is 42.2 kg/m2.  Predicted distance (lower limit of normal) is 368.56 meters.      Test Results:    The test was completed without stopping. The total time walked was 360 seconds. During walking, the patient reported:  No complaints. The patient used no assistive devices during testing.     07/07/2023---------Distance: 304.8 meters (1000 feet)     O2 Sat % Supplemental Oxygen Heart Rate Blood Pressure Safia Scale   Pre-exercise  (Resting) 95 % Room Air 88 bpm 134/62 mmHg 0   During Exercise 90 % Room Air 111 bpm 150/69 mmHg 2   Post-exercise  (Recovery) 95 % Room Air  93 bpm       Recovery Time: 62 seconds    Performing nurse/tech: Carlos CONKLIN      PREVIOUS STUDY:   08/30/2022---------Distance: 345.03 meters (1132 feet)       O2 Sat % Supplemental Oxygen Heart Rate Blood Pressure Safia Scale   Pre-exercise  (Resting) 96 % Room Air 76 bpm 137/65 mmHg 1   During Exercise 91 % Room Air 89 bpm 168/71 mmHg 3   Post-exercise  (Recovery) 96 % Room Air  85 bpm 155/70 mmHg        CLINICAL INTERPRETATION:  Six minute walk distance is 304.8 meters (1000 feet) with light dyspnea.  During exercise, there was significant desaturation while breathing room air.  Blood pressure remained stable and Heart rate increased significantly with walking.  The patient did not report non-pulmonary symptoms during exercise.  Since the previous study in August 2022, exercise capacity may be somewhat worse.  Based upon age and body mass index, exercise capacity is less than predicted.

## 2023-07-07 NOTE — PROGRESS NOTES
Subjective:       Patient ID: Ashwin Peter is a 57 y.o. male.    Chief Complaint: Sarcoidosis      HPI:   Ashwin Peter is a 57 y.o. male last seen by me on 8/30/22 with stage I pulmonary sarcoid (biopsy of mediastinal LAD with non-necrotizing granulomas, 12/2012), chronic hypoxemic respiratory failure on home oxygen, LAW on CPAP, morbid obesity, liver cirrhosis (suspected fatty liver, not related to sarcoid) with grade I varices, BL LE lymphedema, asymptomatic Covid 12/2020, who presents for hospital follow up.    Initial HPI 8/30/23:  Patient was hospitalized from 7/22-25 for decompensated heart failure. Significantly improved w/ 21L diuresis. He did not require supplemental oxygen at discharge. Remains on 10mg of prednisone. Was seen by Cardiology for pulm HTN. [place] it was WHO Group II/III and didn't think a RHC would add anything. Consider outpatient cardiac MRI.    Currently, patient reports his weight is stable. Cough is unchanged. Daily, dry. Does not tolerate weaning pred. Denies rashes, palpitations, f/c/ns. Last corrected calcium was 9.4. Reports rare use of JACOB.    Denies rashes, myalgias, arthralgias, hair/nail changes, dysphagia, eye changes, raynauds, mucosal ulcerations    Today:  Since his last visit, he reports he has been able to lose weight especially in the last month. Having difficulty keeping up physically at work (hurt his R knee). Denies changes in his respiratory symptoms.     Continues to have URI/LRTI in the spring and fall requiring UC visits and steroids.     Reports no benefit with albuterol.     Remains on 10mg of pred. Tried to go to every other day but resumed daily dosing when he had more trouble with his cough in the spring.     Continues to have the dry cough. Denies nighttime symptoms.    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  Tobacco- Denies  Inhalational Agents- Denies      Review of Systems    As above    Social History     Tobacco Use    Smoking status: Never     Passive exposure: Never    Smokeless tobacco: Never   Substance Use Topics    Alcohol use: No       Review of patient's allergies indicates:  No Known Allergies  Past Medical History:   Diagnosis Date    Anxiety     Hyperlipidemia     Morbid obesity with BMI of 45.0-49.9, adult     Multiple pulmonary nodules     Obesity     Other cirrhosis of liver 1/13/2021    Portal hypertension 2/11/2021    Sarcoidosis of lung with sarcoidosis of lymph nodes     Splenomegaly 12/22/2020    Thrombocytopenia 12/22/2020     Past Surgical History:   Procedure Laterality Date    ANTERIOR CRUCIATE LIGAMENT REPAIR  2007    right knee    COLONOSCOPY N/A 4/8/2021    Procedure: COLONOSCOPY;  Surgeon: Jeff Olvera MD;  [facility]  Service: Endoscopy;  Laterality: N/A;  rectal bleeding,   2nd floor BMI 50 (354 lbs)  COVID test on 4/5/21 at [facility]    ESOPHAGOGASTRODUODENOSCOPY N/A 4/8/2021    Procedure: EGD (ESOPHAGOGASTRODUODENOSCOPY);  Surgeon: Jeff Olvera MD;  Location[facility]  Service: Endoscopy;  Laterality: N/A;  variceal screening/ labs the am of procedure  2nd floor BMI 50 (354 lbs)    ESOPHAGOGASTRODUODENOSCOPY N/A 3/31/2022    Procedure: EGD (ESOPHAGOGASTRODUODENOSCOPY);  Surgeon: Jeff Olvera MD;  Location: [facility]  Service: Endoscopy;  Laterality: N/A;  BMI-52    Wt:375#      cirrhosis, variceal screening-labs done on 3/29-GT  vaccinated-GT    ESOPHAGOGASTRODUODENOSCOPY N/A 4/21/2023    Procedure: EGD (ESOPHAGOGASTRODUODENOSCOPY);  Surgeon: Christopher Britton MD;  Location: [facility] (2ND FLR);  Service: Endoscopy;  Laterality: N/A;  pt requested Friday due to work schedule  ECHO PA 44-51  BMI 47.97 Wt 343 lbs  inst portal-RB  last CBC PT/INR 1/24/23 4/17/23- Precall confirmed.    LYMPHADENECTOMY  1985     "MENISCECTOMY      left knee    NASAL SEPTUM SURGERY  1985    WISDOM TOOTH EXTRACTION       Current Outpatient Medications on File Prior to Visit   Medication Sig    acetaminophen (TYLENOL) 500 MG tablet Take 3 tablets by mouth daily as needed for knee pain or headache    dapagliflozin (FARXIGA) 10 mg tablet Take 1 tablet (10 mg total) by mouth once daily.    diclofenac sodium (VOLTAREN) 1 % Gel Apply topically twice daily as needed for knee pain    hydrOXYzine HCL (ATARAX) 25 MG tablet Take 1 tablet (25 mg total) by mouth 3 (three) times daily as needed for Anxiety.    phenylephrine HCl (SINEX REGULAR NASL) 2 sprays by Each Nostril route once daily.    potassium chloride (KLOR-CON) 10 MEQ TbSR Take 1 tablet (10 mEq total) by mouth once daily.    potassium phosphate, monobasic, (K-PHOS) 500 mg TbSO Take 2 tablets (1,000 mg total) by mouth once daily.    torsemide (DEMADEX) 20 MG Tab Take 3 tablets (60 mg total) by mouth once daily. Take torsemide 40mg (2 tablets) in the morning and 20mg (1 tablet) in the afternoon    traMADoL (ULTRAM) 50 mg tablet Take 50 mg by mouth every 6 (six) hours as needed.    [DISCONTINUED] pantoprazole (PROTONIX) 40 MG tablet Take 1 tablet (40 mg total) by mouth once daily. (Patient not taking: Reported on 6/15/2022)     No current facility-administered medications on file prior to visit.       Objective:      Vitals:    07/07/23 1413   BP: 134/62   BP Location: Right arm   Patient Position: Sitting   Pulse: 78   SpO2: 95%   Weight: (!) 137.3 kg (302 lb 11.1 oz)   Height: 5' 11" (1.803 m)     Physical Exam   Constitutional: He is oriented to person, place, and time. He appears well-developed and well-nourished. He is obese.   HENT:   Nose: No mucosal edema.   Mouth/Throat: Oropharynx is clear and moist. Mallampati Score: III.   Neck: No tracheal deviation present.   Cardiovascular: Normal rate, regular rhythm and intact distal pulses.   Pulmonary/Chest: Normal expansion, symmetric chest wall " expansion, effort normal and breath sounds normal. No respiratory distress. He has no wheezes. He has no rhonchi. He has no rales.   Abdominal: He exhibits no distension.   Musculoskeletal:         General: Edema present.      Cervical back: Neck supple.   Lymphadenopathy: No supraclavicular adenopathy is present.     He has no cervical adenopathy.   Neurological: He is alert and oriented to person, place, and time.   Skin: Skin is warm and dry. No cyanosis or erythema. Nails show no clubbing.   Psychiatric: He has a normal mood and affect.   Nursing note and vitals reviewed.    Personal Diagnostic Review    Laboratory:  23  Bicarb 20  Calcium- 8.5    22- corrected calcium 9.4      CT Chest 22- Images personally reviewed and compared to priors. I agree w/ the radiologist who notes;  1. No sizable pulmonary embolus identified, allowing for suboptimal bolus timing and motion limitations.  No CT findings of right heart strain.  2. Borderline cardiomegaly and probable mild diffuse interstitial pulmonary edema versus pneumonitis.  3. Cirrhosis with sequelae of portal hypertension including splenomegaly, upper abdominal collateral vessels, and gastroesophageal varices.    CXR 22- Images personally reviewed and compared to priors. I agree w/ the radiologist who notes;  1. Central pulmonary vascular congestion and cephalization of flow without overt interstitial edema or sizable pleural effusion.       PFTs   23  FVC: 4.23 (85.3 % predicted), FEV1: 3.38 (87.7 % predicted), FEV1/FVC:  80, T.35 (86.6 % predicted), DLCO: 23.00 (74.2 % predicted)  Nl PFTs w/ improvement compared to previous    2023---------Distance: 304.8 meters (1000 feet)       O2 Sat % Supplemental Oxygen Heart Rate Blood Pressure Safia Scale   Pre-exercise  (Resting) 95 % Room Air 88 bpm 134/62 mmHg 0   During Exercise 90 % Room Air 111 bpm 150/69 mmHg 2   Post-exercise  (Recovery) 95 % Room Air  93 bpm          Recovery  Time: 62 seconds    08/30/2022---------Distance: 345.03 meters (1132 feet)       O2 Sat % Supplemental Oxygen Heart Rate Blood Pressure Safia Scale   Pre-exercise  (Resting) 96 % Room Air 76 bpm 137/65 mmHg 1   During Exercise 91 % Room Air 89 bpm 168/71 mmHg 3   Post-exercise  (Recovery) 96 % Room Air  85 bpm 155/70 mmHg        Recovery Time: 213 seconds    TTE 7/24/22  The left ventricle is normal in size with normal systolic function.  The estimated ejection fraction is 65%.  Indeterminate left ventricular diastolic function.  Moderate right ventricular enlargement with mildly to moderately reduced right ventricular systolic function.  The estimated PA systolic pressure is 51 mmHg.  Intermediate central venous pressure (8 mmHg).  There is pulmonary hypertension.  Moderate left atrial enlargement.  No flowsheet data found.        Assessment:     Problem List Items Addressed This Visit          Pulmonary    Sarcoidosis of lung with sarcoidosis of lymph nodes - Primary     Pulmonary sarcoidosis- biopsy of mediastinal LAD with non-necrotizing granulomas, 12/2012. On pred 10mg daily. Primary symptom is cough. Increased difficulty at the change of the seasons. Losing weight purposefully.    - Attempt to wean pred. Decrease by 2.5mg every 2-4 weeks  - unable to transition to steroid sparing agents such as MTX due to chronic liver disease  - given chronic daily dose <20mg qday, do not feel PCP prophylaxis needed at this time  - Needs yearly ophtho screens  - Denies cardiac symptoms but needs yearly EKG, ordered  - CXR ordered         Relevant Medications    predniSONE (DELTASONE) 5 MG tablet    Other Relevant Orders    X-Ray Chest PA And Lateral    EKG 12-lead    Pulmonary hypertension     WHO Group II/III 2/2 LAW, heart failure, sarcoidosis morbid obesity. No desaturation today            Endocrine    Severe obesity (BMI >= 40)     Cont excellent weight loss            Other    LAW on CPAP     Cont nightly compliance.  F/u in sleep clinic.

## 2023-07-18 NOTE — ASSESSMENT & PLAN NOTE
Pulmonary sarcoidosis- biopsy of mediastinal LAD with non-necrotizing granulomas, 12/2012. On pred 10mg daily. Primary symptom is cough. Increased difficulty at the change of the seasons. Losing weight purposefully.    - Attempt to wean pred. Decrease by 2.5mg every 2-4 weeks  - unable to transition to steroid sparing agents such as MTX due to chronic liver disease  - given chronic daily dose <20mg qday, do not feel PCP prophylaxis needed at this time  - Needs yearly ophtho screens  - Denies cardiac symptoms but needs yearly EKG, ordered  - CXR ordered

## 2023-07-21 ENCOUNTER — LAB VISIT (OUTPATIENT)
Dept: LAB | Facility: HOSPITAL | Age: 58
End: 2023-07-21
Attending: INTERNAL MEDICINE
Payer: OTHER GOVERNMENT

## 2023-07-21 DIAGNOSIS — K62.5 RECTAL BLEEDING: ICD-10-CM

## 2023-07-21 PROCEDURE — 82941 ASSAY OF GASTRIN: CPT | Performed by: INTERNAL MEDICINE

## 2023-07-21 PROCEDURE — 36415 COLL VENOUS BLD VENIPUNCTURE: CPT | Performed by: INTERNAL MEDICINE

## 2023-07-24 LAB — GASTRIN SERPL-MCNC: 16 PG/ML

## 2023-08-24 ENCOUNTER — PATIENT MESSAGE (OUTPATIENT)
Dept: CARDIOLOGY | Facility: CLINIC | Age: 58
End: 2023-08-24
Payer: OTHER GOVERNMENT

## 2023-08-24 ENCOUNTER — TELEPHONE (OUTPATIENT)
Dept: CARDIOLOGY | Facility: CLINIC | Age: 58
End: 2023-08-24
Payer: OTHER GOVERNMENT

## 2023-08-24 NOTE — TELEPHONE ENCOUNTER
----- Message from Ana Elilott RN sent at 8/24/2023 11:44 AM CDT -----  Regarding: Pt request  Good Morning,     Pt has reached out to our office to request paperwork to be filled out.  He is no longer enrolled to our clinic, please review his request below and proceed at your discretion. Thank you for your time.    Hello, I was diagnosed with congestive heart failure back in 2022. My employer needs supporting documentation to support my request for [employer].  I have a package that needs to be filled out and returned to me by 5Sep2023. Can I get an email address to scan and send this package to??     Thank you,  Ashwin Peter

## 2023-08-25 ENCOUNTER — LAB VISIT (OUTPATIENT)
Dept: LAB | Facility: HOSPITAL | Age: 58
End: 2023-08-25
Attending: STUDENT IN AN ORGANIZED HEALTH CARE EDUCATION/TRAINING PROGRAM
Payer: OTHER GOVERNMENT

## 2023-08-25 DIAGNOSIS — K74.60 LIVER CIRRHOSIS SECONDARY TO NASH: ICD-10-CM

## 2023-08-25 DIAGNOSIS — K75.81 LIVER CIRRHOSIS SECONDARY TO NASH: ICD-10-CM

## 2023-08-25 LAB
INR PPP: 1.1 (ref 0.8–1.2)
PROTHROMBIN TIME: 12.1 SEC (ref 9–12.5)

## 2023-08-25 PROCEDURE — 36415 COLL VENOUS BLD VENIPUNCTURE: CPT | Performed by: STUDENT IN AN ORGANIZED HEALTH CARE EDUCATION/TRAINING PROGRAM

## 2023-08-25 PROCEDURE — 85610 PROTHROMBIN TIME: CPT | Performed by: STUDENT IN AN ORGANIZED HEALTH CARE EDUCATION/TRAINING PROGRAM

## 2023-09-05 ENCOUNTER — OFFICE VISIT (OUTPATIENT)
Dept: CARDIOLOGY | Facility: CLINIC | Age: 58
End: 2023-09-05
Payer: OTHER GOVERNMENT

## 2023-09-05 VITALS
HEART RATE: 90 BPM | SYSTOLIC BLOOD PRESSURE: 132 MMHG | WEIGHT: 304 LBS | OXYGEN SATURATION: 95 % | HEIGHT: 71 IN | DIASTOLIC BLOOD PRESSURE: 66 MMHG | BODY MASS INDEX: 42.56 KG/M2

## 2023-09-05 DIAGNOSIS — I27.20 PULMONARY HYPERTENSION: ICD-10-CM

## 2023-09-05 DIAGNOSIS — E66.01 MORBID OBESITY WITH BODY MASS INDEX (BMI) OF 45.0 TO 49.9 IN ADULT: ICD-10-CM

## 2023-09-05 DIAGNOSIS — K76.6 PORTAL HYPERTENSION: ICD-10-CM

## 2023-09-05 DIAGNOSIS — E66.01 SEVERE OBESITY (BMI >= 40): ICD-10-CM

## 2023-09-05 DIAGNOSIS — I50.32 CHRONIC DIASTOLIC CONGESTIVE HEART FAILURE: Primary | ICD-10-CM

## 2023-09-05 DIAGNOSIS — I89.0 LYMPHEDEMA: ICD-10-CM

## 2023-09-05 PROCEDURE — 99214 PR OFFICE/OUTPT VISIT, EST, LEVL IV, 30-39 MIN: ICD-10-PCS | Mod: S$GLB,,, | Performed by: INTERNAL MEDICINE

## 2023-09-05 PROCEDURE — 99999 PR PBB SHADOW E&M-EST. PATIENT-LVL IV: ICD-10-PCS | Mod: PBBFAC,,, | Performed by: INTERNAL MEDICINE

## 2023-09-05 PROCEDURE — 99999 PR PBB SHADOW E&M-EST. PATIENT-LVL IV: CPT | Mod: PBBFAC,,, | Performed by: INTERNAL MEDICINE

## 2023-09-05 PROCEDURE — 99214 OFFICE O/P EST MOD 30 MIN: CPT | Mod: S$GLB,,, | Performed by: INTERNAL MEDICINE

## 2023-09-05 RX ORDER — TORSEMIDE 20 MG/1
TABLET ORAL
Status: ON HOLD | COMMUNITY
End: 2024-01-10 | Stop reason: HOSPADM

## 2023-09-05 NOTE — PROGRESS NOTES
Subjective:    Patient ID:  Ashwin Peter is a 58 y.o. male who presents for follow-up of HFpEF    HPI  The patient is a 58  year old male with known HFpEF was admitted 7/22/22 with few months of progressive SOB, DUFF and edema. He has sarcoidosis of the lung with mediastinal involvement,  liver cirrhosis secondary to fatty liver w/ portal HTN, LAW on CPAP. He has long standing bilateral leg lymphedema. He uses mechanical compression boots. He had no coronary calcification on a CTA 1/22/22. He recently developed right knee pain but prior to that he had no significant impairment of physical activity this year.      Owwbcap19/21/22    The test was stopped because the patient experienced shortness of breath.  Intermediate central venous pressure (8 mmHg).  The estimated PA systolic pressure is 44 mmHg.  The left ventricle is mildly enlarged with normal systolic function.  The estimated ejection fraction is 65%.  Indeterminate left ventricular diastolic function.  Mild right ventricular enlargement with normal right ventricular systolic function.  Mild to moderate left atrial enlargement.  Mild tricuspid regurgitation.  There is abnormal septal wall motion.  During stress, the following significant arrhythmias were observed: rare PVCs.  The ECG portion of this study is negative for myocardial ischemia.  The patient's exercise capacity was below average.  The stress echo portion of this study is negative for myocardial ischemia.     Lab Results   Component Value Date     05/26/2023    K 3.8 05/26/2023     05/26/2023    CO2 20 (L) 05/26/2023    BUN 17 05/26/2023    CREATININE 0.8 05/26/2023    GLU 88 05/26/2023    HGBA1C 4.2 10/17/2022    MG 1.8 10/17/2022    AST 57 (H) 05/26/2023    ALT 33 05/26/2023    ALBUMIN 2.6 (L) 05/26/2023    PROT 5.8 (L) 05/26/2023    BILITOT 2.9 (H) 05/26/2023    WBC 6.83 05/26/2023    HGB 13.3 (L) 05/26/2023    HCT 41.9 05/26/2023    MCV 92 05/26/2023    PLT 68 (L) 05/26/2023     INR 1.1 08/25/2023    PSA 0.62 07/14/2014    TSH 1.383 08/30/2022         Lab Results   Component Value Date    CHOL 198 10/17/2022    HDL 41 10/17/2022    TRIG 101 10/17/2022       Lab Results   Component Value Date    LDLCALC 136.8 10/17/2022       Past Medical History:   Diagnosis Date    Anxiety     Hyperlipidemia     Morbid obesity with BMI of 45.0-49.9, adult     Multiple pulmonary nodules     Obesity     Other cirrhosis of liver 1/13/2021    Portal hypertension 2/11/2021    Sarcoidosis of lung with sarcoidosis of lymph nodes     Splenomegaly 12/22/2020    Thrombocytopenia 12/22/2020       Current Outpatient Medications:     acetaminophen (TYLENOL) 500 MG tablet, Take 3 tablets by mouth daily as needed for knee pain or headache, Disp: , Rfl:     dapagliflozin (FARXIGA) 10 mg tablet, Take 1 tablet (10 mg total) by mouth once daily., Disp: 90 tablet, Rfl: 3    diclofenac sodium (VOLTAREN) 1 % Gel, Apply topically twice daily as needed for knee pain, Disp: , Rfl:     hydrOXYzine HCL (ATARAX) 25 MG tablet, Take 1 tablet (25 mg total) by mouth 3 (three) times daily as needed for Anxiety., Disp: 30 tablet, Rfl: 0    phenylephrine HCl (SINEX REGULAR NASL), 2 sprays by Each Nostril route once daily., Disp: , Rfl:     potassium phosphate, monobasic, (K-PHOS) 500 mg TbSO, Take 2 tablets (1,000 mg total) by mouth once daily., Disp: 60 tablet, Rfl: 11    predniSONE (DELTASONE) 5 MG tablet, Take 1 tablet (5 mg total) by mouth once daily., Disp: 45 tablet, Rfl: 3    semaglutide (OZEMPIC) 0.25 mg or 0.5 mg (2 mg/3 mL) pen injector, Inject 0.25 mg into the skin once a week for 4 weeks., Disp: 3 mL, Rfl: 0    torsemide (DEMADEX) 20 MG Tab, Take 20 mg by mouth. Take 3 tabs(60MG) by mouth once daily. Take 2 tabs 40mg in the morning and 20 mg (1tab) in the afternoon., Disp: , Rfl:     traMADoL (ULTRAM) 50 mg tablet, Take 50 mg by mouth every 6 (six) hours as needed., Disp: , Rfl:           Review of Systems   Constitutional:  "Negative for decreased appetite, diaphoresis, fever, malaise/fatigue, weight gain and weight loss.   HENT:  Negative for congestion, ear discharge, ear pain and nosebleeds.    Eyes:  Negative for blurred vision, double vision and visual disturbance.   Cardiovascular:  Positive for dyspnea on exertion and leg swelling (lymphedema). Negative for chest pain, claudication, cyanosis, irregular heartbeat, near-syncope, orthopnea, palpitations, paroxysmal nocturnal dyspnea and syncope.   Respiratory:  Negative for cough, hemoptysis, shortness of breath, sleep disturbances due to breathing, snoring, sputum production and wheezing.    Endocrine: Negative for polydipsia, polyphagia and polyuria.   Hematologic/Lymphatic: Negative for adenopathy and bleeding problem. Does not bruise/bleed easily.   Skin:  Negative for color change, nail changes, poor wound healing and rash.   Musculoskeletal:  Negative for muscle cramps and muscle weakness.   Gastrointestinal:  Negative for abdominal pain, anorexia, change in bowel habit, hematochezia, nausea and vomiting.   Genitourinary:  Negative for dysuria, frequency and hematuria.   Neurological:  Negative for brief paralysis, difficulty with concentration, excessive daytime sleepiness, dizziness, focal weakness, headaches, light-headedness, seizures, vertigo and weakness.   Psychiatric/Behavioral:  Negative for altered mental status and depression.    Allergic/Immunologic: Negative for persistent infections.        Objective:/66   Pulse 90   Ht 5' 11" (1.803 m)   Wt (!) 137.9 kg (304 lb 0.2 oz)   SpO2 95%   BMI 42.40 kg/m²             Physical Exam  Constitutional:       Appearance: He is well-developed. He is obese.   HENT:      Head: Normocephalic.      Right Ear: External ear normal.      Left Ear: External ear normal.      Nose: Nose normal.   Eyes:      General: No scleral icterus.     Pupils: Pupils are equal, round, and reactive to light.   Neck:      Thyroid: No " thyromegaly.      Vascular: No JVD.      Trachea: No tracheal deviation.   Cardiovascular:      Rate and Rhythm: Normal rate and regular rhythm.      Pulses: Intact distal pulses.           Carotid pulses are 2+ on the right side and 2+ on the left side.     Heart sounds: S1 normal and S2 normal. No murmur heard.     No friction rub. No gallop.      Comments: Bilateral lymphedema  JVP normal  Pulmonary:      Effort: Pulmonary effort is normal.      Breath sounds: Normal breath sounds.   Abdominal:      General: Bowel sounds are normal. There is no distension.      Tenderness: There is no abdominal tenderness. There is no guarding.   Musculoskeletal:         General: No tenderness. Normal range of motion.      Cervical back: Normal range of motion and neck supple.   Lymphadenopathy:      Comments: Palpation of neck and groin lymph nodes normal   Skin:     General: Skin is dry.      Comments: No ankle nor pretibial edema   Neurological:      Mental Status: He is alert and oriented to person, place, and time.      Cranial Nerves: No cranial nerve deficit.      Motor: No abnormal muscle tone.      Coordination: Coordination normal.   Psychiatric:         Behavior: Behavior normal.         Thought Content: Thought content normal.         Judgment: Judgment normal.         Assessment:       1. Chronic diastolic congestive heart failure    2. Severe obesity (BMI >= 40)    3. Portal hypertension    4. Morbid obesity with body mass index (BMI) of 45.0 to 49.9 in adult    5. Pulmonary hypertension    6. Lymphedema         Plan:       Ashwin was seen today for hypertension.    Diagnoses and all orders for this visit:    Chronic diastolic congestive heart failure    Severe obesity (BMI >= 40)    Portal hypertension    Morbid obesity with body mass index (BMI) of 45.0 to 49.9 in adult    Pulmonary hypertension    Lymphedema    Other orders  -     torsemide (DEMADEX) 20 MG Tab; Take 20 mg by mouth. Take 3 tabs(60MG) by mouth  once daily. Take 2 tabs 40mg in the morning and 20 mg (1tab) in the afternoon.    9/1523    LOW TO MODERATE RISK FOR PLANNED TKR

## 2023-09-14 ENCOUNTER — PATIENT MESSAGE (OUTPATIENT)
Dept: CARDIOLOGY | Facility: CLINIC | Age: 58
End: 2023-09-14
Payer: OTHER GOVERNMENT

## 2023-10-17 NOTE — H&P
[facility] ([place])  History & Physical    Subjective:      Morbid obesity progressive pain deformity right knee worse than left.  Multiple medical problems.  He is actually lost a fair amount of weight.  Got his BMI below 40.  Profound difficulty walking with crutches.  Admitted for right knee replacement    Patient Active Problem List    Diagnosis Date Noted    Sarcoidosis, unspecified 08/30/2022    Shortness of breath 08/08/2022    Hypervolemia 07/22/2022    Acute on chronic diastolic heart failure 07/22/2022    Pulmonary hypertension 06/17/2022    LAW on CPAP 06/17/2022    Swelling of lower extremity 11/08/2021    Acquired lymphedema of lower extremity 11/08/2021    Liver cirrhosis secondary to GUAJARDO 04/08/2021    Leg edema 02/11/2021    Portal hypertension 02/11/2021    Rectal bleeding 02/11/2021    Other cirrhosis of liver 01/13/2021    Splenomegaly 12/22/2020    Thrombocytopenia 12/22/2020    Sarcoidosis of lung with sarcoidosis of lymph nodes     Severe obesity (BMI >= 40)      Past Medical History:   Diagnosis Date    Anxiety     Hyperlipidemia     Morbid obesity with BMI of 45.0-49.9, adult     Multiple pulmonary nodules     Obesity     Other cirrhosis of liver 1/13/2021    Portal hypertension 2/11/2021    Sarcoidosis of lung with sarcoidosis of lymph nodes     Splenomegaly 12/22/2020    Thrombocytopenia 12/22/2020     Past Surgical History:   Procedure Laterality Date    ANTERIOR CRUCIATE LIGAMENT REPAIR  2007    right knee    COLONOSCOPY N/A 4/8/2021    Procedure: COLONOSCOPY;  Surgeon: Jeff Olvera MD;  [facility] ([address] [place] [address]);  Service: Endoscopy;  Laterality: N/A;  rectal bleeding,   2nd floor BMI 50 (354 lbs)  COVID test on 4/5/21 at [facility]    ESOPHAGOGASTRODUODENOSCOPY N/A 4/8/2021    Procedure: EGD (ESOPHAGOGASTRODUODENOSCOPY);  Surgeon: Jeff Olvera MD;  [facility]  Service: Endoscopy;  Laterality: N/A;  variceal screening/ labs the am of  procedure  2nd floor BMI 50 (354 lbs)    ESOPHAGOGASTRODUODENOSCOPY N/A 3/31/2022    Procedure: EGD (ESOPHAGOGASTRODUODENOSCOPY);  Surgeon: Jeff Olvera MD;  [facility]  Service: Endoscopy;  Laterality: N/A;  BMI-52    Wt:375#      cirrhosis, variceal screening-labs done on 3/29-GT  vaccinated-GT    ESOPHAGOGASTRODUODENOSCOPY N/A 4/21/2023    Procedure: EGD (ESOPHAGOGASTRODUODENOSCOPY);  Surgeon: Christopher Britton MD;  [facility]  Service: Endoscopy;  Laterality: N/A;  pt requested Friday due to work schedule  ECHO PA 44-51  BMI 47.97 Wt 343 lbs  inst portal-RB  last CBC PT/INR 1/24/23 4/17/23- Precall confirmed.    LYMPHADENECTOMY  1985    MENISCECTOMY      left knee    NASAL SEPTUM SURGERY  1985    WISDOM TOOTH EXTRACTION       No medications prior to admission.     Review of patient's allergies indicates:  No Known Allergies  Social History     Tobacco Use    Smoking status: Never     Passive exposure: Never    Smokeless tobacco: Never   Substance Use Topics    Alcohol use: No     Family History   Problem Relation Age of Onset    Hypertension Father     Heart attack Father 55    Diabetes Paternal Grandmother     Aneurysm Mother         eye    Dementia Mother     Colon cancer Neg Hx     Prostate cancer Neg Hx     Sarcoidosis Neg Hx      Social History     Tobacco Use    Smoking status: Never     Passive exposure: Never    Smokeless tobacco: Never   Substance Use Topics    Alcohol use: No    Drug use: No       No medications prior to admission.     Review of patient's allergies indicates:  No Known Allergies     Review of Systems:  All other systems reviewed and are negative.  .    No current facility-administered medications for this encounter.     Current Outpatient Medications   Medication Sig    acetaminophen (TYLENOL) 500 MG tablet Take 3 tablets by mouth daily as needed for knee pain or headache    dapagliflozin (FARXIGA) 10 mg tablet Take 1 tablet (10 mg total)  by mouth once daily.    diclofenac sodium (VOLTAREN) 1 % Gel Apply topically twice daily as needed for knee pain    hydrOXYzine HCL (ATARAX) 25 MG tablet Take 1 tablet (25 mg total) by mouth 3 (three) times daily as needed for Anxiety.    phenylephrine HCl (SINEX REGULAR NASL) 2 sprays by Each Nostril route once daily.    potassium phosphate, monobasic, (K-PHOS) 500 mg TbSO Take 2 tablets (1,000 mg total) by mouth once daily.    predniSONE (DELTASONE) 5 MG tablet Take 1 tablet (5 mg total) by mouth once daily.    semaglutide (OZEMPIC) 0.25 mg or 0.5 mg (2 mg/3 mL) pen injector Inject 0.5 mg into the skin every 7 days.    torsemide (DEMADEX) 20 MG Tab Take 20 mg by mouth. Take 3 tabs(60MG) by mouth once daily. Take 2 tabs 40mg in the morning and 20 mg (1tab) in the afternoon.    traMADoL (ULTRAM) 50 mg tablet Take 50 mg by mouth every 6 (six) hours as needed.       Objective:      Vital Signs (Most Recent)       Vital Signs Range (Last 24H):  BP: ()/()   Arterial Line BP: ()/()     Physical Exam heart regular lungs clear lymphedema most was weight is in the lower extremity.  Tenderness swelling both knees right worse than left    Imaging Review:   Complete loss of joint space with spur formation      Assessment:      There are no hospital problems to display for this patient.      Plan:    The various methods of treatment have been discussed with the patient and family.   After consideration of risks, benefits and other options for treatment, the patient has consented to surgical interventions (severe risks discussed high medical problem).  Questions were answered and Pre-op teaching was done by me.

## 2023-10-19 NOTE — DISCHARGE INSTRUCTIONS
Knee Athroplasty Discharge Instructions    Pain:   After surgery your knee will be sore. The knee will likely have been injected with a numbing medicine (Exparel) prior to completion of surgery for pain control. This is indicated on a green bracelet that you will continue to wear for 4 days after surgery. Ice and elevation will assist with pain control.    Apply ice as much as possible for the first 72 hours. After 72 hours, apply ice for 20minutes after therapy or whenever experiencing pain. Avoid direct skin contact with ice to prevent frostbit.           Elevate the affected leg with the pillow the length of the leg higher than your heart to assist with swelling and pain.  Incision Care:   Some drainage from the incision in the first 72 hours is normal. If drainage is excessive, reinforce dressing and call home health nurse or therapist. Keep incision clean, dry and covered until staples removed. Staples will be removed 14-21 days after surgery .    Activity:                  Perform exercises 2 x day.   You may shower 48 hours after surgery  providing the dressing is waterproof. You can wrap the knee or hip with press n seal saran wrap to assist with keeping the dressing dry while showering. Dr. Liao prefers his patients not to shower for 2 weeks and to sponge bathe.No tub or hot tub usage for 6 weeks after surgery. Support help is mandatory during showering. If the  dressing becomes wet, replace with a new dressing. Do not leave a wet dressing on.   Wear enoch hose to both extremities  for 3 weeks after surgery .You may remove stockings for 1- 2 hours during the day only.            If your physician orders the CPM machine you are to use it for 2 hours in the am and 2 hours in the pm. This is not to replace your exercise program.    You may need antibiotic coverage before dental or minor surgical procedures.  Possible Complication:  Infections: Report these signs and symptoms to your surgeon:  Unexpected  redness around incision  Persistent drainage from wound after 72 hours  Temperature Greater than 101 degrees F  Additional swelling  Pain not controlled with current pain medication  Blood Clot: Report these signs and symptoms to your surgeon:  Unusual pain, unusual warm skin  Red or discolored skin  Swelling in the leg

## 2023-10-25 RX ORDER — POTASSIUM CHLORIDE 750 MG/1
10 TABLET, EXTENDED RELEASE ORAL
Qty: 90 TABLET | Refills: 3 | Status: SHIPPED | OUTPATIENT
Start: 2023-10-25

## 2023-10-27 ENCOUNTER — ANESTHESIA EVENT (OUTPATIENT)
Dept: SURGERY | Facility: OTHER | Age: 58
End: 2023-10-27
Payer: OTHER GOVERNMENT

## 2023-10-27 ENCOUNTER — HOSPITAL ENCOUNTER (OUTPATIENT)
Dept: PREADMISSION TESTING | Facility: OTHER | Age: 58
Discharge: HOME | End: 2023-10-27
Attending: ORTHOPAEDIC SURGERY
Payer: OTHER GOVERNMENT

## 2023-10-27 VITALS
TEMPERATURE: 98 F | WEIGHT: 277 LBS | OXYGEN SATURATION: 97 % | BODY MASS INDEX: 38.78 KG/M2 | HEART RATE: 80 BPM | DIASTOLIC BLOOD PRESSURE: 65 MMHG | HEIGHT: 71 IN | SYSTOLIC BLOOD PRESSURE: 123 MMHG | RESPIRATION RATE: 20 BRPM

## 2023-10-27 LAB
ANION GAP SERPL CALC-SCNC: 9 MMOL/L (ref 8–16)
BACTERIA #/AREA URNS HPF: ABNORMAL /HPF
BASOPHILS # BLD AUTO: 0.02 K/UL (ref 0–0.2)
BASOPHILS NFR BLD: 0.4 % (ref 0–1.9)
BILIRUB UR QL STRIP: NEGATIVE
BUN SERPL-MCNC: 19 MG/DL (ref 6–20)
CALCIUM SERPL-MCNC: 8.6 MG/DL (ref 8.7–10.5)
CHLORIDE SERPL-SCNC: 102 MMOL/L (ref 95–110)
CLARITY UR: ABNORMAL
CO2 SERPL-SCNC: 26 MMOL/L (ref 23–29)
COLOR UR: YELLOW
CREAT SERPL-MCNC: 1.1 MG/DL (ref 0.5–1.4)
DIFFERENTIAL METHOD: ABNORMAL
EOSINOPHIL # BLD AUTO: 0.2 K/UL (ref 0–0.5)
EOSINOPHIL NFR BLD: 3.9 % (ref 0–8)
ERYTHROCYTE [DISTWIDTH] IN BLOOD BY AUTOMATED COUNT: 14.9 % (ref 11.5–14.5)
EST. GFR  (NO RACE VARIABLE): >60 ML/MIN/1.73 M^2
GLUCOSE SERPL-MCNC: 104 MG/DL (ref 70–110)
GLUCOSE UR QL STRIP: NEGATIVE
HCT VFR BLD AUTO: 45.8 % (ref 40–54)
HGB BLD-MCNC: 15.3 G/DL (ref 14–18)
HGB UR QL STRIP: NEGATIVE
HYALINE CASTS #/AREA URNS LPF: 0 /LPF
IMM GRANULOCYTES # BLD AUTO: 0.01 K/UL (ref 0–0.04)
IMM GRANULOCYTES NFR BLD AUTO: 0.2 % (ref 0–0.5)
KETONES UR QL STRIP: NEGATIVE
LEUKOCYTE ESTERASE UR QL STRIP: ABNORMAL
LYMPHOCYTES # BLD AUTO: 0.7 K/UL (ref 1–4.8)
LYMPHOCYTES NFR BLD: 14.5 % (ref 18–48)
MCH RBC QN AUTO: 32.1 PG (ref 27–31)
MCHC RBC AUTO-ENTMCNC: 33.4 G/DL (ref 32–36)
MCV RBC AUTO: 96 FL (ref 82–98)
MICROSCOPIC COMMENT: ABNORMAL
MONOCYTES # BLD AUTO: 0.8 K/UL (ref 0.3–1)
MONOCYTES NFR BLD: 15.8 % (ref 4–15)
NEUTROPHILS # BLD AUTO: 3.1 K/UL (ref 1.8–7.7)
NEUTROPHILS NFR BLD: 65.2 % (ref 38–73)
NITRITE UR QL STRIP: NEGATIVE
NRBC BLD-RTO: 0 /100 WBC
PH UR STRIP: 6 [PH] (ref 5–8)
PLATELET # BLD AUTO: 135 K/UL (ref 150–450)
PMV BLD AUTO: 12.1 FL (ref 9.2–12.9)
POTASSIUM SERPL-SCNC: 4.1 MMOL/L (ref 3.5–5.1)
PROT UR QL STRIP: ABNORMAL
RBC # BLD AUTO: 4.77 M/UL (ref 4.6–6.2)
RBC #/AREA URNS HPF: 1 /HPF (ref 0–4)
SODIUM SERPL-SCNC: 137 MMOL/L (ref 136–145)
SP GR UR STRIP: 1.03 (ref 1–1.03)
SQUAMOUS #/AREA URNS HPF: 2 /HPF
URN SPEC COLLECT METH UR: ABNORMAL
UROBILINOGEN UR STRIP-ACNC: NEGATIVE EU/DL
WBC # BLD AUTO: 4.82 K/UL (ref 3.9–12.7)
WBC #/AREA URNS HPF: 55 /HPF (ref 0–5)

## 2023-10-27 PROCEDURE — 80048 BASIC METABOLIC PNL TOTAL CA: CPT | Performed by: ORTHOPAEDIC SURGERY

## 2023-10-27 PROCEDURE — 85025 COMPLETE CBC W/AUTO DIFF WBC: CPT | Performed by: ORTHOPAEDIC SURGERY

## 2023-10-27 PROCEDURE — 81000 URINALYSIS NONAUTO W/SCOPE: CPT | Performed by: ORTHOPAEDIC SURGERY

## 2023-10-27 PROCEDURE — 36415 COLL VENOUS BLD VENIPUNCTURE: CPT | Performed by: ORTHOPAEDIC SURGERY

## 2023-10-27 RX ORDER — LIDOCAINE HYDROCHLORIDE 10 MG/ML
0.5 INJECTION, SOLUTION EPIDURAL; INFILTRATION; INTRACAUDAL; PERINEURAL ONCE
Status: CANCELLED | OUTPATIENT
Start: 2023-10-27 | End: 2023-10-27

## 2023-10-27 RX ORDER — FAMOTIDINE 20 MG/1
20 TABLET, FILM COATED ORAL
Status: CANCELLED | OUTPATIENT
Start: 2023-10-27 | End: 2023-10-27

## 2023-10-27 RX ORDER — ACETAMINOPHEN 500 MG
1000 TABLET ORAL
Status: CANCELLED | OUTPATIENT
Start: 2023-10-27 | End: 2023-10-27

## 2023-10-27 RX ORDER — SODIUM CHLORIDE, SODIUM LACTATE, POTASSIUM CHLORIDE, CALCIUM CHLORIDE 600; 310; 30; 20 MG/100ML; MG/100ML; MG/100ML; MG/100ML
INJECTION, SOLUTION INTRAVENOUS CONTINUOUS
Status: CANCELLED | OUTPATIENT
Start: 2023-10-27

## 2023-10-27 NOTE — ANESTHESIA PREPROCEDURE EVALUATION
10/27/2023  Ashwin Peter is a 58 y.o., male.      Pre-op Assessment    I have reviewed the Patient Summary Reports.     I have reviewed the Nursing Notes. I have reviewed the NPO Status.   I have reviewed the Medications.   Steroids Taken In Past Year: Prednisone    Review of Systems  Anesthesia Hx:  Denies Family Hx of Anesthesia complications.   Denies Personal Hx of Anesthesia complications.   Social:  Non-Smoker    Hematology/Oncology:  Hematology Normal   Oncology Normal   Hematology Comments: Thrombocytopenia, last plt count 68K    EENT/Dental:   chronic allergic rhinitis   Cardiovascular:   Denies CAD.    CHF DUFF (600ft walking) 10/22 stress echo:  Summary    ? The test was stopped because the patient experienced shortness of breath.  ? Intermediate central venous pressure (8 mmHg).  ? The estimated PA systolic pressure is 44 mmHg.  ? The left ventricle is mildly enlarged with normal systolic function.  ? The estimated ejection fraction is 65%.  ? Indeterminate left ventricular diastolic function.  ? Mild right ventricular enlargement with normal right ventricular systolic function.  ? Mild to moderate left atrial enlargement.  ? Mild tricuspid regurgitation.  ? There is abnormal septal wall motion.  ? During stress, the following significant arrhythmias were observed: rare PVCs.  ? The ECG portion of this study is negative for myocardial ischemia.  ? The patient's exercise capacity was below average.  ? The stress echo portion of this study is negative for myocardial ischemia.      Portal HTN  Pulmonary HTN  Mike LE lymphedema   Pulmonary:   Denies Shortness of breath. (sedentary) Sleep Apnea, CPAP Steroid dependent sarcoidosis   Renal/:  Renal/ Normal     Hepatic/GI:   Liver Disease, (cirrhosis due to GUAJARDO)    Musculoskeletal:  Musculoskeletal Normal    Neurological:  Neurology Normal   "  Endocrine:   100# weight loss  On ozempic 2 months  Last dose 10/20 Obesity / BMI > 30  Dermatological:  Skin Normal    Psych:  Psychiatric Normal           Physical Exam  General: Cooperative, Alert and Oriented    Airway:  Mallampati: I   Mouth Opening: Normal  TM Distance: Normal  Neck ROM: Normal ROM    Dental:  Intact        Anesthesia Plan  Type of Anesthesia, risks & benefits discussed:    Anesthesia Type: Gen ETT  Intra-op Monitoring Plan: Standard ASA Monitors and Art Line  Post Op Pain Control Plan: multimodal analgesia and IV/PO Opioids PRN  Induction:  IV  Airway Plan: Video  Informed Consent: Informed consent signed with the Patient and all parties understand the risks and agree with anesthesia plan.  All questions answered.   ASA Score: 4  Anesthesia Plan Notes: Clearance from cardiology in Psychiatric, Dr. Nix, "low to moderate risk"  Plan GETA due to thrombocytopenia and pulm HTN      Ready For Surgery From Anesthesia Perspective.     .      "

## 2023-10-27 NOTE — PRE ADMISSION SCREENING
PATIENT STATES MD OFFICE OKAY WITH PATIENT HAVING CXR DONE ON THURSDAY AT [facility]. PATIENT WILL GO TO MD OFFICE AFTER LABS.

## 2023-10-27 NOTE — DISCHARGE INSTRUCTIONS
Information to Prepare you for your Surgery    PRE-ADMIT TESTING -  [phone]    [address] [facility]          Your surgery has been scheduled at [facility]. We are pleased to have the opportunity to serve you. For Further Information please call [phone].    On the day of surgery please report to the Information Desk on the 1st floor.    CONTACT YOUR PHYSICIAN'S OFFICE THE DAY PRIOR TO YOUR SURGERY TO OBTAIN YOUR ARRIVAL TIME.     The evening before surgery do not eat anything after 9 p.m. ( this includes hard candy, chewing gum and mints).  You may only have GATORADE, POWERADE AND WATER  from 9 p.m. until you leave your home.   DO NOT DRINK ANY LIQUIDS ON THE WAY TO THE HOSPITAL.      Why does your anesthesiologist allow you to drink Gatorade/Powerade before surgery?  Gatorade/Powerade helps to increase your comfort before surgery and to decrease your nausea after surgery. The carbohydrates in Gatorade/Powerade help reduce your body's stress response to surgery.  If you are a diabetic-drink only water prior to surgery.    Outpatient Surgery- May allow 2 adult (18 and older) Support Persons (1 being the designated ) for all surgical/procedural patients. A breastfeeding mother will be allowed her infant and 2 adult Support Persons. No one under the age of 18 will be allowed in the building.      SPECIAL MEDICATION INSTRUCTIONS: TAKE medications checked off by the Anesthesiologist on your Medication List.    Angiogram Patients: Take medications as instructed by your physician, including aspirin.     Surgery Patients:    If you take ASPIRIN - Your PHYSICIAN/SURGEON will need to inform you IF/OR when you need to stop taking aspirin prior to your surgery.     The week prior to surgery do not ot take any medications containing IBUPROFEN or NSAIDS ( Advil, Motrin, Goodys, BC, Aleve, Naproxen etc) If you are not sure if you should take a medicine  please call your surgeon's office.  Ok to take Tylenol    Do Not Wear any make-up (especially eye make-up) to surgery. Please remove any false eyelashes or eyelash extensions. If you arrive the day of surgery with makeup/eyelashes on you will be required to remove prior to surgery. (There is a risk of corneal abrasions if eye makeup/eyelash extensions are not removed)      Leave all valuables at home.   Do Not wear any jewelry or watches, including any metal in body piercings. Jewelry must be removed prior to coming to the hospital.  There is a possibility that rings that are unable to be removed may be cut off if they are on the surgical extremity.    Please remove all hair extensions, wigs, clips and any other metal accessories/ ornaments from your hair.  These items may pose a flammable/fire risk in Surgery and must be removed.    Do not shave your surgical area at least 5 days prior to your surgery. The surgical prep will be performed at the hospital according to Infection Control regulations.    Contact Lens must be removed before surgery. Either do not wear the contact lens or bring a case and solution for storage.  Please bring a container for eyeglasses or dentures as required.  Bring any paperwork your physician has provided, such as consent forms,  history and physicals, doctor's orders, etc.   Bring comfortable clothes that are loose fitting to wear upon discharge. Take into consideration the type of surgery being performed.  Maintain your diet as advised per your physician the day prior to surgery.      Adequate rest the night before surgery is advised.   Park in the Parking lot behind the hospital or in the [facility] across the street from the parking lot. Parking is complimentary.  If you will be discharged the same day as your procedure, please arrange for a responsible adult to drive you home or to accompany you if traveling by taxi.   YOU WILL NOT BE PERMITTED TO DRIVE OR TO LEAVE THE  HOSPITAL ALONE AFTER SURGERY.   If you are being discharged the same day, it is strongly recommended that you arrange for someone to remain with you for the first 24 hrs following your surgery.    The Surgeon will speak to your family/visitor after your surgery regarding the outcome of your surgery and post op care.  The Surgeon may speak to you after your surgery, but there is a possibility you may not remember the details.  Please check with your family members regarding the conversation with the Surgeon.    We strongly recommend whoever is bringing you home be present for discharge instructions.  This will ensure a thorough understanding for your post op home care.          Thank you for your cooperation.  The Staff of [facility].            Bathing Instructions with Hibiclens    Shower the evening before and morning of your procedure with Chlorhexidine (Hibiclens)  do not use Chlorhexidine on your face or genitals. Do not get in your eyes.  Wash your face with water and your regular face wash/soap  Use your regular shampoo  Apply Chlorhexidine (Hibiclens) directly on your skin or on a wet washcloth and wash gently. When showering: Move away from the shower stream when applying Chlorhexidine (Hibiclens) to avoid rinsing off too soon.  Rinse thoroughly with warm water  Do not dilute Chlorhexidine (Hibiclens)   Dry off as usual, do not use any deodorant, powder, body lotions, perfume, after shave or cologne.

## 2023-11-06 NOTE — PLAN OF CARE
LMSW spoke with patient. Patient denies the use of HH. Patient has crutches and a CPAP. Patient is agreeable to a RW and refused a BSC. Patient is agreeable to MD's prefer choice for HH. Patients PCP is correct on the face sheet. Patient choice pharmacy is bedside. Patients' family will transport the patient home at discharge.       [facility] ([place])  Initial Discharge Assessment       Primary Care Provider: Sergio Guo III, MD    Admission Diagnosis: Osteoarthritis of right knee [M17.11]    Admission Date: (Not on file)  Expected Discharge Date:     Transition of Care Barriers: (P) None    Payor: [employer] / Plan: [employer] / Product Type: PPO /     Extended Emergency Contact Information  Primary Emergency Contact: Daysi Peter  Address: [address] [place] [address] [facility]  Mobile Phone: [phone]  Relation: Spouse    Discharge Plan A: (P) [id number] [place], [place]  [address] [place] [address] & [address] [address]  Phone: [phone] Fax: [phone]      Initial Assessment (most recent)       Adult Discharge Assessment - 11/06/23 1403          Discharge Assessment    Assessment Type Discharge Planning Assessment (P)      Confirmed/corrected address, phone number and insurance Yes (P)      Confirmed Demographics Correct on Facesheet (P)      Source of Information health record (P)      People in Home spouse (P)      Do you expect to return to your current living situation? Yes (P)      Do you have help at home or someone to help you manage your care at home? Yes (P)      Prior to hospitilization cognitive status: Alert/Oriented;No Deficits (P)      Current cognitive status: Alert/Oriented;No Deficits (P)      Equipment Currently Used at Home crutches;CPAP (P)      Readmission within 30 days? No (P)      Patient currently being followed by  outpatient case management? No (P)      Do you currently have service(s) that help you manage your care at home? No (P)      Do you take prescription medications? Yes (P)      Do you have prescription coverage? Yes (P)      Do you have any problems affording any of your prescribed medications? No (P)      Is the patient taking medications as prescribed? yes (P)      How do you get to doctors appointments? car, drives self;family or friend will provide (P)      Are you on dialysis? No (P)      Do you take coumadin? No (P)      DME Needed Upon Discharge  walker, rolling (P)      Discharge Plan discussed with: Patient (P)      Transition of Care Barriers None (P)      Discharge Plan A Home Health (P)

## 2023-11-07 ENCOUNTER — ANESTHESIA (OUTPATIENT)
Dept: SURGERY | Facility: OTHER | Age: 58
End: 2023-11-07
Payer: OTHER GOVERNMENT

## 2023-11-07 ENCOUNTER — HOSPITAL ENCOUNTER (OUTPATIENT)
Facility: OTHER | Age: 58
Discharge: HOME HEALTH | End: 2023-11-08
Attending: ORTHOPAEDIC SURGERY | Admitting: ORTHOPAEDIC SURGERY
Payer: OTHER GOVERNMENT

## 2023-11-07 DIAGNOSIS — D86.9 SARCOIDOSIS, UNSPECIFIED: Primary | ICD-10-CM

## 2023-11-07 DIAGNOSIS — G47.33 OSA ON CPAP: ICD-10-CM

## 2023-11-07 DIAGNOSIS — M17.11 OSTEOARTHRITIS OF RIGHT KNEE: ICD-10-CM

## 2023-11-07 DIAGNOSIS — I50.33 ACUTE ON CHRONIC DIASTOLIC HEART FAILURE: ICD-10-CM

## 2023-11-07 DIAGNOSIS — M17.9 OA (OSTEOARTHRITIS) OF KNEE: ICD-10-CM

## 2023-11-07 PROCEDURE — 71000039 HC RECOVERY, EACH ADD'L HOUR: Performed by: ORTHOPAEDIC SURGERY

## 2023-11-07 PROCEDURE — 63600175 PHARM REV CODE 636 W HCPCS: Performed by: ANESTHESIOLOGY

## 2023-11-07 PROCEDURE — 97530 THERAPEUTIC ACTIVITIES: CPT

## 2023-11-07 PROCEDURE — 37000009 HC ANESTHESIA EA ADD 15 MINS: Performed by: ORTHOPAEDIC SURGERY

## 2023-11-07 PROCEDURE — 25000003 PHARM REV CODE 250: Performed by: ANESTHESIOLOGY

## 2023-11-07 PROCEDURE — 63600175 PHARM REV CODE 636 W HCPCS

## 2023-11-07 PROCEDURE — 25000003 PHARM REV CODE 250: Performed by: NURSE PRACTITIONER

## 2023-11-07 PROCEDURE — 76937 US GUIDE VASCULAR ACCESS: CPT | Performed by: ANESTHESIOLOGY

## 2023-11-07 PROCEDURE — 63600175 PHARM REV CODE 636 W HCPCS: Performed by: ORTHOPAEDIC SURGERY

## 2023-11-07 PROCEDURE — 27201423 OPTIME MED/SURG SUP & DEVICES STERILE SUPPLY: Performed by: ORTHOPAEDIC SURGERY

## 2023-11-07 PROCEDURE — 94761 N-INVAS EAR/PLS OXIMETRY MLT: CPT

## 2023-11-07 PROCEDURE — D9220A PRA ANESTHESIA: ICD-10-PCS | Mod: CRNA,,, | Performed by: NURSE ANESTHETIST, CERTIFIED REGISTERED

## 2023-11-07 PROCEDURE — 36000710: Performed by: ORTHOPAEDIC SURGERY

## 2023-11-07 PROCEDURE — 97162 PT EVAL MOD COMPLEX 30 MIN: CPT

## 2023-11-07 PROCEDURE — 99900035 HC TECH TIME PER 15 MIN (STAT)

## 2023-11-07 PROCEDURE — D9220A PRA ANESTHESIA: Mod: ANES,,, | Performed by: ANESTHESIOLOGY

## 2023-11-07 PROCEDURE — C1776 JOINT DEVICE (IMPLANTABLE): HCPCS | Performed by: ORTHOPAEDIC SURGERY

## 2023-11-07 PROCEDURE — 76937 US GUIDE VASCULAR ACCESS: CPT | Mod: 26,,, | Performed by: ANESTHESIOLOGY

## 2023-11-07 PROCEDURE — 27000190 HC CPAP FULL FACE MASK W/VALVE

## 2023-11-07 PROCEDURE — 25000003 PHARM REV CODE 250: Performed by: NURSE ANESTHETIST, CERTIFIED REGISTERED

## 2023-11-07 PROCEDURE — 97110 THERAPEUTIC EXERCISES: CPT

## 2023-11-07 PROCEDURE — D9220A PRA ANESTHESIA: ICD-10-PCS | Mod: ANES,,, | Performed by: ANESTHESIOLOGY

## 2023-11-07 PROCEDURE — C1713 ANCHOR/SCREW BN/BN,TIS/BN: HCPCS | Performed by: ORTHOPAEDIC SURGERY

## 2023-11-07 PROCEDURE — 76937 ARTERIAL: ICD-10-PCS | Mod: 26,,, | Performed by: ANESTHESIOLOGY

## 2023-11-07 PROCEDURE — 63600175 PHARM REV CODE 636 W HCPCS: Performed by: NURSE ANESTHETIST, CERTIFIED REGISTERED

## 2023-11-07 PROCEDURE — 37000008 HC ANESTHESIA 1ST 15 MINUTES: Performed by: ORTHOPAEDIC SURGERY

## 2023-11-07 PROCEDURE — 94660 CPAP INITIATION&MGMT: CPT

## 2023-11-07 PROCEDURE — 36620 INSERTION CATHETER ARTERY: CPT | Performed by: ANESTHESIOLOGY

## 2023-11-07 PROCEDURE — 94799 UNLISTED PULMONARY SVC/PX: CPT

## 2023-11-07 PROCEDURE — D9220A PRA ANESTHESIA: Mod: CRNA,,, | Performed by: NURSE ANESTHETIST, CERTIFIED REGISTERED

## 2023-11-07 PROCEDURE — 25000003 PHARM REV CODE 250: Performed by: ORTHOPAEDIC SURGERY

## 2023-11-07 PROCEDURE — 25000003 PHARM REV CODE 250

## 2023-11-07 PROCEDURE — 27000221 HC OXYGEN, UP TO 24 HOURS

## 2023-11-07 PROCEDURE — 36620 INSERTION CATHETER ARTERY: CPT | Mod: 59,,, | Performed by: ANESTHESIOLOGY

## 2023-11-07 PROCEDURE — P9045 ALBUMIN (HUMAN), 5%, 250 ML: HCPCS | Mod: JZ,JG | Performed by: NURSE ANESTHETIST, CERTIFIED REGISTERED

## 2023-11-07 PROCEDURE — 36620 ARTERIAL: ICD-10-PCS | Mod: 59,,, | Performed by: ANESTHESIOLOGY

## 2023-11-07 PROCEDURE — 36000711: Performed by: ORTHOPAEDIC SURGERY

## 2023-11-07 PROCEDURE — C9290 INJ, BUPIVACAINE LIPOSOME: HCPCS | Performed by: ORTHOPAEDIC SURGERY

## 2023-11-07 PROCEDURE — 71000033 HC RECOVERY, INTIAL HOUR: Performed by: ORTHOPAEDIC SURGERY

## 2023-11-07 DEVICE — THE PALACOS® R PRO SYSTEM CONSISTS OF A MIXING AND APPLICATION SYSTEM PREFILLED WITH PALACOS® POLYMER POWDER AND MONOMER LIQUID. PALACOS® R PRO IS INTENDED FOR USE IN ARTHROPLASTIC PROCEDURES OF THE HIP, KNEE, AND OTHER JOINTS FOR THE FIXATION OF POLYMER OR METALLIC PROSTHETIC IMPLANTS TO LIVING BONE.
Type: IMPLANTABLE DEVICE | Site: KNEE | Status: FUNCTIONAL
Brand: PALACOS®

## 2023-11-07 DEVICE — IMPLANTABLE DEVICE
Type: IMPLANTABLE DEVICE | Site: KNEE | Status: FUNCTIONAL
Brand: VANGUARD® KNEE SYSTEM

## 2023-11-07 RX ORDER — DEXAMETHASONE SODIUM PHOSPHATE 4 MG/ML
INJECTION, SOLUTION INTRA-ARTICULAR; INTRALESIONAL; INTRAMUSCULAR; INTRAVENOUS; SOFT TISSUE
Status: DISCONTINUED | OUTPATIENT
Start: 2023-11-07 | End: 2023-11-07

## 2023-11-07 RX ORDER — METOCLOPRAMIDE HYDROCHLORIDE 5 MG/ML
5 INJECTION INTRAMUSCULAR; INTRAVENOUS EVERY 6 HOURS PRN
Status: DISCONTINUED | OUTPATIENT
Start: 2023-11-07 | End: 2023-11-08 | Stop reason: HOSPADM

## 2023-11-07 RX ORDER — MEPERIDINE HYDROCHLORIDE 25 MG/ML
12.5 INJECTION INTRAMUSCULAR; INTRAVENOUS; SUBCUTANEOUS ONCE AS NEEDED
Status: DISCONTINUED | OUTPATIENT
Start: 2023-11-07 | End: 2023-11-07 | Stop reason: HOSPADM

## 2023-11-07 RX ORDER — SODIUM CHLORIDE 0.9 % (FLUSH) 0.9 %
3 SYRINGE (ML) INJECTION
Status: DISCONTINUED | OUTPATIENT
Start: 2023-11-07 | End: 2023-11-07 | Stop reason: HOSPADM

## 2023-11-07 RX ORDER — FAMOTIDINE 20 MG/1
20 TABLET, FILM COATED ORAL DAILY
Status: DISCONTINUED | OUTPATIENT
Start: 2023-11-07 | End: 2023-11-08 | Stop reason: HOSPADM

## 2023-11-07 RX ORDER — TRANEXAMIC ACID 100 MG/ML
INJECTION, SOLUTION INTRAVENOUS
Status: DISCONTINUED | OUTPATIENT
Start: 2023-11-07 | End: 2023-11-07

## 2023-11-07 RX ORDER — MUPIROCIN 20 MG/G
OINTMENT TOPICAL 2 TIMES DAILY
Status: DISCONTINUED | OUTPATIENT
Start: 2023-11-07 | End: 2023-11-08 | Stop reason: HOSPADM

## 2023-11-07 RX ORDER — BUPIVACAINE HYDROCHLORIDE AND EPINEPHRINE 5; 5 MG/ML; UG/ML
INJECTION, SOLUTION EPIDURAL; INTRACAUDAL; PERINEURAL
Status: DISCONTINUED | OUTPATIENT
Start: 2023-11-07 | End: 2023-11-07 | Stop reason: HOSPADM

## 2023-11-07 RX ORDER — HYDROMORPHONE HYDROCHLORIDE 2 MG/ML
INJECTION, SOLUTION INTRAMUSCULAR; INTRAVENOUS; SUBCUTANEOUS
Status: DISCONTINUED | OUTPATIENT
Start: 2023-11-07 | End: 2023-11-07

## 2023-11-07 RX ORDER — NAPROXEN SODIUM 220 MG/1
81 TABLET, FILM COATED ORAL 2 TIMES DAILY
Status: DISCONTINUED | OUTPATIENT
Start: 2023-11-07 | End: 2023-11-08 | Stop reason: HOSPADM

## 2023-11-07 RX ORDER — CEFAZOLIN SODIUM 1 G/3ML
2 INJECTION, POWDER, FOR SOLUTION INTRAMUSCULAR; INTRAVENOUS
Status: COMPLETED | OUTPATIENT
Start: 2023-11-07 | End: 2023-11-07

## 2023-11-07 RX ORDER — SODIUM CHLORIDE 9 MG/ML
INJECTION, SOLUTION INTRAVENOUS CONTINUOUS
Status: DISCONTINUED | OUTPATIENT
Start: 2023-11-07 | End: 2023-11-08 | Stop reason: HOSPADM

## 2023-11-07 RX ORDER — HYDROMORPHONE HYDROCHLORIDE 2 MG/ML
0.4 INJECTION, SOLUTION INTRAMUSCULAR; INTRAVENOUS; SUBCUTANEOUS EVERY 5 MIN PRN
Status: DISCONTINUED | OUTPATIENT
Start: 2023-11-07 | End: 2023-11-07 | Stop reason: HOSPADM

## 2023-11-07 RX ORDER — DEXTROSE MONOHYDRATE AND SODIUM CHLORIDE 5; .9 G/100ML; G/100ML
INJECTION, SOLUTION INTRAVENOUS CONTINUOUS
Status: DISCONTINUED | OUTPATIENT
Start: 2023-11-07 | End: 2023-11-07

## 2023-11-07 RX ORDER — PREDNISONE 5 MG/1
5 TABLET ORAL DAILY
Status: DISCONTINUED | OUTPATIENT
Start: 2023-11-08 | End: 2023-11-08 | Stop reason: HOSPADM

## 2023-11-07 RX ORDER — LIDOCAINE HYDROCHLORIDE 10 MG/ML
0.5 INJECTION, SOLUTION EPIDURAL; INFILTRATION; INTRACAUDAL; PERINEURAL ONCE
Status: DISCONTINUED | OUTPATIENT
Start: 2023-11-07 | End: 2023-11-07

## 2023-11-07 RX ORDER — HYDROXYZINE HYDROCHLORIDE 25 MG/1
25 TABLET, FILM COATED ORAL 3 TIMES DAILY PRN
Status: DISCONTINUED | OUTPATIENT
Start: 2023-11-07 | End: 2023-11-08 | Stop reason: HOSPADM

## 2023-11-07 RX ORDER — FAMOTIDINE 20 MG/1
20 TABLET, FILM COATED ORAL
Status: COMPLETED | OUTPATIENT
Start: 2023-11-07 | End: 2023-11-07

## 2023-11-07 RX ORDER — ALBUMIN HUMAN 50 G/1000ML
SOLUTION INTRAVENOUS
Status: DISCONTINUED | OUTPATIENT
Start: 2023-11-07 | End: 2023-11-07

## 2023-11-07 RX ORDER — MORPHINE SULFATE 10 MG/ML
4 INJECTION INTRAMUSCULAR; INTRAVENOUS; SUBCUTANEOUS
Status: ACTIVE | OUTPATIENT
Start: 2023-11-07 | End: 2023-11-08

## 2023-11-07 RX ORDER — OXYCODONE HYDROCHLORIDE 5 MG/1
5 TABLET ORAL ONCE
Status: COMPLETED | OUTPATIENT
Start: 2023-11-07 | End: 2023-11-07

## 2023-11-07 RX ORDER — HYDROCODONE BITARTRATE AND ACETAMINOPHEN 10; 325 MG/1; MG/1
1 TABLET ORAL EVERY 4 HOURS PRN
Status: DISCONTINUED | OUTPATIENT
Start: 2023-11-07 | End: 2023-11-08 | Stop reason: HOSPADM

## 2023-11-07 RX ORDER — POLYETHYLENE GLYCOL 3350 17 G/17G
17 POWDER, FOR SOLUTION ORAL DAILY
Status: DISCONTINUED | OUTPATIENT
Start: 2023-11-07 | End: 2023-11-08 | Stop reason: HOSPADM

## 2023-11-07 RX ORDER — CYCLOBENZAPRINE HCL 5 MG
10 TABLET ORAL 3 TIMES DAILY PRN
Status: DISCONTINUED | OUTPATIENT
Start: 2023-11-07 | End: 2023-11-07

## 2023-11-07 RX ORDER — SODIUM CHLORIDE 9 MG/ML
INJECTION, SOLUTION INTRAVENOUS CONTINUOUS
Status: DISCONTINUED | OUTPATIENT
Start: 2023-11-07 | End: 2023-11-07

## 2023-11-07 RX ORDER — ONDANSETRON 8 MG/1
8 TABLET, ORALLY DISINTEGRATING ORAL EVERY 8 HOURS PRN
Status: DISCONTINUED | OUTPATIENT
Start: 2023-11-07 | End: 2023-11-08 | Stop reason: HOSPADM

## 2023-11-07 RX ORDER — PROPOFOL 10 MG/ML
VIAL (ML) INTRAVENOUS
Status: DISCONTINUED | OUTPATIENT
Start: 2023-11-07 | End: 2023-11-07

## 2023-11-07 RX ORDER — HYDROCODONE BITARTRATE AND ACETAMINOPHEN 5; 325 MG/1; MG/1
1 TABLET ORAL EVERY 4 HOURS PRN
Status: DISCONTINUED | OUTPATIENT
Start: 2023-11-07 | End: 2023-11-08 | Stop reason: HOSPADM

## 2023-11-07 RX ORDER — CELECOXIB 200 MG/1
200 CAPSULE ORAL
Status: DISCONTINUED | OUTPATIENT
Start: 2023-11-07 | End: 2023-11-08 | Stop reason: HOSPADM

## 2023-11-07 RX ORDER — SODIUM CHLORIDE, SODIUM LACTATE, POTASSIUM CHLORIDE, CALCIUM CHLORIDE 600; 310; 30; 20 MG/100ML; MG/100ML; MG/100ML; MG/100ML
INJECTION, SOLUTION INTRAVENOUS CONTINUOUS
Status: DISCONTINUED | OUTPATIENT
Start: 2023-11-07 | End: 2023-11-07

## 2023-11-07 RX ORDER — ACETAMINOPHEN 500 MG
1000 TABLET ORAL
Status: COMPLETED | OUTPATIENT
Start: 2023-11-07 | End: 2023-11-07

## 2023-11-07 RX ORDER — SODIUM CHLORIDE 0.9 % (FLUSH) 0.9 %
5 SYRINGE (ML) INJECTION
Status: DISCONTINUED | OUTPATIENT
Start: 2023-11-07 | End: 2023-11-08 | Stop reason: HOSPADM

## 2023-11-07 RX ORDER — MIDAZOLAM HYDROCHLORIDE 1 MG/ML
INJECTION INTRAMUSCULAR; INTRAVENOUS
Status: DISCONTINUED | OUTPATIENT
Start: 2023-11-07 | End: 2023-11-07

## 2023-11-07 RX ORDER — OXYCODONE HYDROCHLORIDE 5 MG/1
5 TABLET ORAL
Status: DISCONTINUED | OUTPATIENT
Start: 2023-11-07 | End: 2023-11-07 | Stop reason: HOSPADM

## 2023-11-07 RX ORDER — KETAMINE HCL IN 0.9 % NACL 50 MG/5 ML
SYRINGE (ML) INTRAVENOUS
Status: DISCONTINUED | OUTPATIENT
Start: 2023-11-07 | End: 2023-11-07

## 2023-11-07 RX ORDER — CYCLOBENZAPRINE HCL 5 MG
5 TABLET ORAL 3 TIMES DAILY PRN
Status: DISCONTINUED | OUTPATIENT
Start: 2023-11-07 | End: 2023-11-08 | Stop reason: HOSPADM

## 2023-11-07 RX ORDER — PROCHLORPERAZINE EDISYLATE 5 MG/ML
5 INJECTION INTRAMUSCULAR; INTRAVENOUS EVERY 30 MIN PRN
Status: DISCONTINUED | OUTPATIENT
Start: 2023-11-07 | End: 2023-11-07 | Stop reason: HOSPADM

## 2023-11-07 RX ORDER — CYCLOBENZAPRINE HCL 5 MG
10 TABLET ORAL ONCE
Status: COMPLETED | OUTPATIENT
Start: 2023-11-07 | End: 2023-11-07

## 2023-11-07 RX ORDER — VANCOMYCIN HYDROCHLORIDE 500 MG/10ML
INJECTION, POWDER, LYOPHILIZED, FOR SOLUTION INTRAVENOUS
Status: DISCONTINUED | OUTPATIENT
Start: 2023-11-07 | End: 2023-11-07 | Stop reason: HOSPADM

## 2023-11-07 RX ORDER — FENTANYL CITRATE 50 UG/ML
INJECTION, SOLUTION INTRAMUSCULAR; INTRAVENOUS
Status: DISCONTINUED | OUTPATIENT
Start: 2023-11-07 | End: 2023-11-07

## 2023-11-07 RX ORDER — ROCURONIUM BROMIDE 10 MG/ML
INJECTION, SOLUTION INTRAVENOUS
Status: DISCONTINUED | OUTPATIENT
Start: 2023-11-07 | End: 2023-11-07

## 2023-11-07 RX ORDER — TALC
6 POWDER (GRAM) TOPICAL NIGHTLY PRN
Status: DISCONTINUED | OUTPATIENT
Start: 2023-11-07 | End: 2023-11-08 | Stop reason: HOSPADM

## 2023-11-07 RX ORDER — SUCCINYLCHOLINE CHLORIDE 20 MG/ML
INJECTION INTRAMUSCULAR; INTRAVENOUS
Status: DISCONTINUED | OUTPATIENT
Start: 2023-11-07 | End: 2023-11-07

## 2023-11-07 RX ORDER — LIDOCAINE HYDROCHLORIDE 20 MG/ML
INJECTION INTRAVENOUS
Status: DISCONTINUED | OUTPATIENT
Start: 2023-11-07 | End: 2023-11-07

## 2023-11-07 RX ORDER — ONDANSETRON 2 MG/ML
INJECTION INTRAMUSCULAR; INTRAVENOUS
Status: DISCONTINUED | OUTPATIENT
Start: 2023-11-07 | End: 2023-11-07

## 2023-11-07 RX ADMIN — CEFAZOLIN 3 G: 330 INJECTION, POWDER, FOR SOLUTION INTRAMUSCULAR; INTRAVENOUS at 07:11

## 2023-11-07 RX ADMIN — HYDROMORPHONE HYDROCHLORIDE 1 MG: 2 INJECTION INTRAMUSCULAR; INTRAVENOUS; SUBCUTANEOUS at 08:11

## 2023-11-07 RX ADMIN — TRANEXAMIC ACID 1000 MG: 100 INJECTION, SOLUTION INTRAVENOUS at 06:11

## 2023-11-07 RX ADMIN — PROPOFOL 25 MG: 10 INJECTION, EMULSION INTRAVENOUS at 07:11

## 2023-11-07 RX ADMIN — CYCLOBENZAPRINE HYDROCHLORIDE 10 MG: 5 TABLET, FILM COATED ORAL at 09:11

## 2023-11-07 RX ADMIN — Medication 6 MG: at 09:11

## 2023-11-07 RX ADMIN — FAMOTIDINE 20 MG: 20 TABLET ORAL at 05:11

## 2023-11-07 RX ADMIN — FENTANYL CITRATE 100 MCG: 50 INJECTION, SOLUTION INTRAMUSCULAR; INTRAVENOUS at 07:11

## 2023-11-07 RX ADMIN — SUCCINYLCHOLINE CHLORIDE 200 MG: 20 INJECTION, SOLUTION INTRAMUSCULAR; INTRAVENOUS at 07:11

## 2023-11-07 RX ADMIN — PROPOFOL 150 MG: 10 INJECTION, EMULSION INTRAVENOUS at 07:11

## 2023-11-07 RX ADMIN — MIDAZOLAM HYDROCHLORIDE 1 MG: 1 INJECTION, SOLUTION INTRAMUSCULAR; INTRAVENOUS at 06:11

## 2023-11-07 RX ADMIN — CEFAZOLIN 2 G: 2 INJECTION, POWDER, FOR SOLUTION INTRAMUSCULAR; INTRAVENOUS at 11:11

## 2023-11-07 RX ADMIN — PROPOFOL 40 MG: 10 INJECTION, EMULSION INTRAVENOUS at 08:11

## 2023-11-07 RX ADMIN — Medication 50 MG: at 07:11

## 2023-11-07 RX ADMIN — TRANEXAMIC ACID 1000 MG: 100 INJECTION, SOLUTION INTRAVENOUS at 08:11

## 2023-11-07 RX ADMIN — ONDANSETRON HYDROCHLORIDE 4 MG: 2 INJECTION INTRAMUSCULAR; INTRAVENOUS at 06:11

## 2023-11-07 RX ADMIN — MUPIROCIN: 20 OINTMENT TOPICAL at 09:11

## 2023-11-07 RX ADMIN — CARBOXYMETHYLCELLULOSE SODIUM 2 DROP: 2.5 SOLUTION/ DROPS OPHTHALMIC at 07:11

## 2023-11-07 RX ADMIN — OXYCODONE HYDROCHLORIDE 5 MG: 5 TABLET ORAL at 09:11

## 2023-11-07 RX ADMIN — CELECOXIB 200 MG: 200 CAPSULE ORAL at 06:11

## 2023-11-07 RX ADMIN — ALBUMIN (HUMAN) 500 ML: 12.5 SOLUTION INTRAVENOUS at 08:11

## 2023-11-07 RX ADMIN — HYDROCODONE BITARTRATE AND ACETAMINOPHEN 1 TABLET: 5; 325 TABLET ORAL at 09:11

## 2023-11-07 RX ADMIN — MUPIROCIN: 20 OINTMENT TOPICAL at 01:11

## 2023-11-07 RX ADMIN — ALBUMIN (HUMAN) 500 ML: 12.5 SOLUTION INTRAVENOUS at 07:11

## 2023-11-07 RX ADMIN — ACETAMINOPHEN 1000 MG: 500 TABLET ORAL at 05:11

## 2023-11-07 RX ADMIN — SODIUM CHLORIDE: 9 INJECTION, SOLUTION INTRAVENOUS at 12:11

## 2023-11-07 RX ADMIN — SODIUM CHLORIDE, SODIUM LACTATE, POTASSIUM CHLORIDE, AND CALCIUM CHLORIDE: 600; 310; 30; 20 INJECTION, SOLUTION INTRAVENOUS at 06:11

## 2023-11-07 RX ADMIN — HYDROMORPHONE HYDROCHLORIDE 0.6 MG: 2 INJECTION INTRAMUSCULAR; INTRAVENOUS; SUBCUTANEOUS at 07:11

## 2023-11-07 RX ADMIN — DEXAMETHASONE SODIUM PHOSPHATE 8 MG: 4 INJECTION, SOLUTION INTRAMUSCULAR; INTRAVENOUS at 07:11

## 2023-11-07 RX ADMIN — LIDOCAINE HYDROCHLORIDE 80 MG: 20 INJECTION, SOLUTION INTRAVENOUS at 07:11

## 2023-11-07 RX ADMIN — CEFAZOLIN 2 G: 2 INJECTION, POWDER, FOR SOLUTION INTRAMUSCULAR; INTRAVENOUS at 03:11

## 2023-11-07 RX ADMIN — ROCURONIUM BROMIDE 30 MG: 10 SOLUTION INTRAVENOUS at 07:11

## 2023-11-07 RX ADMIN — POLYETHYLENE GLYCOL 3350 17 G: 17 POWDER, FOR SOLUTION ORAL at 01:11

## 2023-11-07 RX ADMIN — SUGAMMADEX 200 MG: 100 INJECTION, SOLUTION INTRAVENOUS at 08:11

## 2023-11-07 RX ADMIN — HYDROMORPHONE HYDROCHLORIDE 0.4 MG: 2 INJECTION INTRAMUSCULAR; INTRAVENOUS; SUBCUTANEOUS at 07:11

## 2023-11-07 RX ADMIN — ROCURONIUM BROMIDE 20 MG: 10 SOLUTION INTRAVENOUS at 07:11

## 2023-11-07 RX ADMIN — ASPIRIN 81 MG CHEWABLE TABLET 81 MG: 81 TABLET CHEWABLE at 09:11

## 2023-11-07 NOTE — PROGRESS NOTES
[facility] - Med Surg ([address])  Wound Care    Patient Name:  Ashwin Peter   MRN:  [id number]  Date: 11/7/2023  Diagnosis: Osteoarthritis of right knee    History:     Past Medical History:   Diagnosis Date    Anxiety     BMI 38.0-38.9,adult     Hyperlipidemia     Multiple pulmonary nodules     Obesity     Other cirrhosis of liver 01/13/2021    Portal hypertension 02/11/2021    Sarcoidosis of lung with sarcoidosis of lymph nodes     Sleep apnea     CPAP USED    Splenomegaly 12/22/2020    Thrombocytopenia 12/22/2020       Social History     Socioeconomic History    Marital status:     Number of children: 2   Occupational History    Occupation: [employer] supervisor     Employer: [employer]   Tobacco Use    Smoking status: Never     Passive exposure: Never    Smokeless tobacco: Never   Substance and Sexual Activity    Alcohol use: No    Drug use: No   Social History Narrative    Patient is originally from   [place]        School at ; [employer] ; [employer]         Working ; 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       Precautions:     Allergies as of 10/13/2023    (No Known Allergies)       [facility] Assessment Details/Treatment     Patient identified as being at increased risk for pressure related skin breakdown. Jose R of 18. Pressure injury prevention interventions ordered.       11/07/2023

## 2023-11-07 NOTE — OR NURSING
VSS on 3L NC. Pain is tolerable per pt. Right leg dressing with Fabian Odell CDI. PIV CDI. Meets PACU discharge criteria. Ready for transfer to [address] [place] [address]. Report given to KATERIN Madera. Family notified.

## 2023-11-07 NOTE — ANESTHESIA POSTPROCEDURE EVALUATION
Anesthesia Post Evaluation    Patient: Ashwin Peter    Procedure(s) Performed: Procedure(s) (LRB):  ARTHROPLASTY, KNEE, TOTAL (Right)    Final Anesthesia Type: general      Patient location during evaluation: PACU  Patient participation: Yes- Able to Participate  Level of consciousness: awake and alert  Post-procedure vital signs: reviewed and stable  Pain management: adequate  Airway patency: patent    PONV status at discharge: No PONV  Anesthetic complications: no      Cardiovascular status: blood pressure returned to baseline  Respiratory status: unassisted and spontaneous ventilation  Hydration status: euvolemic  Follow-up not needed.          Vitals Value Taken Time   /67 11/07/23 1046   Temp 36.9 °C (98.5 °F) 11/07/23 0845   Pulse 100 11/07/23 1054   Resp 18 11/07/23 1045   SpO2 94 % 11/07/23 1054   Vitals shown include unvalidated device data.      No case tracking events are documented in the log.      Pain/Akbar Score: Pain Rating Prior to Med Admin: 9 (11/7/2023  9:21 AM)  Akbar Score: 8 (11/7/2023 10:45 AM)

## 2023-11-07 NOTE — CONSULTS
Consult Note  MED    Consult Requested By: Darnell Liao MD  Reason for Consult: obese, LAW, thrombocytopenia, ABLA, Lipids, lung sarcoid, lymphadema.     SUBJECTIVE:     History of Present Illness:  Patient is a 58 y.o. male presents with scheduled right knee repair.  Seen post op in PACU. VS noted.  Labile cuff/art line.  Discussed with DR. Liao/anesthesia.  Ext hx as outlined below.  Sedate currently.  Pre-op/Epic reviewed.      Past Medical History:   Diagnosis Date    Anxiety     BMI 38.0-38.9,adult     Hyperlipidemia     Multiple pulmonary nodules     Obesity     Other cirrhosis of liver 01/13/2021    Portal hypertension 02/11/2021    Sarcoidosis of lung with sarcoidosis of lymph nodes     Sleep apnea     CPAP USED    Splenomegaly 12/22/2020    Thrombocytopenia 12/22/2020     Past Surgical History:   Procedure Laterality Date    ANTERIOR CRUCIATE LIGAMENT REPAIR  2007    right knee    COLONOSCOPY N/A 4/8/2021    Procedure: COLONOSCOPY;  Surgeon: Jeff Olvera MD;  Location: [facility] ([address] [place] [address]);  Service: Endoscopy;  Laterality: N/A;  rectal bleeding,   2nd floor BMI 50 (354 lbs)  COVID test on 4/5/21 at [facility]    ESOPHAGOGASTRODUODENOSCOPY N/A 4/8/2021    Procedure: EGD (ESOPHAGOGASTRODUODENOSCOPY);  Surgeon: Jeff Olvera MD;  Location: [facility] ([address] [place] [address]);  Service: Endoscopy;  Laterality: N/A;  variceal screening/ labs the am of procedure  2nd floor BMI 50 (354 lbs)    ESOPHAGOGASTRODUODENOSCOPY N/A 3/31/2022    Procedure: EGD (ESOPHAGOGASTRODUODENOSCOPY);  Surgeon: Jeff Olvera MD;  Location: [facility] ([address] [place] [address]);  Service: Endoscopy;  Laterality: N/A;  BMI-52    Wt:375#      cirrhosis, variceal screening-labs done on 3/29-GT  vaccinated-GT    ESOPHAGOGASTRODUODENOSCOPY N/A 4/21/2023    Procedure: EGD (ESOPHAGOGASTRODUODENOSCOPY);  Surgeon: Christopher Britton MD;  Location: [facility] ([address] [place] [address]);  Service: Endoscopy;  Laterality: N/A;  pt requested Friday due to work schedule  ECHO PA  "44-51  BMI 47.97 Wt 343 lbs  inst portal-RB  last CBC PT/INR 1/24/23 4/17/23- Precall confirmed.    LYMPHADENECTOMY  1985    MENISCECTOMY      left knee    NASAL SEPTUM SURGERY  1985    WISDOM TOOTH EXTRACTION       Family History   Problem Relation Age of Onset    Hypertension Father     Heart attack Father 55    Diabetes Paternal Grandmother     Aneurysm Mother         eye    Dementia Mother     Colon cancer Neg Hx     Prostate cancer Neg Hx     Sarcoidosis Neg Hx      Social History     Tobacco Use    Smoking status: Never     Passive exposure: Never    Smokeless tobacco: Never   Substance Use Topics    Alcohol use: No    Drug use: No       Review of patient's allergies indicates:  No Known Allergies     Review of Systems:    Unable.     OBJECTIVE:     Vital Signs (Most Recent)  Temp: 98.5 °F (36.9 °C) (11/07/23 0845)  Pulse: 105 (11/07/23 0845)  Resp: 18 (11/07/23 0845)  BP: (!) 148/74 (11/07/23 0845)  SpO2: 97 % (11/07/23 0845)    Vital Signs Range (Last 24H):  Temp:  [98.2 °F (36.8 °C)-98.5 °F (36.9 °C)]   Pulse:  []   Resp:  [18-20]   BP: (148-153)/(67-74)   SpO2:  [97 %-98 %]       Intake/Output Summary (Last 24 hours) at 11/7/2023 0855  Last data filed at 11/7/2023 0845  Gross per 24 hour   Intake 2000 ml   Output 1000 ml   Net 1000 ml       Physical Exam:  General appearance: Well developed, well nourished, morbid obese  Eyes:  Conjunctivae/corneas clear. PERRL.  Lungs: Normal respiratory effort,   clear to auscultation bilaterally, snoring  Heart: Regular rate and rhythm, S1, S2 normal, no murmur, rub or anna.  Abdomen: Soft, non-tender non-distended; bowel sounds normal; no masses,  no organomegaly, morbid obese  Extremities: No cyanosis or clubbing. Chronic lymphadema    Skin: Skin color, texture, turgor normal. No rashes or lesions  Neurologic: sedate  Right knee CDI  [place]    Laboratory:  No results for input(s): "WBC", "RBC", "HGB", "HCT", "PLT", "MCV", "MCH", "MCHC" in the last 24 " "hours.  BMP: No results for input(s): "GLU", "NA", "K", "CL", "CO2", "BUN", "CREATININE", "CALCIUM", "MG", "PHOS" in the last 168 hours.  Lab Results   Component Value Date    CALCIUM 8.6 (L) 10/27/2023    PHOS 2.8 11/07/2022     BNP  No results for input(s): "BNP", "BNPTRIAGEBLO" in the last 168 hours.  Lab Results   Component Value Date    URICACID 5.7 12/01/2014     Lab Results   Component Value Date    IRON 55 08/30/2022    TIBC 278 08/30/2022    FERRITIN 184 08/30/2022     Lab Results   Component Value Date    PTH 64 07/28/2014    CALCIUM 8.6 (L) 10/27/2023    PHOS 2.8 11/07/2022       Diagnostic Results:  X-Ray Knee 1 or 2 View Right    (Results Pending)       ASSESSMENT/PLAN:     Right Knee:  per ortho/therapy teams.    Morbid obesity:  has lost a significant amount of weight with GLP.  Cont as outpt.    LAW:  CPAP ordered.    Lung Sarcoid:  follows pulm.  On steroids.  Given decadron.  Monitor sats.     Chronic Lymphedema:  on support compressions at home.      ABLA:  given 1L albumin and monitoring.      HFpEF:  stable currently.   Cleared by cards.  Place on tele.     Liver cirrhosis/thrombocytopenia:  monitor platelets.      DVT:  ASA BID.      Thanks for consult  See above  Will follow along.     "

## 2023-11-07 NOTE — PT/OT/SLP EVAL
Physical Therapy Evaluation    Patient Name:  Ashwin Peter   MRN:  [id number]    Recommendations:     Discharge Recommendations: Low Intensity Therapy   Discharge Equipment Recommendations: walker, rolling, bedside commode   Barriers to discharge: None    Assessment:     Ashwin Peter is a 58 y.o. male admitted with a medical diagnosis of Osteoarthritis of right knee.  He presents with the following impairments/functional limitations: weakness, impaired self care skills, impaired functional mobility, decreased lower extremity function, decreased ROM, impaired skin, edema, orthopedic precautions.    Patient evaluated by PT and goals established. Patient very lethargic during today's session and unable to keep eyes open for therapeutic exercise. Ankle pumps, SAQ, and heel slides performed with pt requiring constant tactile cueing and active assistance from therapist for alertness and participation. OOB mobility to be assessed at a later time once patient is more alert.  PT will continue to follow and progress as tolerated. Rec for d/c to home with therapy per MD.     Rehab Prognosis: Good; patient would benefit from acute skilled PT services to address these deficits and reach maximum level of function.    Recent Surgery: Procedure(s) (LRB):  ARTHROPLASTY, KNEE, TOTAL (Right) Day of Surgery    Plan:     During this hospitalization, patient to be seen BID to address the identified rehab impairments via gait training, therapeutic activities, therapeutic exercises, neuromuscular re-education and progress toward the following goals:    Plan of Care Expires:  11/14/23    Subjective     Chief Complaint: No formal complaints at this time  Patient/Family Comments/goals: Pt not making many verbal comments during session d/t lethargy. Wife commenting on how much patient can sleep; Patient agreeable to evaluation.    Pain/Comfort:  Pain Rating 1: 0/10  Location - Side 1: Right  Location 1: knee  Pain Addressed 1:  Distraction, Cessation of Activity, Reposition  Pain Rating Post-Intervention 1:  (Patient lethargic with no verbalizations of pain with exercises)    Patients cultural, spiritual, Yazidi conflicts given the current situation: no    Living Environment:  Pt lives with his wife in a single story home with 1 step to enter and no handrail(s).  Upon discharge, patient will have assistance from wife.  Prior level of function:  Ambulation: Per EMR - mod(I) with crutches   Equipment used at home: CPAP, crutches.  DME owned (not currently used): none.    Objective:     Communicated with RN prior to session.  Patient found HOB elevated with peripheral IV, CPAP, cryotherapy, FCD  upon PT entry to room.    General Precautions: Standard, fall  Orthopedic Precautions:RLE weight bearing as tolerated   Braces: N/A  Respiratory Status: Room air    Exams:  Cognition:   Patient is extremely lethargic with episodes of alertness from tactile cueing or verbal stimulation with his name.  Pt follows approximately <10% of one step commands at this time.    Mood: Lethargic  Musculoskeletal:  LE ROM/Strength: Bilateral ankle dorsiflexion 5/5; unable to assess ROM/strength for remaining joints 2/2 patient's alertness  Neuromuscular:  Sensation: Intact to light touch bilateral LEs. Pt denied paresthesias.   Balance: Not assessed at this time  Integument:  Visible skin intact and surgical extremity dressing clean and dry.   Cardiopulmonary:  Edema: Mild surgical extremity.    Color/temperature/pulses: Equal between extremities      AM-PAC 6 CLICK MOBILITY  Total Score:10       Treatment & Education:  Performed ankle pumps, SAQs, and heel slides AAROM with pt supine 10x  Required constant tactile and verbal cues for active participation   Unable to fully active muscles needed to perform exercises on his own 2/2 alertness  PT educated patient and wife re:   PT plan of care/role of PT  Positioning of LE and use of ice  Pt and wife verbalized  understanding     Patient left HOB elevated with all lines intact, call button in reach, and wife present.    GOALS:   Multidisciplinary Problems       Physical Therapy Goals          Problem: Physical Therapy    Goal Priority Disciplines Outcome Goal Variances Interventions   Physical Therapy Goal     PT, PT/OT Ongoing, Progressing     Description: Goals to be met by: 2023     Patient will increase functional independence with mobility by performin. Supine<>sit with supervision.  2. Sit<>stand transfer with SBA using rolling walker.   3. Gait > 150 feet with SBA using rolling walker.   4. Ascend/descend 1 step with no handrails with CGA and least restrictive AD.                             History:     Past Medical History:   Diagnosis Date    Anxiety     BMI 38.0-38.9,adult     Hyperlipidemia     Multiple pulmonary nodules     Obesity     Other cirrhosis of liver 2021    Portal hypertension 2021    Sarcoidosis of lung with sarcoidosis of lymph nodes     Sleep apnea     CPAP USED    Splenomegaly 2020    Thrombocytopenia 2020       Past Surgical History:   Procedure Laterality Date    ANTERIOR CRUCIATE LIGAMENT REPAIR      right knee    COLONOSCOPY N/A 2021    Procedure: COLONOSCOPY;  Surgeon: Jeff Olvera MD;  Location: [facility] ([address] [place] [address]);  Service: Endoscopy;  Laterality: N/A;  rectal bleeding,   2nd floor BMI 50 (354 lbs)  COVID test on 21 at [facility]    ESOPHAGOGASTRODUODENOSCOPY N/A 2021    Procedure: EGD (ESOPHAGOGASTRODUODENOSCOPY);  Surgeon: Jeff Olvera MD;  Location: [facility] ([address] [place] [address]);  Service: Endoscopy;  Laterality: N/A;  variceal screening/ labs the am of procedure  2nd floor BMI 50 (354 lbs)    ESOPHAGOGASTRODUODENOSCOPY N/A 3/31/2022    Procedure: EGD (ESOPHAGOGASTRODUODENOSCOPY);  Surgeon: Jeff Olvrea MD;  Location: [facility] ([address] [place] [address]);  Service: Endoscopy;  Laterality: N/A;  BMI-52    Wt:375#      cirrhosis, variceal  screening-labs done on 3/29-GT  vaccinated-GT    ESOPHAGOGASTRODUODENOSCOPY N/A 4/21/2023    Procedure: EGD (ESOPHAGOGASTRODUODENOSCOPY);  Surgeon: Christopher Britton MD;  Location: [facility] ([address] [place] [address]);  Service: Endoscopy;  Laterality: N/A;  pt requested Friday due to work schedule  ECHO PA 44-51  BMI 47.97 Wt 343 lbs  inst portal-RB  last CBC PT/INR 1/24/23 4/17/23- Precall confirmed.    LYMPHADENECTOMY  1985    MENISCECTOMY      left knee    NASAL SEPTUM SURGERY  1985    WISDOM TOOTH EXTRACTION         Time Tracking:     PT Received On: 11/07/23  PT Start Time: 1310     PT Stop Time: 1329  PT Total Time (min): 19 min     Billable Minutes: Evaluation 10 and Therapeutic Exercise 9      11/07/2023

## 2023-11-07 NOTE — ANESTHESIA PROCEDURE NOTES
Intubation    Date/Time: 11/7/2023 7:10 AM    Performed by: Erica Santoro CRNA  Authorized by: Carroll Valero MD    Intubation:     Induction:  Rapid sequence induction    Intubated:  Postinduction    Mask Ventilation:  Not attempted    Attempts:  1    Attempted By:  CRNA    Method of Intubation:  Video laryngoscopy    Blade:  Alexander 4    Laryngeal View Grade: Grade I - full view of cords      Difficult Airway Encountered?: No      Complications:  None    Airway Device:  Oral endotracheal tube    Airway Device Size:  8.0    Style/Cuff Inflation:  Cuffed    Inflation Amount (mL):  4    Tube secured:  23    Secured at:  The lips    Placement Verified By:  Capnometry    Complicating Factors:  Obesity    Findings Post-Intubation:  BS equal bilateral

## 2023-11-07 NOTE — PT/OT/SLP PROGRESS
Physical Therapy Treatment    Patient Name:  Ashwin Peter   MRN:  [id number]    Recommendations:     Discharge Recommendations: Low Intensity Therapy  Discharge Equipment Recommendations: walker, rolling, bedside commode  Barriers to discharge: None    Assessment:     Ashwin Peter is a 58 y.o. male admitted with a medical diagnosis of Osteoarthritis of right knee.  He presents with the following impairments/functional limitations: weakness, impaired endurance, impaired self care skills, impaired functional mobility, gait instability, impaired balance, decreased lower extremity function, decreased safety awareness, pain, decreased ROM, impaired skin, edema, orthopedic precautions.    Pt with improved level of alertness during this session, however still requiring tactile and verbal stimulation for alertness with activity in bed. Bed mobility CGA, sit<>stand CGA with RW, ambulated 15ft in room with RW and CGA requiring cueing for directions d/t lethargy and frequent closing of eyes.   Rec d/c to low intensity therapy.    Rehab Prognosis: Good; patient would benefit from acute skilled PT services to address these deficits and reach maximum level of function.    Recent Surgery: Procedure(s) (LRB):  ARTHROPLASTY, KNEE, TOTAL (Right) Day of Surgery    Plan:     During this hospitalization, patient to be seen BID to address the identified rehab impairments via gait training, therapeutic activities, therapeutic exercises, neuromuscular re-education and progress toward the following goals:    Plan of Care Expires:  11/14/23    Subjective     Chief Complaint: None at this time  Patient/Family Comments/goals: Patient agreeable to PT treatment.    Pain/Comfort:  Pain Rating 1: 0/10  Location - Side 1: Right  Location 1: knee  Pain Addressed 1: Reposition, Distraction, Cessation of Activity  Pain Rating Post-Intervention 1:  (Facial grimacing with knee flexion AAROM, but otherwise no mention of increased pain with OOB  mobility)      Objective:     Communicated with KATERIN Madera prior to session.  Patient found HOB elevated with telemetry, peripheral IV, cryotherapy, FCD upon PT entry to room.     General Precautions: Standard, fall  Orthopedic Precautions: RLE weight bearing as tolerated  Braces: N/A  Respiratory Status: Nasal cannula, flow 2 L/min     Functional Mobility:  Bed Mobility:     Supine to Sit: contact guard assistance  Transfers:     Sit to Stand:  contact guard assistance with rolling walker  Cues for hand placement   Gait: 15 ft with RW and CGA  Cueing for navigation as patient was frequently closing his eyes   Noted gait deviations of step to gait pattern, decreased knee flexion of surgical extremity, decreased heel strike/toe off of surgical extremity, and increased double limb support time.         AM-PAC 6 CLICK MOBILITY  Turning over in bed (including adjusting bedclothes, sheets and blankets)?: 3  Sitting down on and standing up from a chair with arms (e.g., wheelchair, bedside commode, etc.): 3  Moving from lying on back to sitting on the side of the bed?: 3  Moving to and from a bed to a chair (including a wheelchair)?: 3  Need to walk in hospital room?: 3  Climbing 3-5 steps with a railing?: 2  Basic Mobility Total Score: 17       Treatment & Education:  Performed supine knee exercises: ankle pumps, SAQs, and heel slides x10 with verbal and tactile cueing  Pt becoming lethargic with ankle pumps requiring active-assisted motion to continue  Poor initiation of quadriceps and hamstrings for remainder of exercises with therapist assistance to complete motion  Therapeutic activity:  Functional mobility as above  Use of incentive spirometer x5  PT educated patient and wife re:   PT plan of care/role of PT  Safety with OOB mobility  Use of RW  Positioning of LE and use of ice  Orthopedic precautions  Pt and wife verbalized understanding     Patient left HOB elevated with all lines intact, call button in reach, RN  notified, and wife present..    GOALS:   Multidisciplinary Problems       Physical Therapy Goals          Problem: Physical Therapy    Goal Priority Disciplines Outcome Goal Variances Interventions   Physical Therapy Goal     PT, PT/OT Ongoing, Progressing     Description: Goals to be met by: 2023     Patient will increase functional independence with mobility by performin. Supine<>sit with supervision.  2. Sit<>stand transfer with SBA using rolling walker.   3. Gait > 150 feet with SBA using rolling walker.   4. Ascend/descend 1 step with no handrails with CGA and least restrictive AD.                             Time Tracking:     PT Received On: 23  PT Start Time: 1523     PT Stop Time: 1547  PT Total Time (min): 24 min     Billable Minutes: Therapeutic Activity 14 and Therapeutic Exercise 10    Treatment Type: Treatment  PT/PTA: PT     Number of PTA visits since last PT visit: 0     2023

## 2023-11-07 NOTE — PLAN OF CARE
Devendra spoke with Gabrielle at [facility] who stated they can service the patient. Devendra faxed Critical access hospital orders.

## 2023-11-07 NOTE — PLAN OF CARE
I certify I provided patient choice and a list to the patient/family of [facility].  Patient/Family signed Patient's Choice Disclosure Form choosing the following  [facility]  Sw contacted [facility] and informed them of patient's dc tomorrow.

## 2023-11-07 NOTE — ANESTHESIA PROCEDURE NOTES
Arterial    Diagnosis: Pulmonary Hypertension    Patient location during procedure: pre-op  Procedure end time: 11/7/2023 6:26 AM    Staffing  Authorizing Provider: Carroll Valero MD  Performing Provider: Carroll Valero MD    Staffing  Performed by: Carroll Valero MD  Authorized by: Carroll Valero MD    Anesthesiologist was present at the time of the procedure.    Preanesthetic Checklist  Completed: patient identified, IV checked, site marked, risks and benefits discussed, surgical consent, monitors and equipment checked, pre-op evaluation, timeout performed and anesthesia consent givenArterial  Skin Prep: chlorhexidine gluconate  Local Infiltration: lidocaine  Orientation: left  Location: radial    Catheter Size: 20 G  Catheter placement by Ultrasound guidance. Heme positive aspiration all ports.   Vessel Caliber: medium, patent  Needle advanced into vessel with real time Ultrasound guidance.  Image recorded and saved.Insertion Attempts: 1  Assessment  Dressing: secured with tape and tegaderm

## 2023-11-07 NOTE — OP NOTE
[facility]  Surgery Department  Operative Note    SUMMARY     Date of Procedure: 11/7/2023     Procedure: Procedure(s) (LRB):  ARTHROPLASTY, KNEE, TOTAL (Right)     Surgeon(s) and Role:     * Darnell Liao MD - Primary    Assisting Surgeon: None    Pre-Operative Diagnosis: Osteoarthritis of right knee [M17.11]    Post-Operative Diagnosis: Post-Op Diagnosis Codes:     * Osteoarthritis of right knee [M17.11]    Anesthesia: Choice    Operative Findings (including complications, if any):  Osteoarthritis right knee    Description of Technical Procedures:  Patient brought the operating room underwent general anesthesia without too much difficulty.  Right lower extremity prepped in usual fashion tourniquet applied 3 mm mercury after Esmarch.  Using midline incision sharp dissection made down extensor mechanism.  He had a venous tourniquet.  Terrible amounts of bleeding through the veins.  Tourniquet dropped.  Prep guest ahead.  Prior to the tourniquet going down a put some vancomycin intraosseous slowly.  Had some difficulty with that because of the size of his leg.  Standard medial release performed get him out of varus alignment fat pad was removed.  Anterior posterior cruciate ligaments removed.  A tibial cut was made per midshaft tibia sizing was 75.  Attention turned to the femur intramedullary guide a 3 degree provisional distal cut was performed.  With the cut his extension gap was symmetric at 10.  Sizing on the femur was a 70 so with the tensor in flexion anterior-posterior cuts performed with a 70.  Flexion gap was symmetric at 10 good stability.  Extension gap was symmetric so chamber cuts performed.  With a floating trial 0-120 degrees range of motion rotation was marked tibia was punched prepatellar thickness 23 post patellar thickness 22 with a 37 patella.  The appropriate components opened using good cement technique all components were cemented excess cement was removed pulse lavage  irrigation was used.  Final 10 mm AS insert placed.  Again excellent range of motion stability.  It should be noted that throughout the case Bovie electrocauterization was used in multiple spots.  A lot of venous bleeding.  Due to his size as well as low platelets.  1. Vicryl using parapatellar arthrotomy closed in flexion post closure 0-120 degrees range of motion to 0 Vicryl subcutaneously staples on the skin.  Exparel Marcaine were instilled the soft tissues in the gutters and in the joint.  He is placed in a bulky soft dressing brought to come sterile fashion intact.      This performed with a poor recognition system.    Significant Surgical Tasks Conducted by the Assistant(s), if Applicable:  Retraction    Estimated Blood Loss (EBL): 1000 mL   tourniquet 6           Implants:   Implant Name Type Inv. Item Serial No.  Lot No. LRB No. Used Action   [employer] Pro 80     [id number] Right 1 Implanted   BEARING VE AS TIB [id number] - [id number]  BEARING VE AS TIB [id number]  CA,INC [id number] Right 1 Implanted   Cemented CR Femoral Interlok     [id number] Right 1 Implanted   75 mm w Locking Bar Cementted Fixed Cruciate     [id number] Right 1 Implanted   Standard Patella     [id number] Right 1 Implanted       Specimens:   Specimen (24h ago, onward)      None                    Condition: Good    Disposition: PACU - hemodynamically stable.    Attestation: I was present and scrubbed for the entire procedure.

## 2023-11-07 NOTE — PLAN OF CARE
Problem: Physical Therapy  Goal: Physical Therapy Goal  Description: Goals to be met by: 2023     Patient will increase functional independence with mobility by performin. Supine<>sit with supervision.  2. Sit<>stand transfer with SBA using rolling walker.   3. Gait > 150 feet with SBA using rolling walker.   4. Ascend/descend 1 step with no handrails with CGA and least restrictive AD.        Outcome: Ongoing, Progressing     Patient evaluated by PT and goals established. Patient very lethargic during today's session and unable to keep eyes open for therapeutic exercise. Ankle pumps, SAQ, and heel slides performed with pt requiring constant tactile cueing and active assistance from therapist for alertness and participation. OOB mobility to be assessed at a later time once patient is more alert.  PT will continue to follow and progress as tolerated. Rec for d/c to home with therapy per MD. Please see progress note for detailed plan of care and recommendations.

## 2023-11-07 NOTE — PLAN OF CARE
Patient admitted from PACU this shift s/p R TKR. Patient lethargic but easily aroused to voice at beginning of shift, able to stay awake for conversation with wife in the afternoon. VSS on 3L NC. Tele monitor, ankle pumps, L enoch hose, and R continuous polar ice in use without complication. Pt voiding in urinal w/o complication. Shifts weight in bed with assistance. Free from falls; safety precautions in place. Care ongoing.

## 2023-11-08 VITALS
BODY MASS INDEX: 38.63 KG/M2 | OXYGEN SATURATION: 96 % | RESPIRATION RATE: 18 BRPM | TEMPERATURE: 98 F | SYSTOLIC BLOOD PRESSURE: 132 MMHG | HEART RATE: 88 BPM | DIASTOLIC BLOOD PRESSURE: 62 MMHG | HEIGHT: 71 IN

## 2023-11-08 LAB
ANION GAP SERPL CALC-SCNC: 6 MMOL/L (ref 8–16)
BASOPHILS # BLD AUTO: 0.01 K/UL (ref 0–0.2)
BASOPHILS NFR BLD: 0.2 % (ref 0–1.9)
BUN SERPL-MCNC: 15 MG/DL (ref 6–20)
CALCIUM SERPL-MCNC: 8.2 MG/DL (ref 8.7–10.5)
CHLORIDE SERPL-SCNC: 111 MMOL/L (ref 95–110)
CO2 SERPL-SCNC: 20 MMOL/L (ref 23–29)
CREAT SERPL-MCNC: 0.9 MG/DL (ref 0.5–1.4)
DIFFERENTIAL METHOD: ABNORMAL
EOSINOPHIL # BLD AUTO: 0 K/UL (ref 0–0.5)
EOSINOPHIL NFR BLD: 0 % (ref 0–8)
ERYTHROCYTE [DISTWIDTH] IN BLOOD BY AUTOMATED COUNT: 14.6 % (ref 11.5–14.5)
EST. GFR  (NO RACE VARIABLE): >60 ML/MIN/1.73 M^2
GLUCOSE SERPL-MCNC: 111 MG/DL (ref 70–110)
HCT VFR BLD AUTO: 31.3 % (ref 40–54)
HGB BLD-MCNC: 10.3 G/DL (ref 14–18)
IMM GRANULOCYTES # BLD AUTO: 0.03 K/UL (ref 0–0.04)
IMM GRANULOCYTES NFR BLD AUTO: 0.5 % (ref 0–0.5)
LYMPHOCYTES # BLD AUTO: 0.4 K/UL (ref 1–4.8)
LYMPHOCYTES NFR BLD: 6.7 % (ref 18–48)
MCH RBC QN AUTO: 32.7 PG (ref 27–31)
MCHC RBC AUTO-ENTMCNC: 32.9 G/DL (ref 32–36)
MCV RBC AUTO: 99 FL (ref 82–98)
MONOCYTES # BLD AUTO: 0.7 K/UL (ref 0.3–1)
MONOCYTES NFR BLD: 11.4 % (ref 4–15)
NEUTROPHILS # BLD AUTO: 5.1 K/UL (ref 1.8–7.7)
NEUTROPHILS NFR BLD: 81.2 % (ref 38–73)
NRBC BLD-RTO: 0 /100 WBC
PLATELET # BLD AUTO: 93 K/UL (ref 150–450)
PMV BLD AUTO: 10.4 FL (ref 9.2–12.9)
POTASSIUM SERPL-SCNC: 4.7 MMOL/L (ref 3.5–5.1)
RBC # BLD AUTO: 3.15 M/UL (ref 4.6–6.2)
SODIUM SERPL-SCNC: 137 MMOL/L (ref 136–145)
WBC # BLD AUTO: 6.3 K/UL (ref 3.9–12.7)

## 2023-11-08 PROCEDURE — 85025 COMPLETE CBC W/AUTO DIFF WBC: CPT | Performed by: ORTHOPAEDIC SURGERY

## 2023-11-08 PROCEDURE — 80048 BASIC METABOLIC PNL TOTAL CA: CPT | Performed by: ORTHOPAEDIC SURGERY

## 2023-11-08 PROCEDURE — 94799 UNLISTED PULMONARY SVC/PX: CPT

## 2023-11-08 PROCEDURE — 97110 THERAPEUTIC EXERCISES: CPT | Mod: CQ

## 2023-11-08 PROCEDURE — 63600175 PHARM REV CODE 636 W HCPCS: Performed by: NURSE PRACTITIONER

## 2023-11-08 PROCEDURE — 97530 THERAPEUTIC ACTIVITIES: CPT | Mod: CQ

## 2023-11-08 PROCEDURE — 36415 COLL VENOUS BLD VENIPUNCTURE: CPT | Performed by: ORTHOPAEDIC SURGERY

## 2023-11-08 PROCEDURE — 25000003 PHARM REV CODE 250: Performed by: NURSE PRACTITIONER

## 2023-11-08 PROCEDURE — 97535 SELF CARE MNGMENT TRAINING: CPT

## 2023-11-08 PROCEDURE — 97165 OT EVAL LOW COMPLEX 30 MIN: CPT

## 2023-11-08 PROCEDURE — 25000003 PHARM REV CODE 250: Performed by: ORTHOPAEDIC SURGERY

## 2023-11-08 PROCEDURE — 97530 THERAPEUTIC ACTIVITIES: CPT

## 2023-11-08 PROCEDURE — 97116 GAIT TRAINING THERAPY: CPT | Mod: CQ

## 2023-11-08 PROCEDURE — 94761 N-INVAS EAR/PLS OXIMETRY MLT: CPT

## 2023-11-08 RX ORDER — CYCLOBENZAPRINE HCL 5 MG
5 TABLET ORAL 3 TIMES DAILY PRN
Qty: 40 TABLET | Refills: 0 | Status: SHIPPED | OUTPATIENT
Start: 2023-11-08 | End: 2023-11-22

## 2023-11-08 RX ORDER — HYDROCODONE BITARTRATE AND ACETAMINOPHEN 10; 325 MG/1; MG/1
1 TABLET ORAL EVERY 4 HOURS PRN
Qty: 50 TABLET | Refills: 0 | Status: SHIPPED | OUTPATIENT
Start: 2023-11-08 | End: 2024-01-09

## 2023-11-08 RX ORDER — NAPROXEN SODIUM 220 MG/1
81 TABLET, FILM COATED ORAL 2 TIMES DAILY
Qty: 60 TABLET | Refills: 0 | Status: SHIPPED | OUTPATIENT
Start: 2023-11-08 | End: 2024-03-14

## 2023-11-08 RX ADMIN — PREDNISONE 5 MG: 5 TABLET ORAL at 09:11

## 2023-11-08 RX ADMIN — ASPIRIN 81 MG CHEWABLE TABLET 81 MG: 81 TABLET CHEWABLE at 09:11

## 2023-11-08 RX ADMIN — HYDROCODONE BITARTRATE AND ACETAMINOPHEN 1 TABLET: 10; 325 TABLET ORAL at 06:11

## 2023-11-08 RX ADMIN — FAMOTIDINE 20 MG: 20 TABLET ORAL at 09:11

## 2023-11-08 RX ADMIN — HYDROCODONE BITARTRATE AND ACETAMINOPHEN 1 TABLET: 10; 325 TABLET ORAL at 10:11

## 2023-11-08 RX ADMIN — SODIUM CHLORIDE: 9 INJECTION, SOLUTION INTRAVENOUS at 02:11

## 2023-11-08 RX ADMIN — MUPIROCIN: 20 OINTMENT TOPICAL at 09:11

## 2023-11-08 RX ADMIN — CELECOXIB 200 MG: 200 CAPSULE ORAL at 09:11

## 2023-11-08 RX ADMIN — POLYETHYLENE GLYCOL 3350 17 G: 17 POWDER, FOR SOLUTION ORAL at 09:11

## 2023-11-08 NOTE — PT/OT/SLP PROGRESS
"Physical Therapy Treatment    Patient Name:  Ashwin Peter   MRN:  [id number]    Recommendations:     Discharge Recommendations: Low Intensity Therapy  Discharge Equipment Recommendations: walker, rolling, bedside commode  Barriers to discharge: None    Assessment:     Ashwin Peter is a 58 y.o. male admitted with a medical diagnosis of Osteoarthritis of right knee.  He presents with the following impairments/functional limitations: weakness, gait instability, pain, edema, orthopedic precautions, impaired balance, impaired endurance, impaired functional mobility, impaired self care skills, impaired skin, decreased lower extremity function, decreased ROM, decreased safety awareness.    Supine>sit with SBA  Sit>stand with RW and CGA progressing to SBA (from elevated bed, chair)  Amb 135' with RW and SBA, R LE WBAT, step-through gait pattern  Ascended/descended 6" curb step with RW and CGA  Pt demos good mobility this visit  Rec Low Intensity Therapy    Rehab Prognosis: Good; patient would benefit from acute skilled PT services to address these deficits and reach maximum level of function.    Recent Surgery: Procedure(s) (LRB):  ARTHROPLASTY, KNEE, TOTAL (Right) 1 Day Post-Op    Plan:     During this hospitalization, patient to be seen BID to address the identified rehab impairments via gait training, therapeutic activities, therapeutic exercises, neuromuscular re-education and progress toward the following goals:    Plan of Care Expires:  11/14/23    Subjective     Chief Complaint: pain with walking  Patient/Family Comments/goals: I like to hunt and fish  Pain/Comfort:  Pain Rating 1: 0/10  Location - Side 1: Right  Location 1: knee  Pain Addressed 1: Pre-medicate for activity, Reposition, Distraction, Cessation of Activity  Pain Rating Post-Intervention 1: 7/10 (with gait)      Objective:     Communicated with nurse [place] prior to session.  Patient found HOB elevated with telemetry, peripheral IV, " "cryotherapy, FCD, oxygen upon PT entry to room.     General Precautions: Standard, fall  Orthopedic Precautions: RLE weight bearing as tolerated  Braces: N/A  Respiratory Status: Room air     Functional Mobility:  Bed Mobility:     Supine to Sit: stand by assistance  Transfers:     Sit to Stand:  stand by assistance and contact guard assistance with rolling walker  Gait: 135' with RW and SBA, R LE WBAT, step-through gait pattern  Stairs:  Pt ascended/descended 6" curb step with Rolling Walker with no handrails with Contact Guard Assistance.       AM-PAC 6 CLICK MOBILITY  Turning over in bed (including adjusting bedclothes, sheets and blankets)?: 3  Sitting down on and standing up from a chair with arms (e.g., wheelchair, bedside commode, etc.): 3  Moving from lying on back to sitting on the side of the bed?: 3  Need to walk in hospital room?: 3  Climbing 3-5 steps with a railing?: 2       Treatment & Education:  Supine therex R LE: AP, QS, GS, heel slides, hip abd/add, SLR x 10  Seated therex R LE: LAQ x 10  Gait and stair training as noted    Patient left up in chair with all lines intact, call button in reach, and nurse Cassy notified..    GOALS:   Multidisciplinary Problems       Physical Therapy Goals          Problem: Physical Therapy    Goal Priority Disciplines Outcome Goal Variances Interventions   Physical Therapy Goal     PT, PT/OT Ongoing, Progressing     Description: Goals to be met by: 2023     Patient will increase functional independence with mobility by performin. Supine<>sit with supervision.  2. Sit<>stand transfer with SBA using rolling walker.   3. Gait > 150 feet with SBA using rolling walker.   4. Ascend/descend 1 step with no handrails with CGA and least restrictive AD.                             Time Tracking:     PT Received On: 23  PT Start Time: 835     PT Stop Time: 935  PT Total Time (min): 60 min     Billable Minutes: Gait Training 30, Therapeutic Activity 15, and " Therapeutic Exercise 15    Treatment Type: Treatment  PT/PTA: PTA     Number of PTA visits since last PT visit: 1     11/08/2023

## 2023-11-08 NOTE — DISCHARGE SUMMARY
[facility] - Med Surg ([address])  Discharge Summary      Admit Date: 11/7/2023    Discharge Date and Time: No discharge date for patient encounter.    Attending Physician: Darnell Liao MD     Reason for Admission:  Osteoarthritis right knee    Procedures Performed: Procedure(s) (LRB):  ARTHROPLASTY, KNEE, TOTAL (Right)    Hospital Course (synopsis of major diagnoses, care, treatment, and services provided during the course of the hospital stay):  Overall did very well considering his size and medical problems.  Okay to discharge home.  Medications reviewed.  Home health arranged.  Instructions given.  Follow up 2 weeks unless there is a problem.      Goals of Care Treatment Preferences:  Code Status: Full Code    Health care agent: Daysi Peter  [facility] agent number:                    Consults: nephrology    Significant Diagnostic Studies: Labs: All labs within the past 24 hours have been reviewed    Final Diagnoses:    Principal Problem: Osteoarthritis of right knee   Secondary Diagnoses:   Active Hospital Problems    Diagnosis  POA    *Osteoarthritis of right knee [M17.11]  Yes      Resolved Hospital Problems   No resolved problems to display.       Discharged Condition: good    Disposition: Home-Health Care [facility]    Follow Up/Patient Instructions:     Medications:  Reconciled Home Medications:      Medication List        START taking these medications      aspirin 81 MG Chew  Take 1 tablet (81 mg total) by mouth 2 (two) times daily.     cyclobenzaprine 5 MG tablet  Commonly known as: FLEXERIL  Take 1 tablet (5 mg total) by mouth 3 (three) times daily as needed for Muscle spasms.     HYDROcodone-acetaminophen  mg per tablet  Commonly known as: NORCO  Take 1 tablet by mouth every 4 (four) hours as needed for Pain.            CONTINUE taking these medications      diclofenac sodium 1 % Gel  Commonly known as: VOLTAREN  Apply topically twice daily as needed for knee pain     FARXIGA 10 mg  "tablet  Generic drug: dapagliflozin propanediol  Take 1 tablet (10 mg total) by mouth once daily.     hydrOXYzine HCL 25 MG tablet  Commonly known as: ATARAX  Take 1 tablet (25 mg total) by mouth 3 (three) times daily as needed for Anxiety.     K-PHOS ORIGINAL 500 mg Tbso  Generic drug: potassium phosphate (monobasic)  Take 2 tablets (1,000 mg total) by mouth once daily.     * OZEMPIC 0.25 mg or 0.5 mg (2 mg/3 mL) pen injector  Generic drug: semaglutide  Inject 0.5 mg into the skin every 7 days.     * OZEMPIC 1 mg/dose (4 mg/3 mL)  Generic drug: semaglutide  Inject 1 mg into the skin into the skin once a week     potassium chloride 10 MEQ Tbsr  Commonly known as: KLOR-CON  TAKE 1 TABLET(10 MEQ) BY MOUTH EVERY DAY     SINEX REGULAR NASL  2 sprays by Each Nostril route once daily.     torsemide 20 MG Tab  Commonly known as: DEMADEX  Take 20 mg by mouth. Take 3 tabs(60MG) by mouth once daily. Take 2 tabs 40mg in the morning and 20 mg (1tab) in the afternoon.           * This list has 2 medication(s) that are the same as other medications prescribed for you. Read the directions carefully, and ask your doctor or other care provider to review them with you.                STOP taking these medications      acetaminophen 500 MG tablet  Commonly known as: TYLENOL     predniSONE 5 MG tablet  Commonly known as: DELTASONE     traMADoL 50 mg tablet  Commonly known as: ULTRAM            Discharge Procedure Orders   WALKER FOR HOME USE     Order Specific Question Answer Comments   Type of Walker: Adult (5'4"-6'6")    With wheels? Yes    Height: 5' 11" (1.803 m)    Weight: 277 lbs    Length of need (1-99 months): 99    Does patient have medical equipment at home? CPAP    Does patient have medical equipment at home? crutches    Please check all that apply: Patient is unable to safely ambulate without equipment.       Follow-up [facility] Follow up on 11/8/2023.    Why: They will contact you to set up home " healthcare appointments.  Contact information:  [phone]               Dme, [facility] Follow up on 11/8/2023.    Specialty: DME Provider  Why: Contact if you have any issues with your medical equipment.  Contact information:  [address]  [address]  [place] [address] [phone]               Darnell Liao MD Follow up.    Specialty: Orthopedic Surgery  Why: Call [phone] to reach Dr. Liao, AS SCHEDULED   Contact information:  [address] [place] [address] [phone]

## 2023-11-08 NOTE — PLAN OF CARE
Problem: Occupational Therapy  Goal: Occupational Therapy Goal  Description: Goals to be met by: 11/15/2023    Patient will increase functional independence with ADLs by performing:    LE Dressing with Supervision.  Grooming while standing at sink with Modified [place].  Toileting from toilet with Supervision for hygiene and clothing management.   Toilet transfer to toilet with Supervision.    Outcome: Ongoing, Progressing     OT evaluation complete and POC established.  Low intensity is recommended upon d/c from acute care to further address deficits and help pt improve overall functional independence.     RONNI Leiva  11/8/2023

## 2023-11-08 NOTE — PROGRESS NOTES
"MED  Progress Note    Admit Date: 11/7/2023   LOS: 0 days     SUBJECTIVE:     Follow-up For:  Osteoarthritis of right knee    Interval History:     Doing well post op.  VSS.  Seen walking halls with PT.  No CP/SOB/Calf pain.     Review of Systems:  Constitutional: No fever or chills  Respiratory: No cough or shortness of breath  Cardiovascular: No chest pain or palpitations  Gastrointestinal: No nausea or vomiting  Neurological: No confusion or weakness    OBJECTIVE:     Vital Signs Range (Last 24H):  /62 (BP Location: Left arm, Patient Position: Lying)   Pulse 70   Temp 96.2 °F (35.7 °C) (Oral)   Resp 12   Ht 5' 11" (1.803 m)   SpO2 99%   BMI 38.63 kg/m²     Temp:  [96.2 °F (35.7 °C)-98.2 °F (36.8 °C)]   Pulse:  []   Resp:  [12-21]   BP: (125-154)/(59-78)   SpO2:  [93 %-99 %]     I & O (Last 24H):  Intake/Output Summary (Last 24 hours) at 11/8/2023 1003  Last data filed at 11/7/2023 2148  Gross per 24 hour   Intake 420 ml   Output 700 ml   Net -280 ml       Physical Exam:  General appearance: Well developed, well nourished, morbid obese  Eyes:  Conjunctivae/corneas clear. PERRL.  Lungs: Normal respiratory effort,   clear to auscultation bilaterally, snoring  Heart: Regular rate and rhythm, S1, S2 normal, no murmur, rub or anna.  Abdomen: Soft, non-tender non-distended; bowel sounds normal; no masses,  no organomegaly, morbid obese  Extremities: No cyanosis or clubbing. Chronic lymphadema    Skin: Skin color, texture, turgor normal. No rashes or lesions  Neurologic: AAO  Right knee CDI    Laboratory Data:  Recent Labs   Lab 11/08/23 0417   WBC 6.30   RBC 3.15*   HGB 10.3*   HCT 31.3*   PLT 93*   MCV 99*   MCH 32.7*   MCHC 32.9       BMP:   Recent Labs   Lab 11/08/23 0417   *      K 4.7   *   CO2 20*   BUN 15   CREATININE 0.9   CALCIUM 8.2*     Lab Results   Component Value Date    CALCIUM 8.2 (L) 11/08/2023    PHOS 2.8 11/07/2022       Lab Results   Component Value Date    " "PTH 64 07/28/2014    CALCIUM 8.2 (L) 11/08/2023    PHOS 2.8 11/07/2022       Lab Results   Component Value Date    URICACID 5.7 12/01/2014       BNP  No results for input(s): "BNP", "BNPTRIAGEBLO" in the last 168 hours.    Medications:  Medication list was reviewed and changes noted under Assessment/Plan.    Diagnostic Results:        ASSESSMENT/PLAN:       Right Knee:  per ortho/therapy teams.     Morbid obesity:  has lost a significant amount of weight with GLP.  Cont as outpt.     LAW:  CPAP ordered.     Lung Sarcoid:  follows pulm.  On steroids.  Gave decadron intraop.  Monitored sats.      Chronic Lymphedema:  on support compressions at home.       ABLA:  given 1L albumin and monitoring.   Hgb 10     HFpEF:  stable currently.   Cleared by cards.  Placed on tele w/o events noted     Liver cirrhosis/thrombocytopenia:  monitored platelets with mild drop.  CBC by HH early next week.      DVT:  ASA BID.      As above  Anticipate DC today.           "

## 2023-11-08 NOTE — PT/OT/SLP EVAL
Occupational Therapy   Evaluation & Treatment    Name: Ashwin Peter  MRN: [id number]  Admitting Diagnosis: Osteoarthritis of right knee  Recent Surgery: Procedure(s) (LRB):  ARTHROPLASTY, KNEE, TOTAL (Right) 1 Day Post-Op    Recommendations:     Discharge Recommendations: Low Intensity Therapy  Discharge Equipment Recommendations:  walker, rolling, bedside commode  Barriers to discharge:  None    Assessment:     Ashwin Peter is a 58 y.o. male with a medical diagnosis of Osteoarthritis of right knee.  He presents with pain in R knee. Performance deficits affecting function: weakness, impaired self care skills, impaired functional mobility, gait instability, impaired balance, decreased lower extremity function, decreased ROM, orthopedic precautions.  Pt agreeable to participating in therapy upon arrival to room.  Pt demonstrates strength and ROM in (B) UE needed for ADLs that is WNL.  Pt able to perform LB dressing with SBA, RW (underwear and shorts) Max A for R sock and shoe.  While standing at sink pt able to perform grooming task with supervision, RW.    Overall, pt tolerated therapy well and was able to use proper body mechanics throughout session.  PTA pt reports being (I) with ADLs and Mod I with mobility with use of crutches.  Pt would benefit from skilled OT services to address problems listed above and increase independence with ADLs.  Low intensity therapy is recommended upon d/c from acute care to further address deficits and help pt improve overall functional independence.         Rehab Prognosis: Good; patient would benefit from acute skilled OT services to address these deficits and reach maximum level of function.       Plan:     Patient to be seen daily to address the above listed problems via self-care/home management, therapeutic activities, therapeutic exercises  Plan of Care Expires: 12/08/23  Plan of Care Reviewed with: patient    Subjective     Chief Complaint: none  stated  Patient/Family Comments/goals: Return home, return to work eventually    Occupational Profile:  Living Environment: Pt lives with wife in [facility], [address] driveway to front porch.  Bathroom has walk-in shower.  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  Patient found up in chair with telemetry, peripheral IV, cryotherapy upon OT entry to room.    General Precautions: Standard, fall  Orthopedic Precautions: RLE weight bearing as tolerated  Braces: N/A  Respiratory Status: Room air    Occupational Performance:    Bed Mobility:    Not assessed 2* pt up in chair upon arrival    Functional Mobility/Transfers:  Sit <> Stand:  CGA, RW x 1 trial from chair  SBA, RW x 1 trial from chair  Functional Mobility: Pt ambulated ~40 ft in room with SBA, RW.  No instances of LOB noted    Activities of Daily Living:  Grooming: Supervision, RW for washing hands while standing at sink.  Cues for RW placement provided  Upper Body Dressing: Independent for [facility] gown; independent for donning shirt while seated up in chair.  Lower Body Dressing:   SBA for donning underwear and shorts.  Pt started task in seated position, then stood with RW to pull up  CGA for donning sock and shoe on left foot utilizing figure four dressing technique (OT held leg in place); Max A  for donning sock and shoe on right foot seated up in chair.    Cognitive/Visual Perceptual:  Cognitive/Psychosocial Skills:    -       Oriented to: Person, Place, Time, and Situation   -       Follows Commands/attention: Follows multistep commands  -       Communication: clear/fluent  -       Memory: No Deficits noted  -       Safety awareness/insight to disability: intact   -       Mood/Affect/Coping skills/emotional control: Cooperative and Pleasant    Physical Exam:  Postural examination/scapula alignment:    -       No postural abnormalities identified  Skin integrity: Visible skin intact  Edema:  Mild R knee  Sensation: -       Intact  Motor Planning: -       WFL  Dominant hand: -       Right  Upper Extremity Range of Motion:  As observed through functional tasks  -       Right Upper Extremity: WNL  -       Left Upper Extremity: WNL  Upper Extremity Strength: As observed through functional tasks  -       Right Upper Extremity: WNL  -       Left Upper Extremity: WNL   Strength: WNL  Fine Motor Coordination: Intact  Gross motor coordination: WFL  Balance: Sitting- Independent; Standing- supervision-independent    AMPAC 6 Click ADL:  AMPAC Total Score: 20    Treatment & Education:  *Pt educated on:   -role of OT in acute care setting  -orthopedic precautions  -LB dressing technique  -transfer technique from EOB, chair, and BSC  -importance of facing activity during ADLs/IADLs to prevent rotation of knee  *Pt ambulated within room to address coordination, endurance, and balance needed for functional mobility  *Pt performed LB dressing; cues for technique provided  *Pt performed grooming task standing at sink; cues for RW placement provided  *POC reviewed with pt      Patient left up in chair with all lines intact, cryotherapy, call button in reach, and RN (Cassy) notified    GOALS:   Multidisciplinary Problems       Occupational Therapy Goals          Problem: Occupational Therapy    Goal Priority Disciplines  Outcome Interventions   Occupational Therapy Goal     OT, PT/OT Ongoing, Progressing    Description: Goals to be met by: 11/15/2023    Patient will increase functional independence with ADLs by performing:    LE Dressing with Supervision.  Grooming while standing at sink with Modified [place].  Toileting from toilet with Supervision for hygiene and clothing management.   Toilet transfer to toilet with Supervision.                         History:     Past Medical History:   Diagnosis Date    Anxiety     BMI 38.0-38.9,adult     Hyperlipidemia     Multiple pulmonary nodules     Obesity     Other cirrhosis of liver 01/13/2021    Portal hypertension 02/11/2021    Sarcoidosis of lung with sarcoidosis of lymph nodes     Sleep apnea     CPAP USED    Splenomegaly 12/22/2020    Thrombocytopenia 12/22/2020         Past Surgical History:   Procedure Laterality Date    ANTERIOR CRUCIATE LIGAMENT REPAIR  2007    right knee    COLONOSCOPY N/A 4/8/2021    Procedure: COLONOSCOPY;  Surgeon: Jeff Olvera MD;  Location: [facility] ([address] [place] [address]);  Service: Endoscopy;  Laterality: N/A;  rectal bleeding,   2nd floor BMI 50 (354 lbs)  COVID test on 4/5/21 at [facility]    ESOPHAGOGASTRODUODENOSCOPY N/A 4/8/2021    Procedure: EGD (ESOPHAGOGASTRODUODENOSCOPY);  Surgeon: Jeff Olvera MD;  Location: [facility] ([address] [place] [address]);  Service: Endoscopy;  Laterality: N/A;  variceal screening/ labs the am of procedure  2nd floor BMI 50 (354 lbs)    ESOPHAGOGASTRODUODENOSCOPY N/A 3/31/2022    Procedure: EGD (ESOPHAGOGASTRODUODENOSCOPY);  Surgeon: Jeff Olvera MD;  Location: [facility] ([address] [place] [address]);  Service: Endoscopy;  Laterality: N/A;  BMI-52    Wt:375#      cirrhosis, variceal screening-labs done on 3/29-GT  vaccinated-GT    ESOPHAGOGASTRODUODENOSCOPY N/A 4/21/2023    Procedure: EGD (ESOPHAGOGASTRODUODENOSCOPY);  Surgeon: Christopher Britton MD;  Location: [facility] ([address] [place] [address]);  Service: Endoscopy;  Laterality: N/A;  pt requested Friday due to  work schedule  ECHO PA 44-51  BMI 47.97 Wt 343 lbs  inst portal-RB  last CBC PT/INR 1/24/23 4/17/23- Precall confirmed.    LYMPHADENECTOMY  1985    MENISCECTOMY      left knee    NASAL SEPTUM SURGERY  1985    TOTAL KNEE ARTHROPLASTY Right 11/7/2023    Procedure: ARTHROPLASTY, KNEE, TOTAL;  Surgeon: Darnell Liao MD;  Location: [facility];  Service: Orthopedics;  Laterality: Right;    WISDOM TOOTH EXTRACTION         Time Tracking:     OT Date of Treatment: 11/08/23  OT Start Time: 1023  OT Stop Time: 1052  OT Total Time (min): 29 min    Billable Minutes:Evaluation 15  Self Care/Home Management 14    RONNI Leiva  11/8/2023

## 2023-11-08 NOTE — PLAN OF CARE
Patient will discharge with a RW. Patient refused a BSC. Patient will discharge with [facility]. Patient's family will provide transportation home.    [facility] - Med Surg ([address])  Discharge Final Note    Primary Care Provider: Sergio Guo III, MD    Expected Discharge Date: 11/8/2023    Final Discharge Note (most recent)       Final Note - 11/08/23 1420          Final Note    Assessment Type Final Discharge Note                   Contact [facility]    Phone: [phone]       Next Steps: Follow up on 11/8/2023    Instructions: They will contact you to set up home healthcare appointments.    [facility]   Specialty: DME Provider    [address] [place], [place] [id number]   Phone: [phone]       Next Steps: Follow up on 11/8/2023    Instructions: Contact if you have any issues with your medical equipment.    Darnell Liao MD   Specialty: Orthopedic Surgery    [address] [place] [id number]   Phone: [phone]       Next Steps: Follow up    Instructions: Call [phone] to reach Dr. Liao, AS SCHEDULED

## 2023-12-19 ENCOUNTER — HOSPITAL ENCOUNTER (OUTPATIENT)
Dept: RADIOLOGY | Facility: HOSPITAL | Age: 58
Discharge: HOME | End: 2023-12-19
Attending: STUDENT IN AN ORGANIZED HEALTH CARE EDUCATION/TRAINING PROGRAM
Payer: OTHER GOVERNMENT

## 2023-12-19 DIAGNOSIS — K75.81 LIVER CIRRHOSIS SECONDARY TO NASH: ICD-10-CM

## 2023-12-19 DIAGNOSIS — K74.60 LIVER CIRRHOSIS SECONDARY TO NASH: ICD-10-CM

## 2023-12-19 PROCEDURE — 76705 ECHO EXAM OF ABDOMEN: CPT | Mod: 26,,, | Performed by: RADIOLOGY

## 2023-12-19 PROCEDURE — 76705 ECHO EXAM OF ABDOMEN: CPT | Mod: TC

## 2023-12-19 PROCEDURE — 76705 US ABDOMEN LIMITED: ICD-10-PCS | Mod: 26,,, | Performed by: RADIOLOGY

## 2024-01-02 ENCOUNTER — TELEPHONE (OUTPATIENT)
Dept: HEPATOLOGY | Facility: CLINIC | Age: 59
End: 2024-01-02
Payer: OTHER GOVERNMENT

## 2024-01-02 NOTE — TELEPHONE ENCOUNTER
Results letter sent  ----- Message from Daniel Crowder MD sent at 1/2/2024  2:21 PM CST -----  Mr. Peter did not view portal message about results. Please let him know that his ultrasound showed no signs of liver cancer.

## 2024-01-09 ENCOUNTER — HOSPITAL ENCOUNTER (OUTPATIENT)
Facility: HOSPITAL | Age: 59
Discharge: HOME | End: 2024-01-10
Attending: STUDENT IN AN ORGANIZED HEALTH CARE EDUCATION/TRAINING PROGRAM | Admitting: STUDENT IN AN ORGANIZED HEALTH CARE EDUCATION/TRAINING PROGRAM
Payer: OTHER GOVERNMENT

## 2024-01-09 DIAGNOSIS — E72.20 HYPERAMMONEMIA: Primary | ICD-10-CM

## 2024-01-09 DIAGNOSIS — R41.0 CONFUSION: ICD-10-CM

## 2024-01-09 DIAGNOSIS — I27.20 PULMONARY HYPERTENSION: ICD-10-CM

## 2024-01-09 DIAGNOSIS — K74.60 LIVER CIRRHOSIS SECONDARY TO NASH: ICD-10-CM

## 2024-01-09 DIAGNOSIS — K76.6 PORTAL HYPERTENSION: ICD-10-CM

## 2024-01-09 DIAGNOSIS — K75.81 LIVER CIRRHOSIS SECONDARY TO NASH: ICD-10-CM

## 2024-01-09 DIAGNOSIS — K92.0 HEMATEMESIS WITHOUT NAUSEA: ICD-10-CM

## 2024-01-09 DIAGNOSIS — K76.82 HEPATIC ENCEPHALOPATHY: ICD-10-CM

## 2024-01-09 DIAGNOSIS — R07.9 CHEST PAIN: ICD-10-CM

## 2024-01-09 DIAGNOSIS — D69.6 THROMBOCYTOPENIA: ICD-10-CM

## 2024-01-09 PROBLEM — E87.70 HYPERVOLEMIA: Status: RESOLVED | Noted: 2022-07-22 | Resolved: 2024-01-09

## 2024-01-09 PROBLEM — R06.02 SHORTNESS OF BREATH: Status: RESOLVED | Noted: 2022-08-08 | Resolved: 2024-01-09

## 2024-01-09 PROBLEM — D70.9 NEUTROPENIA: Status: ACTIVE | Noted: 2024-01-09

## 2024-01-09 PROBLEM — I50.33 ACUTE ON CHRONIC DIASTOLIC HEART FAILURE: Status: RESOLVED | Noted: 2022-07-22 | Resolved: 2024-01-09

## 2024-01-09 PROBLEM — N20.1 CALCULUS OF URETER: Status: ACTIVE | Noted: 2024-01-09

## 2024-01-09 PROBLEM — K62.5 RECTAL BLEEDING: Status: RESOLVED | Noted: 2021-02-11 | Resolved: 2024-01-09

## 2024-01-09 PROBLEM — R29.90 MULTIPLE NEUROLOGICAL SYMPTOMS: Status: ACTIVE | Noted: 2024-01-09

## 2024-01-09 LAB
ALBUMIN SERPL BCP-MCNC: 2.5 G/DL (ref 3.5–5.2)
ALP SERPL-CCNC: 125 U/L (ref 55–135)
ALT SERPL W/O P-5'-P-CCNC: 21 U/L (ref 10–44)
AMMONIA PLAS-SCNC: 74 UMOL/L (ref 10–50)
ANION GAP SERPL CALC-SCNC: 7 MMOL/L (ref 8–16)
AST SERPL-CCNC: 39 U/L (ref 10–40)
BASOPHILS # BLD AUTO: 0.02 K/UL (ref 0–0.2)
BASOPHILS NFR BLD: 0.7 % (ref 0–1.9)
BILIRUB SERPL-MCNC: 2.8 MG/DL (ref 0.1–1)
BILIRUB UR QL STRIP: NEGATIVE
BNP SERPL-MCNC: 58 PG/ML (ref 0–99)
BUN SERPL-MCNC: 15 MG/DL (ref 6–20)
CALCIUM SERPL-MCNC: 8.4 MG/DL (ref 8.7–10.5)
CHLORIDE SERPL-SCNC: 110 MMOL/L (ref 95–110)
CLARITY UR REFRACT.AUTO: CLEAR
CO2 SERPL-SCNC: 22 MMOL/L (ref 23–29)
COLOR UR AUTO: YELLOW
CREAT SERPL-MCNC: 0.7 MG/DL (ref 0.5–1.4)
DIFFERENTIAL METHOD BLD: ABNORMAL
EOSINOPHIL # BLD AUTO: 0.1 K/UL (ref 0–0.5)
EOSINOPHIL NFR BLD: 3.9 % (ref 0–8)
ERYTHROCYTE [DISTWIDTH] IN BLOOD BY AUTOMATED COUNT: 15.9 % (ref 11.5–14.5)
EST. GFR  (NO RACE VARIABLE): >60 ML/MIN/1.73 M^2
GLUCOSE SERPL-MCNC: 84 MG/DL (ref 70–110)
GLUCOSE UR QL STRIP: NEGATIVE
HCT VFR BLD AUTO: 38.3 % (ref 40–54)
HCV AB SERPL QL IA: NORMAL
HGB BLD-MCNC: 12 G/DL (ref 14–18)
HGB UR QL STRIP: NEGATIVE
HIV 1+2 AB+HIV1 P24 AG SERPL QL IA: NORMAL
IMM GRANULOCYTES # BLD AUTO: 0.01 K/UL (ref 0–0.04)
IMM GRANULOCYTES NFR BLD AUTO: 0.4 % (ref 0–0.5)
INR PPP: 1.2 (ref 0.8–1.2)
KETONES UR QL STRIP: NEGATIVE
LACTATE SERPL-SCNC: 1.3 MMOL/L (ref 0.5–2.2)
LEUKOCYTE ESTERASE UR QL STRIP: NEGATIVE
LYMPHOCYTES # BLD AUTO: 0.6 K/UL (ref 1–4.8)
LYMPHOCYTES NFR BLD: 20.8 % (ref 18–48)
MAGNESIUM SERPL-MCNC: 1.9 MG/DL (ref 1.6–2.6)
MCH RBC QN AUTO: 29.1 PG (ref 27–31)
MCHC RBC AUTO-ENTMCNC: 31.3 G/DL (ref 32–36)
MCV RBC AUTO: 93 FL (ref 82–98)
MONOCYTES # BLD AUTO: 0.5 K/UL (ref 0.3–1)
MONOCYTES NFR BLD: 16.6 % (ref 4–15)
NEUTROPHILS # BLD AUTO: 1.6 K/UL (ref 1.8–7.7)
NEUTROPHILS NFR BLD: 57.6 % (ref 38–73)
NITRITE UR QL STRIP: NEGATIVE
NRBC BLD-RTO: 0 /100 WBC
PH UR STRIP: 7 [PH] (ref 5–8)
PLATELET # BLD AUTO: 105 K/UL (ref 150–450)
PMV BLD AUTO: ABNORMAL FL (ref 9.2–12.9)
POTASSIUM SERPL-SCNC: 4.1 MMOL/L (ref 3.5–5.1)
PROT SERPL-MCNC: 6.5 G/DL (ref 6–8.4)
PROT UR QL STRIP: ABNORMAL
PROTHROMBIN TIME: 12.5 SEC (ref 9–12.5)
RBC # BLD AUTO: 4.13 M/UL (ref 4.6–6.2)
SARS-COV-2 RDRP RESP QL NAA+PROBE: NEGATIVE
SODIUM SERPL-SCNC: 139 MMOL/L (ref 136–145)
SP GR UR STRIP: 1.02 (ref 1–1.03)
TROPONIN I SERPL DL<=0.01 NG/ML-MCNC: 0.01 NG/ML (ref 0–0.03)
URN SPEC COLLECT METH UR: ABNORMAL
WBC # BLD AUTO: 2.83 K/UL (ref 3.9–12.7)

## 2024-01-09 PROCEDURE — 81003 URINALYSIS AUTO W/O SCOPE: CPT

## 2024-01-09 PROCEDURE — 85610 PROTHROMBIN TIME: CPT | Performed by: EMERGENCY MEDICINE

## 2024-01-09 PROCEDURE — G0378 HOSPITAL OBSERVATION PER HR: HCPCS

## 2024-01-09 PROCEDURE — 93005 ELECTROCARDIOGRAM TRACING: CPT

## 2024-01-09 PROCEDURE — 99285 EMERGENCY DEPT VISIT HI MDM: CPT | Mod: 25

## 2024-01-09 PROCEDURE — 93010 ELECTROCARDIOGRAM REPORT: CPT | Mod: ,,, | Performed by: INTERNAL MEDICINE

## 2024-01-09 PROCEDURE — 84484 ASSAY OF TROPONIN QUANT: CPT | Performed by: EMERGENCY MEDICINE

## 2024-01-09 PROCEDURE — 87389 HIV-1 AG W/HIV-1&-2 AB AG IA: CPT | Performed by: PHYSICIAN ASSISTANT

## 2024-01-09 PROCEDURE — 83605 ASSAY OF LACTIC ACID: CPT | Performed by: EMERGENCY MEDICINE

## 2024-01-09 PROCEDURE — 83735 ASSAY OF MAGNESIUM: CPT | Performed by: EMERGENCY MEDICINE

## 2024-01-09 PROCEDURE — 80053 COMPREHEN METABOLIC PANEL: CPT | Performed by: EMERGENCY MEDICINE

## 2024-01-09 PROCEDURE — 86803 HEPATITIS C AB TEST: CPT | Performed by: PHYSICIAN ASSISTANT

## 2024-01-09 PROCEDURE — 20600001 HC STEP DOWN PRIVATE ROOM

## 2024-01-09 PROCEDURE — 85025 COMPLETE CBC W/AUTO DIFF WBC: CPT | Performed by: EMERGENCY MEDICINE

## 2024-01-09 PROCEDURE — 83880 ASSAY OF NATRIURETIC PEPTIDE: CPT | Performed by: EMERGENCY MEDICINE

## 2024-01-09 PROCEDURE — 25000003 PHARM REV CODE 250

## 2024-01-09 PROCEDURE — U0002 COVID-19 LAB TEST NON-CDC: HCPCS | Performed by: STUDENT IN AN ORGANIZED HEALTH CARE EDUCATION/TRAINING PROGRAM

## 2024-01-09 PROCEDURE — 82140 ASSAY OF AMMONIA: CPT | Performed by: EMERGENCY MEDICINE

## 2024-01-09 PROCEDURE — 94761 N-INVAS EAR/PLS OXIMETRY MLT: CPT

## 2024-01-09 RX ORDER — PROCHLORPERAZINE EDISYLATE 5 MG/ML
5 INJECTION INTRAMUSCULAR; INTRAVENOUS EVERY 6 HOURS PRN
Status: DISCONTINUED | OUTPATIENT
Start: 2024-01-09 | End: 2024-01-10 | Stop reason: HOSPADM

## 2024-01-09 RX ORDER — BENZONATATE 100 MG/1
100 CAPSULE ORAL 3 TIMES DAILY PRN
COMMUNITY
Start: 2024-01-02

## 2024-01-09 RX ORDER — DOXYCYCLINE 100 MG/1
1 TABLET ORAL 2 TIMES DAILY
Status: ON HOLD | COMMUNITY
Start: 2024-01-02 | End: 2024-01-09

## 2024-01-09 RX ORDER — LACTULOSE 10 G/15ML
20 SOLUTION ORAL EVERY 6 HOURS
Status: DISCONTINUED | OUTPATIENT
Start: 2024-01-10 | End: 2024-01-10 | Stop reason: HOSPADM

## 2024-01-09 RX ORDER — ACETAMINOPHEN 325 MG/1
650 TABLET ORAL EVERY 8 HOURS PRN
Status: DISCONTINUED | OUTPATIENT
Start: 2024-01-09 | End: 2024-01-10 | Stop reason: HOSPADM

## 2024-01-09 RX ORDER — OMEPRAZOLE 40 MG/1
40 CAPSULE, DELAYED RELEASE ORAL DAILY PRN
COMMUNITY
Start: 2024-01-02

## 2024-01-09 RX ORDER — ACETAMINOPHEN 325 MG/1
650 TABLET ORAL EVERY 4 HOURS PRN
Status: DISCONTINUED | OUTPATIENT
Start: 2024-01-09 | End: 2024-01-09

## 2024-01-09 RX ORDER — TALC
6 POWDER (GRAM) TOPICAL NIGHTLY PRN
Status: DISCONTINUED | OUTPATIENT
Start: 2024-01-09 | End: 2024-01-10 | Stop reason: HOSPADM

## 2024-01-09 RX ORDER — FLUTICASONE PROPIONATE 50 MCG
2 SPRAY, SUSPENSION (ML) NASAL DAILY PRN
COMMUNITY
Start: 2024-01-02

## 2024-01-09 RX ORDER — DICLOFENAC SODIUM 10 MG/G
2 GEL TOPICAL 2 TIMES DAILY PRN
Status: DISCONTINUED | OUTPATIENT
Start: 2024-01-09 | End: 2024-01-10 | Stop reason: HOSPADM

## 2024-01-09 RX ORDER — NALOXONE HCL 0.4 MG/ML
0.02 VIAL (ML) INJECTION
Status: DISCONTINUED | OUTPATIENT
Start: 2024-01-09 | End: 2024-01-10 | Stop reason: HOSPADM

## 2024-01-09 RX ORDER — BENZONATATE 100 MG/1
100 CAPSULE ORAL 3 TIMES DAILY PRN
Status: DISCONTINUED | OUTPATIENT
Start: 2024-01-09 | End: 2024-01-10 | Stop reason: HOSPADM

## 2024-01-09 RX ORDER — ONDANSETRON 2 MG/ML
4 INJECTION INTRAMUSCULAR; INTRAVENOUS EVERY 8 HOURS PRN
Status: DISCONTINUED | OUTPATIENT
Start: 2024-01-09 | End: 2024-01-10 | Stop reason: HOSPADM

## 2024-01-09 RX ORDER — ENOXAPARIN SODIUM 100 MG/ML
40 INJECTION SUBCUTANEOUS EVERY 24 HOURS
Status: DISCONTINUED | OUTPATIENT
Start: 2024-01-10 | End: 2024-01-10 | Stop reason: HOSPADM

## 2024-01-09 RX ORDER — DEXTROMETHORPHAN HB/DOXYLAMINE 15-6.25/15
1 SOLUTION, ORAL ORAL DAILY
COMMUNITY
Start: 2024-01-02

## 2024-01-09 RX ORDER — SODIUM CHLORIDE 0.9 % (FLUSH) 0.9 %
10 SYRINGE (ML) INJECTION EVERY 6 HOURS PRN
Status: DISCONTINUED | OUTPATIENT
Start: 2024-01-09 | End: 2024-01-10 | Stop reason: HOSPADM

## 2024-01-09 RX ORDER — LACTULOSE 10 G/15ML
10 SOLUTION ORAL
Status: COMPLETED | OUTPATIENT
Start: 2024-01-09 | End: 2024-01-09

## 2024-01-09 RX ORDER — ONDANSETRON 8 MG/1
8 TABLET, ORALLY DISINTEGRATING ORAL EVERY 8 HOURS PRN
COMMUNITY
Start: 2024-01-02

## 2024-01-09 RX ADMIN — LACTULOSE 10 G: 20 SOLUTION ORAL at 05:01

## 2024-01-09 RX ADMIN — LACTULOSE 20 G: 20 SOLUTION ORAL at 11:01

## 2024-01-09 NOTE — ED PROVIDER NOTES
"Encounter Date: 1/9/2024       History     Chief Complaint   Patient presents with    Multiple complaints     Feeling weird, hx cirrhosis, forgetful,      58-year-old male with history of diastolic HF, sarcoidosis, pulmonary hypertension, and cirrhosis presents to the ED regarding multiple complaints including forgetfulness, confusion, and bilateral hand shaking onset last night.  Patient also complaining of blurred vision and N/V.  States he had 3 episodes of dry heaving this morning.  Denies abdominal pain or changes in bowels.  States "just feeling strange." Wife at bedside who states he continuously for got things that was already told to him.  For example, she gave him his shoes and he consistently asked where his shoes or though right in front of him.  States this has happened once before last year but did not come come to ED or follow up outpatient and resolved itself.  Denies chest pain, shortness of breath, or any other complaints at this time.  Does not take lactulose.    The history is provided by the patient, medical records and the spouse.     Review of patient's allergies indicates:  No Known Allergies  Past Medical History:   Diagnosis Date    Anxiety     BMI 38.0-38.9,adult     Hyperlipidemia     Hypervolemia 07/22/2022    Multiple pulmonary nodules     Obesity     Other cirrhosis of liver 01/13/2021    Portal hypertension 02/11/2021    Rectal bleeding 02/11/2021    Sarcoidosis of lung with sarcoidosis of lymph nodes     Sleep apnea     CPAP USED    Splenomegaly 12/22/2020    Thrombocytopenia 12/22/2020     Past Surgical History:   Procedure Laterality Date    ANTERIOR CRUCIATE LIGAMENT REPAIR  2007    right knee    COLONOSCOPY N/A 4/8/2021    Procedure: COLONOSCOPY;  Surgeon: Jeff Olvera MD;  Location: [facility] ([address] [place] [address]);  Service: Endoscopy;  Laterality: N/A;  rectal bleeding,   2nd floor BMI 50 (354 lbs)  COVID test on 4/5/21 at [facility]    ESOPHAGOGASTRODUODENOSCOPY N/A 4/8/2021    " Procedure: EGD (ESOPHAGOGASTRODUODENOSCOPY);  Surgeon: Jeff Olvera MD;  [facility]  Service: Endoscopy;  Laterality: N/A;  variceal screening/ labs the am of procedure  2nd floor BMI 50 (354 lbs)    ESOPHAGOGASTRODUODENOSCOPY N/A 3/31/2022    Procedure: EGD (ESOPHAGOGASTRODUODENOSCOPY);  Surgeon: Jeff Olvera MD;  [facility]  Service: Endoscopy;  Laterality: N/A;  BMI-52    Wt:375#      cirrhosis, variceal screening-labs done on 3/29-GT  vaccinated-GT    ESOPHAGOGASTRODUODENOSCOPY N/A 4/21/2023    Procedure: EGD (ESOPHAGOGASTRODUODENOSCOPY);  Surgeon: Christopher Britton MD;  [facility]  Service: Endoscopy;  Laterality: N/A;  pt requested Friday due to work schedule  ECHO PA 44-51  BMI 47.97 Wt 343 lbs  inst portal-RB  last CBC PT/INR 1/24/23 4/17/23- Precall confirmed.    LYMPHADENECTOMY  1985    MENISCECTOMY      left knee    NASAL SEPTUM SURGERY  1985    TOTAL KNEE ARTHROPLASTY Right 11/7/2023    Procedure: ARTHROPLASTY, KNEE, TOTAL;  Surgeon: Darnell Liao MD;  Location: [facility];  Service: Orthopedics;  Laterality: Right;    WISDOM TOOTH EXTRACTION       Family History   Problem Relation Age of Onset    Hypertension Father     Heart attack Father 55    Diabetes Paternal Grandmother     Aneurysm Mother         eye    Dementia Mother     Colon cancer Neg Hx     Prostate cancer Neg Hx     Sarcoidosis Neg Hx      Social History     Tobacco Use    Smoking status: Never     Passive exposure: Never    Smokeless tobacco: Never   Substance Use Topics    Alcohol use: No    Drug use: No     Review of Systems   Constitutional:  Negative for fever.   HENT:  Negative for sore throat.    Eyes:  Positive for visual disturbance.   Respiratory:  Negative for shortness of breath.    Cardiovascular:  Negative for chest pain.   Gastrointestinal:  Positive for nausea and vomiting. Negative for abdominal pain.   Genitourinary:  Negative for dysuria.    Musculoskeletal:  Negative for back pain.   Skin:  Negative for rash.   Neurological:  Negative for weakness.   Hematological:  Does not bruise/bleed easily.   Psychiatric/Behavioral:  Positive for confusion.        Physical Exam     Initial Vitals [01/09/24 1452]   BP Pulse Resp Temp SpO2   (!) 143/69 91 18 98.4 °F (36.9 °C) 98 %      MAP       --         Physical Exam    Vitals reviewed.  Constitutional: He appears well-developed and well-nourished. He is not diaphoretic. No distress.   HENT:   Head: Normocephalic and atraumatic.   Speech slow but no dysarthria   Cardiovascular:  Normal rate, regular rhythm and normal heart sounds.     Exam reveals no gallop and no friction rub.       No murmur heard.  Pulmonary/Chest: Breath sounds normal. No respiratory distress. He has no wheezes. He has no rhonchi. He has no rales.   Abdominal: Abdomen is soft and protuberant. He exhibits no distension. There is no abdominal tenderness.   Small area of ecchymosis to LLQ There is no rebound and no guarding.   Musculoskeletal:      Right lower leg: Edema present.      Left lower leg: Edema present.     Neurological: He is alert. He is disoriented. He displays no tremor.   Alert to self and place but not time   Psychiatric: He has a normal mood and affect.         ED Course   Procedures  Labs Reviewed   CBC W/ AUTO DIFFERENTIAL - Abnormal; Notable for the following components:       Result Value    WBC 2.83 (*)     RBC 4.13 (*)     Hemoglobin 12.0 (*)     Hematocrit 38.3 (*)     MCHC 31.3 (*)     RDW 15.9 (*)     Platelets 105 (*)     Gran # (ANC) 1.6 (*)     Lymph # 0.6 (*)     Mono % 16.6 (*)     All other components within normal limits    Narrative:     Release to patient->Immediate   COMPREHENSIVE METABOLIC PANEL - Abnormal; Notable for the following components:    CO2 22 (*)     Calcium 8.4 (*)     Albumin 2.5 (*)     Total Bilirubin 2.8 (*)     Anion Gap 7 (*)     All other components within normal limits    Narrative:      Release to patient->Immediate   AMMONIA - Abnormal; Notable for the following components:    Ammonia 74 (*)     All other components within normal limits    Narrative:     Release to patient->Immediate   URINALYSIS, REFLEX TO URINE CULTURE - Abnormal; Notable for the following components:    Protein, UA Trace (*)     All other components within normal limits    Narrative:     Specimen Source->Urine   PROTIME-INR    Narrative:     Release to patient->Immediate   MAGNESIUM    Narrative:     Release to patient->Immediate   LACTIC ACID, PLASMA    Narrative:     Release to patient->Immediate   TROPONIN I    Narrative:     Release to patient->Immediate   B-TYPE NATRIURETIC PEPTIDE    Narrative:     Release to patient->Immediate   HIV 1 / 2 ANTIBODY    Narrative:     Release to patient->Immediate   HEPATITIS C ANTIBODY    Narrative:     Release to patient->Immediate          Imaging Results              CT Head Without Contrast (Final result)  Result time 01/09/24 18:38:08      Final result by Andrea Wheeler MD (01/09/24 18:38:08)                   Impression:      1. No acute intracranial process.  2. Prominent paranasal sinus disease with air-fluid levels.  Acute sinusitis is a consideration.      Electronically signed by: Andrea Wheeler  Date:    01/09/2024  Time:    18:38               Narrative:    EXAMINATION:  CT HEAD WITHOUT CONTRAST    CLINICAL HISTORY:  Mental status change, unknown cause;    TECHNIQUE:  Low dose axial CT images obtained throughout the head without intravenous contrast. Sagittal and coronal reconstructions were performed.    COMPARISON:  None.    FINDINGS:  Intracranial compartment:    Ventricles and sulci are normal in size for age without evidence of hydrocephalus. No extra-axial blood or fluid collections.    The brain parenchyma appears normal. No parenchymal mass, hemorrhage, edema or major vascular distribution infarct.    Skull/extracranial contents (limited evaluation): No fracture.  Severe bilateral ethmoid sinus disease, frontal sinus disease and right maxillary sinus disease.  Sphenoid and left maxillary sinus disease.  Air-fluid level in the right maxillary sinus.                                       Medications   lactulose 20 gram/30 mL solution Soln 10 g (10 g Oral Given 1/9/24 1735)     Medical Decision Making  Amount and/or Complexity of Data Reviewed  Labs:  Decision-making details documented in ED Course.  Radiology:  Decision-making details documented in ED Course.    Risk  Prescription drug management.  Decision regarding hospitalization.         APC / Resident Notes:   58-year-old male with history of diastolic HF, sarcoidosis, pulmonary hypertension, and cirrhosis presents to the ED regarding multiple complaints including forgetfulness, confusion, and bilateral hand shaking onset last night.  Mildly disoriented though in no acute distress.  Abdomen is soft, nontender. Workup initiated by triage.  Suspect symptoms related to cirrhosis/hyperammonemia, will give lactulose and plan for admission.    My differential diagnoses include but are not limited to:   Hepatic encephalopathy, brain mass, ICH, electrolyte abnormality, MATIAS    See ED course.  I have reviewed the patient's records and discussed with my supervising physician.        ED Course as of 01/09/24 2027 Tue Jan 09, 2024   1701 Ammonia(!): 74 [AC]   1701 WBC(!): 2.83  Leukopenic [AC]   1721 Lactate, Amrit: 1.3 [KB]   1721 Troponin I: 0.009 [KB]   1721 BNP: 58 [KB]   1721 Comprehensive metabolic panel(!)  No electrolyte or metabolic derangement.  Hyperbilirubinemia appears chronic [KB]   1834 NITRITE UA: Negative  No UTI [KB]   1841 CT Head Without Contrast  Impression:     1. No acute intracranial process.  2. Prominent paranasal sinus disease with air-fluid levels.  Acute sinusitis is a consideration. [KB]   1915 Patient and wife educated on findings and plan for admission with Hospital Medicine.  They are comfortable with  admission [KB]      ED Course User Index  [AC] Zafar Stacy DO  [KB] Madhuri Figueredo PA-C                           Clinical Impression:  Final diagnoses:  [I27.20] Pulmonary hypertension  [E72.20] Hyperammonemia (Primary)  [R41.0] Confusion          ED Disposition Condition    Admit Stable                Madhuri Figueredo PA-C  01/09/24 2027

## 2024-01-09 NOTE — Clinical Note
Diagnosis: Hyperammonemia [839834]   Future Attending Provider: MAURO PELAEZ [[id number]]   Admitting Provider:: MAURO PELAEZ [[id number]]   Reason for IP Medical Treatment  (Clinical interventions that can only be accomplished in the IP setting? ) :: AMS   I certify that Inpatient services for greater than or equal to 2 midnights are medically necessary:: Yes   Plans for Post-Acute care--if anticipated (pick the single best option):: A. No post acute care anticipated at this time

## 2024-01-09 NOTE — FIRST PROVIDER EVALUATION
Medical screening examination initiated.  I have conducted a focused provider triage encounter, findings are as follows:    Brief history of present illness:  59 yo M presents w/ 'feeling strange'. Having some memory deficits and tremor. H/o cirrhosis, sarcoidosis, pulmonary HTN    There were no vitals filed for this visit.    Pertinent physical exam:  slow but clear speech w/o dysarthria    Brief workup plan:  cbc, cmp, urinalysis, ammonia, ct head    Preliminary workup initiated; this workup will be continued and followed by the physician or advanced practice provider that is assigned to the patient when roomed.

## 2024-01-09 NOTE — ED TRIAGE NOTES
Ashwin Peter, a 58 y.o. male presents to the ED w/ complaint of forgetfulness and hand tremors. Pt states he was recently seen at  about a week ago and was given meds to treat bronchitis      Triage note:  Chief Complaint   Patient presents with    Multiple complaints     Feeling weird, hx cirrhosis, forgetful,      Review of patient's allergies indicates:  No Known Allergies  Past Medical History:   Diagnosis Date    Anxiety     BMI 38.0-38.9,adult     Hyperlipidemia     Multiple pulmonary nodules     Obesity     Other cirrhosis of liver 01/13/2021    Portal hypertension 02/11/2021    Sarcoidosis of lung with sarcoidosis of lymph nodes     Sleep apnea     CPAP USED    Splenomegaly 12/22/2020    Thrombocytopenia 12/22/2020

## 2024-01-10 ENCOUNTER — ANESTHESIA EVENT (OUTPATIENT)
Dept: ENDOSCOPY | Facility: HOSPITAL | Age: 59
End: 2024-01-10
Payer: OTHER GOVERNMENT

## 2024-01-10 ENCOUNTER — TELEPHONE (OUTPATIENT)
Dept: HEPATOLOGY | Facility: CLINIC | Age: 59
End: 2024-01-10
Payer: OTHER GOVERNMENT

## 2024-01-10 ENCOUNTER — ANESTHESIA (OUTPATIENT)
Dept: ENDOSCOPY | Facility: HOSPITAL | Age: 59
End: 2024-01-10
Payer: OTHER GOVERNMENT

## 2024-01-10 VITALS
HEART RATE: 86 BPM | RESPIRATION RATE: 18 BRPM | BODY MASS INDEX: 40.4 KG/M2 | TEMPERATURE: 98 F | OXYGEN SATURATION: 97 % | WEIGHT: 288.56 LBS | HEIGHT: 71 IN | SYSTOLIC BLOOD PRESSURE: 138 MMHG | DIASTOLIC BLOOD PRESSURE: 59 MMHG

## 2024-01-10 PROBLEM — R11.2 NAUSEA & VOMITING: Status: ACTIVE | Noted: 2024-01-10

## 2024-01-10 LAB
ABO + RH BLD: NORMAL
ALBUMIN SERPL BCP-MCNC: 2.3 G/DL (ref 3.5–5.2)
ALP SERPL-CCNC: 116 U/L (ref 55–135)
ALT SERPL W/O P-5'-P-CCNC: 17 U/L (ref 10–44)
ANION GAP SERPL CALC-SCNC: 5 MMOL/L (ref 8–16)
APTT PPP: 31.6 SEC (ref 21–32)
AST SERPL-CCNC: 33 U/L (ref 10–40)
BASOPHILS # BLD AUTO: 0.02 K/UL (ref 0–0.2)
BASOPHILS NFR BLD: 0.7 % (ref 0–1.9)
BILIRUB SERPL-MCNC: 2.8 MG/DL (ref 0.1–1)
BLD GP AB SCN CELLS X3 SERPL QL: NORMAL
BUN SERPL-MCNC: 17 MG/DL (ref 6–20)
CALCIUM SERPL-MCNC: 8.3 MG/DL (ref 8.7–10.5)
CHLORIDE SERPL-SCNC: 113 MMOL/L (ref 95–110)
CO2 SERPL-SCNC: 21 MMOL/L (ref 23–29)
CREAT SERPL-MCNC: 0.9 MG/DL (ref 0.5–1.4)
DIFFERENTIAL METHOD BLD: ABNORMAL
EOSINOPHIL # BLD AUTO: 0.1 K/UL (ref 0–0.5)
EOSINOPHIL NFR BLD: 3.6 % (ref 0–8)
ERYTHROCYTE [DISTWIDTH] IN BLOOD BY AUTOMATED COUNT: 15.8 % (ref 11.5–14.5)
ERYTHROCYTE [DISTWIDTH] IN BLOOD BY AUTOMATED COUNT: 15.8 % (ref 11.5–14.5)
EST. GFR  (NO RACE VARIABLE): >60 ML/MIN/1.73 M^2
GLUCOSE SERPL-MCNC: 82 MG/DL (ref 70–110)
HCT VFR BLD AUTO: 36.6 % (ref 40–54)
HCT VFR BLD AUTO: 39.1 % (ref 40–54)
HGB BLD-MCNC: 11.5 G/DL (ref 14–18)
HGB BLD-MCNC: 12.4 G/DL (ref 14–18)
IMM GRANULOCYTES # BLD AUTO: 0.01 K/UL (ref 0–0.04)
IMM GRANULOCYTES NFR BLD AUTO: 0.4 % (ref 0–0.5)
INR PPP: 1.2 (ref 0.8–1.2)
LACTATE SERPL-SCNC: 1 MMOL/L (ref 0.5–2.2)
LYMPHOCYTES # BLD AUTO: 0.6 K/UL (ref 1–4.8)
LYMPHOCYTES NFR BLD: 22.3 % (ref 18–48)
MAGNESIUM SERPL-MCNC: 1.9 MG/DL (ref 1.6–2.6)
MCH RBC QN AUTO: 29.1 PG (ref 27–31)
MCH RBC QN AUTO: 29.2 PG (ref 27–31)
MCHC RBC AUTO-ENTMCNC: 31.4 G/DL (ref 32–36)
MCHC RBC AUTO-ENTMCNC: 31.7 G/DL (ref 32–36)
MCV RBC AUTO: 92 FL (ref 82–98)
MCV RBC AUTO: 93 FL (ref 82–98)
MONOCYTES # BLD AUTO: 0.6 K/UL (ref 0.3–1)
MONOCYTES NFR BLD: 19.8 % (ref 4–15)
NEUTROPHILS # BLD AUTO: 1.5 K/UL (ref 1.8–7.7)
NEUTROPHILS NFR BLD: 53.2 % (ref 38–73)
NRBC BLD-RTO: 0 /100 WBC
PHOSPHATE SERPL-MCNC: 2.6 MG/DL (ref 2.7–4.5)
PLATELET # BLD AUTO: 102 K/UL (ref 150–450)
PLATELET # BLD AUTO: 99 K/UL (ref 150–450)
PMV BLD AUTO: 13.2 FL (ref 9.2–12.9)
PMV BLD AUTO: ABNORMAL FL (ref 9.2–12.9)
POCT GLUCOSE: 79 MG/DL (ref 70–110)
POTASSIUM SERPL-SCNC: 4.1 MMOL/L (ref 3.5–5.1)
PROCALCITONIN SERPL IA-MCNC: 0.05 NG/ML
PROT SERPL-MCNC: 6 G/DL (ref 6–8.4)
PROTHROMBIN TIME: 13 SEC (ref 9–12.5)
RBC # BLD AUTO: 3.95 M/UL (ref 4.6–6.2)
RBC # BLD AUTO: 4.24 M/UL (ref 4.6–6.2)
SODIUM SERPL-SCNC: 139 MMOL/L (ref 136–145)
SPECIMEN OUTDATE: NORMAL
WBC # BLD AUTO: 2.78 K/UL (ref 3.9–12.7)
WBC # BLD AUTO: 2.98 K/UL (ref 3.9–12.7)

## 2024-01-10 PROCEDURE — 63600175 PHARM REV CODE 636 W HCPCS: Performed by: NURSE ANESTHETIST, CERTIFIED REGISTERED

## 2024-01-10 PROCEDURE — 94761 N-INVAS EAR/PLS OXIMETRY MLT: CPT

## 2024-01-10 PROCEDURE — D9220A PRA ANESTHESIA: Mod: ANES,,, | Performed by: SURGERY

## 2024-01-10 PROCEDURE — G0378 HOSPITAL OBSERVATION PER HR: HCPCS

## 2024-01-10 PROCEDURE — 84145 PROCALCITONIN (PCT): CPT | Performed by: HOSPITALIST

## 2024-01-10 PROCEDURE — D9220A PRA ANESTHESIA: Mod: CRNA,,, | Performed by: NURSE ANESTHETIST, CERTIFIED REGISTERED

## 2024-01-10 PROCEDURE — 80053 COMPREHEN METABOLIC PANEL: CPT | Performed by: HOSPITALIST

## 2024-01-10 PROCEDURE — 99215 OFFICE O/P EST HI 40 MIN: CPT | Mod: ,,, | Performed by: STUDENT IN AN ORGANIZED HEALTH CARE EDUCATION/TRAINING PROGRAM

## 2024-01-10 PROCEDURE — 84100 ASSAY OF PHOSPHORUS: CPT | Performed by: HOSPITALIST

## 2024-01-10 PROCEDURE — 43235 EGD DIAGNOSTIC BRUSH WASH: CPT | Mod: ,,, | Performed by: INTERNAL MEDICINE

## 2024-01-10 PROCEDURE — 25000003 PHARM REV CODE 250: Performed by: HOSPITALIST

## 2024-01-10 PROCEDURE — 37000009 HC ANESTHESIA EA ADD 15 MINS: Performed by: INTERNAL MEDICINE

## 2024-01-10 PROCEDURE — 85027 COMPLETE CBC AUTOMATED: CPT | Performed by: HOSPITALIST

## 2024-01-10 PROCEDURE — 63600175 PHARM REV CODE 636 W HCPCS: Performed by: STUDENT IN AN ORGANIZED HEALTH CARE EDUCATION/TRAINING PROGRAM

## 2024-01-10 PROCEDURE — 85730 THROMBOPLASTIN TIME PARTIAL: CPT | Performed by: HOSPITALIST

## 2024-01-10 PROCEDURE — 36415 COLL VENOUS BLD VENIPUNCTURE: CPT | Performed by: STUDENT IN AN ORGANIZED HEALTH CARE EDUCATION/TRAINING PROGRAM

## 2024-01-10 PROCEDURE — 63600175 PHARM REV CODE 636 W HCPCS: Performed by: HOSPITALIST

## 2024-01-10 PROCEDURE — 83735 ASSAY OF MAGNESIUM: CPT | Performed by: HOSPITALIST

## 2024-01-10 PROCEDURE — 37000008 HC ANESTHESIA 1ST 15 MINUTES: Performed by: INTERNAL MEDICINE

## 2024-01-10 PROCEDURE — 99900035 HC TECH TIME PER 15 MIN (STAT)

## 2024-01-10 PROCEDURE — 87040 BLOOD CULTURE FOR BACTERIA: CPT | Performed by: STUDENT IN AN ORGANIZED HEALTH CARE EDUCATION/TRAINING PROGRAM

## 2024-01-10 PROCEDURE — 83605 ASSAY OF LACTIC ACID: CPT | Performed by: HOSPITALIST

## 2024-01-10 PROCEDURE — 86901 BLOOD TYPING SEROLOGIC RH(D): CPT | Performed by: HOSPITALIST

## 2024-01-10 PROCEDURE — 25000003 PHARM REV CODE 250: Performed by: NURSE ANESTHETIST, CERTIFIED REGISTERED

## 2024-01-10 PROCEDURE — C9113 INJ PANTOPRAZOLE SODIUM, VIA: HCPCS | Performed by: HOSPITALIST

## 2024-01-10 PROCEDURE — 43235 EGD DIAGNOSTIC BRUSH WASH: CPT | Performed by: INTERNAL MEDICINE

## 2024-01-10 PROCEDURE — 25000003 PHARM REV CODE 250: Performed by: STUDENT IN AN ORGANIZED HEALTH CARE EDUCATION/TRAINING PROGRAM

## 2024-01-10 PROCEDURE — 99223 1ST HOSP IP/OBS HIGH 75: CPT | Mod: 25,,, | Performed by: INTERNAL MEDICINE

## 2024-01-10 PROCEDURE — 27000190 HC CPAP FULL FACE MASK W/VALVE

## 2024-01-10 PROCEDURE — 85025 COMPLETE CBC W/AUTO DIFF WBC: CPT | Performed by: HOSPITALIST

## 2024-01-10 PROCEDURE — 27100171 HC OXYGEN HIGH FLOW UP TO 24 HOURS

## 2024-01-10 PROCEDURE — 94660 CPAP INITIATION&MGMT: CPT

## 2024-01-10 PROCEDURE — 85610 PROTHROMBIN TIME: CPT | Performed by: HOSPITALIST

## 2024-01-10 PROCEDURE — 36415 COLL VENOUS BLD VENIPUNCTURE: CPT | Mod: XB | Performed by: HOSPITALIST

## 2024-01-10 RX ORDER — PREDNISONE 5 MG/1
5 TABLET ORAL DAILY
Status: DISCONTINUED | OUTPATIENT
Start: 2024-01-10 | End: 2024-01-10 | Stop reason: HOSPADM

## 2024-01-10 RX ORDER — TORSEMIDE 20 MG/1
20 TABLET ORAL
Status: DISCONTINUED | OUTPATIENT
Start: 2024-01-10 | End: 2024-01-10 | Stop reason: HOSPADM

## 2024-01-10 RX ORDER — LACTULOSE 10 G/15ML
15 SOLUTION ORAL; RECTAL 3 TIMES DAILY
Qty: 2070 ML | Refills: 0 | Status: SHIPPED | OUTPATIENT
Start: 2024-01-10 | End: 2024-02-09

## 2024-01-10 RX ORDER — TORSEMIDE 20 MG/1
40 TABLET ORAL DAILY
Status: DISCONTINUED | OUTPATIENT
Start: 2024-01-10 | End: 2024-01-10 | Stop reason: HOSPADM

## 2024-01-10 RX ORDER — CETIRIZINE HYDROCHLORIDE 10 MG/1
10 TABLET ORAL DAILY
Status: DISCONTINUED | OUTPATIENT
Start: 2024-01-10 | End: 2024-01-10 | Stop reason: HOSPADM

## 2024-01-10 RX ORDER — PANTOPRAZOLE SODIUM 40 MG/10ML
80 INJECTION, POWDER, LYOPHILIZED, FOR SOLUTION INTRAVENOUS ONCE
Status: COMPLETED | OUTPATIENT
Start: 2024-01-10 | End: 2024-01-10

## 2024-01-10 RX ORDER — LIDOCAINE HYDROCHLORIDE 20 MG/ML
INJECTION INTRAVENOUS
Status: DISCONTINUED | OUTPATIENT
Start: 2024-01-10 | End: 2024-01-10

## 2024-01-10 RX ORDER — PREDNISONE 5 MG/1
5 TABLET ORAL DAILY
Qty: 30 TABLET | Refills: 0 | Status: SHIPPED | OUTPATIENT
Start: 2024-01-10 | End: 2024-01-24 | Stop reason: SDUPTHER

## 2024-01-10 RX ORDER — PSEUDOEPHEDRINE HCL 120 MG/1
120 TABLET, FILM COATED, EXTENDED RELEASE ORAL 2 TIMES DAILY
Status: DISCONTINUED | OUTPATIENT
Start: 2024-01-10 | End: 2024-01-10 | Stop reason: HOSPADM

## 2024-01-10 RX ORDER — SODIUM CHLORIDE 0.9 % (FLUSH) 0.9 %
10 SYRINGE (ML) INJECTION
Status: DISCONTINUED | OUTPATIENT
Start: 2024-01-10 | End: 2024-01-10

## 2024-01-10 RX ORDER — TORSEMIDE 20 MG/1
60 TABLET ORAL
Qty: 90 TABLET | Refills: 0 | Status: SHIPPED | OUTPATIENT
Start: 2024-01-10 | End: 2024-02-09

## 2024-01-10 RX ORDER — OCTREOTIDE ACETATE 100 UG/ML
50 INJECTION, SOLUTION INTRAVENOUS; SUBCUTANEOUS ONCE
Status: DISCONTINUED | OUTPATIENT
Start: 2024-01-10 | End: 2024-01-10 | Stop reason: HOSPADM

## 2024-01-10 RX ORDER — SODIUM CHLORIDE 9 MG/ML
INJECTION, SOLUTION INTRAVENOUS CONTINUOUS
Status: DISCONTINUED | OUTPATIENT
Start: 2024-01-10 | End: 2024-01-10 | Stop reason: HOSPADM

## 2024-01-10 RX ORDER — CIPROFLOXACIN 500 MG/1
500 TABLET ORAL 2 TIMES DAILY
Qty: 16 TABLET | Refills: 0 | Status: SHIPPED | OUTPATIENT
Start: 2024-01-10 | End: 2024-01-18

## 2024-01-10 RX ORDER — PANTOPRAZOLE SODIUM 40 MG/10ML
40 INJECTION, POWDER, LYOPHILIZED, FOR SOLUTION INTRAVENOUS 2 TIMES DAILY
Status: DISCONTINUED | OUTPATIENT
Start: 2024-01-10 | End: 2024-01-10 | Stop reason: HOSPADM

## 2024-01-10 RX ORDER — PROPOFOL 10 MG/ML
VIAL (ML) INTRAVENOUS
Status: DISCONTINUED | OUTPATIENT
Start: 2024-01-10 | End: 2024-01-10

## 2024-01-10 RX ORDER — FLUTICASONE PROPIONATE 50 MCG
2 SPRAY, SUSPENSION (ML) NASAL DAILY PRN
Status: DISCONTINUED | OUTPATIENT
Start: 2024-01-10 | End: 2024-01-10 | Stop reason: HOSPADM

## 2024-01-10 RX ORDER — OCTREOTIDE ACETATE 100 UG/ML
50 INJECTION, SOLUTION INTRAVENOUS; SUBCUTANEOUS DAILY
Status: DISCONTINUED | OUTPATIENT
Start: 2024-01-10 | End: 2024-01-10

## 2024-01-10 RX ORDER — PREDNISONE 5 MG/1
5 TABLET ORAL DAILY
Status: ON HOLD | COMMUNITY
End: 2024-01-10

## 2024-01-10 RX ADMIN — ONDANSETRON 4 MG: 2 INJECTION INTRAMUSCULAR; INTRAVENOUS at 12:01

## 2024-01-10 RX ADMIN — PANTOPRAZOLE SODIUM 40 MG: 40 INJECTION, POWDER, FOR SOLUTION INTRAVENOUS at 08:01

## 2024-01-10 RX ADMIN — CETIRIZINE HYDROCHLORIDE 10 MG: 10 TABLET, FILM COATED ORAL at 08:01

## 2024-01-10 RX ADMIN — LACTULOSE 20 G: 20 SOLUTION ORAL at 05:01

## 2024-01-10 RX ADMIN — PSEUDOEPHEDRINE HYDROCHLORIDE 120 MG: 120 TABLET, FILM COATED ORAL at 08:01

## 2024-01-10 RX ADMIN — TORSEMIDE 40 MG: 20 TABLET ORAL at 08:01

## 2024-01-10 RX ADMIN — PANTOPRAZOLE SODIUM 80 MG: 40 INJECTION, POWDER, FOR SOLUTION INTRAVENOUS at 12:01

## 2024-01-10 RX ADMIN — PROPOFOL 80 MG: 10 INJECTION, EMULSION INTRAVENOUS at 11:01

## 2024-01-10 RX ADMIN — CEFTRIAXONE 2 G: 2 INJECTION, POWDER, FOR SOLUTION INTRAMUSCULAR; INTRAVENOUS at 09:01

## 2024-01-10 RX ADMIN — PREDNISONE 5 MG: 5 TABLET ORAL at 08:01

## 2024-01-10 RX ADMIN — LIDOCAINE HYDROCHLORIDE 100 MG: 20 INJECTION INTRAVENOUS at 11:01

## 2024-01-10 RX ADMIN — SODIUM CHLORIDE: 9 INJECTION, SOLUTION INTRAVENOUS at 10:01

## 2024-01-10 NOTE — SUBJECTIVE & OBJECTIVE
Past Medical History:   Diagnosis Date    Anxiety     BMI 38.0-38.9,adult     Hyperlipidemia     Hypervolemia 07/22/2022    Multiple pulmonary nodules     Obesity     Other cirrhosis of liver 01/13/2021    Portal hypertension 02/11/2021    Rectal bleeding 02/11/2021    Sarcoidosis of lung with sarcoidosis of lymph nodes     Sleep apnea     CPAP USED    Splenomegaly 12/22/2020    Thrombocytopenia 12/22/2020       Past Surgical History:   Procedure Laterality Date    ANTERIOR CRUCIATE LIGAMENT REPAIR  2007    right knee    COLONOSCOPY N/A 4/8/2021    Procedure: COLONOSCOPY;  Surgeon: Jeff Olvera MD;  [facility]  Service: Endoscopy;  Laterality: N/A;  rectal bleeding,   2nd floor BMI 50 (354 lbs)  COVID test on 4/5/21 at [facility]    ESOPHAGOGASTRODUODENOSCOPY N/A 4/8/2021    Procedure: EGD (ESOPHAGOGASTRODUODENOSCOPY);  Surgeon: Jeff Olvera MD;  [facility]  Service: Endoscopy;  Laterality: N/A;  variceal screening/ labs the am of procedure  2nd floor BMI 50 (354 lbs)    ESOPHAGOGASTRODUODENOSCOPY N/A 3/31/2022    Procedure: EGD (ESOPHAGOGASTRODUODENOSCOPY);  Surgeon: Jeff Olvera MD;  [facility]  Service: Endoscopy;  Laterality: N/A;  BMI-52    Wt:375#      cirrhosis, variceal screening-labs done on 3/29-GT  vaccinated-GT    ESOPHAGOGASTRODUODENOSCOPY N/A 4/21/2023    Procedure: EGD (ESOPHAGOGASTRODUODENOSCOPY);  Surgeon: Christopher Britton MD;  Location: [facility] ([address] [place] [address]);  Service: Endoscopy;  Laterality: N/A;  pt requested Friday due to work schedule  ECHO PA 44-51  BMI 47.97 Wt 343 lbs  inst portal-RB  last CBC PT/INR 1/24/23 4/17/23- Precall confirmed.    LYMPHADENECTOMY  1985    MENISCECTOMY      left knee    NASAL SEPTUM SURGERY  1985    TOTAL KNEE ARTHROPLASTY Right 11/7/2023    Procedure: ARTHROPLASTY, KNEE, TOTAL;  Surgeon: Darnell Liao MD;  Location: [facility];  Service: Orthopedics;  Laterality: Right;    WISDOM TOOTH  EXTRACTION         Review of patient's allergies indicates:  No Known Allergies    No current facility-administered medications on file prior to encounter.     Current Outpatient Medications on File Prior to Encounter   Medication Sig    aspirin 81 MG Chew Take 1 tablet (81 mg total) by mouth 2 (two) times daily.    benzonatate (TESSALON) 100 MG capsule Take 100 mg by mouth 3 (three) times daily as needed for Cough.    fluticasone propionate (FLONASE) 50 mcg/actuation nasal spray 2 sprays by Each Nostril route daily as needed for Rhinitis or Allergies.    LORATADINE-D  mg per 24 hr tablet Take 1 tablet by mouth once daily.    omeprazole (PRILOSEC) 40 MG capsule Take 40 mg by mouth daily as needed (acid reflux).    ondansetron (ZOFRAN-ODT) 8 MG TbDL Take 8 mg by mouth every 8 (eight) hours as needed (nausea).    potassium chloride (KLOR-CON) 10 MEQ TbSR TAKE 1 TABLET(10 MEQ) BY MOUTH EVERY DAY    predniSONE (DELTASONE) 5 MG tablet Take 5 mg by mouth once daily.    semaglutide (OZEMPIC) 2 mg/dose (8 mg/3 mL) PnIj Inject 2 mg into the skin every 7 days. (Patient taking differently: Inject 2 mg into the skin every 7 days. On Friday.)    torsemide (DEMADEX) 20 MG Tab Take 2 tabs 40mg in the morning and 20 mg (1tab) in the afternoon.    diclofenac sodium (VOLTAREN) 1 % Gel Apply topically twice daily as needed for knee pain    [DISCONTINUED] pantoprazole (PROTONIX) 40 MG tablet Take 1 tablet (40 mg total) by mouth once daily. (Patient not taking: Reported on 6/15/2022)     Family History       Problem Relation (Age of Onset)    Aneurysm Mother    Dementia Mother    Diabetes Paternal Grandmother    Heart attack Father (55)    Hypertension Father          Tobacco Use    Smoking status: Never     Passive exposure: Never    Smokeless tobacco: Never   Substance and Sexual Activity    Alcohol use: No    Drug use: No    Sexual activity: Not on file     Review of Systems   Constitutional:  Positive for activity change.  Negative for appetite change, chills and fever.   HENT:  Negative for trouble swallowing.    Respiratory:  Negative for cough and shortness of breath.    Cardiovascular:  Negative for chest pain.   Gastrointestinal:  Negative for nausea and vomiting.   Skin:  Negative for rash.   Psychiatric/Behavioral:  Positive for confusion.      Objective:     Vital Signs (Most Recent):  Temp: 98.3 °F (36.8 °C) (01/10/24 0049)  Pulse: 80 (01/10/24 0049)  Resp: 14 (01/10/24 0049)  BP: 139/69 (01/10/24 0049)  SpO2: 100 % (01/10/24 0049) Vital Signs (24h Range):  Temp:  [98.3 °F (36.8 °C)-98.6 °F (37 °C)] 98.3 °F (36.8 °C)  Pulse:  [80-91] 80  Resp:  [10-22] 14  SpO2:  [94 %-100 %] 100 %  BP: (134-160)/(58-81) 139/69     Weight: 132.1 kg (291 lb 3.6 oz)  Body mass index is 40.62 kg/m².     Physical Exam  Vitals and nursing note reviewed.   Constitutional:       General: He is not in acute distress.     Appearance: He is obese.   HENT:      Head: Normocephalic and atraumatic.      Mouth/Throat:      Mouth: Mucous membranes are moist.   Eyes:      General: No scleral icterus.     Conjunctiva/sclera: Conjunctivae normal.      Pupils: Pupils are equal, round, and reactive to light.   Cardiovascular:      Rate and Rhythm: Normal rate and regular rhythm.      Heart sounds: Murmur heard.   Pulmonary:      Effort: Pulmonary effort is normal. No respiratory distress.      Breath sounds: No wheezing or rales.   Abdominal:      General: Abdomen is protuberant. There is no distension.      Palpations: Abdomen is soft.      Tenderness: There is no abdominal tenderness. There is no guarding.   Musculoskeletal:      Cervical back: Neck supple.      Right upper leg: Swelling present.      Left upper leg: Swelling present.      Right lower le+ Pitting Edema present.      Left lower leg: 3+ Pitting Edema present.   Neurological:      Mental Status: He is alert and oriented to person, place, and time.      Comments: Asterixis present    Psychiatric:         Attention and Perception: Attention normal.         Mood and Affect: Mood normal.         Behavior: Behavior normal.         Cognition and Memory: He exhibits impaired recent memory (Slower recall).              CRANIAL NERVES     CN III, IV, VI   Pupils are equal, round, and reactive to light.       Significant Labs: All pertinent labs within the past 24 hours have been reviewed.  CBC:   Recent Labs   Lab 01/09/24  1538   WBC 2.83*   HGB 12.0*   HCT 38.3*   *     CMP:   Recent Labs   Lab 01/09/24  1538      K 4.1      CO2 22*   GLU 84   BUN 15   CREATININE 0.7   CALCIUM 8.4*   PROT 6.5   ALBUMIN 2.5*   BILITOT 2.8*   ALKPHOS 125   AST 39   ALT 21   ANIONGAP 7*     Cardiac Markers:   Recent Labs   Lab 01/09/24  1538   BNP 58     Coagulation:   Recent Labs   Lab 01/09/24  1538   INR 1.2     Lactic Acid:   Recent Labs   Lab 01/09/24  1538   LACTATE 1.3     Magnesium:   Recent Labs   Lab 01/09/24  1538   MG 1.9     Troponin:   Recent Labs   Lab 01/09/24  1538   TROPONINI 0.009     Urine Studies:   Recent Labs   Lab 01/09/24  1801   COLORU Yellow   APPEARANCEUA Clear   PHUR 7.0   SPECGRAV 1.025   PROTEINUA Trace*   GLUCUA Negative   KETONESU Negative   BILIRUBINUA Negative   OCCULTUA Negative   NITRITE Negative   LEUKOCYTESUR Negative       Significant Imaging: I have reviewed all pertinent imaging results/findings within the past 24 hours.  CT HEAD WITHOUT CONTRAST     CLINICAL HISTORY:  Mental status change, unknown cause;     TECHNIQUE:  Low dose axial CT images obtained throughout the head without intravenous contrast. Sagittal and coronal reconstructions were performed.     COMPARISON:  None.     FINDINGS:  Intracranial compartment:     Ventricles and sulci are normal in size for age without evidence of hydrocephalus. No extra-axial blood or fluid collections.     The brain parenchyma appears normal. No parenchymal mass, hemorrhage, edema or major vascular distribution  infarct.     Skull/extracranial contents (limited evaluation): No fracture. Severe bilateral ethmoid sinus disease, frontal sinus disease and right maxillary sinus disease.  Sphenoid and left maxillary sinus disease.  Air-fluid level in the right maxillary sinus.     Impression:     1. No acute intracranial process.  2. Prominent paranasal sinus disease with air-fluid levels.  Acute sinusitis is a consideration.        Electronically signed by: Andrea Garvin  Date:                                            01/09/2024  Time:                                           18:38           Exam Ended: 01/09/24 18:15 CST

## 2024-01-10 NOTE — PROGRESS NOTES
Patient is Aox4. PIV removed. VSS. Discharge paperwork printed and discussed with him, No further questions. Pharmacy delivered medications to bedside. Wife arrived and they ambulated off the unit.

## 2024-01-10 NOTE — ASSESSMENT & PLAN NOTE
Out of home prednisone 5mg po daily x 1 week, was waiting for refill from [facility] clinic  -will restart prednisone 5mg po daily in AM

## 2024-01-10 NOTE — ASSESSMENT & PLAN NOTE
Presenting with confusion, tremors, forgetfulness. Ammonia elevated at 74 on admission. No longer having asterixis, memory improving, patient not confused and A&O x3. Patient had 2 BM's last night and this AM. Liver US showing Micronodular small liver, and multiple collateral vessels suggestive of increased portal venous pressure, no thrombus identified.    Recommendations:   - Continue Lactulose, titrate to have 3 BM's daily  - Recommend SBP prophylaxis for 5 day course, can transition to PO Ciprofloxacin upon DC  - Patient can be discharged today after EGD if no further workup needed from GI or primary team standpoint  - Agree with rest of care per GI/primary team

## 2024-01-10 NOTE — PLAN OF CARE
Attempted to complete CM discharge assessment, pt off the floor for procedure at this time.  Will continue to follow    Roscoe Montelongo RN CM  Case Management  [id number]

## 2024-01-10 NOTE — ASSESSMENT & PLAN NOTE
58-year-old man with history of GUAJARDO Cirrhosis, LAW (on CPAP), Sarcoidosis, Pulm HTN, Lymphedema presents with multiple neurologic complaints. On 1/2 he presented to urgent care and was treated for an upper respiratory infection. He reports post-tussive emesis on 1/2, 1/3, and 1/4 that he described as containing a small amount of a burgundy colored substance. He recently lost >100lbs prior to having knee replacement surgery in November, recently completed post-op dvt ppx course of baby ASA BID.  His EGDs since 2021 have continued to show recurrent non-bleeding duodenal ulcers. Recently evaluated for ZES but gastrin level was WNL.       Labs notable for Hgb 11.5 (baseline 12-13), platelets 102, INR 1.2, BUN 17, sCr 0.7, Albumin 2.3, Bilirubin 2.8, AST 33, ALT 17, and ammonia 74.     Our recommendations are as follows:    No current evidence for a current GI bleed, but given history of recurrent duodenal ulcers, esophageal varices and portal hypertensive gastropathy, plan for EGD today.  Keep patient NPO until after procedure  Give PROTONIX 80 mg once followed by 40 mg IV BID.   RBC transfusion as indicated if Hgb <7 g/dL.   Please correct any coagulopathy with platelets and FFP to a goal of platelets >50K and INR <2.0.   Discontinue all antiplatelet, anti-coagulation, and NSAID products.   Please promptly notify GI team if there is significant change in patient's clinical status

## 2024-01-10 NOTE — SUBJECTIVE & OBJECTIVE
Past Medical History:   Diagnosis Date    Anxiety     BMI 38.0-38.9,adult     Hyperlipidemia     Hypervolemia 07/22/2022    Multiple pulmonary nodules     Obesity     Other cirrhosis of liver 01/13/2021    Portal hypertension 02/11/2021    Rectal bleeding 02/11/2021    Sarcoidosis of lung with sarcoidosis of lymph nodes     Sleep apnea     CPAP USED    Splenomegaly 12/22/2020    Thrombocytopenia 12/22/2020       Past Surgical History:   Procedure Laterality Date    ANTERIOR CRUCIATE LIGAMENT REPAIR  2007    right knee    COLONOSCOPY N/A 4/8/2021    Procedure: COLONOSCOPY;  Surgeon: Jeff Olvera MD;  [facility]: [facility]  Service: Endoscopy;  Laterality: N/A;  rectal bleeding,   2nd floor BMI 50 (354 lbs)  COVID test on 4/5/21 at [facility]    ESOPHAGOGASTRODUODENOSCOPY N/A 4/8/2021    Procedure: EGD (ESOPHAGOGASTRODUODENOSCOPY);  Surgeon: Jeff Olvera MD;  [facility]: [facility]  Service: Endoscopy;  Laterality: N/A;  variceal screening/ labs the am of procedure  2nd floor BMI 50 (354 lbs)    ESOPHAGOGASTRODUODENOSCOPY N/A 3/31/2022    Procedure: EGD (ESOPHAGOGASTRODUODENOSCOPY);  Surgeon: Jeff Olvera MD;  Location: [facility] ([facility]  Service: Endoscopy;  Laterality: N/A;  BMI-52    Wt:375#      cirrhosis, variceal screening-labs done on 3/29-GT  vaccinated-GT    ESOPHAGOGASTRODUODENOSCOPY N/A 4/21/2023    Procedure: EGD (ESOPHAGOGASTRODUODENOSCOPY);  Surgeon: Christopher Britton MD;  Location: [facility] ([address] [place] [address]);  Service: Endoscopy;  Laterality: N/A;  pt requested Friday due to work schedule  ECHO PA 44-51  BMI 47.97 Wt 343 lbs  inst portal-RB  last CBC PT/INR 1/24/23 4/17/23- Precall confirmed.    LYMPHADENECTOMY  1985    MENISCECTOMY      left knee    NASAL SEPTUM SURGERY  1985    TOTAL KNEE ARTHROPLASTY Right 11/7/2023    Procedure: ARTHROPLASTY, KNEE, TOTAL;  Surgeon: Darnell Liao MD;  Location: [facility];  Service: Orthopedics;  Laterality: Right;    WISDOM TOOTH  EXTRACTION         Review of patient's allergies indicates:  No Known Allergies  Family History       Problem Relation (Age of Onset)    Aneurysm Mother    Dementia Mother    Diabetes Paternal Grandmother    Heart attack Father (55)    Hypertension Father          Tobacco Use    Smoking status: Never     Passive exposure: Never    Smokeless tobacco: Never   Substance and Sexual Activity    Alcohol use: No    Drug use: No    Sexual activity: Not on file     Review of Systems   Respiratory:  Positive for cough.    Gastrointestinal:  Positive for nausea and vomiting. Negative for abdominal distention, abdominal pain, blood in stool and constipation.   Psychiatric/Behavioral:  Positive for confusion (improving).      Objective:     Vital Signs (Most Recent):  Temp: 98 °F (36.7 °C) (01/10/24 0457)  Pulse: 82 (01/10/24 0457)  Resp: 18 (01/10/24 0457)  BP: 132/61 (01/10/24 0457)  SpO2: (!) 93 % (01/10/24 0457) Vital Signs (24h Range):  Temp:  [98 °F (36.7 °C)-98.6 °F (37 °C)] 98 °F (36.7 °C)  Pulse:  [80-91] 82  Resp:  [10-22] 18  SpO2:  [93 %-100 %] 93 %  BP: (132-160)/(58-81) 132/61     Weight: 130.9 kg (288 lb 9.3 oz) (01/10/24 0545)  Body mass index is 40.25 kg/m².      Intake/Output Summary (Last 24 hours) at 1/10/2024 0900  Last data filed at 1/9/2024 2300  Gross per 24 hour   Intake 250 ml   Output --   Net 250 ml       Lines/Drains/Airways       Peripheral Intravenous Line  Duration                  Peripheral IV - Single Lumen 01/09/24 1538 20 G Anterior;Distal;Right Upper Arm <1 day                     Physical Exam  Vitals and nursing note reviewed.   Constitutional:       General: He is not in acute distress.     Appearance: He is obese.   HENT:      Head: Normocephalic and atraumatic.      Mouth/Throat:      Mouth: Mucous membranes are moist.   Eyes:      General: No scleral icterus.  Cardiovascular:      Rate and Rhythm: Normal rate and regular rhythm.      Heart sounds: Murmur heard.   Pulmonary:       Effort: Pulmonary effort is normal. No respiratory distress.      Breath sounds: No wheezing or rales.   Abdominal:      General: Abdomen is protuberant. There is no distension.      Palpations: Abdomen is soft.      Tenderness: There is no abdominal tenderness. There is no guarding.   Musculoskeletal:      Cervical back: Neck supple.      Right upper leg: Swelling present.      Left upper leg: Swelling present.      Right lower le+ Pitting Edema present.      Left lower leg: 3+ Pitting Edema present.   Skin:     Comments: Small diffuse bruises  Multiple cherry hemangiomas on torso   Neurological:      Mental Status: He is alert and oriented to person, place, and time.      Comments: No asterixis present   Psychiatric:         Attention and Perception: Attention normal.         Mood and Affect: Mood normal.         Behavior: Behavior is slowed.          Significant Labs:  CBC:   Recent Labs   Lab 24  1538 01/10/24  0631   WBC 2.83* 2.78*   HGB 12.0* 11.5*   HCT 38.3* 36.6*   * 102*     CMP:   Recent Labs   Lab 01/10/24  0631   GLU 82   CALCIUM 8.3*   ALBUMIN 2.3*   PROT 6.0      K 4.1   CO2 21*   *   BUN 17   CREATININE 0.9   ALKPHOS 116   ALT 17   AST 33   BILITOT 2.8*     Coagulation:   Recent Labs   Lab 01/10/24  0631   INR 1.2   APTT 31.6       Significant Imaging:  Imaging results within the past 24 hours have been reviewed.

## 2024-01-10 NOTE — SUBJECTIVE & OBJECTIVE
Review of Systems   Constitutional:  Negative for activity change, appetite change, chills, fatigue and fever.   HENT:  Positive for sinus pressure and sore throat.    Eyes:  Negative for photophobia and visual disturbance.   Respiratory:  Positive for cough. Negative for chest tightness, shortness of breath and wheezing.    Cardiovascular:  Negative for chest pain.   Gastrointestinal:  Negative for abdominal distention, abdominal pain, blood in stool, constipation, diarrhea, nausea and vomiting.   Genitourinary:  Negative for difficulty urinating and dysuria.   Musculoskeletal:  Negative for gait problem.   Skin:  Negative for color change.   Neurological:  Negative for dizziness, weakness, light-headedness and headaches.   Psychiatric/Behavioral:  Positive for confusion (improving). Negative for agitation and behavioral problems.        Past Medical History:   Diagnosis Date    Anxiety     BMI 38.0-38.9,adult     Hyperlipidemia     Hypervolemia 07/22/2022    Multiple pulmonary nodules     Obesity     Other cirrhosis of liver 01/13/2021    Portal hypertension 02/11/2021    Rectal bleeding 02/11/2021    Sarcoidosis of lung with sarcoidosis of lymph nodes     Sleep apnea     CPAP USED    Splenomegaly 12/22/2020    Thrombocytopenia 12/22/2020       Past Surgical History:   Procedure Laterality Date    ANTERIOR CRUCIATE LIGAMENT REPAIR  2007    right knee    COLONOSCOPY N/A 4/8/2021    Procedure: COLONOSCOPY;  Surgeon: Jeff Olvera MD;  Location: [facility] ([address] [place] [address]);  Service: Endoscopy;  Laterality: N/A;  rectal bleeding,   2nd floor BMI 50 (354 lbs)  [facility] 4/5/[facility]    ESOPHAGOGASTRODUODENOSCOPY N/A 4/8/2021    Procedure: EGD (ESOPHAGOGASTRODUODENOSCOPY);  Surgeon: Jeff Olvera MD;  Location: [facility] ([address] [place] [address]);  Service: Endoscopy;  Laterality: N/A;  variceal screening/ labs the am of procedure  2nd floor BMI 50 (354 lbs)    ESOPHAGOGASTRODUODENOSCOPY N/A 3/31/2022    Procedure: EGD  (ESOPHAGOGASTRODUODENOSCOPY);  Surgeon: Jeff Olvera MD;  Location: [facility] (2ND FLR);  Service: Endoscopy;  Laterality: N/A;  BMI-52    Wt:375#      cirrhosis, variceal screening-labs done on 3/29-GT  vaccinated-GT    ESOPHAGOGASTRODUODENOSCOPY N/A 4/21/2023    Procedure: EGD (ESOPHAGOGASTRODUODENOSCOPY);  Surgeon: Christopher Britton MD;  Location: [facility] (2ND FLR);  Service: Endoscopy;  Laterality: N/A;  pt requested Friday due to work schedule  ECHO PA 44-51  BMI 47.97 Wt 343 lbs  inst portal-RB  last CBC PT/INR 1/24/23 4/17/23- Precall confirmed.    LYMPHADENECTOMY  1985    MENISCECTOMY      left knee    NASAL SEPTUM SURGERY  1985    TOTAL KNEE ARTHROPLASTY Right 11/7/2023    Procedure: ARTHROPLASTY, KNEE, TOTAL;  Surgeon: Darnell Liao MD;  Location: [facility];  Service: Orthopedics;  Laterality: Right;    WISDOM TOOTH EXTRACTION         Family history of liver disease: No    Review of patient's allergies indicates:  No Known Allergies      Tobacco Use    Smoking status: Never     Passive exposure: Never    Smokeless tobacco: Never   Substance and Sexual Activity    Alcohol use: No    Drug use: No    Sexual activity: Not on file       Medications Prior to Admission   Medication Sig Dispense Refill Last Dose    aspirin 81 MG Chew Take 1 tablet (81 mg total) by mouth 2 (two) times daily. 60 tablet 0 Past Month    benzonatate (TESSALON) 100 MG capsule Take 100 mg by mouth 3 (three) times daily as needed for Cough.   Past Week    fluticasone propionate (FLONASE) 50 mcg/actuation nasal spray 2 sprays by Each Nostril route daily as needed for Rhinitis or Allergies.   Past Week    LORATADINE-D  mg per 24 hr tablet Take 1 tablet by mouth once daily.   Past Week    omeprazole (PRILOSEC) 40 MG capsule Take 40 mg by mouth daily as needed (acid reflux).   Past Week    ondansetron (ZOFRAN-ODT) 8 MG TbDL Take 8 mg by mouth every 8 (eight) hours as needed (nausea).   Past Week    potassium chloride  (KLOR-CON) 10 MEQ TbSR TAKE 1 TABLET(10 MEQ) BY MOUTH EVERY DAY 90 tablet 3 Past Week    predniSONE (DELTASONE) 5 MG tablet Take 5 mg by mouth once daily.   Past Month    semaglutide (OZEMPIC) 2 mg/dose (8 mg/3 mL) PnIj Inject 2 mg into the skin every 7 days. (Patient taking differently: Inject 2 mg into the skin every 7 days. On Friday.) 9 mL 0 Past Week    torsemide (DEMADEX) 20 MG Tab Take 2 tabs 40mg in the morning and 20 mg (1tab) in the afternoon.   Past Week    diclofenac sodium (VOLTAREN) 1 % Gel Apply topically twice daily as needed for knee pain   More than a month       Objective:     Vital Signs (Most Recent):  Temp: 98 °F (36.7 °C) (01/10/24 0457)  Pulse: 82 (01/10/24 0457)  Resp: 18 (01/10/24 0457)  BP: 132/61 (01/10/24 0457)  SpO2: (!) 93 % (01/10/24 0457) Vital Signs (24h Range):  Temp:  [98 °F (36.7 °C)-98.6 °F (37 °C)] 98 °F (36.7 °C)  Pulse:  [80-91] 82  Resp:  [10-22] 18  SpO2:  [93 %-100 %] 93 %  BP: (132-160)/(58-81) 132/61     Weight: 130.9 kg (288 lb 9.3 oz) (01/10/24 0545)  Body mass index is 40.25 kg/m².       Physical Exam  Vitals and nursing note reviewed.   Constitutional:       General: He is not in acute distress.     Appearance: Normal appearance. He is not ill-appearing.   HENT:      Head: Normocephalic and atraumatic.      Right Ear: External ear normal.      Left Ear: External ear normal.      Nose: Nose normal.   Eyes:      General: No scleral icterus.     Conjunctiva/sclera: Conjunctivae normal.      Pupils: Pupils are equal, round, and reactive to light.   Cardiovascular:      Rate and Rhythm: Normal rate and regular rhythm.   Pulmonary:      Effort: Pulmonary effort is normal. No respiratory distress.   Abdominal:      General: Abdomen is flat. There is no distension.      Tenderness: There is no abdominal tenderness. There is no guarding.   Musculoskeletal:         General: Normal range of motion.      Cervical back: Normal range of motion.      Right lower leg: Edema present.       Left lower leg: Edema present.   Skin:     Coloration: Skin is not jaundiced.      Findings: No bruising.   Neurological:      Mental Status: He is alert and oriented to person, place, and time.      Comments: Still with mild memory impairment though improving  No asterixis            MELD 3.0: 15 at 1/10/2024  6:31 AM  MELD-Na: 12 at 1/10/2024  6:31 AM  Calculated from:  Serum Creatinine: 0.9 mg/dL (Using min of 1 mg/dL) at 1/10/2024  6:31 AM  Serum Sodium: 139 mmol/L (Using max of 137 mmol/L) at 1/10/2024  6:31 AM  Total Bilirubin: 2.8 mg/dL at 1/10/2024  6:31 AM  Serum Albumin: 2.3 g/dL at 1/10/2024  6:31 AM  INR(ratio): 1.2 at 1/10/2024  6:31 AM  Age at listing (hypothetical): 58 years  Sex: Male at 1/10/2024  6:31 AM      Significant Labs:  CBC:   Recent Labs   Lab 01/10/24  0631   WBC 2.78*   RBC 3.95*   HGB 11.5*   HCT 36.6*   *     CMP:   Recent Labs   Lab 01/10/24  0631   GLU 82   CALCIUM 8.3*   ALBUMIN 2.3*   PROT 6.0      K 4.1   CO2 21*   *   BUN 17   CREATININE 0.9   ALKPHOS 116   ALT 17   AST 33   BILITOT 2.8*       Significant Imaging:  X-Ray Chest AP Portable    Result Date: 1/10/2024  See above Electronically signed by: Luis Daniel Hadley MD Date:    01/10/2024 Time:    09:00    US Liver with Doppler (xpd)    Result Date: 1/10/2024  Micronodular small liver,  and multiple collateral vessels suggestive of increased portal venous pressure, no thrombus identified. Reversal of flow within the portal venous system, mesenteric vein and splenic vein, due to decreased elasticity of the liver from chronic cirrhosis, no thrombus identified. Splenomegaly. Electronically signed by: Amanda Shultz MD Date:    01/10/2024 Time:    08:50    CT Head Without Contrast    Result Date: 1/9/2024  1. No acute intracranial process. 2. Prominent paranasal sinus disease with air-fluid levels.  Acute sinusitis is a consideration. Electronically signed by: Andrea Garvin Date:    01/09/2024  Time:    18:38

## 2024-01-10 NOTE — PLAN OF CARE
[facility] - Transplant Stepdown  Discharge Final Note    Primary Care Provider: Sergio Guo III, MD    Expected Discharge Date: 1/10/2024    Final Discharge Note (most recent)       Final Note - 01/10/24 1610          Final Note    Assessment Type Final Discharge Note     Anticipated Discharge Disposition Home or Self Care     What phone number can be called within the next 1-3 days to see how you are doing after discharge? [phone]     Hospital Resources/Appts/Education Provided Provided patient/caregiver with written discharge plan information   per bedside nurse       Post-Acute Status    Discharge Delays None known at this time                     Important Message from Medicare      Patient medically ready for discharge to home with family.  This CM team scheduled or requested necessary hospital follow-up appointments.  Family/patient aware of discharge.    Future Appointments   Date Time Provider Department Center   1/12/2024  8:20 AM Aditya Steele DO [facility]   3/18/2024  8:00 AM Daniel Crowder MD [facility] HEPAT Jamal Montelongo RN CM  Case Management  [id number]

## 2024-01-10 NOTE — TELEPHONE ENCOUNTER
Appt scheduled     ----- Message from Adeline Magdaleno RN sent at 1/10/2024  2:09 PM CST -----  Regarding: EP Discharge after GIbleed  Can I get a follow up appt for Mr. Peter in clinic with Dr. Fleming please?  Thanks  Jocelyn

## 2024-01-10 NOTE — H&P
"  [facility] Medicine  History & Physical    Patient Name: Ashwin Peter  MRN: [id number]  Patient Class: IP- Inpatient  Admission Date: 1/9/2024  Attending Physician: Katie Meeks MD [facility] HOSP MED X  Admitting Physician: Deangelo Barlow MD  Primary Care Provider: Sergio Guo III, MD     Patient information was obtained from patient, past medical records, and ER records.     Subjective:     Principal Problem:Hepatic encephalopathy    Chief Complaint:   Chief Complaint   Patient presents with    Multiple complaints     Feeling weird, hx cirrhosis, forgetful,         HPI: 57 y/o WM hx of GUAJARDO Cirrhosis, LAW, Sarcoidosis, Pulm HTN, Lymphedema presents to the ED with multiple neurologic complaints.  He has noted in recent days the onset of tremor, forgetfulness, intermittent confusion, intermittent blurred vision.  More recently has had nausea/vomiting and dry heaves, reports emesis is not williams blood but has burgundy appearance.   He denies any abdominal pain but reported a "strange feeling" to ED staff.     He has recent hx of right knee replacement, had to lose >100lb prior to having surgery in November, recently completed post-op dvt ppx course of ASA BID.       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    He denies any ETOH/Tob/Drug use.      He has had prior EGD/Colonoscopy denies any hx of GI bleeding, adherent to medications.   ED w/u  with CT head negative for acute process - does show paranasal air fluid level ?acute sinusitis.  He was seen @ Urgent care on 01/02 diagnosed with bronchitis, prescribed - Claritin-D, benzonatate, Flonase, Zofran.     ED treatment - given 10gram lactulose has had 1 bm since, Ammonia  74 - referred for admit for Hepatic Encephalopathy;.     Past Medical History:   Diagnosis Date    Anxiety     BMI 38.0-38.9,adult     Hyperlipidemia     " Hypervolemia 07/22/2022    Multiple pulmonary nodules     Obesity     Other cirrhosis of liver 01/13/2021    Portal hypertension 02/11/2021    Rectal bleeding 02/11/2021    Sarcoidosis of lung with sarcoidosis of lymph nodes     Sleep apnea     CPAP USED    Splenomegaly 12/22/2020    Thrombocytopenia 12/22/2020       Past Surgical History:   Procedure Laterality Date    ANTERIOR CRUCIATE LIGAMENT REPAIR  2007    right knee    COLONOSCOPY N/A 4/8/2021    Procedure: COLONOSCOPY;  Surgeon: Jeff Olvera MD;  [facility]  Service: Endoscopy;  Laterality: N/A;  rectal bleeding,   2nd floor BMI 50 (354 lbs)  COVID test on 4/5/21 at [facility]    ESOPHAGOGASTRODUODENOSCOPY N/A 4/8/2021    Procedure: EGD (ESOPHAGOGASTRODUODENOSCOPY);  Surgeon: Jeff Olvera MD;  Location: [facility] ENDO (2ND FLR);  Service: Endoscopy;  Laterality: N/A;  variceal screening/ labs the am of procedure  2nd floor BMI 50 (354 lbs)    ESOPHAGOGASTRODUODENOSCOPY N/A 3/31/2022    Procedure: EGD (ESOPHAGOGASTRODUODENOSCOPY);  Surgeon: Jeff Olvera MD;  [facility]  Service: Endoscopy;  Laterality: N/A;  BMI-52    Wt:375#      cirrhosis, variceal screening-labs done on 3/29-GT  vaccinated-GT    ESOPHAGOGASTRODUODENOSCOPY N/A 4/21/2023    Procedure: EGD (ESOPHAGOGASTRODUODENOSCOPY);  Surgeon: Christopher Britton MD;  [facility]  Service: Endoscopy;  Laterality: N/A;  pt requested Friday due to work schedule  ECHO PA 44-51  BMI 47.97 Wt 343 lbs  inst portal-RB  last CBC PT/INR 1/24/23 4/17/23- Precall confirmed.    LYMPHADENECTOMY  1985    MENISCECTOMY      left knee    NASAL SEPTUM SURGERY  1985    TOTAL KNEE ARTHROPLASTY Right 11/7/2023    Procedure: ARTHROPLASTY, KNEE, TOTAL;  Surgeon: Darnell Liao MD;  Location: [facility];  Service: Orthopedics;  Laterality: Right;    WISDOM TOOTH EXTRACTION         Review of patient's allergies indicates:  No Known Allergies    No current  facility-administered medications on file prior to encounter.     Current Outpatient Medications on File Prior to Encounter   Medication Sig    aspirin 81 MG Chew Take 1 tablet (81 mg total) by mouth 2 (two) times daily.    benzonatate (TESSALON) 100 MG capsule Take 100 mg by mouth 3 (three) times daily as needed for Cough.    fluticasone propionate (FLONASE) 50 mcg/actuation nasal spray 2 sprays by Each Nostril route daily as needed for Rhinitis or Allergies.    LORATADINE-D  mg per 24 hr tablet Take 1 tablet by mouth once daily.    omeprazole (PRILOSEC) 40 MG capsule Take 40 mg by mouth daily as needed (acid reflux).    ondansetron (ZOFRAN-ODT) 8 MG TbDL Take 8 mg by mouth every 8 (eight) hours as needed (nausea).    potassium chloride (KLOR-CON) 10 MEQ TbSR TAKE 1 TABLET(10 MEQ) BY MOUTH EVERY DAY    predniSONE (DELTASONE) 5 MG tablet Take 5 mg by mouth once daily.    semaglutide (OZEMPIC) 2 mg/dose (8 mg/3 mL) PnIj Inject 2 mg into the skin every 7 days. (Patient taking differently: Inject 2 mg into the skin every 7 days. On Friday.)    torsemide (DEMADEX) 20 MG Tab Take 2 tabs 40mg in the morning and 20 mg (1tab) in the afternoon.    diclofenac sodium (VOLTAREN) 1 % Gel Apply topically twice daily as needed for knee pain    [DISCONTINUED] pantoprazole (PROTONIX) 40 MG tablet Take 1 tablet (40 mg total) by mouth once daily. (Patient not taking: Reported on 6/15/2022)     Family History       Problem Relation (Age of Onset)    Aneurysm Mother    Dementia Mother    Diabetes Paternal Grandmother    Heart attack Father (55)    Hypertension Father          Tobacco Use    Smoking status: Never     Passive exposure: Never    Smokeless tobacco: Never   Substance and Sexual Activity    Alcohol use: No    Drug use: No    Sexual activity: Not on file     Review of Systems   Constitutional:  Positive for activity change. Negative for appetite change, chills and fever.   HENT:  Negative for trouble swallowing.     Respiratory:  Negative for cough and shortness of breath.    Cardiovascular:  Negative for chest pain.   Gastrointestinal:  Negative for nausea and vomiting.   Skin:  Negative for rash.   Psychiatric/Behavioral:  Positive for confusion.      Objective:     Vital Signs (Most Recent):  Temp: 98.3 °F (36.8 °C) (01/10/24 0049)  Pulse: 80 (01/10/24 0049)  Resp: 14 (01/10/24 0049)  BP: 139/69 (01/10/24 0049)  SpO2: 100 % (01/10/24 0049) Vital Signs (24h Range):  Temp:  [98.3 °F (36.8 °C)-98.6 °F (37 °C)] 98.3 °F (36.8 °C)  Pulse:  [80-91] 80  Resp:  [10-22] 14  SpO2:  [94 %-100 %] 100 %  BP: (134-160)/(58-81) 139/69     Weight: 132.1 kg (291 lb 3.6 oz)  Body mass index is 40.62 kg/m².     Physical Exam  Vitals and nursing note reviewed.   Constitutional:       General: He is not in acute distress.     Appearance: He is obese.   HENT:      Head: Normocephalic and atraumatic.      Mouth/Throat:      Mouth: Mucous membranes are moist.   Eyes:      General: No scleral icterus.     Conjunctiva/sclera: Conjunctivae normal.      Pupils: Pupils are equal, round, and reactive to light.   Cardiovascular:      Rate and Rhythm: Normal rate and regular rhythm.      Heart sounds: Murmur heard.   Pulmonary:      Effort: Pulmonary effort is normal. No respiratory distress.      Breath sounds: No wheezing or rales.   Abdominal:      General: Abdomen is protuberant. There is no distension.      Palpations: Abdomen is soft.      Tenderness: There is no abdominal tenderness. There is no guarding.   Musculoskeletal:      Cervical back: Neck supple.      Right upper leg: Swelling present.      Left upper leg: Swelling present.      Right lower le+ Pitting Edema present.      Left lower leg: 3+ Pitting Edema present.   Neurological:      Mental Status: He is alert and oriented to person, place, and time.      Comments: Asterixis present   Psychiatric:         Attention and Perception: Attention normal.         Mood and Affect: Mood  normal.         Behavior: Behavior normal.         Cognition and Memory: He exhibits impaired recent memory (Slower recall).              CRANIAL NERVES     CN III, IV, VI   Pupils are equal, round, and reactive to light.       Significant Labs: All pertinent labs within the past 24 hours have been reviewed.  CBC:   Recent Labs   Lab 01/09/24  1538   WBC 2.83*   HGB 12.0*   HCT 38.3*   *     CMP:   Recent Labs   Lab 01/09/24  1538      K 4.1      CO2 22*   GLU 84   BUN 15   CREATININE 0.7   CALCIUM 8.4*   PROT 6.5   ALBUMIN 2.5*   BILITOT 2.8*   ALKPHOS 125   AST 39   ALT 21   ANIONGAP 7*     Cardiac Markers:   Recent Labs   Lab 01/09/24  1538   BNP 58     Coagulation:   Recent Labs   Lab 01/09/24  1538   INR 1.2     Lactic Acid:   Recent Labs   Lab 01/09/24  1538   LACTATE 1.3     Magnesium:   Recent Labs   Lab 01/09/24  1538   MG 1.9     Troponin:   Recent Labs   Lab 01/09/24  1538   TROPONINI 0.009     Urine Studies:   Recent Labs   Lab 01/09/24  1801   COLORU Yellow   APPEARANCEUA Clear   PHUR 7.0   SPECGRAV 1.025   PROTEINUA Trace*   GLUCUA Negative   KETONESU Negative   BILIRUBINUA Negative   OCCULTUA Negative   NITRITE Negative   LEUKOCYTESUR Negative       Significant Imaging: I have reviewed all pertinent imaging results/findings within the past 24 hours.  CT HEAD WITHOUT CONTRAST     CLINICAL HISTORY:  Mental status change, unknown cause;     TECHNIQUE:  Low dose axial CT images obtained throughout the head without intravenous contrast. Sagittal and coronal reconstructions were performed.     COMPARISON:  None.     FINDINGS:  Intracranial compartment:     Ventricles and sulci are normal in size for age without evidence of hydrocephalus. No extra-axial blood or fluid collections.     The brain parenchyma appears normal. No parenchymal mass, hemorrhage, edema or major vascular distribution infarct.     Skull/extracranial contents (limited evaluation): No fracture. Severe bilateral ethmoid  sinus disease, frontal sinus disease and right maxillary sinus disease.  Sphenoid and left maxillary sinus disease.  Air-fluid level in the right maxillary sinus.     Impression:     1. No acute intracranial process.  2. Prominent paranasal sinus disease with air-fluid levels.  Acute sinusitis is a consideration.        Electronically signed by: Andrea Garvin  Date:                                            01/09/2024  Time:                                           18:38           Exam Ended: 01/09/24 18:15 CST           Assessment/Plan:     * Hepatic encephalopathy  -patient with sleep disturbances, intermittent confusion, slowed thought process and asterixis   -Ammonia elevated in setting of franklin cirrhosis - likely diagnosis is new onset hepatic encephalopathy - Grade1-2  -He's received lactulose 10gram and had 1 BM, feeling a little better   >continue 20gram every 6 hours until pt has at least 2 bm then taper the frequency and dosing to maintenance level for 2-3 soft BM per day  -GIven N/V - start treatment & workup for UGIB  -Obtain Liver doppler to r/o portal vein thrombosis  -New Neutropenia/leukopenia - UA w/o infection, obtain CXR.  CT head notes right paranasl sinus  ?sinusitis, given symptom treatment for URI @ Urgent care 1 week prior - monitor CBC & WBC, consider empiric antibiotics.   -no neuropsych meds that might precipitate encephalopathy  -consult Hepatology to assist in eval/management.       Nausea & vomiting  -no williams hematemesis noted  -given report of dark emesis - burgundy color seen and cirrhosis  -keep NPO - give protonix 80mg IV and 40mg BID after.   -trend CBC every 6-8hours  -blood product consent obtained, sent type& screen with AM labs  -Gi consult to evaluate for EGD as bleeding could precipitate hepatic encephalopathy exacerbation      Neutropenia  Noted on CBC   -ANC approx 1700  -trend at this time  -UA negative, obtain CXR  -no acute fevers, stable lactic level, no other signs  of end organ damagne      Multiple neurological symptoms  Secondary to hepatic encephalopathy exacerbation      Liver cirrhosis secondary to GUAJARDO  Co-morbidities are present and inclusive of hepatic encephalopathy.  MELD-Na score calculated; MELD 3.0: 14 at 1/9/2024  3:38 PM  MELD-Na: 12 at 1/9/2024  3:38 PM  Calculated from:  Serum Creatinine: 0.7 mg/dL (Using min of 1 mg/dL) at 1/9/2024  3:38 PM  Serum Sodium: 139 mmol/L (Using max of 137 mmol/L) at 1/9/2024  3:38 PM  Total Bilirubin: 2.8 mg/dL at 1/9/2024  3:38 PM  Serum Albumin: 2.5 g/dL at 1/9/2024  3:38 PM  INR(ratio): 1.2 at 1/9/2024  3:38 PM  Age at listing (hypothetical): 58 years  Sex: Male at 1/9/2024  3:38 PM      Continue chronic meds. Etiology likely NAFLD. Will avoid any hepatotoxic meds, and monitor CBC/CMP/INR for synthetic function.     Sarcoidosis of lung with sarcoidosis of lymph nodes  Out of home prednisone 5mg po daily x 1 week, was waiting for refill from PULM clinic  -will restart prednisone 5mg po daily in AM       LAW on CPAP  Resume CPAP in patient - orders placed      Leg edema  He has chronic lymphedema and RLE lymphedema is worse s/p TKA - reports orthopedic surgery aware.   He was on post-op ASA 81 BID as dvt ppx that has concluded.     IF any worsening or pain in RLE develop obtain LE venous u/s to rule out DVT    Continue home dose of Torsemide BID    Severe obesity (BMI >= 40)  Body mass index is 40.62 kg/m². Morbid obesity complicates all aspects of disease management from diagnostic modalities to treatment.     Patient continues on ozempic as outpatient for weight loss.            VTE Risk Mitigation (From admission, onward)           Ordered     enoxaparin injection 40 mg  Every 24 hours         01/09/24 2106     IP VTE HIGH RISK PATIENT  Once         01/09/24 2106     Place sequential compression device  Until discontinued         01/09/24 2106                          Deangelo Barlow MD  Department of Hospital Medicine  [facility] - Transplant Stepdown

## 2024-01-10 NOTE — PLAN OF CARE
"- Patient arrived to U room 52992 ~2200 overnight, accompanied by spouse. Patient presents with hepatic encephalopathy and recent nausea & vomiting.  - On arrival to floor, patient was oriented to person, place, and time.  - Lactulose ordered q6h until 2 BMs achieved. Patient has had 1 BM so far.  - Patient denies pain; IV Zofran given for nausea.  - Patent's legs are very swollen - 4+ edema and redness to both lower legs. Patient states this is their baseline appearance.  - Patient is up independently / standby assistance for distances. Patient had right knee replacement 11/7/23.  - Patient wears CPAP at home; MD entered orders for hospital machine.  - Patient reports "burgundy emesis" at home; IV Protonix started; serial CBCs ordered. Gastroenterology consulted for possible EGD. Patient still has Lovenox shot ordered for this afternoon.  - Patient has been NPO since midnight.  - Liver ultrasound ordered.  - Chest X-ray ordered.  - Patient reports that his daughter is pregnant and may be delivering as soon as this week in [place], [place]. Patient is hoping for brief hospitalization.  "

## 2024-01-10 NOTE — ASSESSMENT & PLAN NOTE
Co-morbidities are present and inclusive of hepatic encephalopathy.  MELD-Na score calculated; MELD 3.0: 14 at 1/9/2024  3:38 PM  MELD-Na: 12 at 1/9/2024  3:38 PM  Calculated from:  Serum Creatinine: 0.7 mg/dL (Using min of 1 mg/dL) at 1/9/2024  3:38 PM  Serum Sodium: 139 mmol/L (Using max of 137 mmol/L) at 1/9/2024  3:38 PM  Total Bilirubin: 2.8 mg/dL at 1/9/2024  3:38 PM  Serum Albumin: 2.5 g/dL at 1/9/2024  3:38 PM  INR(ratio): 1.2 at 1/9/2024  3:38 PM  Age at listing (hypothetical): 58 years  Sex: Male at 1/9/2024  3:38 PM      Continue chronic meds. Etiology likely NAFLD. Will avoid any hepatotoxic meds, and monitor CBC/CMP/INR for synthetic function.

## 2024-01-10 NOTE — INTERVAL H&P NOTE
The patient has been examined and the H&P has been reviewed:    Pre-Procedure H and P Addendum    Patient seen and examined.  History and exam unchanged from prior history and physical.      Procedure: EGD  Indication: Hematemesis, chronic duodenal ulcers  ASA Class: per anesthesiology  Airway: normal  Neck Mobility: full range of motion  Mallampatti score: per anesthesia  History of anesthesia problems: no  Family history of anesthesia problems: no  Anesthesia Plan: MAC        Active Hospital Problems    Diagnosis  POA    *Hepatic encephalopathy [K76.82]  Yes    Nausea & vomiting [R11.2]  Yes    Multiple neurological symptoms [R29.90]  Yes    Neutropenia [D70.9]  Yes    LAW on CPAP [G47.33]  Yes    Pulmonary hypertension [I27.20]  Yes    Liver cirrhosis secondary to GUAJARDO [K75.81, K74.60]  Yes    Leg edema [R60.0]  Yes    Sarcoidosis of lung with sarcoidosis of lymph nodes [D86.2]  Yes    Severe obesity (BMI >= 40) [E66.01]  Yes      Resolved Hospital Problems   No resolved problems to display.

## 2024-01-10 NOTE — H&P (VIEW-ONLY)
[facility] - Transplant Stepdown  Gastroenterology  Consult Note    Patient Name: Ashwin Peter  MRN: [id number]  Admission Date: 1/9/2024  Hospital Length of Stay: 1 days  Code Status: Full Code   Attending Provider: Katie Meeks MD   Consulting Provider: Kendell Murcia MD  Primary Care Physician: Sergio Guo III, MD  Principal Problem:Hepatic encephalopathy    Inpatient consult to Gastroenterology  Consult performed by: Kendell Murcia MD  Consult ordered by: Deangelo Barlow MD        Subjective:     HPI:  This is a 58-year-old man with history of GUAJARDO Cirrhosis, LAW (on CPAP), Sarcoidosis, Pulm HTN, Lymphedema presents with multiple neurologic complaints.  He has noted in recent days the onset of tremor, forgetfulness, intermittent confusion, intermittent blurred vision. It was reported that he was very confused and had asterixis yesterday, but today he is alert and oriented and says that he feels more like himself and that he just feels like his cognition is slightly slowed. On 1/2 he presented to urgent care and was treated for an upper respiratory infection. He reports post-tussive emesis on 1/2, 1/3, and 1/4 that he described as containing a small amount of a burgundy colored substance. He recently lost >100lbs prior to having knee replacement surgery in November, recently completed post-op dvt ppx course of baby ASA BID. He denies any NSAID or anticoagulation use, and says he only takes Tylenol for his knee pain. His EGDs since 2021 have continued to show recurrent non-bleeding duodenal ulcers. Recently evaluated for ZES but gastrin level was WNL. Patient takes Ozempic and reports his last dose was on 1/5/24.      Labs notable for Hgb 11.5 (baseline 12-13), platelets 102, INR 1.2, BUN 17, sCr 0.7, Albumin 2.3, Bilirubin 2.8, AST 33, ALT 17, and ammonia 74.     EGD 2023   - Small (< 5 mm) esophageal varices.   - Esophageal ulcers.   - Portal hypertensive gastropathy.   - Non-bleeding duodenal  ulcers.      EGD 2022  - Grade I esophageal varices.   - Portal hypertensive gastropathy, biopsied.   - Duodenal ulcers with pigmented material, treated with bipolar cautery.      EGD 2021  - Grade I esophageal varices with LA Grade B reflux esophagitis with no bleeding.   - Erosive gastropathy, biopsied.   - Multiple non-bleeding superficial duodenal ulcers with no stigmata of bleeding, biopsied.      Colonoscopy 2021          - Diverticulosis in the sigmoid colon.   - One small colon polyp was resected and retrieved.      Past Medical History:   Diagnosis Date    Anxiety     BMI 38.0-38.9,adult     Hyperlipidemia     Hypervolemia 07/22/2022    Multiple pulmonary nodules     Obesity     Other cirrhosis of liver 01/13/2021    Portal hypertension 02/11/2021    Rectal bleeding 02/11/2021    Sarcoidosis of lung with sarcoidosis of lymph nodes     Sleep apnea     CPAP USED    Splenomegaly 12/22/2020    Thrombocytopenia 12/22/2020       Past Surgical History:   Procedure Laterality Date    ANTERIOR CRUCIATE LIGAMENT REPAIR  2007    right knee    COLONOSCOPY N/A 4/8/2021    Procedure: COLONOSCOPY;  Surgeon: Jeff Olvera MD;  Location: [facility] ([address] [place] [address]);  Service: Endoscopy;  Laterality: N/A;  rectal bleeding,   2nd floor BMI 50 (354 lbs)  C[facility] on 4/5/21 [facility]    ESOPHAGOGASTRODUODENOSCOPY N/A 4/8/2021    Procedure: EGD (ESOPHAGOGASTRODUODENOSCOPY);  Surgeon: Jeff Olvera MD;  Location: [facility] ([address] [place] [address]);  Service: Endoscopy;  Laterality: N/A;  variceal screening/ labs the am of procedure  2nd floor BMI 50 (354 lbs)    ESOPHAGOGASTRODUODENOSCOPY N/A 3/31/2022    Procedure: EGD (ESOPHAGOGASTRODUODENOSCOPY);  Surgeon: Jeff Olvera MD;  Location: [facility] ([address] [place] [address]);  Service: Endoscopy;  Laterality: N/A;  BMI-52    Wt:375#      cirrhosis, variceal screening-labs done on 3/29-GT  vaccinated-GT    ESOPHAGOGASTRODUODENOSCOPY N/A 4/21/2023    Procedure: EGD (ESOPHAGOGASTRODUODENOSCOPY);  Surgeon:  Christopher Britton MD;  Location: [facility] ENDO (2ND FLR);  Service: Endoscopy;  Laterality: N/A;  pt requested Friday due to work schedule  ECHO PA 44-51  BMI 47.97 Wt 343 lbs  inst portal-RB  last CBC PT/INR 1/24/23 4/17/23- Precall confirmed.    LYMPHADENECTOMY  1985    MENISCECTOMY      left knee    NASAL SEPTUM SURGERY  1985    TOTAL KNEE ARTHROPLASTY Right 11/7/2023    Procedure: ARTHROPLASTY, KNEE, TOTAL;  Surgeon: Darnell Liao MD;  Location: [facility];  Service: Orthopedics;  Laterality: Right;    WISDOM TOOTH EXTRACTION         Review of patient's allergies indicates:  No Known Allergies  Family History       Problem Relation (Age of Onset)    Aneurysm Mother    Dementia Mother    Diabetes Paternal Grandmother    Heart attack Father (55)    Hypertension Father          Tobacco Use    Smoking status: Never     Passive exposure: Never    Smokeless tobacco: Never   Substance and Sexual Activity    Alcohol use: No    Drug use: No    Sexual activity: Not on file     Review of Systems   Respiratory:  Positive for cough.    Gastrointestinal:  Positive for nausea and vomiting. Negative for abdominal distention, abdominal pain, blood in stool and constipation.   Psychiatric/Behavioral:  Positive for confusion (improving).      Objective:     Vital Signs (Most Recent):  Temp: 98 °F (36.7 °C) (01/10/24 0457)  Pulse: 82 (01/10/24 0457)  Resp: 18 (01/10/24 0457)  BP: 132/61 (01/10/24 0457)  SpO2: (!) 93 % (01/10/24 0457) Vital Signs (24h Range):  Temp:  [98 °F (36.7 °C)-98.6 °F (37 °C)] 98 °F (36.7 °C)  Pulse:  [80-91] 82  Resp:  [10-22] 18  SpO2:  [93 %-100 %] 93 %  BP: (132-160)/(58-81) 132/61     Weight: 130.9 kg (288 lb 9.3 oz) (01/10/24 0545)  Body mass index is 40.25 kg/m².      Intake/Output Summary (Last 24 hours) at 1/10/2024 0900  Last data filed at 1/9/2024 2300  Gross per 24 hour   Intake 250 ml   Output --   Net 250 ml       Lines/Drains/Airways       Peripheral Intravenous Line  Duration                   Peripheral IV - Single Lumen 24 1538 20 G Anterior;Distal;Right Upper Arm <1 day                     Physical Exam  Vitals and nursing note reviewed.   Constitutional:       General: He is not in acute distress.     Appearance: He is obese.   HENT:      Head: Normocephalic and atraumatic.      Mouth/Throat:      Mouth: Mucous membranes are moist.   Eyes:      General: No scleral icterus.  Cardiovascular:      Rate and Rhythm: Normal rate and regular rhythm.      Heart sounds: Murmur heard.   Pulmonary:      Effort: Pulmonary effort is normal. No respiratory distress.      Breath sounds: No wheezing or rales.   Abdominal:      General: Abdomen is protuberant. There is no distension.      Palpations: Abdomen is soft.      Tenderness: There is no abdominal tenderness. There is no guarding.   Musculoskeletal:      Cervical back: Neck supple.      Right upper leg: Swelling present.      Left upper leg: Swelling present.      Right lower le+ Pitting Edema present.      Left lower leg: 3+ Pitting Edema present.   Skin:     Comments: Small diffuse bruises  Multiple cherry hemangiomas on torso   Neurological:      Mental Status: He is alert and oriented to person, place, and time.      Comments: No asterixis present   Psychiatric:         Attention and Perception: Attention normal.         Mood and Affect: Mood normal.         Behavior: Behavior is slowed.          Significant Labs:  CBC:   Recent Labs   Lab 24  1538 01/10/24  0631   WBC 2.83* 2.78*   HGB 12.0* 11.5*   HCT 38.3* 36.6*   * 102*     CMP:   Recent Labs   Lab 01/10/24  0631   GLU 82   CALCIUM 8.3*   ALBUMIN 2.3*   PROT 6.0      K 4.1   CO2 21*   *   BUN 17   CREATININE 0.9   ALKPHOS 116   ALT 17   AST 33   BILITOT 2.8*     Coagulation:   Recent Labs   Lab 01/10/24  0631   INR 1.2   APTT 31.6       Significant Imaging:  Imaging results within the past 24 hours have been reviewed.  Assessment/Plan:     GI  Liver cirrhosis  secondary to GUAJARDO  58-year-old man with history of GUAJARDO Cirrhosis, LAW (on CPAP), Sarcoidosis, Pulm HTN, Lymphedema presents with multiple neurologic complaints. On 1/2 he presented to urgent care and was treated for an upper respiratory infection. He reports post-tussive emesis on 1/2, 1/3, and 1/4 that he described as containing a small amount of a burgundy colored substance. He recently lost >100lbs prior to having knee replacement surgery in November, recently completed post-op dvt ppx course of baby ASA BID.  His EGDs since 2021 have continued to show recurrent non-bleeding duodenal ulcers. Recently evaluated for ZES but gastrin level was WNL.       Labs notable for Hgb 11.5 (baseline 12-13), platelets 102, INR 1.2, BUN 17, sCr 0.7, Albumin 2.3, Bilirubin 2.8, AST 33, ALT 17, and ammonia 74.     Our recommendations are as follows:    No current evidence for a current GI bleed, but given history of recurrent duodenal ulcers, esophageal varices and portal hypertensive gastropathy, plan for EGD today.  Keep patient NPO until after procedure  Give PROTONIX 80 mg once followed by 40 mg IV BID.   RBC transfusion as indicated if Hgb <7 g/dL.   Please correct any coagulopathy with platelets and FFP to a goal of platelets >50K and INR <2.0.   Discontinue all antiplatelet, anti-coagulation, and NSAID products.   Please promptly notify GI team if there is significant change in patient's clinical status        Thank you for your consult. I will follow-up with patient. Please contact us if you have any additional questions.    Kendell Murcia MD  Gastroenterology  [facility]

## 2024-01-10 NOTE — HPI
"57 y/o WM hx of GUAJARDO Cirrhosis, LAW, Sarcoidosis, Pulm HTN, Lymphedema presents to the ED with multiple neurologic complaints.  He has noted in recent days the onset of tremor, forgetfulness, intermittent confusion, intermittent blurred vision.  More recently has had nausea/vomiting and dry heaves, reports emesis is not williams blood but has burgundy appearance.   He denies any abdominal pain but reported a "strange feeling" to ED staff.     He has recent hx of right knee replacement, had to lose >100lb prior to having surgery in November, recently completed post-op dvt ppx course of ASA BID.       He lives with his wife & niece, works as [employer] supervisor -  [employer], has been off work x last 10 weeks due to Right TKA.    He denies any ETOH/Tob/Drug use.      He has had prior EGD/Colonoscopy denies any hx of GI bleeding, adherent to medications.   ED w/u  with CT head negative for acute process - does show paranasal air fluid level ?acute sinusitis.  He was seen @ Urgent care on 01/02 diagnosed with bronchitis, prescribed - Claritin-D, benzonatate, Flonase, Zofran.     ED treatment - given 10gram lactulose has had 1 bm since, Ammonia  74 - referred for admit for Hepatic Encephalopathy;.   "

## 2024-01-10 NOTE — PROVATION PATIENT INSTRUCTIONS
Discharge Summary/Instructions after an Endoscopic Procedure  Patient Name: Ashwin Peter  Patient MRN: [id number]  Patient YOB: 1965  Wednesday, January 10, 2024  Christopher Conley MD  Dear patient,  As a result of recent federal legislation (The Federal Cures Act), you may   receive lab or pathology results from your procedure in your [facility]   account before your physician is able to contact you. Your physician or   their representative will relay the results to you with their   recommendations at their soonest availability.  Thank you,  RESTRICTIONS:  During your procedure today, you received medications for sedation.  These   medications may affect your judgment, balance and coordination.  Therefore,   for 24 hours, you have the following restrictions:   - DO NOT drive a car, operate machinery, make legal/financial decisions,   sign important papers or drink alcohol.    ACTIVITY:  Today: no heavy lifting, straining or running due to procedural   sedation/anesthesia.  The following day: return to full activity including work.  DIET:  Eat and drink normally unless instructed otherwise.     TREATMENT FOR COMMON SIDE EFFECTS:  - Mild abdominal pain, nausea, belching, bloating or excessive gas:  rest,   eat lightly and use a heating pad.  - Sore Throat: treat with throat lozenges and/or gargle with warm salt   water.  - Because air was used during the procedure, expelling large amounts of air   from your rectum or belching is normal.  - If a bowel prep was taken, you may not have a bowel movement for 1-3 days.    This is normal.  SYMPTOMS TO WATCH FOR AND REPORT TO YOUR PHYSICIAN:  1. Abdominal pain or bloating, other than gas cramps.  2. Chest pain.  3. Back pain.  4. Signs of infection such as: chills or fever occurring within 24 hours   after the procedure.  5. Rectal bleeding, which would show as bright red, maroon, or black stools.   (A tablespoon of blood from the rectum is not serious, especially  if   hemorrhoids are present.)  6. Vomiting.  7. Weakness or dizziness.  GO DIRECTLY TO THE NEAREST EMERGENCY ROOM IF YOU HAVE ANY OF THE FOLLOWING:      Difficulty breathing              Chills and/or fever over 101 F   Persistent vomiting and/or vomiting blood   Severe abdominal pain   Severe chest pain   Black, tarry stools   Bleeding- more than one tablespoon   Any other symptom or condition that you feel may need urgent attention  Your doctor recommends these additional instructions:  If any biopsies were taken, your doctors clinic will contact you in 1 to 2   weeks with any results.  - Return patient to hospital [facility] for ongoing care.   - Resume previous diet today.   - Continue present medications.   - Observe patient's clinical course.   - No source of reported hematemesis seen on this exam. Patient could have   possiby had Bianka-Rogers tear that has since healed or blood could have   come from upper respiratory tract.   For questions, problems or results please call your physician - Christopher Conley MD at Work:  [phone].  [facility], EMERGENCY ROOM PHONE NUMBER: [phone]  IF A COMPLICATION OR EMERGENCY SITUATION ARISES AND YOU ARE UNABLE TO REACH   YOUR PHYSICIAN - GO DIRECTLY TO THE EMERGENCY ROOM.  Christopher Conley MD  1/10/2024 11:29:47 AM  This report has been verified and signed electronically.  Dear patient,  As a result of recent federal legislation (The Federal Cures Act), you may   receive lab or pathology results from your procedure in your [facility]   account before your physician is able to contact you. Your physician or   their representative will relay the results to you with their   recommendations at their soonest availability.  Thank you,  PROVATION

## 2024-01-10 NOTE — ASSESSMENT & PLAN NOTE
He has chronic lymphedema and RLE lymphedema is worse s/p TKA - reports orthopedic surgery aware.   He was on post-op ASA 81 BID as dvt ppx that has concluded.     IF any worsening or pain in RLE develop obtain LE venous u/s to rule out DVT    Continue home dose of Torsemide BID

## 2024-01-10 NOTE — ANESTHESIA POSTPROCEDURE EVALUATION
Airway  Performed by: Mera Zelaya CRNA  Authorized by: Mera Zelaya CRNA     Final Airway Type:  Supraglottic airway  Final Supraglottic Airway:  Air-Q  SGA Size*:  3.5  Airway Seal Pressure (cm H2O):  18  Attempts*:  1   Patient Identified, Procedure confirmed, Emergency equipment available and Safety protocols followed  Location:  OR  Urgency:  Elective  Difficult Airway: No    Indications for Airway Management:  Anesthesia  Sedation Level:  Deep  Mask Difficulty Assessment:  1 - vent by mask         Anesthesia Post Evaluation    Patient: Ashwin Peter    Procedure(s) Performed: Procedure(s) (LRB):  EGD (ESOPHAGOGASTRODUODENOSCOPY) (N/A)    Final Anesthesia Type: general      Patient location during evaluation: PACU  Patient participation: Yes- Able to Participate  Level of consciousness: awake and alert  Post-procedure vital signs: reviewed and stable  Pain management: adequate  Airway patency: patent  LAW mitigation strategies: Multimodal analgesia, Preoperative use of mandibular advancement devices or oral appliances and Intraoperative administration of CPAP, nasopharyngeal airway, or oral appliance during sedation  PONV status at discharge: No PONV  Anesthetic complications: no      Cardiovascular status: blood pressure returned to baseline, hemodynamically stable and stable  Respiratory status: unassisted and spontaneous ventilation  Hydration status: euvolemic  Follow-up not needed.              Vitals Value Taken Time   /69 01/10/24 1200   Temp 36.9 °C (98.4 °F) 01/10/24 1131   Pulse 84 01/10/24 1200   Resp 20 01/10/24 1200   SpO2 96 % 01/10/24 1200         Event Time   Out of Recovery 12:06:00         Pain/Akbar Score: Akbar Score: 10 (1/10/2024 11:45 AM)

## 2024-01-10 NOTE — CONSULTS
[facility] - Transplant Stepdown  Gastroenterology  Consult Note    Patient Name: Ashwin Peter  MRN: [id number]  Admission Date: 1/9/2024  Hospital Length of Stay: 1 days  Code Status: Full Code   Attending Provider: Katie Meeks MD   Consulting Provider: Kendell Murcia MD  Primary Care Physician: Sergio Guo III, MD  Principal Problem:Hepatic encephalopathy    Inpatient consult to Gastroenterology  Consult performed by: Kendell Murcia MD  Consult ordered by: Deangelo Barlow MD        Subjective:     HPI:  This is a 58-year-old man with history of GUAJARDO Cirrhosis, LAW (on CPAP), Sarcoidosis, Pulm HTN, Lymphedema presents with multiple neurologic complaints.  He has noted in recent days the onset of tremor, forgetfulness, intermittent confusion, intermittent blurred vision. It was reported that he was very confused and had asterixis yesterday, but today he is alert and oriented and says that he feels more like himself and that he just feels like his cognition is slightly slowed. On 1/2 he presented to urgent care and was treated for an upper respiratory infection. He reports post-tussive emesis on 1/2, 1/3, and 1/4 that he described as containing a small amount of a burgundy colored substance. He recently lost >100lbs prior to having knee replacement surgery in November, recently completed post-op dvt ppx course of baby ASA BID. He denies any NSAID or anticoagulation use, and says he only takes Tylenol for his knee pain. His EGDs since 2021 have continued to show recurrent non-bleeding duodenal ulcers. Recently evaluated for ZES but gastrin level was WNL. Patient takes Ozempic and reports his last dose was on 1/5/24.      Labs notable for Hgb 11.5 (baseline 12-13), platelets 102, INR 1.2, BUN 17, sCr 0.7, Albumin 2.3, Bilirubin 2.8, AST 33, ALT 17, and ammonia 74.     EGD 2023   - Small (< 5 mm) esophageal varices.   - Esophageal ulcers.   - Portal hypertensive gastropathy.   - Non-bleeding duodenal  ulcers.      EGD 2022  - Grade I esophageal varices.   - Portal hypertensive gastropathy, biopsied.   - Duodenal ulcers with pigmented material, treated with bipolar cautery.      EGD 2021  - Grade I esophageal varices with LA Grade B reflux esophagitis with no bleeding.   - Erosive gastropathy, biopsied.   - Multiple non-bleeding superficial duodenal ulcers with no stigmata of bleeding, biopsied.      Colonoscopy 2021          - Diverticulosis in the sigmoid colon.   - One small colon polyp was resected and retrieved.      Past Medical History:   Diagnosis Date    Anxiety     BMI 38.0-38.9,adult     Hyperlipidemia     Hypervolemia 07/22/2022    Multiple pulmonary nodules     Obesity     Other cirrhosis of liver 01/13/2021    Portal hypertension 02/11/2021    Rectal bleeding 02/11/2021    Sarcoidosis of lung with sarcoidosis of lymph nodes     Sleep apnea     CPAP USED    Splenomegaly 12/22/2020    Thrombocytopenia 12/22/2020       Past Surgical History:   Procedure Laterality Date    ANTERIOR CRUCIATE LIGAMENT REPAIR  2007    right knee    COLONOSCOPY N/A 4/8/2021    Procedure: COLONOSCOPY;  Surgeon: Jeff Olvera MD;  Location: [facility] ([address] [place] [address]);  Service: Endoscopy;  Laterality: N/A;  rectal bleeding,   2nd floor BMI 50 (354 lbs)  C[facility] 4/5/21 at [facility]    ESOPHAGOGASTRODUODENOSCOPY N/A 4/8/2021    Procedure: EGD (ESOPHAGOGASTRODUODENOSCOPY);  Surgeon: Jeff Olvera MD;  Location: [facility] ([address] [place] [address]);  Service: Endoscopy;  Laterality: N/A;  variceal screening/ labs the am of procedure  2nd floor BMI 50 (354 lbs)    ESOPHAGOGASTRODUODENOSCOPY N/A 3/31/2022    Procedure: EGD (ESOPHAGOGASTRODUODENOSCOPY);  Surgeon: Jeff Olvera MD;  Location: [facility] ([address] [place] [address]);  Service: Endoscopy;  Laterality: N/A;  BMI-52    Wt:375#      cirrhosis, variceal screening-labs done on 3/29-GT  vaccinated-GT    ESOPHAGOGASTRODUODENOSCOPY N/A 4/21/2023    Procedure: EGD (ESOPHAGOGASTRODUODENOSCOPY);  Surgeon:  Christopher Britton MD;  Location: [facility] ENDO (2ND FLR);  Service: Endoscopy;  Laterality: N/A;  pt requested Friday due to work schedule  ECHO PA 44-51  BMI 47.97 Wt 343 lbs  inst portal-RB  last CBC PT/INR 1/24/23 4/17/23- Precall confirmed.    LYMPHADENECTOMY  1985    MENISCECTOMY      left knee    NASAL SEPTUM SURGERY  1985    TOTAL KNEE ARTHROPLASTY Right 11/7/2023    Procedure: ARTHROPLASTY, KNEE, TOTAL;  Surgeon: Darnell Liao MD;  Location: [facility];  Service: Orthopedics;  Laterality: Right;    WISDOM TOOTH EXTRACTION         Review of patient's allergies indicates:  No Known Allergies  Family History       Problem Relation (Age of Onset)    Aneurysm Mother    Dementia Mother    Diabetes Paternal Grandmother    Heart attack Father (55)    Hypertension Father          Tobacco Use    Smoking status: Never     Passive exposure: Never    Smokeless tobacco: Never   Substance and Sexual Activity    Alcohol use: No    Drug use: No    Sexual activity: Not on file     Review of Systems   Respiratory:  Positive for cough.    Gastrointestinal:  Positive for nausea and vomiting. Negative for abdominal distention, abdominal pain, blood in stool and constipation.   Psychiatric/Behavioral:  Positive for confusion (improving).      Objective:     Vital Signs (Most Recent):  Temp: 98 °F (36.7 °C) (01/10/24 0457)  Pulse: 82 (01/10/24 0457)  Resp: 18 (01/10/24 0457)  BP: 132/61 (01/10/24 0457)  SpO2: (!) 93 % (01/10/24 0457) Vital Signs (24h Range):  Temp:  [98 °F (36.7 °C)-98.6 °F (37 °C)] 98 °F (36.7 °C)  Pulse:  [80-91] 82  Resp:  [10-22] 18  SpO2:  [93 %-100 %] 93 %  BP: (132-160)/(58-81) 132/61     Weight: 130.9 kg (288 lb 9.3 oz) (01/10/24 0545)  Body mass index is 40.25 kg/m².      Intake/Output Summary (Last 24 hours) at 1/10/2024 0900  Last data filed at 1/9/2024 2300  Gross per 24 hour   Intake 250 ml   Output --   Net 250 ml       Lines/Drains/Airways       Peripheral Intravenous Line  Duration                   Peripheral IV - Single Lumen 24 1538 20 G Anterior;Distal;Right Upper Arm <1 day                     Physical Exam  Vitals and nursing note reviewed.   Constitutional:       General: He is not in acute distress.     Appearance: He is obese.   HENT:      Head: Normocephalic and atraumatic.      Mouth/Throat:      Mouth: Mucous membranes are moist.   Eyes:      General: No scleral icterus.  Cardiovascular:      Rate and Rhythm: Normal rate and regular rhythm.      Heart sounds: Murmur heard.   Pulmonary:      Effort: Pulmonary effort is normal. No respiratory distress.      Breath sounds: No wheezing or rales.   Abdominal:      General: Abdomen is protuberant. There is no distension.      Palpations: Abdomen is soft.      Tenderness: There is no abdominal tenderness. There is no guarding.   Musculoskeletal:      Cervical back: Neck supple.      Right upper leg: Swelling present.      Left upper leg: Swelling present.      Right lower le+ Pitting Edema present.      Left lower leg: 3+ Pitting Edema present.   Skin:     Comments: Small diffuse bruises  Multiple cherry hemangiomas on torso   Neurological:      Mental Status: He is alert and oriented to person, place, and time.      Comments: No asterixis present   Psychiatric:         Attention and Perception: Attention normal.         Mood and Affect: Mood normal.         Behavior: Behavior is slowed.          Significant Labs:  CBC:   Recent Labs   Lab 24  1538 01/10/24  0631   WBC 2.83* 2.78*   HGB 12.0* 11.5*   HCT 38.3* 36.6*   * 102*     CMP:   Recent Labs   Lab 01/10/24  0631   GLU 82   CALCIUM 8.3*   ALBUMIN 2.3*   PROT 6.0      K 4.1   CO2 21*   *   BUN 17   CREATININE 0.9   ALKPHOS 116   ALT 17   AST 33   BILITOT 2.8*     Coagulation:   Recent Labs   Lab 01/10/24  0631   INR 1.2   APTT 31.6       Significant Imaging:  Imaging results within the past 24 hours have been reviewed.  Assessment/Plan:     GI  Liver cirrhosis  secondary to GUAJARDO  58-year-old man with history of GUAJARDO Cirrhosis, LAW (on CPAP), Sarcoidosis, Pulm HTN, Lymphedema presents with multiple neurologic complaints. On 1/2 he presented to urgent care and was treated for an upper respiratory infection. He reports post-tussive emesis on 1/2, 1/3, and 1/4 that he described as containing a small amount of a burgundy colored substance. He recently lost >100lbs prior to having knee replacement surgery in November, recently completed post-op dvt ppx course of baby ASA BID.  His EGDs since 2021 have continued to show recurrent non-bleeding duodenal ulcers. Recently evaluated for ZES but gastrin level was WNL.       Labs notable for Hgb 11.5 (baseline 12-13), platelets 102, INR 1.2, BUN 17, sCr 0.7, Albumin 2.3, Bilirubin 2.8, AST 33, ALT 17, and ammonia 74.     Our recommendations are as follows:    No current evidence for a current GI bleed, but given history of recurrent duodenal ulcers, esophageal varices and portal hypertensive gastropathy, plan for EGD today.  Keep patient NPO until after procedure  Give PROTONIX 80 mg once followed by 40 mg IV BID.   RBC transfusion as indicated if Hgb <7 g/dL.   Please correct any coagulopathy with platelets and FFP to a goal of platelets >50K and INR <2.0.   Discontinue all antiplatelet, anti-coagulation, and NSAID products.   Please promptly notify GI team if there is significant change in patient's clinical status        Thank you for your consult. I will follow-up with patient. Please contact us if you have any additional questions.    Kendell Murcia MD  Gastroenterology  [facility]

## 2024-01-10 NOTE — TREATMENT PLAN
GI Treatment Plan Note:    Impression:            - Normal esophagus.                          - Normal stomach.                          - Normal examined duodenum.                          - No specimens collected.     Recommendation:        - Return patient to hospital [facility] for ongoing care.                          - Resume previous diet today.                          - Continue present medications.                          - Observe patient's clinical course.                          - No source of reported hematemesis seen on this                          exam. Patient could have possiby had Bianka-Rogers                          tear that has since healed or blood could have                          come from upper respiratory tract.     We will sign off.    Michelle Holm DO  Gastroenterology PGY-4

## 2024-01-10 NOTE — ANESTHESIA PREPROCEDURE EVALUATION
[facility]  Anesthesia Pre-Operative Evaluation         Patient Name: Ashwin Peter  YOB: 1965  MRN: [id number]    SUBJECTIVE:     Pre-operative evaluation for Procedure(s) (LRB):  EGD (ESOPHAGOGASTRODUODENOSCOPY) (N/A)     01/10/2024    Ashwin Peter is a 58 y.o. male w/ a significant PMHx of GUAJARDO Cirrhosis, LAW, Sarcoidosis, Pulm HTN, Lymphedema presents to the ED with multiple neurologic complaints .    Patient now presents for the above procedure(s).      LDA: None documented.       Peripheral IV - Single Lumen 01/09/24 1538 20 G Anterior;Distal;Right Upper Arm (Active)   Site Assessment Clean;Intact;Dry 01/10/24 0800   Extremity Assessment Distal to IV No abnormal discoloration 01/09/24 2200   Line Status Saline locked 01/10/24 0800   Dressing Status Intact;Dry;Clean 01/10/24 0800   Dressing Intervention Integrity maintained 01/10/24 0800   Dressing Change Due 01/13/24 01/09/24 2200   Site Change Due 01/13/24 01/09/24 2200   Reason Not Rotated Not due 01/09/24 2200   Number of days: 0        Prev airway: None documented.    Drips: None documented.   octreotide (SANDOSTATIN) 500 mcg in sodium chloride 0.9% 100 mL infusion          Patient Active Problem List   Diagnosis    Sarcoidosis of lung with sarcoidosis of lymph nodes    Severe obesity (BMI >= 40)    Splenomegaly    Thrombocytopenia    Other cirrhosis of liver    Leg edema    Portal hypertension    Liver cirrhosis secondary to GUAJARDO    Swelling of lower extremity    Acquired lymphedema of lower extremity    Pulmonary hypertension    LAW on CPAP    Sarcoidosis, unspecified    Osteoarthritis of right knee    Hepatic encephalopathy    Multiple neurological symptoms    Calculus of ureter    Neutropenia    Nausea & vomiting       Review of patient's allergies indicates:  No Known Allergies    Current Outpatient Medications:    Current Facility-Administered Medications:     acetaminophen tablet 650 mg, 650 mg, Oral, Q8H  PRN, Deangelo Barlow MD    benzonatate capsule 100 mg, 100 mg, Oral, TID PRN, Deangelo Barlow MD    cefTRIAXone (ROCEPHIN) 2 g in dextrose 5 % in water (D5W) 100 mL IVPB (MB+), 2 g, Intravenous, Q24H, Katie Meeks MD, Last Rate: 200 mL/hr at 01/10/24 0946, 2 g at 01/10/24 0946    cetirizine tablet 10 mg, 10 mg, Oral, Daily, 10 mg at 01/10/24 0832 **AND** pseudoephedrine 12 hr tablet 120 mg, 120 mg, Oral, BID, Deangelo Barlow MD, 120 mg at 01/10/24 0838    diclofenac sodium 1 % gel 2 g, 2 g, Topical (Top), BID PRN, Deangelo Barlow MD    enoxaparin injection 40 mg, 40 mg, Subcutaneous, Q24H (prophylaxis, 1700), Deangelo Barlow MD    fluticasone propionate 50 mcg/actuation nasal spray 100 mcg, 2 spray, Each Nostril, Daily PRN, Deangelo Barlow MD    lactulose 20 gram/30 mL solution Soln 20 g, 20 g, Oral, Q6H, Deangelo Barlow MD, 20 g at 01/10/24 0555    melatonin tablet 6 mg, 6 mg, Oral, Nightly PRN, Deangelo Barlow MD    naloxone 0.4 mg/mL injection 0.02 mg, 0.02 mg, Intravenous, PRN, Deangelo Barlow MD    octreotide (SANDOSTATIN) 500 mcg in sodium chloride 0.9% 100 mL infusion, 50 mcg/hr, Intravenous, Continuous, Katie Meeks MD    octreotide injection 50 mcg, 50 mcg, Intravenous, Once, Katie Meeks MD    ondansetron injection 4 mg, 4 mg, Intravenous, Q8H PRN, Deangelo Barlow MD, 4 mg at 01/10/24 0026    [COMPLETED] pantoprazole injection 80 mg, 80 mg, Intravenous, Once, 80 mg at 01/10/24 0025 **FOLLOWED BY** pantoprazole injection 40 mg, 40 mg, Intravenous, BID, Deangelo Barlow MD, 40 mg at 01/10/24 0832    predniSONE tablet 5 mg, 5 mg, Oral, Daily, Deangelo Barlow MD, 5 mg at 01/10/24 0832    prochlorperazine injection Soln 5 mg, 5 mg, Intravenous, Q6H PRN, Deangelo Barlow MD    sodium chloride 0.9% flush 10 mL, 10 mL, Intravenous, Q6H PRN, Deangelo Barlow MD    torsemide tablet 40 mg, 40 mg, Oral, Daily, 40 mg at 01/10/24 0831 **AND** torsemide tablet 20 mg, 20 mg, Oral,  After dinner, Deangelo Barlow MD    Past Surgical History:   Procedure Laterality Date    ANTERIOR CRUCIATE LIGAMENT REPAIR  2007    right knee    COLONOSCOPY N/A 4/8/2021    Procedure: COLONOSCOPY;  Surgeon: Jeff Olvera MD;  [facility]  Service: Endoscopy;  Laterality: N/A;  rectal bleeding,   2nd floor BMI 50 (354 lbs)  COVID test on 4/5/21 at [facility]    ESOPHAGOGASTRODUODENOSCOPY N/A 4/8/2021    Procedure: EGD (ESOPHAGOGASTRODUODENOSCOPY);  Surgeon: Jeff Olvera MD;  [facility]  Service: Endoscopy;  Laterality: N/A;  variceal screening/ labs the am of procedure  2nd floor BMI 50 (354 lbs)    ESOPHAGOGASTRODUODENOSCOPY N/A 3/31/2022    Procedure: EGD (ESOPHAGOGASTRODUODENOSCOPY);  Surgeon: Jeff Olvera MD;  [facility]  Service: Endoscopy;  Laterality: N/A;  BMI-52    Wt:375#      cirrhosis, variceal screening-labs done on 3/29-GT  vaccinated-GT    ESOPHAGOGASTRODUODENOSCOPY N/A 4/21/2023    Procedure: EGD (ESOPHAGOGASTRODUODENOSCOPY);  Surgeon: Christopher Britton MD;  Location: [facility] ([address] [place] [address]);  Service: Endoscopy;  Laterality: N/A;  pt requested Friday due to work schedule  ECHO PA 44-51  BMI 47.97 Wt 343 lbs  inst portal-RB  last CBC PT/INR 1/24/23 4/17/23- Precall confirmed.    LYMPHADENECTOMY  1985    MENISCECTOMY      left knee    NASAL SEPTUM SURGERY  1985    TOTAL KNEE ARTHROPLASTY Right 11/7/2023    Procedure: ARTHROPLASTY, KNEE, TOTAL;  Surgeon: Darnell Liao MD;  Location: [facility];  Service: Orthopedics;  Laterality: Right;    WISDOM TOOTH EXTRACTION         Social History     Socioeconomic History    Marital status:     Number of children: 2   Occupational History    Occupation: maintance supervisor     Employer: [employer]   Tobacco Use    Smoking status: Never     Passive exposure: Never    Smokeless tobacco: Never   Substance and Sexual Activity    Alcohol use: No    Drug use: No    Social History Narrative    Patient is originally from   [place]        School at ; [employer]        [id number] ; [employer]         Working ; [employer],  [employer]            Daysi 22 yrs         Children    Maksim Funez        Lives with     Wife         Diet/Exericse                Works as a  and .  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       OBJECTIVE:     Vital Signs Range (Last 24H):  Temp:  [36.7 °C (98 °F)-37 °C (98.6 °F)]   Pulse:  [80-91]   Resp:  [10-22]   BP: (132-160)/(58-81)   SpO2:  [93 %-100 %]       Significant Labs:  Lab Results   Component Value Date    WBC 2.78 (L) 01/10/2024    HGB 11.5 (L) 01/10/2024    HCT 36.6 (L) 01/10/2024     (L) 01/10/2024    CHOL 198 10/17/2022    TRIG 101 10/17/2022    HDL 41 10/17/2022    ALT 17 01/10/2024    AST 33 01/10/2024     01/10/2024    K 4.1 01/10/2024     (H) 01/10/2024    CREATININE 0.9 01/10/2024    BUN 17 01/10/2024    CO2 21 (L) 01/10/2024    TSH 1.383 08/30/2022    PSA 0.62 07/14/2014    INR 1.2 01/10/2024    HGBA1C 4.2 10/17/2022       Diagnostic Studies: No relevant studies.    EKG:   Results for orders placed or performed during the hospital encounter of 07/22/22   EKG 12-lead    Collection Time: 07/22/22 11:55 AM    Narrative    Test Reason : R06.02,    Vent. Rate : 082 BPM     Atrial Rate : 082 BPM     P-R Int : 196 ms          QRS Dur : 084 ms      QT Int : 398 ms       P-R-T Axes : 024 039 075 degrees     QTc Int : 464 ms    Normal sinus rhythm  Prolonged QT  Abnormal ECG  When compared with ECG of 29-MAR-2022 20:13,  Premature atrial complexes are no longer Present  Confirmed by Lucio Leone MD ([id number]) on 7/22/2022 3:21:18 PM    Referred By: [email or web address]   SELF           Confirmed By:Lucio Leone MD       2D ECHO:  TTE:  Results for orders placed or performed during the hospital encounter of 07/22/22   Echo   Result Value Ref Range    Ascending  aorta 3.30 cm    STJ 2.48 cm    AV mean gradient 10 mmHg    Ao peak jean 1.88 m/s    Ao VTI 50.32 cm    IVRT 91.34 msec    IVS 1.03 0.6 - 1.1 cm    LA size 5.24 cm    Left Atrium Major Axis 6.07 cm    Left Atrium Minor Axis 6.69 cm    LVIDd 4.98 3.5 - 6.0 cm    LVIDs 3.71 2.1 - 4.0 cm    LVOT diameter 2.29 cm    LVOT peak VTI 31.05 cm    Posterior Wall 0.88 0.6 - 1.1 cm    MV Peak A Jean 1.06 m/s    E wave deceleration time 168.51 msec    MV Peak E Jean 1.08 m/s    RA Major Axis 5.55 cm    RA Width 3.84 cm    RVDD 4.50 cm    Sinus 2.90 cm    TAPSE 3.50 cm    TR Max Jean 3.28 m/s    TDI LATERAL 0.15 m/s    TDI SEPTAL 0.09 m/s    LA WIDTH 4.53 cm    MV stenosis pressure 1/2 time 48.87 ms    LV Diastolic Volume 117.30 mL    LV Systolic Volume 58.32 mL    LVOT peak jean 1.33 m/s    LA volume (mod) 120.00 cm3    LV LATERAL E/E' RATIO 7.20 m/s    LV SEPTAL E/E' RATIO 12.00 m/s    FS 26 %    LA volume 128.42 cm3    LV mass 169.98 g    Left Ventricle Relative Wall Thickness 0.35 cm    AV valve area 2.54 cm2    AV Velocity Ratio 0.71     AV index (prosthetic) 0.62     MV valve area p 1/2 method 4.50 cm2    E/A ratio 1.02     Mean e' 0.12 m/s    LVOT area 4.1 cm2    LVOT stroke volume 127.82 cm3    AV peak gradient 14 mmHg    E/E' ratio 9.00 m/s    LV Systolic Volume Index 21.9 mL/m2    LV Diastolic Volume Index 44.10 mL/m2    LA Volume Index 48.3 mL/m2    LV Mass Index 64 g/m2    Triscuspid Valve Regurgitation Peak Gradient 43 mmHg    LA Volume Index (Mod) 45.1 mL/m2    BSA 2.8 m2    Right Atrial Pressure (from IVC) 8 mmHg    EF 65 %    TV resting pulmonary artery pressure 51 mmHg    Narrative    · The left ventricle is normal in size with normal systolic function.  · The estimated ejection fraction is 65%.  · Indeterminate left ventricular diastolic function.  · Moderate right ventricular enlargement with mildly to moderately reduced   right ventricular systolic function.  · The estimated PA systolic pressure is 51 mmHg.  ·  Intermediate central venous pressure (8 mmHg).  · There is pulmonary hypertension.  · Moderate left atrial enlargement.          JENNIFER:  No results found for this or any previous visit.    ASSESSMENT/PLAN:             Pre-op Assessment    I have reviewed the Patient Summary Reports.     I have reviewed the Nursing Notes. I have reviewed the NPO Status.   I have reviewed the Medications.     Review of Systems  Anesthesia Hx:  No problems with previous Anesthesia   History of prior surgery of interest to airway management or planning:          Denies Family Hx of Anesthesia complications.    Denies Personal Hx of Anesthesia complications.                    Hematology/Oncology:    Oncology Normal    -- Anemia:                                  EENT/Dental:  EENT/Dental Normal           Cardiovascular:     Hypertension       CHF                                 Pulmonary:        Sleep Apnea, CPAP pHTN               Renal/:  Renal/ Normal                 Hepatic/GI:      Liver Disease,            Musculoskeletal:  Arthritis               Neurological:           Admitted with multiple neuro complaints, possible Hepatic Encephalopathy                            Endocrine:  Endocrine Normal          Morbid Obesity / BMI > 40  Dermatological:  Skin Normal    Psych:  Psychiatric Normal                    Physical Exam  General: Well nourished, Cooperative and Alert    Airway:  Mallampati: IV / III  Mouth Opening: Normal  Tongue: Large    Dental:  Intact    Chest/Lungs:  Clear to auscultation, Normal Respiratory Rate    Heart:  Rate: Normal        Anesthesia Plan  Type of Anesthesia, risks & benefits discussed:    Anesthesia Type: Gen Natural Airway  Intra-op Monitoring Plan: Standard ASA Monitors  Post Op Pain Control Plan: multimodal analgesia and IV/PO Opioids PRN  Induction:  IV  Informed Consent: Informed consent signed with the Patient and all parties understand the risks and agree with anesthesia plan.  All questions  answered.   ASA Score: 4  Day of Surgery Review of History & Physical: H&P Update referred to the surgeon/provider.    Ready For Surgery From Anesthesia Perspective.     .

## 2024-01-10 NOTE — ASSESSMENT & PLAN NOTE
Body mass index is 40.62 kg/m². Morbid obesity complicates all aspects of disease management from diagnostic modalities to treatment.     Patient continues on ozempic as outpatient for weight loss.

## 2024-01-10 NOTE — ED NOTES
Ashwin Peter, a 58 y.o. male presents to the ED w/ complaint of feeling weird. Pt states he had a multiple of symptoms since earlier this morning. Pt's wife said he was confused, had blurry vision, and hand tremors. Pt states his blurry vision has decreased a little. Pt states he is no longer nauseous.     Adult Physical Assessment  LOC: Ashwin Peter, 58 y.o. male verified via two identifiers.  The patient is awake, alert, oriented to person, place, and time and speaking appropriately at this time.  APPEARANCE: Patient resting comfortably and appears to be in no acute distress at this time. Patient is clean and well groomed, patient's clothing is properly fastened.  SKIN:The skin is warm and dry, color consistent with ethnicity, patient has normal skin turgor and moist mucus membranes, skin intact, no breakdown or brusing noted.  MUSCULOSKELETAL: Patient moving all extremities well. Lower extremities are extremely swollen. The pt's right extremity is more swollen than the left. Generalized weakness noted to lower extremities. Bilateral hand tremors.  RESPIRATORY: Airway is open and patent, respirations are spontaneous, patient has a normal effort and rate, no accessory muscle use noted. Denies SOB  CARDIAC: Patient has a normal rate and rhythm, no periphreal edema noted in any extremity, capillary refill < 3 seconds in all extremities  ABDOMEN: Soft and non tender to palpation, no abdominal distention noted. Denies abdominal pain. Pt states he isn't nauseous at this time.   NEUROLOGIC: Eyes open spontaneously, behavior appropriate to situation, follows commands, facial expression symmetrical, bilateral hand grasp equal and even, purposeful motor response noted, normal sensation in all extremities when touched with a finger.      Triage note:  Chief Complaint   Patient presents with    Multiple complaints     Feeling weird, hx cirrhosis, forgetful,      Review of patient's allergies indicates:  No Known  Allergies  Past Medical History:   Diagnosis Date    Anxiety     BMI 38.0-38.9,adult     Hyperlipidemia     Hypervolemia 07/22/2022    Multiple pulmonary nodules     Obesity     Other cirrhosis of liver 01/13/2021    Portal hypertension 02/11/2021    Rectal bleeding 02/11/2021    Sarcoidosis of lung with sarcoidosis of lymph nodes     Sleep apnea     CPAP USED    Splenomegaly 12/22/2020    Thrombocytopenia 12/22/2020

## 2024-01-10 NOTE — ASSESSMENT & PLAN NOTE
-patient with sleep disturbances, intermittent confusion, slowed thought process and asterixis   -Ammonia elevated in setting of franklin cirrhosis - likely diagnosis is new onset hepatic encephalopathy - Grade1-2  -He's received lactulose 10gram and had 1 BM, feeling a little better   >continue 20gram every 6 hours until pt has at least 2 bm then taper the frequency and dosing to maintenance level for 2-3 soft BM per day  -GIven N/V - start treatment & workup for UGIB  -Obtain Liver doppler to r/o portal vein thrombosis  -New Neutropenia/leukopenia - UA w/o infection, obtain CXR.  CT head notes right paranasl sinus  ?sinusitis, given symptom treatment for URI @ Urgent care 1 week prior - monitor CBC & WBC, consider empiric antibiotics.   -no neuropsych meds that might precipitate encephalopathy  -consult Hepatology to assist in eval/management.

## 2024-01-10 NOTE — PROGRESS NOTES
[unfilled]   [address]  [place] [address]  Phone: [phone]  Fax: [phone]          Return to Work/School    Patient: Ashwin Peter  YOB: 1965   Date: 01/10/2024     To Whom It May Concern:     Ashwin Peter   Was [facility] on 1/09/[facility]24. He has been seen and treated. May return to work on 1/13/2024.      If you have any questions or concerns, or if I can be of further assistance, please do not hesitate to contact me.     Sincerely,    Lucio Waggoner RN

## 2024-01-10 NOTE — CONSULTS
[facility]  Hepatology  Consult Note    Patient Name: Ashwin Peter  MRN: [id number]  Admission Date: 1/9/2024  Hospital Length of Stay: 1 days  Attending Provider: Katie Meeks MD   Primary Care Physician: Sergio Guo III, MD  Principal Problem:Hepatic encephalopathy    Inpatient consult to Hepatology  Consult performed by: Nadeen Amaya MD  Consult ordered by: Deangelo Barlow MD        Subjective:     Transplant status: No    HPI:  59 y/o M with hx of GUAJARDO Cirrhosis, LAW, Sarcoidosis, Pulm HTN, Lymphedema presents to the ED with tremor, encephalopathy, and burgundy emesis. Reports 3x bloody emesis prior to admission, no N/V this morning at time of exam. He had an endoscopy in 4/23 where he had small varices, esophageal and duodenal ulcers. Colonoscopy done 4/21 with diverticulosis and a polyp that was resected. MELD score at time of admission at 14 which is his baseline. This morning patient mentation greatly improved, no asterixis or ascites present on exam. A&O x3. On labs pt was pancytopenic with PLT at 105, Tbili elevated at 2.8, ammonia of 74. Pt was started on lactulose q6h, and protonix IV for UGIB. GI also consulted for potential scope.    Review of Systems   Constitutional:  Negative for activity change, appetite change, chills, fatigue and fever.   HENT:  Positive for sinus pressure and sore throat.    Eyes:  Negative for photophobia and visual disturbance.   Respiratory:  Positive for cough. Negative for chest tightness, shortness of breath and wheezing.    Cardiovascular:  Negative for chest pain.   Gastrointestinal:  Negative for abdominal distention, abdominal pain, blood in stool, constipation, diarrhea, nausea and vomiting.   Genitourinary:  Negative for difficulty urinating and dysuria.   Musculoskeletal:  Negative for gait problem.   Skin:  Negative for color change.   Neurological:  Negative for dizziness, weakness, light-headedness and headaches.   Psychiatric/Behavioral:   Positive for confusion (improving). Negative for agitation and behavioral problems.        Past Medical History:   Diagnosis Date    Anxiety     BMI 38.0-38.9,adult     Hyperlipidemia     Hypervolemia 07/22/2022    Multiple pulmonary nodules     Obesity     Other cirrhosis of liver 01/13/2021    Portal hypertension 02/11/2021    Rectal bleeding 02/11/2021    Sarcoidosis of lung with sarcoidosis of lymph nodes     Sleep apnea     CPAP USED    Splenomegaly 12/22/2020    Thrombocytopenia 12/22/2020       Past Surgical History:   Procedure Laterality Date    ANTERIOR CRUCIATE LIGAMENT REPAIR  2007    right knee    COLONOSCOPY N/A 4/8/2021    Procedure: COLONOSCOPY;  Surgeon: Jeff Olvera MD;  Location: [facility] ENDO (2ND FLR);  Service: Endoscopy;  Laterality: N/A;  rectal bleeding,   2nd floor BMI 50 (354 lbs)  COVID [facility] on 4/5/21 at [facility]    ESOPHAGOGASTRODUODENOSCOPY N/A 4/8/2021    Procedure: EGD (ESOPHAGOGASTRODUODENOSCOPY);  Surgeon: Jeff Olvera MD;  [facility]  Service: Endoscopy;  Laterality: N/A;  variceal screening/ labs the am of procedure  2nd floor BMI 50 (354 lbs)    ESOPHAGOGASTRODUODENOSCOPY N/A 3/31/2022    Procedure: EGD (ESOPHAGOGASTRODUODENOSCOPY);  Surgeon: Jeff Olvera MD;  Location: [facility] ENDO (2ND FLR);  Service: Endoscopy;  Laterality: N/A;  BMI-52    Wt:375#      cirrhosis, variceal screening-labs done on 3/29-GT  vaccinated-GT    ESOPHAGOGASTRODUODENOSCOPY N/A 4/21/2023    Procedure: EGD (ESOPHAGOGASTRODUODENOSCOPY);  Surgeon: Christopher Britton MD;  [facility]  Service: Endoscopy;  Laterality: N/A;  pt requested Friday due to work schedule  ECHO PA 44-51  BMI 47.97 Wt 343 lbs  inst portal-RB  last CBC PT/INR 1/24/23 4/17/23- Precall confirmed.    LYMPHADENECTOMY  1985    MENISCECTOMY      left knee    NASAL SEPTUM SURGERY  1985    TOTAL KNEE ARTHROPLASTY Right 11/7/2023    Procedure: ARTHROPLASTY, KNEE, TOTAL;  Surgeon: Darnell Liao  MD ANDREW;  Location: [facility];  Service: Orthopedics;  Laterality: Right;    WISDOM TOOTH EXTRACTION         Family history of liver disease: No    Review of patient's allergies indicates:  No Known Allergies      Tobacco Use    Smoking status: Never     Passive exposure: Never    Smokeless tobacco: Never   Substance and Sexual Activity    Alcohol use: No    Drug use: No    Sexual activity: Not on file       Medications Prior to Admission   Medication Sig Dispense Refill Last Dose    aspirin 81 MG Chew Take 1 tablet (81 mg total) by mouth 2 (two) times daily. 60 tablet 0 Past Month    benzonatate (TESSALON) 100 MG capsule Take 100 mg by mouth 3 (three) times daily as needed for Cough.   Past Week    fluticasone propionate (FLONASE) 50 mcg/actuation nasal spray 2 sprays by Each Nostril route daily as needed for Rhinitis or Allergies.   Past Week    LORATADINE-D  mg per 24 hr tablet Take 1 tablet by mouth once daily.   Past Week    omeprazole (PRILOSEC) 40 MG capsule Take 40 mg by mouth daily as needed (acid reflux).   Past Week    ondansetron (ZOFRAN-ODT) 8 MG TbDL Take 8 mg by mouth every 8 (eight) hours as needed (nausea).   Past Week    potassium chloride (KLOR-CON) 10 MEQ TbSR TAKE 1 TABLET(10 MEQ) BY MOUTH EVERY DAY 90 tablet 3 Past Week    predniSONE (DELTASONE) 5 MG tablet Take 5 mg by mouth once daily.   Past Month    semaglutide (OZEMPIC) 2 mg/dose (8 mg/3 mL) PnIj Inject 2 mg into the skin every 7 days. (Patient taking differently: Inject 2 mg into the skin every 7 days. On Friday.) 9 mL 0 Past Week    torsemide (DEMADEX) 20 MG Tab Take 2 tabs 40mg in the morning and 20 mg (1tab) in the afternoon.   Past Week    diclofenac sodium (VOLTAREN) 1 % Gel Apply topically twice daily as needed for knee pain   More than a month       Objective:     Vital Signs (Most Recent):  Temp: 98 °F (36.7 °C) (01/10/24 0457)  Pulse: 82 (01/10/24 0457)  Resp: 18 (01/10/24 0457)  BP: 132/61 (01/10/24 0457)  SpO2: (!) 93 %  (01/10/24 2588) Vital Signs (24h Range):  Temp:  [98 °F (36.7 °C)-98.6 °F (37 °C)] 98 °F (36.7 °C)  Pulse:  [80-91] 82  Resp:  [10-22] 18  SpO2:  [93 %-100 %] 93 %  BP: (132-160)/(58-81) 132/61     Weight: 130.9 kg (288 lb 9.3 oz) (01/10/24 6919)  Body mass index is 40.25 kg/m².       Physical Exam  Vitals and nursing note reviewed.   Constitutional:       General: He is not in acute distress.     Appearance: Normal appearance. He is not ill-appearing.   HENT:      Head: Normocephalic and atraumatic.      Right Ear: External ear normal.      Left Ear: External ear normal.      Nose: Nose normal.   Eyes:      General: No scleral icterus.     Conjunctiva/sclera: Conjunctivae normal.      Pupils: Pupils are equal, round, and reactive to light.   Cardiovascular:      Rate and Rhythm: Normal rate and regular rhythm.   Pulmonary:      Effort: Pulmonary effort is normal. No respiratory distress.   Abdominal:      General: Abdomen is flat. There is no distension.      Tenderness: There is no abdominal tenderness. There is no guarding.   Musculoskeletal:         General: Normal range of motion.      Cervical back: Normal range of motion.      Right lower leg: Edema present.      Left lower leg: Edema present.   Skin:     Coloration: Skin is not jaundiced.      Findings: No bruising.   Neurological:      Mental Status: He is alert and oriented to person, place, and time.      Comments: Still with mild memory impairment though improving  No asterixis            MELD 3.0: 15 at 1/10/2024  6:31 AM  MELD-Na: 12 at 1/10/2024  6:31 AM  Calculated from:  Serum Creatinine: 0.9 mg/dL (Using min of 1 mg/dL) at 1/10/2024  6:31 AM  Serum Sodium: 139 mmol/L (Using max of 137 mmol/L) at 1/10/2024  6:31 AM  Total Bilirubin: 2.8 mg/dL at 1/10/2024  6:31 AM  Serum Albumin: 2.3 g/dL at 1/10/2024  6:31 AM  INR(ratio): 1.2 at 1/10/2024  6:31 AM  Age at listing (hypothetical): 58 years  Sex: Male at 1/10/2024  6:31 AM      Significant Labs:  CBC:    Recent Labs   Lab 01/10/24  0631   WBC 2.78*   RBC 3.95*   HGB 11.5*   HCT 36.6*   *     CMP:   Recent Labs   Lab 01/10/24  0631   GLU 82   CALCIUM 8.3*   ALBUMIN 2.3*   PROT 6.0      K 4.1   CO2 21*   *   BUN 17   CREATININE 0.9   ALKPHOS 116   ALT 17   AST 33   BILITOT 2.8*       Significant Imaging:  X-Ray Chest AP Portable    Result Date: 1/10/2024  See above Electronically signed by: Luis Daniel Hadley MD Date:    01/10/2024 Time:    09:00    US Liver with Doppler (xpd)    Result Date: 1/10/2024  Micronodular small liver,  and multiple collateral vessels suggestive of increased portal venous pressure, no thrombus identified. Reversal of flow within the portal venous system, mesenteric vein and splenic vein, due to decreased elasticity of the liver from chronic cirrhosis, no thrombus identified. Splenomegaly. Electronically signed by: Amanda Shultz MD Date:    01/10/2024 Time:    08:50    CT Head Without Contrast    Result Date: 1/9/2024  1. No acute intracranial process. 2. Prominent paranasal sinus disease with air-fluid levels.  Acute sinusitis is a consideration. Electronically signed by: Andrea Garvin Date:    01/09/2024 Time:    18:38     Assessment/Plan:     GI  * Hepatic encephalopathy  Presenting with confusion, tremors, forgetfulness. Ammonia elevated at 74 on admission. No longer having asterixis, memory improving, patient not confused and A&O x3. Patient had 2 BM's last night and this AM. Liver US showing Micronodular small liver, and multiple collateral vessels suggestive of increased portal venous pressure, no thrombus identified.    Recommendations:   - Continue Lactulose, titrate to have 3 BM's daily  - Recommend SBP prophylaxis for 5 day course, can transition to PO Ciprofloxacin upon DC  - Patient can be discharged today after EGD if no further workup needed from GI or primary team standpoint  - Agree with rest of care per GI/primary team          Thank you for your  consult. I will follow-up with patient. Please contact us if you have any additional questions.    Nadeen Amaya MD  Hepatology  [facility] - Endoscopy

## 2024-01-10 NOTE — NURSING TRANSFER
Nursing Transfer Note      1/10/2024   12:06 PM    Nurse giving handoff: KATERIN Tafoya   Nurse receiving handoff:KATERIN Valdes TSU    Reason patient is being transferred: post anesthesia     Transfer From: PACU to [facility]    Transfer via stretcher    Transfer with n/a    Transported by EZEQUIEL Ram    Transfer Vital Signs:  Blood Pressure:133/69  Heart Rate:84  O2:96  Temperature:98.4  Respirations:20    4eyes on Skin: yes    Medicines sent: none    Any special needs or follow-up needed: none    Patient belongings transferred with patient:  n/a    Chart send with patient: Yes    Notified: spouse    Patient reassessed at: 11/10/24 @ 1132

## 2024-01-10 NOTE — TRANSFER OF CARE
"Anesthesia Transfer of Care Note    Patient: Ashwin Peter    Procedure(s) Performed: Procedure(s) (LRB):  EGD (ESOPHAGOGASTRODUODENOSCOPY) (N/A)    Patient location: PACU    Anesthesia Type: general    Transport from OR: Transported from OR on room air with adequate spontaneous ventilation    Post pain: adequate analgesia    Post assessment: no apparent anesthetic complications and tolerated procedure well    Post vital signs: stable    Level of consciousness: awake, alert and oriented    Nausea/Vomiting: no nausea/vomiting    Complications: none    Transfer of care protocol was followed      Last vitals: Visit Vitals  BP (!) 138/67 (BP Location: Left arm, Patient Position: Lying)   Pulse 79   Temp 36.7 °C (98.1 °F) (Temporal)   Resp 16   Ht 5' 11" (1.803 m)   Wt 130.9 kg (288 lb 9.3 oz)   SpO2 98%   BMI 40.25 kg/m²     "

## 2024-01-10 NOTE — HPI
This is a 58-year-old man with history of GUAJAROD Cirrhosis, LAW (on CPAP), Sarcoidosis, Pulm HTN, Lymphedema presents with multiple neurologic complaints.  He has noted in recent days the onset of tremor, forgetfulness, intermittent confusion, intermittent blurred vision. It was reported that he was very confused and had asterixis yesterday, but today he is alert and oriented and says that he feels more like himself and that he just feels like his cognition is slightly slowed. On 1/2 he presented to urgent care and was treated for an upper respiratory infection. He reports post-tussive emesis on 1/2, 1/3, and 1/4 that he described as containing a small amount of a burgundy colored substance. He recently lost >100lbs prior to having knee replacement surgery in November, recently completed post-op dvt ppx course of baby ASA BID. He denies any NSAID or anticoagulation use, and says he only takes Tylenol for his knee pain. His EGDs since 2021 have continued to show recurrent non-bleeding duodenal ulcers. Recently evaluated for ZES but gastrin level was WNL. Patient takes Ozempic and reports his last dose was on 1/5/24.      Labs notable for Hgb 11.5 (baseline 12-13), platelets 102, INR 1.2, BUN 17, sCr 0.7, Albumin 2.3, Bilirubin 2.8, AST 33, ALT 17, and ammonia 74.     EGD 2023   - Small (< 5 mm) esophageal varices.   - Esophageal ulcers.   - Portal hypertensive gastropathy.   - Non-bleeding duodenal ulcers.      EGD 2022  - Grade I esophageal varices.   - Portal hypertensive gastropathy, biopsied.   - Duodenal ulcers with pigmented material, treated with bipolar cautery.      EGD 2021  - Grade I esophageal varices with LA Grade B reflux esophagitis with no bleeding.   - Erosive gastropathy, biopsied.   - Multiple non-bleeding superficial duodenal ulcers with no stigmata of bleeding, biopsied.      Colonoscopy 2021          - Diverticulosis in the sigmoid colon.   - One small colon polyp was resected and retrieved.

## 2024-01-10 NOTE — PLAN OF CARE
[facility] - Transplant Stepdown  Initial Discharge Assessment       Primary Care Provider: Sergio Guo III, MD    Admission Diagnosis: Confusion [R41.0]  Hyperammonemia [E72.20]  Pulmonary hypertension [I27.20]  Chest pain [R07.9]    Admission Date: 1/9/2024  Expected Discharge Date: 1/10/2024    Transition of Care Barriers: None    Payor: [employer] [place] / Plan: [employer] / Product Type: PPO /     Extended Emergency Contact Information  Primary Emergency Contact: Daysi Peter  Address: [address] [place] [address] [place]  Mobile Phone: [phone]  Relation: Spouse    Discharge Plan A: Home with family  Discharge Plan B: Home      [id number] [place] [facility] [address] AT [facility] & [address] [address]  Phone: [phone] Fax: [phone]      Initial Assessment (most recent)       Adult Discharge Assessment - 01/10/24 1541          Discharge Assessment    Assessment Type Discharge Planning Assessment     Confirmed/corrected address, phone number and insurance Yes     Confirmed Demographics Correct on Facesheet     Source of Information patient     Reason For Admission hepatic encephalopathy     People in Home spouse;other relative(s)     Facility Arrived From: home     Do you expect to return to your current living situation? Yes     Do you have help at home or someone to help you manage your care at home? Yes     Who are your caregiver(s) and their phone number(s)? wife Daysi Peter [phone]     Prior to hospitilization cognitive status: Alert/Oriented     Current cognitive status: Alert/Oriented     Walking or Climbing Stairs Difficulty yes     Walking or Climbing Stairs ambulation difficulty, requires equipment     Dressing/Bathing Difficulty no     Home Layout Able to live on 1st floor     Equipment Currently Used at Home cane, straight;CPAP     Do you currently have  service(s) that help you manage your care at home? No     Do you have prescription coverage? Yes     Do you have any problems affording any of your prescribed medications? No     Who is going to help you get home at discharge? wife     How do you get to doctors appointments? family or friend will provide;car, drives self     Are you on dialysis? No     Do you take coumadin? No     Discharge Plan A Home with family     Discharge Plan B Home     DME Needed Upon Discharge  other (see comments)   TBD    Transition of Care Barriers None        Physical Activity    On average, how many days per week do you engage in moderate to strenuous exercise (like a brisk walk)? 0 days   recent knee replacement       Financial Resource Strain    How hard is it for you to pay for the very basics like food, housing, medical care, and heating? Not very hard        Housing Stability    In the last 12 months, was there a time when you were not able to pay the mortgage or rent on time? No     In the last 12 months, was there a time when you did not have a steady place to sleep or slept in a shelter (including now)? No        Transportation Needs    In the past 12 months, has lack of transportation kept you from medical appointments or from getting medications? No     In the past 12 months, has lack of transportation kept you from meetings, work, or from getting things needed for daily living? No        Food Insecurity    Within the past 12 months, you worried that your food would run out before you got the money to buy more. Never true     Within the past 12 months, the food you bought just didn't last and you didn't have money to get more. Never true        Social Connections    In a typical week, how many times do you talk on the phone with family, friends, or neighbors? More than three times a week     How often do you get together with friends or relatives? More than three times a week     Are you , , , ,  never , or living with a partner?         Alcohol Use    Q2: How many drinks containing alcohol do you have on a typical day when you are drinking? Patient does not drink                   Pt lives in a 1 story home with 1 step to enter with his wife and niece.  Pt uses a cane and CPAP machine as home DME, denies HD/BT/home O2.  Pt's wife will provide transportation home at time of d/c.  Will continue to follow for d/c needs.    Discharge Plan A and Plan B have been determined by review of patient's clinical status, future medical and therapeutic needs, and coverage/benefits for post-acute care in coordination with multidisciplinary team members.     Roscoe Montelongo RN CM  Case Management  [id number]

## 2024-01-10 NOTE — PHARMACY MED REC
"Admission Medication History     The home medication history was taken by Uzair Avila.    You may go to "Admission" then "Reconcile Home Medications" tabs to review and/or act upon these items.     The home medication list has been updated by the Pharmacy department.   Please read ALL comments highlighted in yellow.   Please address this information as you see fit.    Feel free to contact us if you have any questions or require assistance.      The medications listed below were removed from the home medication list. Please reorder if appropriate:  Patient reports no longer taking the following medication(s):  FARXIGA 10 MG TABLET  HYDROCODONE-ACETAMINOPHEN  MG TABLET  HYDROXYZINE 25 MG TABLET  PANTOPRAZOLE 40 MG TABLET  PHENYLEPHRINE SINUS NASAL SPRAY  POTASSIUM PHOSPHATE 500 MG TABLET    Medications listed below were obtained from: Patient/family and [employer]- [place] [employer] Medications   Medication Sig    benzonatate (TESSALON) 100 MG capsule   Take 100 mg by mouth 3 (three) times daily as needed for cough.    fluticasone propionate (FLONASE) 50 mcg/actuation nasal spray   Instill 2 sprays by each nostril route daily as needed for rhinitis or allergies.    LORATADINE-D  mg per 24 hr tablet   Take 1 tablet by mouth once daily.    omeprazole (PRILOSEC) 40 MG capsule   Take 40 mg by mouth daily as needed for acid reflux.    ondansetron (ZOFRAN-ODT) 8 MG TbDL   Take 8 mg by mouth every 8 (eight) hours as needed for nausea.    potassium chloride (KLOR-CON) 10 MEQ TbSR   Take 1 tablet by mouth once daily.    semaglutide (OZEMPIC) 2 mg/dose (8 mg/3 mL) PnIj   Inject 2 mg into the skin every 7 days on Friday.    torsemide (DEMADEX) 20 MG Tab   Take 2 tablets by mouth in the morning and take 1 tablet by mouth in the afternoon.    aspirin 81 MG Chew     Take 1 tablet (81 mg total) by mouth 2 (two) times daily.   (Patient not taking: Reported on 1/9/2024)      diclofenac sodium (VOLTAREN) 1 % Gel   " Apply topically twice daily as needed for knee pain.  (Patient not taking: Reported on 1/9/2024)       Potential issues to be addressed PRIOR TO DISCHARGE  Please discuss with the patient barriers to adherence with medication treatment plans  Patient requires education regarding drug therapies     Uzair Avila  [phone] [id number]                  .

## 2024-01-10 NOTE — ASSESSMENT & PLAN NOTE
Noted on CBC   -ANC approx 1700  -trend at this time  -UA negative, obtain CXR  -no acute fevers, stable lactic level, no other signs of end organ damagne

## 2024-01-10 NOTE — HPI
59 y/o M with hx of GUAJARDO Cirrhosis, LAW, Sarcoidosis, Pulm HTN, Lymphedema presents to the ED with tremor, encephalopathy, and burgundy emesis. Reports 3x bloody emesis prior to admission, no N/V this morning at time of exam. He had an endoscopy in 4/23 where he had small varices, esophageal and duodenal ulcers. Colonoscopy done 4/21 with diverticulosis and a polyp that was resected. MELD score at time of admission at 14 which is his baseline. This morning patient mentation greatly improved, no asterixis or ascites present on exam. A&O x3. On labs pt was pancytopenic with PLT at 105, Tbili elevated at 2.8, ammonia of 74. Pt was started on lactulose q6h, and protonix IV for UGIB. GI also consulted for potential scope.   Statement Selected

## 2024-01-11 ENCOUNTER — TELEPHONE (OUTPATIENT)
Dept: FAMILY MEDICINE | Facility: CLINIC | Age: 59
End: 2024-01-11
Payer: OTHER GOVERNMENT

## 2024-01-11 NOTE — SUBJECTIVE & OBJECTIVE
Review of Systems   Constitutional:  Negative for activity change, chills and fever.   HENT:  Positive for sinus pressure and sore throat.    Eyes:  Negative for visual disturbance.   Respiratory:  Positive for cough. Negative for chest tightness, shortness of breath and wheezing.    Cardiovascular:  Negative for chest pain.   Gastrointestinal:  Negative for abdominal distention, abdominal pain, blood in stool, nausea and vomiting.   Genitourinary:  Negative for dysuria.   Musculoskeletal:  Negative for gait problem.   Skin:  Negative for color change.   Neurological:  Negative for dizziness and light-headedness.   Psychiatric/Behavioral:  Negative for behavioral problems and confusion.      Objective:     Vital Signs (Most Recent):  Temp: 98.1 °F (36.7 °C) (01/10/24 1544)  Pulse: 86 (01/10/24 1544)  Resp: 18 (01/10/24 1544)  BP: (!) 138/59 (01/10/24 1544)  SpO2: 97 % (01/10/24 1544) Vital Signs (24h Range):  Temp:  [98.1 °F (36.7 °C)] 98.1 °F (36.7 °C)  Pulse:  [86] 86  Resp:  [18] 18  SpO2:  [97 %] 97 %  BP: (138)/(59) 138/59     Weight: 130.9 kg (288 lb 9.3 oz) (01/10/24 0545)  Body mass index is 40.25 kg/m².       Physical Exam  Vitals and nursing note reviewed.   Constitutional:       General: He is not in acute distress.     Appearance: Normal appearance. He is not ill-appearing.   HENT:      Head: Normocephalic and atraumatic.      Nose: Nose normal.   Eyes:      General: No scleral icterus.     Extraocular Movements: Extraocular movements intact.   Cardiovascular:      Rate and Rhythm: Normal rate and regular rhythm.      Heart sounds: No murmur heard.  Pulmonary:      Effort: Pulmonary effort is normal. No respiratory distress.   Abdominal:      General: Abdomen is flat. There is no distension.      Tenderness: There is no abdominal tenderness.   Musculoskeletal:         General: Normal range of motion.      Cervical back: Normal range of motion.      Right lower leg: Edema present.      Left lower leg:  Edema present.   Skin:     Coloration: Skin is not jaundiced.      Findings: No bruising.   Neurological:      Mental Status: He is alert and oriented to person, place, and time.      Comments: Still with mild memory impairment though improving  No asterixis   Psychiatric:         Behavior: Behavior normal.            MELD 3.0: 15 at 1/10/2024  6:31 AM  MELD-Na: 12 at 1/10/2024  6:31 AM  Calculated from:  Serum Creatinine: 0.9 mg/dL (Using min of 1 mg/dL) at 1/10/2024  6:31 AM  Serum Sodium: 139 mmol/L (Using max of 137 mmol/L) at 1/10/2024  6:31 AM  Total Bilirubin: 2.8 mg/dL at 1/10/2024  6:31 AM  Serum Albumin: 2.3 g/dL at 1/10/2024  6:31 AM  INR(ratio): 1.2 at 1/10/2024  6:31 AM  Age at listing (hypothetical): 58 years  Sex: Male at 1/10/2024  6:31 AM      Significant Labs:  CBC:   Recent Labs   Lab 01/10/24  1334   WBC 2.98*   RBC 4.24*   HGB 12.4*   HCT 39.1*   PLT 99*       CMP:   Recent Labs   Lab 01/10/24  0631   GLU 82   CALCIUM 8.3*   ALBUMIN 2.3*   PROT 6.0      K 4.1   CO2 21*   *   BUN 17   CREATININE 0.9   ALKPHOS 116   ALT 17   AST 33   BILITOT 2.8*         Significant Imaging:  X-Ray Chest AP Portable    Result Date: 1/10/2024  See above Electronically signed by: Luis Daniel Hadley MD Date:    01/10/2024 Time:    09:00    US Liver with Doppler (xpd)    Result Date: 1/10/2024  Micronodular small liver,  and multiple collateral vessels suggestive of increased portal venous pressure, no thrombus identified. Reversal of flow within the portal venous system, mesenteric vein and splenic vein, due to decreased elasticity of the liver from chronic cirrhosis, no thrombus identified. Splenomegaly. Electronically signed by: Amanda Shultz MD Date:    01/10/2024 Time:    08:50    CT Head Without Contrast    Result Date: 1/9/2024  1. No acute intracranial process. 2. Prominent paranasal sinus disease with air-fluid levels.  Acute sinusitis is a consideration. Electronically signed by: Andrea Garvin  Date:    01/09/2024 Time:    18:38

## 2024-01-11 NOTE — ASSESSMENT & PLAN NOTE
Co-morbidities are present and inclusive of hepatic encephalopathy.  MELD-Na score calculated; MELD 3.0: 15 at 1/10/2024  6:31 AM  MELD-Na: 12 at 1/10/2024  6:31 AM  Calculated from:  Serum Creatinine: 0.9 mg/dL (Using min of 1 mg/dL) at 1/10/2024  6:31 AM  Serum Sodium: 139 mmol/L (Using max of 137 mmol/L) at 1/10/2024  6:31 AM  Total Bilirubin: 2.8 mg/dL at 1/10/2024  6:31 AM  Serum Albumin: 2.3 g/dL at 1/10/2024  6:31 AM  INR(ratio): 1.2 at 1/10/2024  6:31 AM  Age at listing (hypothetical): 58 years  Sex: Male at 1/10/2024  6:31 AM      Continue chronic meds. Etiology likely NAFLD. Will avoid any hepatotoxic meds, and monitor CBC/CMP/INR for synthetic function.     US Liver on 1/10 Micronodular small liver,  and multiple collateral vessels suggestive of increased portal venous pressure, no thrombus identified. Reversal of flow within the portal venous system, mesenteric vein and splenic vein, due to decreased elasticity of the liver from chronic cirrhosis, no thrombus identified. Splenomegaly.     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     Seen by GI and was evaluated by EGD on 1/10 which reported Normal esophagus, stomach and duodenum. No specimens collected. No source of reported hematemesis seen on exam. Could possibly have been a Bianka Rogers tear that since may have healed or possibly upper respiratory.     Mentation improved with continued lactulose. Recommend continued PPI and antibiotics. Will treat with additional 4 days of antibiotics. Vitals remained stable and HB stable. Will recommend outpatient follow up with PCP and hepatology

## 2024-01-11 NOTE — TELEPHONE ENCOUNTER
Patient states he has a hospital follow up visit scheduled tomorrow, 1/12, and he will be out of town. Wanted an appointment for today. Informed no availability. Will be out of town for > 1 week. Given appointment on 1/24. Verbalized understanding.

## 2024-01-11 NOTE — HOSPITAL COURSE
CT  Head on admission reported No acute intracranial process.  And prominent paranasal sinus disease with air-fluid levels.  Acute sinusitis is a consideration     US Liver on 1/10 Micronodular small liver,  and multiple collateral vessels suggestive of increased portal venous pressure, no thrombus identified. Reversal of flow within the portal venous system, mesenteric vein and splenic vein, due to decreased elasticity of the liver from chronic cirrhosis, no thrombus identified. Splenomegaly.     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     Seen by GI and was evaluated by EGD on 1/10 which reported Normal esophagus, stomach and duodenum. No specimens collected. No source of reported hematemesis seen on exam. Could possibly have been a Bianka Rogers tear that since may have healed or possibly upper respiratory.     Mentation improved with continued lactulose. Recommend continued PPI and antibiotics. Will treat with additional 4 days of antibiotics. Vitals remained stable and HB stable. Will recommend outpatient follow up with PCP and hepatology     Review of Systems   Constitutional:  Negative for activity change, chills and fever.   HENT:  Positive for sinus pressure and sore throat.    Eyes:  Negative for visual disturbance.   Respiratory:  Positive for cough. Negative for chest tightness, shortness of breath and wheezing.    Cardiovascular:  Negative for chest pain.   Gastrointestinal:  Negative for abdominal distention, abdominal pain, blood in stool, nausea and vomiting.   Genitourinary:  Negative for dysuria.   Musculoskeletal:  Negative for gait problem.   Skin:  Negative for color change.   Neurological:  Negative for dizziness and light-headedness.   Psychiatric/Behavioral:  Negative for behavioral problems and confusion.      Objective:     Vital Signs (Most Recent):  Temp: 98.1 °F (36.7 °C) (01/10/24 1544)  Pulse: 86 (01/10/24  1544)  Resp: 18 (01/10/24 1544)  BP: (!) 138/59 (01/10/24 1544)  SpO2: 97 % (01/10/24 1544) Vital Signs (24h Range):  Temp:  [98.1 °F (36.7 °C)] 98.1 °F (36.7 °C)  Pulse:  [86] 86  Resp:  [18] 18  SpO2:  [97 %] 97 %  BP: (138)/(59) 138/59     Weight: 130.9 kg (288 lb 9.3 oz) (01/10/24 0545)  Body mass index is 40.25 kg/m².       Physical Exam  Vitals and nursing note reviewed.   Constitutional:       General: He is not in acute distress.     Appearance: Normal appearance. He is not ill-appearing.   HENT:      Head: Normocephalic and atraumatic.      Nose: Nose normal.   Eyes:      General: No scleral icterus.     Extraocular Movements: Extraocular movements intact.   Cardiovascular:      Rate and Rhythm: Normal rate and regular rhythm.      Heart sounds: No murmur heard.  Pulmonary:      Effort: Pulmonary effort is normal. No respiratory distress.   Abdominal:      General: Abdomen is flat. There is no distension.      Tenderness: There is no abdominal tenderness.   Musculoskeletal:         General: Normal range of motion.      Cervical back: Normal range of motion.      Right lower leg: Edema present.      Left lower leg: Edema present.   Skin:     Coloration: Skin is not jaundiced.      Findings: No bruising.   Neurological:      Mental Status: He is alert and oriented to person, place, and time.      Comments: Still with mild memory impairment though improving  No asterixis   Psychiatric:         Behavior: Behavior normal.

## 2024-01-11 NOTE — ASSESSMENT & PLAN NOTE
Chronic sarcoidosis   Requests for prednisone prescription as not sent by pulmonology  Will arrange prednisone

## 2024-01-11 NOTE — ASSESSMENT & PLAN NOTE
He has chronic lymphedema and RLE lymphedema is worse s/p TKA -  follows with orthopedic surgery  Resume home Torsemide of 40 daily and 20 nightly

## 2024-01-11 NOTE — DISCHARGE SUMMARY
"[facility] Medicine  Discharge Summary      Patient Name: Ashwin Peter  MRN: [id number]  HARI: [id number]  Patient Class: OP- Observation  Admission Date: 1/9/2024  Hospital Length of Stay: 1 days  Discharge Date and Time: 1/10/2024  6:08 PM  Attending Physician: No att. providers found   Discharging Provider: Katie Meeks MD  Primary Care Provider: Sergio Guo III, MD  [facility] Medicine Team: [facility] MED X Katie Meeks MD  Primary Care Team: [facility] HOSP MED X    HPI:   57 y/o WM hx of GUAJARDO Cirrhosis, LAW, Sarcoidosis, Pulm HTN, Lymphedema presents to the ED with multiple neurologic complaints.  He has noted in recent days the onset of tremor, forgetfulness, intermittent confusion, intermittent blurred vision.  More recently has had nausea/vomiting and dry heaves, reports emesis is not williams blood but has burgundy appearance.   He denies any abdominal pain but reported a "strange feeling" to ED staff.     He has recent hx of right knee replacement, had to lose >100lb prior to having surgery in November, recently completed post-op dvt ppx course of ASA BID.       He lives with his wife & niece, works as [employer] supervisor -  [employer], has been off work x last 10 weeks due to Right TKA.    He denies any ETOH/Tob/Drug use.      He has had prior EGD/Colonoscopy denies any hx of GI bleeding, adherent to medications.   ED w/u  with CT head negative for acute process - does show paranasal air fluid level ?acute sinusitis.  He was seen @ Urgent care on 01/02 diagnosed with bronchitis, prescribed - Claritin-D, benzonatate, Flonase, Zofran.     ED treatment - given 10gram lactulose has had 1 bm since, Ammonia  74 - referred for admit for Hepatic Encephalopathy;.     Procedure(s) (LRB):  EGD (ESOPHAGOGASTRODUODENOSCOPY) (N/A)      Hospital Course:   CT  Head on admission reported No acute intracranial process.  And prominent paranasal sinus disease with air-fluid levels.  Acute " sinusitis is a consideration     US Liver on 1/10 Micronodular small liver,  and multiple collateral vessels suggestive of increased portal venous pressure, no thrombus identified. Reversal of flow within the portal venous system, mesenteric vein and splenic vein, due to decreased elasticity of the liver from chronic cirrhosis, no thrombus identified. Splenomegaly.     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     Seen by GI and was evaluated by EGD on 1/10 which reported Normal esophagus, stomach and duodenum. No specimens collected. No source of reported hematemesis seen on exam. Could possibly have been a Bianka Rogers tear that since may have healed or possibly upper respiratory.     Mentation improved with continued lactulose. Recommend continued PPI and antibiotics. Will treat with additional 4 days of antibiotics. Vitals remained stable and HB stable. Will recommend outpatient follow up with PCP and hepatology     Review of Systems   Constitutional:  Negative for activity change, chills and fever.   HENT:  Positive for sinus pressure and sore throat.    Eyes:  Negative for visual disturbance.   Respiratory:  Positive for cough. Negative for chest tightness, shortness of breath and wheezing.    Cardiovascular:  Negative for chest pain.   Gastrointestinal:  Negative for abdominal distention, abdominal pain, blood in stool, nausea and vomiting.   Genitourinary:  Negative for dysuria.   Musculoskeletal:  Negative for gait problem.   Skin:  Negative for color change.   Neurological:  Negative for dizziness and light-headedness.   Psychiatric/Behavioral:  Negative for behavioral problems and confusion.      Objective:     Vital Signs (Most Recent):  Temp: 98.1 °F (36.7 °C) (01/10/24 1544)  Pulse: 86 (01/10/24 1544)  Resp: 18 (01/10/24 1544)  BP: (!) 138/59 (01/10/24 1544)  SpO2: 97 % (01/10/24 1544) Vital Signs (24h Range):  Temp:  [98.1 °F  (36.7 °C)] 98.1 °F (36.7 °C)  Pulse:  [86] 86  Resp:  [18] 18  SpO2:  [97 %] 97 %  BP: (138)/(59) 138/59     Weight: 130.9 kg (288 lb 9.3 oz) (01/10/24 0545)  Body mass index is 40.25 kg/m².       Physical Exam  Vitals and nursing note reviewed.   Constitutional:       General: He is not in acute distress.     Appearance: Normal appearance. He is not ill-appearing.   HENT:      Head: Normocephalic and atraumatic.      Nose: Nose normal.   Eyes:      General: No scleral icterus.     Extraocular Movements: Extraocular movements intact.   Cardiovascular:      Rate and Rhythm: Normal rate and regular rhythm.      Heart sounds: No murmur heard.  Pulmonary:      Effort: Pulmonary effort is normal. No respiratory distress.   Abdominal:      General: Abdomen is flat. There is no distension.      Tenderness: There is no abdominal tenderness.   Musculoskeletal:         General: Normal range of motion.      Cervical back: Normal range of motion.      Right lower leg: Edema present.      Left lower leg: Edema present.   Skin:     Coloration: Skin is not jaundiced.      Findings: No bruising.   Neurological:      Mental Status: He is alert and oriented to person, place, and time.      Comments: Still with mild memory impairment though improving  No asterixis   Psychiatric:         Behavior: Behavior normal.      Goals of Care Treatment Preferences:  Code Status: Full Code    Living Will: Yes              Consults:   Consults (From admission, onward)          Status Ordering Provider     Inpatient consult to Gastroenterology  Once        Provider:  (Not yet assigned)    Completed CRISTI GARCÍA     Inpatient consult to Hepatology  Once        Provider:  (Not yet assigned)    Completed CRISTI GARCÍA            Pulmonary  Sarcoidosis of lung with sarcoidosis of lymph nodes    Chronic sarcoidosis   Requests for prednisone prescription as not sent by pulmonology  Will arrange prednisone         GI  Liver cirrhosis secondary to  GUAJARDO  Co-morbidities are present and inclusive of hepatic encephalopathy.  MELD-Na score calculated; MELD 3.0: 15 at 1/10/2024  6:31 AM  MELD-Na: 12 at 1/10/2024  6:31 AM  Calculated from:  Serum Creatinine: 0.9 mg/dL (Using min of 1 mg/dL) at 1/10/2024  6:31 AM  Serum Sodium: 139 mmol/L (Using max of 137 mmol/L) at 1/10/2024  6:31 AM  Total Bilirubin: 2.8 mg/dL at 1/10/2024  6:31 AM  Serum Albumin: 2.3 g/dL at 1/10/2024  6:31 AM  INR(ratio): 1.2 at 1/10/2024  6:31 AM  Age at listing (hypothetical): 58 years  Sex: Male at 1/10/2024  6:31 AM      Continue chronic meds. Etiology likely NAFLD. Will avoid any hepatotoxic meds, and monitor CBC/CMP/INR for synthetic function.     US Liver on 1/10 Micronodular small liver,  and multiple collateral vessels suggestive of increased portal venous pressure, no thrombus identified. Reversal of flow within the portal venous system, mesenteric vein and splenic vein, due to decreased elasticity of the liver from chronic cirrhosis, no thrombus identified. Splenomegaly.     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     Seen by GI and was evaluated by EGD on 1/10 which reported Normal esophagus, stomach and duodenum. No specimens collected. No source of reported hematemesis seen on exam. Could possibly have been a Bianka Rogers tear that since may have healed or possibly upper respiratory.     Mentation improved with continued lactulose. Recommend continued PPI and antibiotics. Will treat with additional 4 days of antibiotics. Vitals remained stable and HB stable. Will recommend outpatient follow up with PCP and hepatology           Other  Leg edema  He has chronic lymphedema and RLE lymphedema is worse s/p TKA -  follows with orthopedic surgery  Resume home Torsemide of 40 daily and 20 nightly      Final Active Diagnoses:    Diagnosis Date Noted POA    PRINCIPAL PROBLEM:  Hepatic encephalopathy [K76.82]  01/09/2024 Yes    Nausea & vomiting [R11.2] 01/10/2024 Yes    Multiple neurological symptoms [R29.90] 01/09/2024 Yes    Neutropenia [D70.9] 01/09/2024 Yes    LAW on CPAP [G47.33] 06/17/2022 Yes    Pulmonary hypertension [I27.20] 06/17/2022 Yes    Liver cirrhosis secondary to GUAJARDO [K75.81, K74.60] 04/08/2021 Yes    Leg edema [R60.0] 02/11/2021 Yes    Sarcoidosis of lung with sarcoidosis of lymph nodes [D86.2]  Yes    Severe obesity (BMI >= 40) [E66.01]  Yes      Problems Resolved During this Admission:       Discharged Condition: stable    Disposition: Home or Self Care    Follow Up:    Patient Instructions:      Diet Adult Regular     Order Specific Question Answer Comments   Na restriction, if any: 2gNa      Notify your health care provider if you experience any of the following:  temperature >100.4     Notify your health care provider if you experience any of the following:  persistent nausea and vomiting or diarrhea     Notify your health care provider if you experience any of the following:  severe uncontrolled pain     Notify your health care provider if you experience any of the following:  difficulty breathing or increased cough     Notify your health care provider if you experience any of the following:  increased confusion or weakness     Notify your health care provider if you experience any of the following:  temperature >100.4     Notify your health care provider if you experience any of the following:  severe uncontrolled pain     Notify your health care provider if you experience any of the following:  increased confusion or weakness     Activity as tolerated     Activity as tolerated       Significant Diagnostic Studies: Labs: CMP   Recent Labs   Lab 01/09/24  1538 01/10/24  0631    139   K 4.1 4.1    113*   CO2 22* 21*   GLU 84 82   BUN 15 17   CREATININE 0.7 0.9   CALCIUM 8.4* 8.3*   PROT 6.5 6.0   ALBUMIN 2.5* 2.3*   BILITOT 2.8* 2.8*   ALKPHOS 125 116   AST 39 33   ALT 21 17   ANIONGAP  7* 5*    and CBC   Recent Labs   Lab 01/09/24  1538 01/10/24  0631 01/10/24  1334   WBC 2.83* 2.78* 2.98*   HGB 12.0* 11.5* 12.4*   HCT 38.3* 36.6* 39.1*   * 102* 99*       Pending Diagnostic Studies:       None           Medications:  Reconciled Home Medications:      Medication List        START taking these medications      ciprofloxacin HCl 500 MG tablet  Commonly known as: CIPRO  Take 1 tablet (500 mg total) by mouth 2 (two) times a day. for 8 days     CONSTULOSE 10 gram/15 mL solution  Generic drug: lactulose  Take 23 mLs (15.3333 g total) by mouth 3 (three) times daily.            CHANGE how you take these medications      OZEMPIC 2 mg/dose (8 mg/3 mL) Pnij  Generic drug: semaglutide  Inject 2 mg into the skin every 7 days.  What changed: additional instructions     torsemide 20 MG Tab  Commonly known as: DEMADEX  Take 3 tablets (60 mg total) by mouth after dinner.  What changed:   how much to take  how to take this  when to take this  additional instructions            CONTINUE taking these medications      aspirin 81 MG Chew  Take 1 tablet (81 mg total) by mouth 2 (two) times daily.     benzonatate 100 MG capsule  Commonly known as: TESSALON  Take 100 mg by mouth 3 (three) times daily as needed for Cough.     fluticasone propionate 50 mcg/actuation nasal spray  Commonly known as: FLONASE  2 sprays by Each Nostril route daily as needed for Rhinitis or Allergies.     LORATADINE-D  mg per 24 hr tablet  Generic drug: loratadine-pseudoephedrine  mg  Take 1 tablet by mouth once daily.     omeprazole 40 MG capsule  Commonly known as: PRILOSEC  Take 40 mg by mouth daily as needed (acid reflux).     ondansetron 8 MG Tbdl  Commonly known as: ZOFRAN-ODT  Take 8 mg by mouth every 8 (eight) hours as needed (nausea).     potassium chloride 10 MEQ Tbsr  Commonly known as: KLOR-CON  TAKE 1 TABLET(10 MEQ) BY MOUTH EVERY DAY     predniSONE 5 MG tablet  Commonly known as: DELTASONE  Take 1 tablet (5 mg  total) by mouth once daily.            STOP taking these medications      diclofenac sodium 1 % Gel  Commonly known as: VOLTAREN              Indwelling Lines/Drains at time of discharge:   Lines/Drains/Airways       None                   Time spent on the discharge of patient: 45 minutes         Katie Meeks MD  Department of Hospital Medicine  [facility] - Transplant Stepdown

## 2024-01-11 NOTE — TELEPHONE ENCOUNTER
----- Message from Marina Hugo sent at 1/11/2024  9:21 AM CST -----  Type:  Same Day Appointment Request    Caller is requesting a same day appointment.  Caller declined first available appointment listed below.    Name of Caller: pt  When is the first available appointment? Pt have appt set for 1/12/24  Symptoms: hospital follow up  Best Call Back Number: [phone]  Additional Information:  pt said an appt was scheduled for him for 1/12/24 but that he is going out of town that day and needs an appt today

## 2024-01-15 LAB
BACTERIA BLD CULT: NORMAL
BACTERIA BLD CULT: NORMAL

## 2024-01-24 ENCOUNTER — OFFICE VISIT (OUTPATIENT)
Dept: FAMILY MEDICINE | Facility: CLINIC | Age: 59
End: 2024-01-24
Payer: OTHER GOVERNMENT

## 2024-01-24 ENCOUNTER — TELEPHONE (OUTPATIENT)
Dept: INTERNAL MEDICINE | Facility: CLINIC | Age: 59
End: 2024-01-24
Payer: OTHER GOVERNMENT

## 2024-01-24 DIAGNOSIS — D86.2 SARCOIDOSIS OF LUNG WITH SARCOIDOSIS OF LYMPH NODES: ICD-10-CM

## 2024-01-24 DIAGNOSIS — K76.82 HEPATIC ENCEPHALOPATHY: Primary | ICD-10-CM

## 2024-01-24 DIAGNOSIS — F41.1 GAD (GENERALIZED ANXIETY DISORDER): ICD-10-CM

## 2024-01-24 PROCEDURE — 99214 OFFICE O/P EST MOD 30 MIN: CPT | Mod: 95,,, | Performed by: STUDENT IN AN ORGANIZED HEALTH CARE EDUCATION/TRAINING PROGRAM

## 2024-01-24 RX ORDER — HYDROXYZINE HYDROCHLORIDE 25 MG/1
25 TABLET, FILM COATED ORAL 3 TIMES DAILY PRN
Qty: 90 TABLET | Refills: 1 | Status: SHIPPED | OUTPATIENT
Start: 2024-01-24

## 2024-01-24 RX ORDER — PREDNISONE 5 MG/1
5 TABLET ORAL DAILY
Qty: 30 TABLET | Refills: 1 | Status: SHIPPED | OUTPATIENT
Start: 2024-01-24 | End: 2024-03-24

## 2024-01-24 NOTE — PROGRESS NOTES
Telehealth Visit  [facility]      The patient location is: Home  The chief complaint leading to consultation is: Hospital f/u  Visit type: audiovisual    Face to Face time with patient: 13 minutes      HPI: Ashwin Peter is a 58 y.o. male with PMH  GUAJARDO Cirrhosis, LAW, Sarcoidosis, Pulm HTN, Lymphedema presenting for hospital f/u.    Admitted 1/9/24 for hepatic encephalopathy with possible variceal bleed. EGD unremarkable without signs of bleed. Mentation improved with lactulose and antibiotics. Discharged with additional 4 days of cipro and on constulose with recommendation to f/u with PCP and hepatology outpatient.     Yesterday missed his lunchtime lactulose dose and started to feel funny in the evening. Once he took the lactulose and improved. Having 1-2 Bms daily with lactulose. Otherwise has been doing well. Completed antibiotic.     Using hydroxyzine PRN for anxiety. Requesting a refill of this medication. Has had a lot of stress at work recently so needing it more. Using it 1-2 times per day. Works well for him. If he takes it 3 times a day will get sleepy but less than that does okay.     Answers submitted by the patient for this visit:  Review of Systems Questionnaire (Submitted on 1/24/2024)  activity change: Yes  unexpected weight change: No  neck pain: No  hearing loss: No  rhinorrhea: No  trouble swallowing: No  eye discharge: No  visual disturbance: Yes  chest tightness: No  wheezing: No  chest pain: No  palpitations: No  blood in stool: No  constipation: No  vomiting: No  diarrhea: No  polydipsia: No  polyuria: No  difficulty urinating: No  urgency: No  hematuria: No  joint swelling: No  arthralgias: No  headaches: No  weakness: No  confusion: Yes  dysphoric mood: No       Assessment and plan:       1. Hepatic encephalopathy  Will try to get sooner hepatology appt. Discussed if having some confusion and no BM should take additional lactulose dose as this works to remove  toxins through the stool to improve mentation. Pt verbalized understanding and was in agreement with the plan.      2. GLORIA (generalized anxiety disorder)  Doing well on medication. Would benefit from further evaluation of anxiety, will arrange f/u with PCP for this. Medication, side effects and proper use discussed. Pt verbalized understanding and was in agreement with the plan.    - hydrOXYzine HCL (ATARAX) 25 MG tablet; Take 1 tablet (25 mg total) by mouth 3 (three) times daily as needed for Anxiety.  Dispense: 90 tablet; Refill: 1    3. Sarcoidosis of lung with sarcoidosis of lymph nodes  Per pulm note looks like goal was to taper off of prednisone. Will refill and assist with getting f/u appt with pulm to further manage steroid taper. Pt verbalized understanding and was in agreement with the plan.    - predniSONE (DELTASONE) 5 MG tablet; Take 1 tablet (5 mg total) by mouth once daily.  Dispense: 30 tablet; Refill: 1      Follow-up: with PCP in 2 months for anxiety    30 minutes of total time spent on the encounter, which includes face to face time and non-face to face time preparing to see the patient (eg, review of tests), Obtaining and/or reviewing separately obtained history, Documenting clinical information in the electronic or other health record, Independently interpreting results (not separately reported) and communicating results to the patient/family/caregiver, or Care coordination (not separately reported).        This service was not originating from a related E/M service provided within the previous 7 days nor will  to an E/M service or procedure within the next 24 hours or my soonest available appointment.  Prevailing standard of care was able to be met in this audio-only visit.      Aditya Steele,   Family Medicine

## 2024-03-14 ENCOUNTER — OFFICE VISIT (OUTPATIENT)
Dept: PULMONOLOGY | Facility: CLINIC | Age: 59
End: 2024-03-14
Payer: OTHER GOVERNMENT

## 2024-03-14 VITALS
WEIGHT: 279 LBS | BODY MASS INDEX: 39.06 KG/M2 | HEART RATE: 84 BPM | SYSTOLIC BLOOD PRESSURE: 144 MMHG | OXYGEN SATURATION: 97 % | DIASTOLIC BLOOD PRESSURE: 74 MMHG | HEIGHT: 71 IN

## 2024-03-14 DIAGNOSIS — G47.33 OSA ON CPAP: ICD-10-CM

## 2024-03-14 DIAGNOSIS — I27.20 PULMONARY HYPERTENSION: Primary | ICD-10-CM

## 2024-03-14 DIAGNOSIS — D86.2 SARCOIDOSIS OF LUNG WITH SARCOIDOSIS OF LYMPH NODES: ICD-10-CM

## 2024-03-14 PROCEDURE — 99214 OFFICE O/P EST MOD 30 MIN: CPT | Mod: S$GLB,,, | Performed by: INTERNAL MEDICINE

## 2024-03-14 PROCEDURE — 99999 PR PBB SHADOW E&M-EST. PATIENT-LVL III: CPT | Mod: PBBFAC,,, | Performed by: INTERNAL MEDICINE

## 2024-03-14 NOTE — PROGRESS NOTES
Subjective:       Patient ID: Ashwin Peter is a 58 y.o. male.    Chief Complaint: Sarcoidosis      HPI:   Ashwin Peter is a 58 y.o. male last seen by me on 7/7/23 with stage I pulmonary sarcoid (biopsy of mediastinal LAD with non-necrotizing granulomas, 12/2012), chronic hypoxemic respiratory failure on home oxygen, LAW on CPAP, morbid obesity, liver cirrhosis (suspected fatty liver, not related to sarcoid) with grade I varices, BL LE lymphedema, asymptomatic Covid 12/2020, who presents for hospital follow up.    Initial HPI 8/30/23:  Patient was hospitalized from 7/22-25 for decompensated heart failure. Significantly improved w/ 21L diuresis. He did not require supplemental oxygen at discharge. Remains on 10mg of prednisone. Was seen by Cardiology for pulm HTN. [place] it was WHO Group II/III and didn't think a RHC would add anything. Consider outpatient cardiac MRI.    Currently, patient reports his weight is stable. Cough is unchanged. Daily, dry. Does not tolerate weaning pred. Denies rashes, palpitations, f/c/ns. Last corrected calcium was 9.4. Reports rare use of JACOB.    Denies rashes, myalgias, arthralgias, hair/nail changes, dysphagia, eye changes, raynauds, mucosal ulcerations    7/7/23 HPI:  Since his last visit, he reports he has been able to lose weight especially in the last month. Having difficulty keeping up physically at work (hurt his R knee). Denies changes in his respiratory symptoms.     Continues to have URI/LRTI in the spring and fall requiring UC visits and steroids.     Reports no benefit with albuterol.     Remains on 10mg of pred. Tried to go to every other day but resumed daily dosing when he had more trouble with his cough in the spring.     Continues to have the dry cough. Denies nighttime symptoms.    Denies any palpitations, chest pain, presyncope/syncope    Today:  Hospitalized from 1/9 to 1/11 w/ tremor, emesis, confusion. EGD w/o an obvious source of bleed. Mentation  improved w/ lactulose. Received abx.    Reports respiratory symptoms are stable. Denies cough, sputum production.    Currently on 5mg of prednisone. Denies difficulty with weaning.     LAW- uses cpap nightly    Off lactulose for the last month. Sleep reversal but this has been present since his knee surgery. Denies worsening daytime fatigue. Seeing hepatology.      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  Tobacco- Denies  Inhalational Agents- Denies      Review of Systems    As above    Social History     Tobacco Use    Smoking status: Never     Passive exposure: Never    Smokeless tobacco: Never   Substance Use Topics    Alcohol use: No       Review of patient's allergies indicates:  No Known Allergies  Past Medical History:   Diagnosis Date    Anxiety     BMI 38.0-38.9,adult     Hyperlipidemia     Hypervolemia 07/22/2022    Multiple pulmonary nodules     Obesity     Other cirrhosis of liver 01/13/2021    Portal hypertension 02/11/2021    Rectal bleeding 02/11/2021    Sarcoidosis of lung with sarcoidosis of lymph nodes     Sleep apnea     CPAP USED    Splenomegaly 12/22/2020    Thrombocytopenia 12/22/2020     Past Surgical History:   Procedure Laterality Date    ANTERIOR CRUCIATE LIGAMENT REPAIR  2007    right knee    COLONOSCOPY N/A 4/8/2021    Procedure: COLONOSCOPY;  Surgeon: Jeff Olvera MD;  Location: [facility] ([address] [place] [address]);  Service: Endoscopy;  Laterality: N/A;  rectal bleeding,   2nd floor BMI 50 (354 lbs)  COVID test on 4/5/21 at [facility]    ESOPHAGOGASTRODUODENOSCOPY N/A 4/8/2021    Procedure: EGD (ESOPHAGOGASTRODUODENOSCOPY);  Surgeon: Jeff Olvera MD;  Location: [facility] ([address] [place] [address]);  Service: Endoscopy;  Laterality: N/A;  variceal screening/ labs the am of procedure  2nd floor BMI 50 (354 lbs)    ESOPHAGOGASTRODUODENOSCOPY N/A 3/31/2022    Procedure: EGD (ESOPHAGOGASTRODUODENOSCOPY);  Surgeon: Jeff  "RENE Olvera MD;  [facility]  Service: Endoscopy;  Laterality: N/A;  BMI-52    Wt:375#      cirrhosis, variceal screening-labs done on 3/29-GT  vaccinated-GT    ESOPHAGOGASTRODUODENOSCOPY N/A 4/21/2023    Procedure: EGD (ESOPHAGOGASTRODUODENOSCOPY);  Surgeon: Christopher Britton MD;  Location: [facility] ([facility]);  Service: Endoscopy;  Laterality: N/A;  pt requested Friday due to work schedule  ECHO PA 44-51  BMI 47.97 Wt 343 lbs  inst portal-RB  last CBC PT/INR 1/24/23 4/17/23- Precall confirmed.    ESOPHAGOGASTRODUODENOSCOPY N/A 1/10/2024    Procedure: EGD (ESOPHAGOGASTRODUODENOSCOPY);  Surgeon: Christopher Conley MD;  Location: [facility] ([address] [place] [address]);  Service: Endoscopy;  Laterality: N/A;    LYMPHADENECTOMY  1985    MENISCECTOMY      left knee    NASAL SEPTUM SURGERY  1985    TOTAL KNEE ARTHROPLASTY Right 11/7/2023    Procedure: ARTHROPLASTY, KNEE, TOTAL;  Surgeon: Darnell Liao MD;  Location: [facility];  Service: Orthopedics;  Laterality: Right;    WISDOM TOOTH EXTRACTION       Current Outpatient Medications on File Prior to Visit   Medication Sig    hydrOXYzine HCL (ATARAX) 25 MG tablet Take 1 tablet (25 mg total) by mouth 3 (three) times daily as needed for Anxiety.    potassium chloride (KLOR-CON) 10 MEQ TbSR TAKE 1 TABLET(10 MEQ) BY MOUTH EVERY DAY    semaglutide (OZEMPIC) 2 mg/dose (8 mg/3 mL) PnIj Inject 2 mg into the skin every 7 days.    torsemide (DEMADEX) 20 MG Tab Take 3 tablets (60 mg total) by mouth after dinner.    [DISCONTINUED] pantoprazole (PROTONIX) 40 MG tablet Take 1 tablet (40 mg total) by mouth once daily. (Patient not taking: Reported on 6/15/2022)     No current facility-administered medications on file prior to visit.       Objective:      Vitals:    03/14/24 0827   BP: (!) 144/74   Pulse: 84   SpO2: 97%   Weight: 126.6 kg (279 lb)   Height: 5' 11" (1.803 m)       Physical Exam   Constitutional: He is oriented to person, place, and time. He appears well-developed and " well-nourished. He is obese.   HENT:   Nose: No mucosal edema.   Mouth/Throat: Oropharynx is clear and moist. Mallampati Score: III.   Neck: No tracheal deviation present.   Cardiovascular: Normal rate, regular rhythm and intact distal pulses.   Pulmonary/Chest: Normal expansion, symmetric chest wall expansion, effort normal and breath sounds normal. No respiratory distress. He has no wheezes. He has no rhonchi. He has no rales.   Abdominal: He exhibits no distension.   Musculoskeletal:         General: Edema present.      Cervical back: Neck supple.   Lymphadenopathy: No supraclavicular adenopathy is present.     He has no cervical adenopathy.   Neurological: He is alert and oriented to person, place, and time.   Skin: Skin is warm and dry. No cyanosis or erythema. Nails show no clubbing.   Psychiatric: He has a normal mood and affect.   Nursing note and vitals reviewed.      Personal Diagnostic Review    Laboratory:  1/10/24  WBC- 2.98  Eos- 0.1  Bicarb- 21  Ca- 8.3    5/26/23  Bicarb 20  Calcium- 8.5    8/30/22- corrected calcium 9.4      CT Chest 1/6/22- Images personally reviewed and compared to priors. I agree w/ the radiologist who notes;  1. No sizable pulmonary embolus identified, allowing for suboptimal bolus timing and motion limitations.  No CT findings of right heart strain.  2. Borderline cardiomegaly and probable mild diffuse interstitial pulmonary edema versus pneumonitis.  3. Cirrhosis with sequelae of portal hypertension including splenomegaly, upper abdominal collateral   vessels, and gastroesophageal varices.      CXR 1/10/24- Images personally reviewed and compared to priors. I agree w/ the radiologist who notes;  Heart size normal. Prominent pulmonary arteries noted bilaterally. No significant airspace consolidation or pleural effusion. Mild accentuation of the basilar interstitial markings noted     CXR 7/22/22- Images personally reviewed and compared to priors. I agree w/ the radiologist who  notes;  1. Central pulmonary vascular congestion and cephalization of flow without overt interstitial edema or sizable pleural effusion.       PFTs   23  FVC: 4.23 (85.3 % predicted), FEV1: 3.38 (87.7 % predicted), FEV1/FVC:  80, T.35 (86.6 % predicted), DLCO: 23.00 (74.2 % predicted)  Nl PFTs w/ improvement compared to previous    2023---------Distance: 304.8 meters (1000 feet)       O2 Sat % Supplemental Oxygen Heart Rate Blood Pressure Safia Scale   Pre-exercise  (Resting) 95 % Room Air 88 bpm 134/62 mmHg 0   During Exercise 90 % Room Air 111 bpm 150/69 mmHg 2   Post-exercise  (Recovery) 95 % Room Air  93 bpm          Recovery Time: 62 seconds    2022---------Distance: 345.03 meters (1132 feet)       O2 Sat % Supplemental Oxygen Heart Rate Blood Pressure Safia Scale   Pre-exercise  (Resting) 96 % Room Air 76 bpm 137/65 mmHg 1   During Exercise 91 % Room Air 89 bpm 168/71 mmHg 3   Post-exercise  (Recovery) 96 % Room Air  85 bpm 155/70 mmHg        Recovery Time: 213 seconds    TTE   3/15/24    Left Ventricle: The left ventricle is normal in size. Normal wall thickness. There is normal systolic function with a visually estimated ejection fraction of 65 - 70%. There is normal diastolic function.    Right Ventricle: Moderate right ventricular enlargement. Wall thickness is normal. Right ventricle wall motion  is normal. Systolic function is normal.  Free wall strain: -22%    Severe biatrial enlargement    Pulmonary Artery: The estimated pulmonary artery systolic pressure is 34 mmHg.    IVC/SVC: Normal venous pressure at 3 mmHg.       22  The left ventricle is normal in size with normal systolic function.  The estimated ejection fraction is 65%.  Indeterminate left ventricular diastolic function.  Moderate right ventricular enlargement with mildly to moderately reduced right ventricular systolic function.  The estimated PA systolic pressure is 51 mmHg.  Intermediate central venous pressure (8  mmHg).  There is pulmonary hypertension.  Moderate left atrial enlargement.          Assessment:     Problem List Items Addressed This Visit          Pulmonary    Sarcoidosis of lung with sarcoidosis of lymph nodes     Pulmonary sarcoidosis- biopsy of mediastinal LAD with non-necrotizing granulomas, 12/2012. On pred 10mg daily. Primary symptom is cough. Increased difficulty at the change of the seasons.     CXR is stable    Weaning prednisone. Currently on 5mg/day. Got to 2.5mg/day for 1 month followed by 2.5mg every other day for 1month       - Check TTE and PFTs  - Needs yearly ophtho screens  - Denies cardiac symptoms but needs yearly EKG, ordered          Relevant Orders    Lung Volumes    DLCO-Carbon Monoxide Diffusing Capacity    Spirometry without Bronchodilator (Completed)    Echo (Completed)       Cardiac/Vascular    Pulmonary hypertension - Primary     Repeat TTE w/o pulm HTN         Relevant Orders    Lung Volumes    DLCO-Carbon Monoxide Diffusing Capacity    Spirometry without Bronchodilator (Completed)    Echo (Completed)       Other    LAW on CPAP     Encouraged nightly use            37 minutes of total time spent on the encounter, which includes face to face time and non-face to face time preparing to see the patient (eg, review of tests), Obtaining and/or reviewing separately obtained history, Documenting clinical information in the electronic or other health record, Independently interpreting results (not separately reported) and communicating results to the patient/family/caregiver, or Care coordination (not separately reported).

## 2024-03-15 ENCOUNTER — HOSPITAL ENCOUNTER (OUTPATIENT)
Dept: CARDIOLOGY | Facility: HOSPITAL | Age: 59
Discharge: HOME | End: 2024-03-15
Attending: INTERNAL MEDICINE
Payer: OTHER GOVERNMENT

## 2024-03-15 VITALS
SYSTOLIC BLOOD PRESSURE: 135 MMHG | WEIGHT: 279 LBS | HEIGHT: 71 IN | BODY MASS INDEX: 39.06 KG/M2 | DIASTOLIC BLOOD PRESSURE: 82 MMHG | HEART RATE: 78 BPM

## 2024-03-15 DIAGNOSIS — I27.20 PULMONARY HYPERTENSION: ICD-10-CM

## 2024-03-15 DIAGNOSIS — D86.2 SARCOIDOSIS OF LUNG WITH SARCOIDOSIS OF LYMPH NODES: ICD-10-CM

## 2024-03-15 LAB
ASCENDING AORTA: 3.27 CM
AV INDEX (PROSTH): 0.63
AV MEAN GRADIENT: 11 MMHG
AV PEAK GRADIENT: 21 MMHG
AV VALVE AREA BY VELOCITY RATIO: 3.05 CM²
AV VALVE AREA: 3.02 CM²
AV VELOCITY RATIO: 0.64
BSA FOR ECHO PROCEDURE: 2.52 M2
CV ECHO LV RWT: 0.33 CM
DOP CALC AO PEAK VEL: 2.28 M/S
DOP CALC AO VTI: 43.85 CM
DOP CALC LVOT AREA: 4.8 CM2
DOP CALC LVOT DIAMETER: 2.47 CM
DOP CALC LVOT PEAK VEL: 1.45 M/S
DOP CALC LVOT STROKE VOLUME: 132.37 CM3
DOP CALCLVOT PEAK VEL VTI: 27.64 CM
E WAVE DECELERATION TIME: 200.9 MSEC
E/A RATIO: 0.86
E/E' RATIO: 7.83 M/S
ECHO LV POSTERIOR WALL: 0.96 CM (ref 0.6–1.1)
FRACTIONAL SHORTENING: 30 % (ref 28–44)
INTERVENTRICULAR SEPTUM: 1.05 CM (ref 0.6–1.1)
LA MAJOR: 7.53 CM
LA MINOR: 7.03 CM
LA WIDTH: 5.25 CM
LEFT ATRIUM SIZE: 5.36 CM
LEFT ATRIUM VOLUME INDEX MOD: 62 ML/M2
LEFT ATRIUM VOLUME INDEX: 71.6 ML/M2
LEFT ATRIUM VOLUME MOD: 150.55 CM3
LEFT ATRIUM VOLUME: 173.92 CM3
LEFT INTERNAL DIMENSION IN SYSTOLE: 4.09 CM (ref 2.1–4)
LEFT VENTRICLE DIASTOLIC VOLUME INDEX: 70.24 ML/M2
LEFT VENTRICLE DIASTOLIC VOLUME: 170.69 ML
LEFT VENTRICLE MASS INDEX: 98 G/M2
LEFT VENTRICLE SYSTOLIC VOLUME INDEX: 30.3 ML/M2
LEFT VENTRICLE SYSTOLIC VOLUME: 73.63 ML
LEFT VENTRICULAR INTERNAL DIMENSION IN DIASTOLE: 5.86 CM (ref 3.5–6)
LEFT VENTRICULAR MASS: 238.73 G
LV LATERAL E/E' RATIO: 6.92 M/S
LV SEPTAL E/E' RATIO: 9 M/S
MV A" WAVE DURATION": 9.13 MSEC
MV PEAK A VEL: 1.05 M/S
MV PEAK E VEL: 0.9 M/S
MV STENOSIS PRESSURE HALF TIME: 58.26 MS
MV VALVE AREA P 1/2 METHOD: 3.78 CM2
PISA TR MAX VEL: 2.78 M/S
PULM VEIN S/D RATIO: 1.13
PV PEAK D VEL: 0.52 M/S
PV PEAK S VEL: 0.59 M/S
RA MAJOR: 6.33 CM
RA PRESSURE ESTIMATED: 3 MMHG
RA WIDTH: 5.12 CM
RIGHT VENTRICLE FREE WALL STRAIN: 22.6 %
RIGHT VENTRICULAR END-DIASTOLIC DIMENSION: 5.47 CM
RV TB RVSP: 6 MMHG
SINUS: 3.43 CM
STJ: 2.51 CM
TDI LATERAL: 0.13 M/S
TDI SEPTAL: 0.1 M/S
TDI: 0.12 M/S
TR MAX PG: 31 MMHG
TRICUSPID ANNULAR PLANE SYSTOLIC EXCURSION: 3.17 CM
TV REST PULMONARY ARTERY PRESSURE: 34 MMHG
Z-SCORE OF LEFT VENTRICULAR DIMENSION IN END DIASTOLE: -6.92
Z-SCORE OF LEFT VENTRICULAR DIMENSION IN END SYSTOLE: -4.24

## 2024-03-15 PROCEDURE — 93306 TTE W/DOPPLER COMPLETE: CPT | Mod: 26,,, | Performed by: INTERNAL MEDICINE

## 2024-03-15 PROCEDURE — 93306 TTE W/DOPPLER COMPLETE: CPT

## 2024-03-18 ENCOUNTER — HOSPITAL ENCOUNTER (OUTPATIENT)
Dept: PULMONOLOGY | Facility: CLINIC | Age: 59
Discharge: HOME | End: 2024-03-18
Payer: OTHER GOVERNMENT

## 2024-03-18 ENCOUNTER — OFFICE VISIT (OUTPATIENT)
Dept: HEPATOLOGY | Facility: CLINIC | Age: 59
End: 2024-03-18
Payer: OTHER GOVERNMENT

## 2024-03-18 VITALS
DIASTOLIC BLOOD PRESSURE: 65 MMHG | WEIGHT: 279 LBS | BODY MASS INDEX: 39.06 KG/M2 | HEIGHT: 71 IN | SYSTOLIC BLOOD PRESSURE: 136 MMHG | HEART RATE: 77 BPM | OXYGEN SATURATION: 97 %

## 2024-03-18 DIAGNOSIS — K76.82 HEPATIC ENCEPHALOPATHY: ICD-10-CM

## 2024-03-18 DIAGNOSIS — K75.81 LIVER CIRRHOSIS SECONDARY TO NASH: Primary | ICD-10-CM

## 2024-03-18 DIAGNOSIS — I27.20 PULMONARY HYPERTENSION: ICD-10-CM

## 2024-03-18 DIAGNOSIS — D86.2 SARCOIDOSIS OF LUNG WITH SARCOIDOSIS OF LYMPH NODES: ICD-10-CM

## 2024-03-18 DIAGNOSIS — K74.60 LIVER CIRRHOSIS SECONDARY TO NASH: Primary | ICD-10-CM

## 2024-03-18 PROCEDURE — 94729 DIFFUSING CAPACITY: CPT | Mod: S$GLB,,, | Performed by: INTERNAL MEDICINE

## 2024-03-18 PROCEDURE — 94010 BREATHING CAPACITY TEST: CPT | Mod: S$GLB,,, | Performed by: INTERNAL MEDICINE

## 2024-03-18 PROCEDURE — 99215 OFFICE O/P EST HI 40 MIN: CPT | Mod: S$GLB,,, | Performed by: STUDENT IN AN ORGANIZED HEALTH CARE EDUCATION/TRAINING PROGRAM

## 2024-03-18 PROCEDURE — 99999 PR PBB SHADOW E&M-EST. PATIENT-LVL IV: CPT | Mod: PBBFAC,,, | Performed by: STUDENT IN AN ORGANIZED HEALTH CARE EDUCATION/TRAINING PROGRAM

## 2024-03-18 PROCEDURE — 94727 GAS DIL/WSHOT DETER LNG VOL: CPT | Mod: S$GLB,,, | Performed by: INTERNAL MEDICINE

## 2024-03-18 RX ORDER — LACTULOSE 10 G/15ML
10 SOLUTION ORAL 3 TIMES DAILY
Qty: 1350 ML | Refills: 11 | Status: SHIPPED | OUTPATIENT
Start: 2024-03-18 | End: 2025-03-18

## 2024-03-18 NOTE — PROGRESS NOTES
Hepatology Follow Up Note    Referring provider: No ref. provider found  PCP: Sergio Guo III, MD    Chief complaint: follow up GUAJARDO cirrhosis    HPI:  Ashwin Peter is a 58 y.o. male with history of GUAJARDO related cirrhosis who presents for follow up.    3/18/24:  He is without complaints today.  He was hospitalized in January 2024 with encephalopathy, nausea, vomiting, and possible hematemesis.  EGD showed no blood or obvious source of bleeding.  He was treated with lactulose with resolution of his encephalopathy.  He denies further episodes of GI bleeding and encephalopathy.  No other recent hospitalizations or ED visits.  Compliant with torsemide without recent abdominal distention.  No recent jaundice.    5/26/23: He is without complaints today. No recent hospitalizations or ED visits. Compliant with diuretics without recent abdominal distention though does report lower extremity edema.  He denies other signs of decompensated cirrhosis including no recent encephalopathy, jaundice, GI bleeding.    1/24/23: He is without complaints today. No recent hospitalizations or ED visits. Compliant with diuretics without recent abdominal distention.  He denies other signs of decompensated cirrhosis including no recent encephalopathy, jaundice, GI bleeding.    10/25/22:  He is without complaints today.  He was hospitalized in July 2022 with volume overload treated with IV Lasix.  On discharge he was switched to oral torsemide and spironolactone.  He reports overall doing well since.  Is compliant with diuretics without recent abdominal distention.  He denies other signs of decompensated cirrhosis including no recent encephalopathy, jaundice, GI bleeding.    1/27/22 (Dr. Bowie): He was recently admitted to the hospital for acute hypoxic respiratory failure and volume overload. He briefly required supplemental O2 but was discharged on room air. He was discharged on Lasix 40 mg BID and aldactone 100 mg daily. He  admits to stable lower extremity edema despite adherence with diuretics and a low-salt diet. He otherwise denies any other signs of decompensated cirrhosis including recent encephalopathy or GI bleeding.     5/3/21:  He is without complaints today.  He has not had recent hospitalizations or ED visits.  He was found to have elevated LFTs in December 2020 with both elevated bilirubin and transaminases.  He does report getting a sinus infection in October 2020 that was treated with a steroid injection and amoxicillin.  He was also found to have COVID in December 2020.  He reports compliance with diuretics without recent abdominal distension. He denies other signs of decompensated cirrhosis including no recent  encephalopathy or GI bleeding.    Past Medical History:   Diagnosis Date    Anxiety     BMI 38.0-38.9,adult     Hyperlipidemia     Hypervolemia 07/22/2022    Multiple pulmonary nodules     Obesity     Other cirrhosis of liver 01/13/2021    Portal hypertension 02/11/2021    Rectal bleeding 02/11/2021    Sarcoidosis of lung with sarcoidosis of lymph nodes     Sleep apnea     CPAP USED    Splenomegaly 12/22/2020    Thrombocytopenia 12/22/2020       Past Surgical History:   Procedure Laterality Date    ANTERIOR CRUCIATE LIGAMENT REPAIR  2007    right knee    COLONOSCOPY N/A 4/8/2021    Procedure: COLONOSCOPY;  Surgeon: Jeff Olvera MD;  Location: [facility] ([address] [place] [address]);  Service: Endoscopy;  Laterality: N/A;  rectal bleeding,   2nd floor BMI 50 (354 lbs)  COVID test on 4/5/21 at [facility]    ESOPHAGOGASTRODUODENOSCOPY N/A 4/8/2021    Procedure: EGD (ESOPHAGOGASTRODUODENOSCOPY);  Surgeon: Jeff Olvera MD;  Location: [facility] ([address] [place] [address]);  Service: Endoscopy;  Laterality: N/A;  variceal screening/ labs the am of procedure  2nd floor BMI 50 (354 lbs)    ESOPHAGOGASTRODUODENOSCOPY N/A 3/31/2022    Procedure: EGD (ESOPHAGOGASTRODUODENOSCOPY);  Surgeon: Jeff Olvera MD;  Location: [facility] ([address] [place] [address]);  Service:  Endoscopy;  Laterality: N/A;  BMI-52    Wt:375#      cirrhosis, variceal screening-labs done on 3/29-GT  vaccinated-GT    ESOPHAGOGASTRODUODENOSCOPY N/A 4/21/2023    Procedure: EGD (ESOPHAGOGASTRODUODENOSCOPY);  Surgeon: Christopher Britton MD;  [facility]  Service: Endoscopy;  Laterality: N/A;  pt requested Friday due to work schedule  ECHO PA 44-51  BMI 47.97 Wt 343 lbs  inst portal-RB  last CBC PT/INR 1/24/23 4/17/23- Precall confirmed.    ESOPHAGOGASTRODUODENOSCOPY N/A 1/10/2024    Procedure: EGD (ESOPHAGOGASTRODUODENOSCOPY);  Surgeon: Christopher Conley MD;  Location: [facility] ([address] [place] [address]);  Service: Endoscopy;  Laterality: N/A;    LYMPHADENECTOMY  1985    MENISCECTOMY      left knee    NASAL SEPTUM SURGERY  1985    TOTAL KNEE ARTHROPLASTY Right 11/7/2023    Procedure: ARTHROPLASTY, KNEE, TOTAL;  Surgeon: Darnell Liao MD;  Location: [facility];  Service: Orthopedics;  Laterality: Right;    WISDOM TOOTH EXTRACTION         Family History   Problem Relation Age of Onset    Hypertension Father     Heart attack Father 55    Diabetes Paternal Grandmother     Aneurysm Mother         eye    Dementia Mother     Colon cancer Neg Hx     Prostate cancer Neg Hx     Sarcoidosis Neg Hx        Social History     Tobacco Use    Smoking status: Never     Passive exposure: Never    Smokeless tobacco: Never   Substance Use Topics    Alcohol use: No    Drug use: No       Current Outpatient Medications   Medication Sig Dispense Refill    benzonatate (TESSALON) 100 MG capsule Take 100 mg by mouth 3 (three) times daily as needed for Cough.      fluticasone propionate (FLONASE) 50 mcg/actuation nasal spray 2 sprays by Each Nostril route daily as needed for Rhinitis or Allergies.      hydrOXYzine HCL (ATARAX) 25 MG tablet Take 1 tablet (25 mg total) by mouth 3 (three) times daily as needed for Anxiety. 90 tablet 1    LORATADINE-D  mg per 24 hr tablet Take 1 tablet by mouth once daily.      omeprazole  "(PRILOSEC) 40 MG capsule Take 40 mg by mouth daily as needed (acid reflux).      ondansetron (ZOFRAN-ODT) 8 MG TbDL Take 8 mg by mouth every 8 (eight) hours as needed (nausea).      potassium chloride (KLOR-CON) 10 MEQ TbSR TAKE 1 TABLET(10 MEQ) BY MOUTH EVERY DAY 90 tablet 3    predniSONE (DELTASONE) 5 MG tablet Take 1 tablet (5 mg total) by mouth once daily. 30 tablet 1    semaglutide (OZEMPIC) 2 mg/dose (8 mg/3 mL) PnIj Inject 2 mg into the skin every 7 days. 9 mL 0    torsemide (DEMADEX) 20 MG Tab Take 3 tablets (60 mg total) by mouth after dinner. 90 tablet 0     No current facility-administered medications for this visit.       Review of patient's allergies indicates:  No Known Allergies    Review of Systems   Constitutional:  Negative for fever and weight loss.   Gastrointestinal:  Negative for abdominal pain, blood in stool, constipation, diarrhea, heartburn, melena, nausea and vomiting.       Vitals:    03/18/24 0801   BP: 136/65   Pulse: 77   SpO2: 97%   Weight: 126.6 kg (279 lb)   Height: 5' 11" (1.803 m)         Physical Exam  Vitals reviewed.   Constitutional:       General: He is not in acute distress.     Appearance: He is not ill-appearing.   HENT:      Head: Normocephalic and atraumatic.   Eyes:      General: No scleral icterus.     Extraocular Movements: Extraocular movements intact.   Cardiovascular:      Rate and Rhythm: Normal rate and regular rhythm.   Pulmonary:      Effort: Pulmonary effort is normal. No respiratory distress.   Abdominal:      General: Bowel sounds are normal. There is no distension.      Palpations: Abdomen is soft.      Tenderness: There is no abdominal tenderness. There is no guarding or rebound.   Skin:     Coloration: Skin is not jaundiced.   Neurological:      Mental Status: He is alert.         LABS: I personally reviewed pertinent laboratory findings.    Lab Results   Component Value Date    WBC 2.98 (L) 01/10/2024    HGB 12.4 (L) 01/10/2024    HCT 39.1 (L) 01/10/2024 "    MCV 92 01/10/2024    PLT 99 (L) 01/10/2024       Lab Results   Component Value Date     01/10/2024    K 4.1 01/10/2024     (H) 01/10/2024    CO2 21 (L) 01/10/2024    BUN 17 01/10/2024    CREATININE 0.9 01/10/2024    CALCIUM 8.3 (L) 01/10/2024    ANIONGAP 5 (L) 01/10/2024    ESTGFRAFRICA >60.0 07/25/2022    EGFRNONAA >60.0 07/25/2022       Lab Results   Component Value Date    ALT 17 01/10/2024    AST 33 01/10/2024    ALKPHOS 116 01/10/2024    BILITOT 2.8 (H) 01/10/2024       Lab Results   Component Value Date    HEPAIGM Negative 12/22/2020    HEPBIGM Negative 12/22/2020    HEPBCAB Negative 12/22/2020    HEPCAB Non-reactive 01/09/2024       Lab Results   Component Value Date    SMOOTHMUSCAB Negative 1:40 12/22/2020    CERULOPLSM 26.0 12/22/2020      MELD 3.0: 15 at 1/10/2024  6:31 AM  MELD-Na: 12 at 1/10/2024  6:31 AM  Calculated from:  Serum Creatinine: 0.9 mg/dL (Using min of 1 mg/dL) at 1/10/2024  6:31 AM  Serum Sodium: 139 mmol/L (Using max of 137 mmol/L) at 1/10/2024  6:31 AM  Total Bilirubin: 2.8 mg/dL at 1/10/2024  6:31 AM  Serum Albumin: 2.3 g/dL at 1/10/2024  6:31 AM  INR(ratio): 1.2 at 1/10/2024  6:31 AM  Age at listing (hypothetical): 58 years  Sex: Male at 1/10/2024  6:31 AM      IMAGING: I personally reviewed imaging studies.    Assessment:  58 y.o. male with GUAJARDO related cirrhosis. MELD-Na 12.    1. Liver cirrhosis secondary to GUAJARDO    2. Hepatic encephalopathy        Recommendations:  1. Cirrhosis due to GUAJARDO  - Congratulated on recent weight loss and counseled on continued diet, weight loss, and control of co-morbidities. Encouraged to lose 10% of his body weight over the next 12 months.    2. Ascites/Edema: 2 gm Na diet.  Diuretics per cardiology. Currently torsemide 60 mg daily.    3. Encephalopathy:  Continue lactulose.  Titrate to 3-4 bowel movements daily.    4. Transplant candidacy: MELD 12 primarily driven by indirect hyperbilirubinemia. Will consider transplant evaluation if  MELD >15 and/or worsening decompensations.     Cirrhosis HM  - HCC screening: US in 01/2024 without HCC. Repeat US and AFP every 6 months.    - Variceal screening: EGD in 01/2024 showed no varices. Repeat EGD in 01/2025.    - Immunizations: Recommend HAV and HBV vaccinations if not immune.    - CRC screening: Colonoscopy 04/2021 with polypectomy x1. Repeat colonoscopy in 04/2026.    Return to clinic in 4 months with labs and US.    I spent a total of 40 minutes on the day of the visit. This includes face to face time and non-face to face time preparing to see the patient (eg, review of tests), obtaining and/or reviewing separately obtained history, documenting clinical information in the electronic or other health record, independently interpreting results, and communicating results to the patient/family/caregiver, or care coordination.    Daniel Crowder MD  Staff Physician  Hepatology and Liver Transplant  [facility]  [facility]

## 2024-03-25 ENCOUNTER — OFFICE VISIT (OUTPATIENT)
Dept: INTERNAL MEDICINE | Facility: CLINIC | Age: 59
End: 2024-03-25
Payer: OTHER GOVERNMENT

## 2024-03-25 VITALS
HEART RATE: 87 BPM | DIASTOLIC BLOOD PRESSURE: 80 MMHG | WEIGHT: 285.06 LBS | OXYGEN SATURATION: 98 % | BODY MASS INDEX: 39.91 KG/M2 | SYSTOLIC BLOOD PRESSURE: 132 MMHG | HEIGHT: 71 IN

## 2024-03-25 DIAGNOSIS — Z12.5 ENCOUNTER FOR PROSTATE CANCER SCREENING: ICD-10-CM

## 2024-03-25 DIAGNOSIS — I89.0 ACQUIRED LYMPHEDEMA OF LOWER EXTREMITY: ICD-10-CM

## 2024-03-25 DIAGNOSIS — K74.60 LIVER CIRRHOSIS SECONDARY TO NASH: Primary | ICD-10-CM

## 2024-03-25 DIAGNOSIS — K75.81 LIVER CIRRHOSIS SECONDARY TO NASH: Primary | ICD-10-CM

## 2024-03-25 PROCEDURE — 99999 PR PBB SHADOW E&M-EST. PATIENT-LVL IV: CPT | Mod: PBBFAC,,, | Performed by: INTERNAL MEDICINE

## 2024-03-25 PROCEDURE — G2211 COMPLEX E/M VISIT ADD ON: HCPCS | Mod: S$GLB,,, | Performed by: INTERNAL MEDICINE

## 2024-03-25 PROCEDURE — 99214 OFFICE O/P EST MOD 30 MIN: CPT | Mod: S$GLB,,, | Performed by: INTERNAL MEDICINE

## 2024-03-25 NOTE — Clinical Note
Hi Patient still having significant pain , immobility after knee surgery, are you able to see him back for evaluation, maybe additional imaging etc

## 2024-03-25 NOTE — PROGRESS NOTES
Hi  Patient still having significant pain , immobility after knee surgery, are you able to see him back for evaluation, maybe additional imaging etc     Assessment:         1. Liver cirrhosis secondary to GUAJARDO    2. Acquired lymphedema of lower extremity    3. Encounter for prostate cancer screening          Plan:           Ashwin was seen today for follow-up.    Diagnoses and all orders for this visit:    Liver cirrhosis secondary to GUAJARDO  -     Hemoglobin A1C; Standing  -     Lipid Panel; Standing  -     PSA, Screening; Standing    Acquired lymphedema of lower extremity  -     Hemoglobin A1C; Standing  -     Lipid Panel; Standing  -     PSA, Screening; Standing    Encounter for prostate cancer screening  -     PSA, Screening; Standing        Fu in 6 month for annual   Sent message to orthopedics for follow up   Add labs to next blood work 7/23 -   Psa, A1c, lipids      Subjective:       Patient ID: Ashwin Peter is a 58 y.o. male.    Chief Complaint: Follow-up        Interim Hx  Fu of anxiety , and feeling weel       Concerns today    Still having R knee pain,     Chronic problems     Patient Active Problem List   Diagnosis    Sarcoidosis of lung with sarcoidosis of lymph nodes    Severe obesity (BMI >= 40)    Splenomegaly    Thrombocytopenia    Other cirrhosis of liver    Leg edema    Portal hypertension    Liver cirrhosis secondary to GUAJARDO    Swelling of lower extremity    Acquired lymphedema of lower extremity    Pulmonary hypertension    LAW on CPAP    Sarcoidosis, unspecified    Osteoarthritis of right knee    Hepatic encephalopathy    Multiple neurological symptoms    Calculus of ureter    Neutropenia    Nausea & vomiting    GLORIA (generalized anxiety disorder)       HPI    Review of Systems   All other systems reviewed and are negative.            Health Maintenance Due   Topic Date Due    PROSTATE-SPECIFIC ANTIGEN  07/14/2015         Objective:     /80 (BP Location: Right arm, Patient Position:  "Sitting, BP Method: Large (Manual))   Pulse 87   Ht 5' 11" (1.803 m)   Wt 129.3 kg (285 lb 0.9 oz)   SpO2 98%   BMI 39.76 kg/m²         3/25/2024     2:35 PM 3/18/2024     8:01 AM 3/15/2024    11:19 AM 3/14/2024     8:27 AM 1/10/2024     5:45 AM   Vitals   Height 5' 11" (1.803 m) 5' 11" (1.803 m) 5' 11" (1.803 m) 5' 11" (1.803 m) 5' 11" (1.803 m)   Weight (lbs) 285.06 279 279 279 288.58   BMI (kg/m2) 39.8 38.9 38.9 38.9 40.3                Physical Exam  Nursing note reviewed.   Constitutional:       General: He is not in acute distress.     Appearance: Normal appearance. He is not ill-appearing, toxic-appearing or diaphoretic.   HENT:      Head: Normocephalic.   Eyes:      Conjunctiva/sclera: Conjunctivae normal.   Cardiovascular:      Rate and Rhythm: Normal rate.   Pulmonary:      Effort: Pulmonary effort is normal. No respiratory distress.   Musculoskeletal:      Comments: Limited range of motion knee flexion past 90o  Sligthly warm    Neurological:      General: No focal deficit present.      Mental Status: He is alert and oriented to person, place, and time.   Psychiatric:         Mood and Affect: Mood normal.         Behavior: Behavior normal.         Thought Content: Thought content normal.         Judgment: Judgment normal.           ASCVD  The 10-year ASCVD risk score (George DK, et al., 2019) is: 7.5%    Values used to calculate the score:      Age: 58 years      Sex: Male      Is Non- : No      Diabetic: No      Tobacco smoker: No      Systolic Blood Pressure: 132 mmHg      Is BP treated: No      HDL Cholesterol: 41 mg/dL      Total Cholesterol: 198 mg/dL    Basic Labs    BMP  Lab Results   Component Value Date     01/10/2024    K 4.1 01/10/2024     (H) 01/10/2024    CO2 21 (L) 01/10/2024    BUN 17 01/10/2024    CREATININE 0.9 01/10/2024    CALCIUM 8.3 (L) 01/10/2024    ANIONGAP 5 (L) 01/10/2024    ESTGFRAFRICA >60.0 07/25/2022    EGFRNONAA >60.0 07/25/2022 "     Lab Results   Component Value Date    EGFRNORACEVR >60.0 01/10/2024       Lab Results   Component Value Date    ALT 17 01/10/2024    AST 33 01/10/2024    ALKPHOS 116 01/10/2024    BILITOT 2.8 (H) 01/10/2024         Lab Results   Component Value Date    TSH 1.383 08/30/2022     Lab Results   Component Value Date    WBC 2.98 (L) 01/10/2024    HGB 12.4 (L) 01/10/2024    HCT 39.1 (L) 01/10/2024    MCV 92 01/10/2024    PLT 99 (L) 01/10/2024           STD  Lab Results   Component Value Date    LOG84YRJZ Non-reactive 01/09/2024     Lab Results   Component Value Date    RPR Non-reactive 12/02/2020     Lab Results   Component Value Date    HEPAIGM Negative 12/22/2020    HEPBIGM Negative 12/22/2020    HEPBCAB Negative 12/22/2020    HEPCAB Non-reactive 01/09/2024         Lipids  Lab Results   Component Value Date    CHOL 198 10/17/2022     Lab Results   Component Value Date    HDL 41 10/17/2022     Lab Results   Component Value Date    LDLCALC 136.8 10/17/2022     Lab Results   Component Value Date    TRIG 101 10/17/2022     Lab Results   Component Value Date    CHOLHDL 20.7 10/17/2022       DM  Lab Results   Component Value Date    HGBA1C 4.2 10/17/2022       PSA  PSA, Screen (ng/mL)   Date Value   07/14/2014 0.62       Future Appointments   Date Time Provider Department Center   6/21/2024 10:00 AM Raoul Rae MD [facility] PULMSVC [facility]   7/23/2024  7:15 AM [facility] OIC-US1 MASTER [facility] ULTR [facility]   7/23/2024  7:40 AM LAB, APPOINTMENT [facility] INTMED [facility] LAB IM [facility] PCW   7/30/2024  4:00 PM Daniel Crowder MD [facility] HEPAT [facility]   9/25/2024  1:40 PM Sergio Guo III, MD [facility]         Medication List with Changes/Refills   Current Medications    HYDROXYZINE HCL (ATARAX) 25 MG TABLET    Take 1 tablet (25 mg total) by mouth 3 (three) times daily as needed for Anxiety.    LACTULOSE (CONSTULOSE) 10 GRAM/15 ML SOLUTION    Take 15 mLs (10 g total) by mouth 3 (three) times daily.    OMEPRAZOLE  (PRILOSEC) 40 MG CAPSULE    Take 40 mg by mouth daily as needed (acid reflux).    POTASSIUM CHLORIDE (KLOR-CON) 10 MEQ TBSR    TAKE 1 TABLET(10 MEQ) BY MOUTH EVERY DAY    SEMAGLUTIDE (OZEMPIC) 2 MG/DOSE (8 MG/3 ML) PNIJ    Inject 2 mg into the skin every 7 days.    TORSEMIDE (DEMADEX) 20 MG TAB    Take 3 tablets (60 mg total) by mouth after dinner.   Discontinued Medications    BENZONATATE (TESSALON) 100 MG CAPSULE    Take 100 mg by mouth 3 (three) times daily as needed for Cough.    FLUTICASONE PROPIONATE (FLONASE) 50 MCG/ACTUATION NASAL SPRAY    2 sprays by Each Nostril route daily as needed for Rhinitis or Allergies.    LORATADINE-D  MG PER 24 HR TABLET    Take 1 tablet by mouth once daily.    ONDANSETRON (ZOFRAN-ODT) 8 MG TBDL    Take 8 mg by mouth every 8 (eight) hours as needed (nausea).         Disclaimer:  This note has been generated using voice-recognition software. There may be grammatical or spelling errors that have been missed during proof-reading Visit complexity inherent to evaluation and management associated with medical care services that serve as the continuing focal point for all needed health care services and/or with medical care services that are part of ongoing care related to a patient's single, serious condition or a complex condition

## 2024-04-01 NOTE — ASSESSMENT & PLAN NOTE
Pulmonary sarcoidosis- biopsy of mediastinal LAD with non-necrotizing granulomas, 12/2012. On pred 10mg daily. Primary symptom is cough. Increased difficulty at the change of the seasons.     CXR is stable    Weaning prednisone. Currently on 5mg/day. Got to 2.5mg/day for 1 month followed by 2.5mg every other day for 1month       - Check TTE and PFTs  - Needs yearly ophtho screens  - Denies cardiac symptoms but needs yearly EKG, ordered

## 2024-07-16 ENCOUNTER — ANESTHESIA EVENT (OUTPATIENT)
Dept: SURGERY | Facility: OTHER | Age: 59
End: 2024-07-16
Payer: OTHER GOVERNMENT

## 2024-07-16 NOTE — ANESTHESIA PREPROCEDURE EVALUATION
07/16/2024  Ashwin Peter is a 58 y.o., male.      Pre-op Assessment    I have reviewed the Patient Summary Reports.     I have reviewed the Nursing Notes. I have reviewed the NPO Status.   I have reviewed the Medications.   Steroids Taken In Past Year:     Review of Systems  Anesthesia Hx:  No problems with previous Anesthesia   History of prior surgery of interest to airway management or planning:  Previous anesthesia: General Christianity Rt total knee with general anesthesia.  Procedure performed at an [facility].       Denies Family Hx of Anesthesia complications.    Denies Personal Hx of Anesthesia complications.                    Social:  Non-Smoker       Hematology/Oncology:    Oncology Normal    -- Anemia:               Hematology Comments: Thrombocytopenia                    EENT/Dental:  EENT/Dental Normal           Cardiovascular:     Hypertension   Denies MI.        CHF        3/2024 ECHO - nml EF; severe biatrial enlargement; PAP 34    Neg stress test 2022    BL LE lymphedema                         Pulmonary:        Sleep Apnea Sarcoidosis                  Renal/:   renal calculi               Hepatic/GI:     GERD Liver Disease,  GUAJARDO / Cirrhosis          Musculoskeletal:  Arthritis               Neurological:    Denies CVA.        Hx Hepatic Encephalopathy                            Endocrine:     Ozempic    On Prednisone for sacoidosis - weaned off several months ago (was on steroid Rx for few years)      Morbid Obesity / BMI > 40  Psych:  Psychiatric History anxiety               Physical Exam  General: Well nourished, Cooperative, Alert and Oriented    Airway:  Mallampati: IV   Mouth Opening: Normal  TM Distance: Normal  Tongue: Large  Neck ROM: Normal ROM    Dental:  Intact    Anesthesia Plan  Type of Anesthesia, risks & benefits discussed:    Anesthesia Type: Gen ETT  Intra-op  Monitoring Plan: Standard ASA Monitors  Post Op Pain Control Plan: multimodal analgesia  Induction:  IV  Airway Plan: Video  Informed Consent: Informed consent signed with the Patient and all parties understand the risks and agree with anesthesia plan.  All questions answered.   ASA Score: 3  Day of Surgery Review of History & Physical: H&P Update referred to the surgeon/provider.  Anesthesia Plan Notes: TKA 11/2023 -  under GETA due to thrombocytopenia and pHTN; repeat echo no longer c/w pHTN; will recheck platelet count    Pan labs by surgery  Cirrhosis ; consider steroid coverage for sarcoidosis chronic therapy  Plan GA LUCIO as before!    Ready For Surgery From Anesthesia Perspective.   .

## 2024-07-19 ENCOUNTER — HOSPITAL ENCOUNTER (OUTPATIENT)
Dept: PREADMISSION TESTING | Facility: OTHER | Age: 59
Discharge: HOME | End: 2024-07-19
Attending: ORTHOPAEDIC SURGERY
Payer: OTHER GOVERNMENT

## 2024-07-19 VITALS
OXYGEN SATURATION: 97 % | SYSTOLIC BLOOD PRESSURE: 127 MMHG | DIASTOLIC BLOOD PRESSURE: 56 MMHG | HEART RATE: 84 BPM | RESPIRATION RATE: 16 BRPM | WEIGHT: 280 LBS | HEIGHT: 71 IN | BODY MASS INDEX: 39.2 KG/M2 | TEMPERATURE: 98 F

## 2024-07-19 LAB
ANION GAP SERPL CALC-SCNC: 6 MMOL/L (ref 8–16)
BASOPHILS # BLD AUTO: 0.03 K/UL (ref 0–0.2)
BASOPHILS NFR BLD: 0.7 % (ref 0–1.9)
BILIRUB UR QL STRIP: ABNORMAL
BUN SERPL-MCNC: 20 MG/DL (ref 6–20)
CALCIUM SERPL-MCNC: 8.1 MG/DL (ref 8.7–10.5)
CHLORIDE SERPL-SCNC: 108 MMOL/L (ref 95–110)
CLARITY UR: CLEAR
CO2 SERPL-SCNC: 26 MMOL/L (ref 23–29)
COLOR UR: YELLOW
CREAT SERPL-MCNC: 1.1 MG/DL (ref 0.5–1.4)
DIFFERENTIAL METHOD BLD: ABNORMAL
EOSINOPHIL # BLD AUTO: 0.1 K/UL (ref 0–0.5)
EOSINOPHIL NFR BLD: 3 % (ref 0–8)
ERYTHROCYTE [DISTWIDTH] IN BLOOD BY AUTOMATED COUNT: 17.1 % (ref 11.5–14.5)
EST. GFR  (NO RACE VARIABLE): >60 ML/MIN/1.73 M^2
GLUCOSE SERPL-MCNC: 94 MG/DL (ref 70–110)
GLUCOSE UR QL STRIP: NEGATIVE
HCT VFR BLD AUTO: 40.6 % (ref 40–54)
HGB BLD-MCNC: 12.8 G/DL (ref 14–18)
HGB UR QL STRIP: NEGATIVE
IMM GRANULOCYTES # BLD AUTO: 0.01 K/UL (ref 0–0.04)
IMM GRANULOCYTES NFR BLD AUTO: 0.2 % (ref 0–0.5)
KETONES UR QL STRIP: ABNORMAL
LEUKOCYTE ESTERASE UR QL STRIP: NEGATIVE
LYMPHOCYTES # BLD AUTO: 0.7 K/UL (ref 1–4.8)
LYMPHOCYTES NFR BLD: 16.3 % (ref 18–48)
MCH RBC QN AUTO: 29.6 PG (ref 27–31)
MCHC RBC AUTO-ENTMCNC: 31.5 G/DL (ref 32–36)
MCV RBC AUTO: 94 FL (ref 82–98)
MONOCYTES # BLD AUTO: 0.6 K/UL (ref 0.3–1)
MONOCYTES NFR BLD: 13.3 % (ref 4–15)
NEUTROPHILS # BLD AUTO: 2.9 K/UL (ref 1.8–7.7)
NEUTROPHILS NFR BLD: 66.5 % (ref 38–73)
NITRITE UR QL STRIP: NEGATIVE
NRBC BLD-RTO: 0 /100 WBC
PH UR STRIP: 6 [PH] (ref 5–8)
PLATELET # BLD AUTO: 106 K/UL (ref 150–450)
PMV BLD AUTO: ABNORMAL FL (ref 9.2–12.9)
POTASSIUM SERPL-SCNC: 3.7 MMOL/L (ref 3.5–5.1)
PROT UR QL STRIP: NEGATIVE
RBC # BLD AUTO: 4.33 M/UL (ref 4.6–6.2)
SODIUM SERPL-SCNC: 140 MMOL/L (ref 136–145)
SP GR UR STRIP: 1.02 (ref 1–1.03)
URN SPEC COLLECT METH UR: ABNORMAL
UROBILINOGEN UR STRIP-ACNC: 1 EU/DL
WBC # BLD AUTO: 4.35 K/UL (ref 3.9–12.7)

## 2024-07-19 PROCEDURE — 80048 BASIC METABOLIC PNL TOTAL CA: CPT | Performed by: ORTHOPAEDIC SURGERY

## 2024-07-19 PROCEDURE — 36415 COLL VENOUS BLD VENIPUNCTURE: CPT | Performed by: ORTHOPAEDIC SURGERY

## 2024-07-19 PROCEDURE — 81003 URINALYSIS AUTO W/O SCOPE: CPT | Performed by: ORTHOPAEDIC SURGERY

## 2024-07-19 PROCEDURE — 85025 COMPLETE CBC W/AUTO DIFF WBC: CPT | Performed by: ORTHOPAEDIC SURGERY

## 2024-07-19 RX ORDER — SODIUM CHLORIDE, SODIUM LACTATE, POTASSIUM CHLORIDE, CALCIUM CHLORIDE 600; 310; 30; 20 MG/100ML; MG/100ML; MG/100ML; MG/100ML
INJECTION, SOLUTION INTRAVENOUS CONTINUOUS
OUTPATIENT
Start: 2024-07-19

## 2024-07-19 RX ORDER — FLUTICASONE PROPIONATE 50 MCG
1 SPRAY, SUSPENSION (ML) NASAL
COMMUNITY

## 2024-07-19 NOTE — DISCHARGE INSTRUCTIONS
Information to Prepare you for your Surgery    PRE-ADMIT TESTING   [address] [facility]       Your surgery has been scheduled at [facility]. We are pleased to have the opportunity to serve you. For Further Information please call [phone].    On the day of surgery please report to the Information Desk on the 1st floor.    CONTACT YOUR PHYSICIAN'S OFFICE THE DAY PRIOR TO YOUR SURGERY TO OBTAIN YOUR ARRIVAL TIME.     The evening before surgery do not eat anything after 9 p.m. ( this includes hard candy, chewing gum and mints).  You may only have GATORADE, POWERADE AND WATER  from 9 p.m. until you leave your home.   DO NOT DRINK ANY LIQUIDS ON THE WAY TO THE HOSPITAL.      Why does your anesthesiologist allow you to drink Gatorade/Powerade before surgery?  Gatorade/Powerade helps to increase your comfort before surgery and to decrease your nausea after surgery.   The carbohydrates in Gatorade/Powerade help reduce your body's stress response to surgery.  If you are a diabetic-drink only water prior to surgery.    Outpatient Surgery- May allow 2 adults (18 and older)/ Support Persons (1 being the designated ) for all surgical/procedural patients. A breastfeeding mother will be allowed her infant and 2 adult Support Persons. No one under the age of 18 will be allowed in the building.      SPECIAL MEDICATION INSTRUCTIONS: TAKE medications checked off by the Anesthesiologist on your Medication List.    Surgery Patients:  If you take ASPIRIN - Your PHYSICIAN/SURGEON will need to inform you IF/OR when you need to stop taking aspirin prior to your surgery.     Starting the week prior to surgery, do not take any medications containing IBUPROFEN or NSAIDS (Advil, Aleve, BC, Goody's, Ketorolac, Meloxicam, Mobic, Motrin, Naproxen, Toradol, etc).  If you are not sure if you should take a medicine please call your surgeon's office.  You may take Tylenol.    Do Not Wear any make-up  (especially eye make-up) to surgery. Please remove any false eyelashes or eyelash extensions. If you arrive the day of surgery with makeup/eyelashes on you will be required to remove prior to surgery. (There is a risk of corneal abrasions if eye makeup/eyelash extensions are not removed)    Leave all valuables at home.   Do Not wear any jewelry or watches, including any metal in body piercings. Jewelry must be removed prior to coming to the hospital.  There is a possibility that rings that are unable to be removed may be cut off if they are on the surgical extremity.    Please remove all hair extensions, wigs, clips and any other metal accessories/ ornaments from your hair.  These items may pose a flammable/fire risk in Surgery and must be removed.    Do not shave your surgical area at least 5 days prior to your surgery. The surgical prep will be performed at the hospital according to Infection Control regulations.    Contact Lens must be removed before surgery. Either do not wear the contact lens or bring a case and solution for storage.  Please bring a container for eyeglasses or dentures as required.  Bring any paperwork your physician has provided, such as consent forms,  history and physicals, doctor's orders, etc.   Bring comfortable clothes that are loose fitting to wear upon discharge. Take into consideration the type of surgery being performed.  Maintain your diet as advised per your physician the day prior to surgery.    Adequate rest the night before surgery is advised.   Park in the Parking lot behind the hospital or in the [facility] across the street from the parking lot. Parking is complimentary.  If you will be discharged the same day as your procedure, please arrange for a responsible adult to drive you home or to accompany you if traveling by taxi.   YOU WILL NOT BE PERMITTED TO DRIVE OR TO LEAVE THE HOSPITAL ALONE AFTER SURGERY.   If you are being discharged the same day, it is  strongly recommended that you arrange for someone to remain with you for the first 24 hrs following your surgery.    The Surgeon will speak to your family/visitor after your surgery regarding the outcome of your surgery and post op care.  The Surgeon may speak to you after your surgery, but there is a possibility you may not remember the details.  Please check with your family members regarding the conversation with the Surgeon.    We strongly recommend whoever is bringing you home be present for discharge instructions.  This will ensure a thorough understanding for your post op home care.              Bathing Instructions with Hibiclens  Shower the evening before and morning of your procedure with Chlorhexidine (Hibiclens)    Do not use Chlorhexidine on your face or genitals. Do not get in your eyes.  Wash your face with water and your regular face wash/soap  Use your regular shampoo  Apply Chlorhexidine (Hibiclens) directly on your skin or on a wet washcloth and wash gently. When showering: Move away from the shower stream when applying Chlorhexidine (Hibiclens) to avoid rinsing off too soon.  Rinse thoroughly with warm water  Do not dilute Chlorhexidine (Hibiclens)   Dry off as usual, do not use any deodorant, powder, body lotions, perfume, after shave or cologne.

## 2024-08-01 ENCOUNTER — HOSPITAL ENCOUNTER (INPATIENT)
Facility: OTHER | Age: 59
LOS: 3 days | Discharge: HOME HEALTH | DRG: 467 | End: 2024-08-04
Attending: ORTHOPAEDIC SURGERY | Admitting: ORTHOPAEDIC SURGERY
Payer: OTHER GOVERNMENT

## 2024-08-01 ENCOUNTER — ANESTHESIA (OUTPATIENT)
Dept: SURGERY | Facility: OTHER | Age: 59
End: 2024-08-01
Payer: OTHER GOVERNMENT

## 2024-08-01 DIAGNOSIS — T84.012A FAILED TOTAL RIGHT KNEE REPLACEMENT: ICD-10-CM

## 2024-08-01 DIAGNOSIS — T84.53XA INFECTION ASSOCIATED WITH INTERNAL RIGHT KNEE PROSTHESIS, INITIAL ENCOUNTER: Primary | ICD-10-CM

## 2024-08-01 PROBLEM — T84.498A MECHANICAL COMPLICATION OF INTERNAL ORTHOPEDIC DEVICE: Status: ACTIVE | Noted: 2024-08-01

## 2024-08-01 LAB
GRAM STN SPEC: NORMAL

## 2024-08-01 PROCEDURE — 87070 CULTURE OTHR SPECIMN AEROBIC: CPT | Performed by: ORTHOPAEDIC SURGERY

## 2024-08-01 PROCEDURE — 37000008 HC ANESTHESIA 1ST 15 MINUTES: Performed by: ORTHOPAEDIC SURGERY

## 2024-08-01 PROCEDURE — 63600175 PHARM REV CODE 636 W HCPCS

## 2024-08-01 PROCEDURE — 99900035 HC TECH TIME PER 15 MIN (STAT)

## 2024-08-01 PROCEDURE — 25000003 PHARM REV CODE 250

## 2024-08-01 PROCEDURE — C1776 JOINT DEVICE (IMPLANTABLE): HCPCS | Performed by: ORTHOPAEDIC SURGERY

## 2024-08-01 PROCEDURE — 87102 FUNGUS ISOLATION CULTURE: CPT | Performed by: ORTHOPAEDIC SURGERY

## 2024-08-01 PROCEDURE — 97110 THERAPEUTIC EXERCISES: CPT

## 2024-08-01 PROCEDURE — 87206 SMEAR FLUORESCENT/ACID STAI: CPT | Mod: 91 | Performed by: ORTHOPAEDIC SURGERY

## 2024-08-01 PROCEDURE — 25000003 PHARM REV CODE 250: Performed by: NURSE ANESTHETIST, CERTIFIED REGISTERED

## 2024-08-01 PROCEDURE — 63600175 PHARM REV CODE 636 W HCPCS: Performed by: ANESTHESIOLOGY

## 2024-08-01 PROCEDURE — 87116 MYCOBACTERIA CULTURE: CPT | Performed by: ORTHOPAEDIC SURGERY

## 2024-08-01 PROCEDURE — C1713 ANCHOR/SCREW BN/BN,TIS/BN: HCPCS | Performed by: ORTHOPAEDIC SURGERY

## 2024-08-01 PROCEDURE — 0SBC0ZZ EXCISION OF RIGHT KNEE JOINT, OPEN APPROACH: ICD-10-PCS | Performed by: ORTHOPAEDIC SURGERY

## 2024-08-01 PROCEDURE — C9290 INJ, BUPIVACAINE LIPOSOME: HCPCS | Performed by: ORTHOPAEDIC SURGERY

## 2024-08-01 PROCEDURE — C1729 CATH, DRAINAGE: HCPCS | Performed by: ORTHOPAEDIC SURGERY

## 2024-08-01 PROCEDURE — 25000003 PHARM REV CODE 250: Performed by: ANESTHESIOLOGY

## 2024-08-01 PROCEDURE — 97161 PT EVAL LOW COMPLEX 20 MIN: CPT

## 2024-08-01 PROCEDURE — 71000039 HC RECOVERY, EACH ADD'L HOUR: Performed by: ORTHOPAEDIC SURGERY

## 2024-08-01 PROCEDURE — 36000711: Performed by: ORTHOPAEDIC SURGERY

## 2024-08-01 PROCEDURE — 25000003 PHARM REV CODE 250: Performed by: ORTHOPAEDIC SURGERY

## 2024-08-01 PROCEDURE — 37000009 HC ANESTHESIA EA ADD 15 MINS: Performed by: ORTHOPAEDIC SURGERY

## 2024-08-01 PROCEDURE — 27201423 OPTIME MED/SURG SUP & DEVICES STERILE SUPPLY: Performed by: ORTHOPAEDIC SURGERY

## 2024-08-01 PROCEDURE — 63600175 PHARM REV CODE 636 W HCPCS: Performed by: ORTHOPAEDIC SURGERY

## 2024-08-01 PROCEDURE — 87075 CULTR BACTERIA EXCEPT BLOOD: CPT | Performed by: ORTHOPAEDIC SURGERY

## 2024-08-01 PROCEDURE — 88300 SURGICAL PATH GROSS: CPT | Performed by: STUDENT IN AN ORGANIZED HEALTH CARE EDUCATION/TRAINING PROGRAM

## 2024-08-01 PROCEDURE — 11000001 HC ACUTE MED/SURG PRIVATE ROOM

## 2024-08-01 PROCEDURE — 87205 SMEAR GRAM STAIN: CPT | Mod: 59 | Performed by: ORTHOPAEDIC SURGERY

## 2024-08-01 PROCEDURE — 63600175 PHARM REV CODE 636 W HCPCS: Performed by: NURSE ANESTHETIST, CERTIFIED REGISTERED

## 2024-08-01 PROCEDURE — 27000221 HC OXYGEN, UP TO 24 HOURS

## 2024-08-01 PROCEDURE — 36000710: Performed by: ORTHOPAEDIC SURGERY

## 2024-08-01 PROCEDURE — 0SRC0J9 REPLACEMENT OF RIGHT KNEE JOINT WITH SYNTHETIC SUBSTITUTE, CEMENTED, OPEN APPROACH: ICD-10-PCS | Performed by: ORTHOPAEDIC SURGERY

## 2024-08-01 PROCEDURE — 94761 N-INVAS EAR/PLS OXIMETRY MLT: CPT

## 2024-08-01 PROCEDURE — 71000033 HC RECOVERY, INTIAL HOUR: Performed by: ORTHOPAEDIC SURGERY

## 2024-08-01 PROCEDURE — 94799 UNLISTED PULMONARY SVC/PX: CPT

## 2024-08-01 PROCEDURE — 0SPC0JZ REMOVAL OF SYNTHETIC SUBSTITUTE FROM RIGHT KNEE JOINT, OPEN APPROACH: ICD-10-PCS | Performed by: ORTHOPAEDIC SURGERY

## 2024-08-01 PROCEDURE — P9045 ALBUMIN (HUMAN), 5%, 250 ML: HCPCS | Mod: JZ,JG | Performed by: NURSE ANESTHETIST, CERTIFIED REGISTERED

## 2024-08-01 PROCEDURE — 97116 GAIT TRAINING THERAPY: CPT

## 2024-08-01 DEVICE — KIT GRAFT BONE/SUB STIM 20ML: Type: IMPLANTABLE DEVICE | Site: KNEE | Status: FUNCTIONAL

## 2024-08-01 RX ORDER — DEXTROSE MONOHYDRATE AND SODIUM CHLORIDE 5; .9 G/100ML; G/100ML
INJECTION, SOLUTION INTRAVENOUS CONTINUOUS
Status: DISCONTINUED | OUTPATIENT
Start: 2024-08-01 | End: 2024-08-02

## 2024-08-01 RX ORDER — MORPHINE SULFATE 4 MG/ML
4 INJECTION, SOLUTION INTRAMUSCULAR; INTRAVENOUS
Status: ACTIVE | OUTPATIENT
Start: 2024-08-01 | End: 2024-08-02

## 2024-08-01 RX ORDER — FAMOTIDINE 20 MG/1
20 TABLET, FILM COATED ORAL DAILY
Status: DISCONTINUED | OUTPATIENT
Start: 2024-08-01 | End: 2024-08-04 | Stop reason: HOSPADM

## 2024-08-01 RX ORDER — SODIUM CHLORIDE 0.9 % (FLUSH) 0.9 %
3 SYRINGE (ML) INJECTION
Status: DISCONTINUED | OUTPATIENT
Start: 2024-08-01 | End: 2024-08-01 | Stop reason: HOSPADM

## 2024-08-01 RX ORDER — NAPROXEN SODIUM 220 MG/1
81 TABLET, FILM COATED ORAL 2 TIMES DAILY
Status: DISCONTINUED | OUTPATIENT
Start: 2024-08-01 | End: 2024-08-04 | Stop reason: HOSPADM

## 2024-08-01 RX ORDER — ONDANSETRON 8 MG/1
8 TABLET, ORALLY DISINTEGRATING ORAL EVERY 8 HOURS PRN
Status: DISCONTINUED | OUTPATIENT
Start: 2024-08-01 | End: 2024-08-04 | Stop reason: HOSPADM

## 2024-08-01 RX ORDER — MUPIROCIN 20 MG/G
OINTMENT TOPICAL
Status: DISCONTINUED | OUTPATIENT
Start: 2024-08-01 | End: 2024-08-01 | Stop reason: HOSPADM

## 2024-08-01 RX ORDER — MEPERIDINE HYDROCHLORIDE 25 MG/ML
12.5 INJECTION INTRAMUSCULAR; INTRAVENOUS; SUBCUTANEOUS ONCE AS NEEDED
Status: DISCONTINUED | OUTPATIENT
Start: 2024-08-01 | End: 2024-08-01 | Stop reason: HOSPADM

## 2024-08-01 RX ORDER — PROCHLORPERAZINE EDISYLATE 5 MG/ML
5 INJECTION INTRAMUSCULAR; INTRAVENOUS EVERY 30 MIN PRN
Status: DISCONTINUED | OUTPATIENT
Start: 2024-08-01 | End: 2024-08-01 | Stop reason: HOSPADM

## 2024-08-01 RX ORDER — CEFAZOLIN SODIUM 1 G/3ML
2 INJECTION, POWDER, FOR SOLUTION INTRAMUSCULAR; INTRAVENOUS
Status: COMPLETED | OUTPATIENT
Start: 2024-08-01 | End: 2024-08-01

## 2024-08-01 RX ORDER — HYDROCODONE BITARTRATE AND ACETAMINOPHEN 10; 325 MG/1; MG/1
1 TABLET ORAL EVERY 4 HOURS PRN
Status: DISCONTINUED | OUTPATIENT
Start: 2024-08-01 | End: 2024-08-02

## 2024-08-01 RX ORDER — DEXAMETHASONE SODIUM PHOSPHATE 4 MG/ML
INJECTION, SOLUTION INTRA-ARTICULAR; INTRALESIONAL; INTRAMUSCULAR; INTRAVENOUS; SOFT TISSUE
Status: DISCONTINUED | OUTPATIENT
Start: 2024-08-01 | End: 2024-08-01

## 2024-08-01 RX ORDER — KETAMINE HCL IN 0.9 % NACL 50 MG/5 ML
SYRINGE (ML) INTRAVENOUS
Status: DISCONTINUED | OUTPATIENT
Start: 2024-08-01 | End: 2024-08-01

## 2024-08-01 RX ORDER — BUPIVACAINE HYDROCHLORIDE AND EPINEPHRINE 5; 5 MG/ML; UG/ML
INJECTION, SOLUTION EPIDURAL; INTRACAUDAL; PERINEURAL
Status: DISCONTINUED | OUTPATIENT
Start: 2024-08-01 | End: 2024-08-01 | Stop reason: HOSPADM

## 2024-08-01 RX ORDER — OXYCODONE HYDROCHLORIDE 5 MG/1
5 TABLET ORAL
Status: DISCONTINUED | OUTPATIENT
Start: 2024-08-01 | End: 2024-08-01 | Stop reason: HOSPADM

## 2024-08-01 RX ORDER — CELECOXIB 200 MG/1
200 CAPSULE ORAL
Status: COMPLETED | OUTPATIENT
Start: 2024-08-01 | End: 2024-08-01

## 2024-08-01 RX ORDER — ROCURONIUM BROMIDE 10 MG/ML
INJECTION, SOLUTION INTRAVENOUS
Status: DISCONTINUED | OUTPATIENT
Start: 2024-08-01 | End: 2024-08-01

## 2024-08-01 RX ORDER — FLUTICASONE PROPIONATE 50 MCG
1 SPRAY, SUSPENSION (ML) NASAL DAILY
Status: DISCONTINUED | OUTPATIENT
Start: 2024-08-02 | End: 2024-08-04 | Stop reason: HOSPADM

## 2024-08-01 RX ORDER — SUCCINYLCHOLINE CHLORIDE 20 MG/ML
INJECTION INTRAMUSCULAR; INTRAVENOUS
Status: DISCONTINUED | OUTPATIENT
Start: 2024-08-01 | End: 2024-08-01

## 2024-08-01 RX ORDER — VANCOMYCIN HYDROCHLORIDE 1 G/20ML
INJECTION, POWDER, LYOPHILIZED, FOR SOLUTION INTRAVENOUS
Status: DISCONTINUED | OUTPATIENT
Start: 2024-08-01 | End: 2024-08-01 | Stop reason: HOSPADM

## 2024-08-01 RX ORDER — HYDROXYZINE HYDROCHLORIDE 25 MG/1
25 TABLET, FILM COATED ORAL 3 TIMES DAILY PRN
Status: DISCONTINUED | OUTPATIENT
Start: 2024-08-01 | End: 2024-08-04 | Stop reason: HOSPADM

## 2024-08-01 RX ORDER — PROPOFOL 10 MG/ML
VIAL (ML) INTRAVENOUS
Status: DISCONTINUED | OUTPATIENT
Start: 2024-08-01 | End: 2024-08-01

## 2024-08-01 RX ORDER — ONDANSETRON HYDROCHLORIDE 2 MG/ML
INJECTION, SOLUTION INTRAVENOUS
Status: DISCONTINUED | OUTPATIENT
Start: 2024-08-01 | End: 2024-08-01

## 2024-08-01 RX ORDER — HYDROMORPHONE HYDROCHLORIDE 2 MG/ML
INJECTION, SOLUTION INTRAMUSCULAR; INTRAVENOUS; SUBCUTANEOUS
Status: DISCONTINUED | OUTPATIENT
Start: 2024-08-01 | End: 2024-08-01

## 2024-08-01 RX ORDER — SODIUM CHLORIDE, SODIUM LACTATE, POTASSIUM CHLORIDE, CALCIUM CHLORIDE 600; 310; 30; 20 MG/100ML; MG/100ML; MG/100ML; MG/100ML
INJECTION, SOLUTION INTRAVENOUS CONTINUOUS
Status: DISCONTINUED | OUTPATIENT
Start: 2024-08-01 | End: 2024-08-01

## 2024-08-01 RX ORDER — HYDROCODONE BITARTRATE AND ACETAMINOPHEN 5; 325 MG/1; MG/1
1 TABLET ORAL EVERY 4 HOURS PRN
Status: DISCONTINUED | OUTPATIENT
Start: 2024-08-01 | End: 2024-08-02

## 2024-08-01 RX ORDER — MINOCYCLINE HYDROCHLORIDE 100 MG/1
100 CAPSULE ORAL EVERY 12 HOURS
Status: DISCONTINUED | OUTPATIENT
Start: 2024-08-01 | End: 2024-08-02

## 2024-08-01 RX ORDER — OMEPRAZOLE 40 MG/1
40 CAPSULE, DELAYED RELEASE ORAL DAILY
COMMUNITY

## 2024-08-01 RX ORDER — ALBUMIN HUMAN 50 G/1000ML
SOLUTION INTRAVENOUS
Status: DISCONTINUED | OUTPATIENT
Start: 2024-08-01 | End: 2024-08-01

## 2024-08-01 RX ORDER — FENTANYL CITRATE 50 UG/ML
INJECTION, SOLUTION INTRAMUSCULAR; INTRAVENOUS
Status: DISCONTINUED | OUTPATIENT
Start: 2024-08-01 | End: 2024-08-01

## 2024-08-01 RX ORDER — LIDOCAINE HYDROCHLORIDE 20 MG/ML
INJECTION INTRAVENOUS
Status: DISCONTINUED | OUTPATIENT
Start: 2024-08-01 | End: 2024-08-01

## 2024-08-01 RX ORDER — POLYETHYLENE GLYCOL 3350 17 G/17G
17 POWDER, FOR SOLUTION ORAL DAILY
Status: DISCONTINUED | OUTPATIENT
Start: 2024-08-01 | End: 2024-08-04 | Stop reason: HOSPADM

## 2024-08-01 RX ORDER — METOCLOPRAMIDE HYDROCHLORIDE 5 MG/ML
5 INJECTION INTRAMUSCULAR; INTRAVENOUS EVERY 6 HOURS PRN
Status: DISCONTINUED | OUTPATIENT
Start: 2024-08-01 | End: 2024-08-04 | Stop reason: HOSPADM

## 2024-08-01 RX ORDER — GENTAMICIN 40 MG/ML
INJECTION, SOLUTION INTRAMUSCULAR; INTRAVENOUS
Status: DISCONTINUED | OUTPATIENT
Start: 2024-08-01 | End: 2024-08-01 | Stop reason: HOSPADM

## 2024-08-01 RX ORDER — TALC
9 POWDER (GRAM) TOPICAL NIGHTLY PRN
Status: DISCONTINUED | OUTPATIENT
Start: 2024-08-01 | End: 2024-08-04 | Stop reason: HOSPADM

## 2024-08-01 RX ORDER — SODIUM CHLORIDE 0.9 % (FLUSH) 0.9 %
5 SYRINGE (ML) INJECTION
Status: DISCONTINUED | OUTPATIENT
Start: 2024-08-01 | End: 2024-08-04 | Stop reason: HOSPADM

## 2024-08-01 RX ORDER — TRANEXAMIC ACID 100 MG/ML
INJECTION, SOLUTION INTRAVENOUS
Status: DISCONTINUED | OUTPATIENT
Start: 2024-08-01 | End: 2024-08-01

## 2024-08-01 RX ORDER — GLUCAGON 1 MG
1 KIT INJECTION
Status: DISCONTINUED | OUTPATIENT
Start: 2024-08-01 | End: 2024-08-01 | Stop reason: HOSPADM

## 2024-08-01 RX ORDER — HYDROMORPHONE HYDROCHLORIDE 2 MG/ML
0.4 INJECTION, SOLUTION INTRAMUSCULAR; INTRAVENOUS; SUBCUTANEOUS EVERY 5 MIN PRN
Status: DISCONTINUED | OUTPATIENT
Start: 2024-08-01 | End: 2024-08-01 | Stop reason: HOSPADM

## 2024-08-01 RX ORDER — MUPIROCIN 20 MG/G
OINTMENT TOPICAL 2 TIMES DAILY
Status: DISCONTINUED | OUTPATIENT
Start: 2024-08-01 | End: 2024-08-04 | Stop reason: HOSPADM

## 2024-08-01 RX ADMIN — ROCURONIUM BROMIDE 20 MG: 10 INJECTION INTRAVENOUS at 09:08

## 2024-08-01 RX ADMIN — HYDROMORPHONE HYDROCHLORIDE 0.4 MG: 2 INJECTION INTRAMUSCULAR; INTRAVENOUS; SUBCUTANEOUS at 11:08

## 2024-08-01 RX ADMIN — ROCURONIUM BROMIDE 20 MG: 10 INJECTION INTRAVENOUS at 10:08

## 2024-08-01 RX ADMIN — CELECOXIB 200 MG: 200 CAPSULE ORAL at 06:08

## 2024-08-01 RX ADMIN — SUCCINYLCHOLINE CHLORIDE 160 MG: 20 INJECTION, SOLUTION INTRAMUSCULAR; INTRAVENOUS at 09:08

## 2024-08-01 RX ADMIN — PROPOFOL 200 MG: 10 INJECTION, EMULSION INTRAVENOUS at 09:08

## 2024-08-01 RX ADMIN — HYDROMORPHONE HYDROCHLORIDE 0.2 MG: 2 INJECTION INTRAMUSCULAR; INTRAVENOUS; SUBCUTANEOUS at 10:08

## 2024-08-01 RX ADMIN — Medication 10 MG: at 10:08

## 2024-08-01 RX ADMIN — GLYCOPYRROLATE 0.1 MG: 0.2 INJECTION, SOLUTION INTRAMUSCULAR; INTRAVITREAL at 09:08

## 2024-08-01 RX ADMIN — VANCOMYCIN HYDROCHLORIDE 1000 MG: 1 INJECTION, POWDER, LYOPHILIZED, FOR SOLUTION INTRAVENOUS at 09:08

## 2024-08-01 RX ADMIN — FENTANYL CITRATE 50 MCG: 50 INJECTION, SOLUTION INTRAMUSCULAR; INTRAVENOUS at 10:08

## 2024-08-01 RX ADMIN — MINOCYCLINE HYDROCHLORIDE 100 MG: 100 CAPSULE ORAL at 09:08

## 2024-08-01 RX ADMIN — SUGAMMADEX 200 MG: 100 INJECTION, SOLUTION INTRAVENOUS at 11:08

## 2024-08-01 RX ADMIN — Medication 30 MG: at 09:08

## 2024-08-01 RX ADMIN — ALBUMIN (HUMAN) 100 ML: 12.5 SOLUTION INTRAVENOUS at 10:08

## 2024-08-01 RX ADMIN — SODIUM CHLORIDE, SODIUM LACTATE, POTASSIUM CHLORIDE, AND CALCIUM CHLORIDE: 600; 310; 30; 20 INJECTION, SOLUTION INTRAVENOUS at 08:08

## 2024-08-01 RX ADMIN — TRANEXAMIC ACID 1000 MG: 100 INJECTION, SOLUTION INTRAVENOUS at 08:08

## 2024-08-01 RX ADMIN — MINOCYCLINE HYDROCHLORIDE 100 MG: 100 CAPSULE ORAL at 01:08

## 2024-08-01 RX ADMIN — CARBOXYMETHYLCELLULOSE SODIUM 2 DROP: 2.5 SOLUTION/ DROPS OPHTHALMIC at 09:08

## 2024-08-01 RX ADMIN — OXYCODONE HYDROCHLORIDE 5 MG: 5 TABLET ORAL at 11:08

## 2024-08-01 RX ADMIN — Medication 10 MG: at 09:08

## 2024-08-01 RX ADMIN — CEFAZOLIN 2 G: 2 INJECTION, POWDER, FOR SOLUTION INTRAMUSCULAR; INTRAVENOUS at 07:08

## 2024-08-01 RX ADMIN — ONDANSETRON HYDROCHLORIDE 4 MG: 2 INJECTION INTRAMUSCULAR; INTRAVENOUS at 08:08

## 2024-08-01 RX ADMIN — DEXAMETHASONE SODIUM PHOSPHATE 4 MG: 4 INJECTION, SOLUTION INTRAMUSCULAR; INTRAVENOUS at 09:08

## 2024-08-01 RX ADMIN — MUPIROCIN: 20 OINTMENT TOPICAL at 06:08

## 2024-08-01 RX ADMIN — LIDOCAINE HYDROCHLORIDE 100 MG: 20 INJECTION, SOLUTION INTRAVENOUS at 09:08

## 2024-08-01 RX ADMIN — ROCURONIUM BROMIDE 10 MG: 10 INJECTION INTRAVENOUS at 09:08

## 2024-08-01 RX ADMIN — HYDROCODONE BITARTRATE AND ACETAMINOPHEN 1 TABLET: 10; 325 TABLET ORAL at 07:08

## 2024-08-01 RX ADMIN — ONDANSETRON HYDROCHLORIDE 4 MG: 2 INJECTION INTRAMUSCULAR; INTRAVENOUS at 11:08

## 2024-08-01 RX ADMIN — DEXTROSE AND SODIUM CHLORIDE: 5; 900 INJECTION, SOLUTION INTRAVENOUS at 01:08

## 2024-08-01 RX ADMIN — ALBUMIN (HUMAN) 150 ML: 12.5 SOLUTION INTRAVENOUS at 10:08

## 2024-08-01 RX ADMIN — ASPIRIN 81 MG CHEWABLE TABLET 81 MG: 81 TABLET CHEWABLE at 09:08

## 2024-08-01 RX ADMIN — POLYETHYLENE GLYCOL 3350 17 G: 17 POWDER, FOR SOLUTION ORAL at 01:08

## 2024-08-01 RX ADMIN — CEFAZOLIN 2 G: 330 INJECTION, POWDER, FOR SOLUTION INTRAMUSCULAR; INTRAVENOUS at 09:08

## 2024-08-01 RX ADMIN — FENTANYL CITRATE 100 MCG: 50 INJECTION, SOLUTION INTRAMUSCULAR; INTRAVENOUS at 09:08

## 2024-08-01 RX ADMIN — ROCURONIUM BROMIDE 40 MG: 10 INJECTION INTRAVENOUS at 09:08

## 2024-08-01 RX ADMIN — ALBUMIN (HUMAN) 100 ML: 12.5 SOLUTION INTRAVENOUS at 09:08

## 2024-08-01 RX ADMIN — HYDROCORTISONE SODIUM SUCCINATE 100 MG: 100 INJECTION, POWDER, FOR SOLUTION INTRAMUSCULAR; INTRAVENOUS at 09:08

## 2024-08-01 RX ADMIN — MUPIROCIN: 20 OINTMENT TOPICAL at 09:08

## 2024-08-01 RX ADMIN — FAMOTIDINE 20 MG: 20 TABLET ORAL at 01:08

## 2024-08-01 NOTE — ANESTHESIA POSTPROCEDURE EVALUATION
Anesthesia Post Evaluation    Patient: Ashwin Peter    Procedure(s) Performed: Procedure(s) (LRB):  REVISION, ARTHROPLASTY, KNEE TOTAL (Right)  INCISION AND DRAINAGE (Right)    Final Anesthesia Type: general      Patient location during evaluation: PACU  Patient participation: Yes- Able to Participate  Level of consciousness: awake and alert  Post-procedure vital signs: reviewed and stable  Pain management: adequate  Airway patency: patent  LAW mitigation strategies: Extubation while patient is awake  PONV status at discharge: No PONV  Anesthetic complications: no      Cardiovascular status: hemodynamically stable  Respiratory status: unassisted  Hydration status: euvolemic  Follow-up not needed.              Vitals Value Taken Time   /58 08/01/24 1337   Temp 36.8 °C (98.24 °F) 08/01/24 1337   Pulse 91 08/01/24 1337   Resp 13 08/01/24 1337   SpO2 93 % 08/01/24 1337   Vitals shown include unfiled device data.      Event Time   Out of Recovery 12:51:02         Pain/Akbar Score: Pain Rating Prior to Med Admin: 10 (8/1/2024 11:41 AM)  Pain Rating Post Med Admin: 3 (8/1/2024 12:32 PM)  Akbar Score: 8 (8/1/2024 12:45 PM)

## 2024-08-01 NOTE — ANESTHESIA PROCEDURE NOTES
Intubation    Date/Time: 8/1/2024 9:11 AM    Performed by: Erica Santoro CRNA  Authorized by: Anjum Tee MD    Intubation:     Induction:  Intravenous    Intubated:  Postinduction    Mask Ventilation:  Not attempted    Attempts:  1    Attempted By:  Student    Method of Intubation:  Video laryngoscopy    Blade:  Alexander 4    Laryngeal View Grade: Grade I - full view of cords      Difficult Airway Encountered?: No      Complications:  None    Airway Device:  Oral endotracheal tube    Airway Device Size:  8.0    Style/Cuff Inflation:  Cuffed (inflated to minimal occlusive pressure)    Inflation Amount (mL):  5    Tube secured:  21    Secured at:  The lips    Placement Verified By:  Capnometry    Complicating Factors:  None    Findings Post-Intubation:  BS equal bilateral and atraumatic/condition of teeth unchanged

## 2024-08-01 NOTE — TRANSFER OF CARE
Anesthesia Transfer of Care Note    Patient: Ashwin Peter    Procedure(s) Performed: Procedure(s) (LRB):  REVISION, ARTHROPLASTY, KNEE TOTAL (Right)  INCISION AND DRAINAGE (Right)    Patient location: PACU    Transport from OR: Transported from OR on 6-10 L/min O2 by face mask with adequate spontaneous ventilation    Post pain: adequate analgesia    Post assessment: no apparent anesthetic complications    Post vital signs: stable    Level of consciousness: awake and alert    Nausea/Vomiting: no nausea/vomiting    Complications: none    Transfer of care protocol was followed      Last vitals: Visit Vitals  /65 (BP Location: Left arm, Patient Position: Lying)   Pulse 89   Temp 36.7 °C (98 °F) (Oral)   Resp 18   SpO2 (!) 94%

## 2024-08-02 PROBLEM — T84.53XA INFECTION OF PROSTHETIC RIGHT KNEE JOINT: Status: ACTIVE | Noted: 2024-08-02

## 2024-08-02 LAB
ACID FAST MOD KINY STN SPEC: NORMAL
ACID FAST MOD KINY STN SPEC: NORMAL
ANION GAP SERPL CALC-SCNC: 4 MMOL/L (ref 8–16)
BASOPHILS # BLD AUTO: 0.01 K/UL (ref 0–0.2)
BASOPHILS NFR BLD: 0.2 % (ref 0–1.9)
BUN SERPL-MCNC: 20 MG/DL (ref 6–20)
CALCIUM SERPL-MCNC: 8.1 MG/DL (ref 8.7–10.5)
CHLORIDE SERPL-SCNC: 109 MMOL/L (ref 95–110)
CO2 SERPL-SCNC: 22 MMOL/L (ref 23–29)
CREAT SERPL-MCNC: 1 MG/DL (ref 0.5–1.4)
CRP SERPL-MCNC: 62.55 MG/L (ref 0–3.19)
DIFFERENTIAL METHOD BLD: ABNORMAL
EOSINOPHIL # BLD AUTO: 0 K/UL (ref 0–0.5)
EOSINOPHIL NFR BLD: 0 % (ref 0–8)
ERYTHROCYTE [DISTWIDTH] IN BLOOD BY AUTOMATED COUNT: 17.2 % (ref 11.5–14.5)
ERYTHROCYTE [SEDIMENTATION RATE] IN BLOOD BY PHOTOMETRIC METHOD: 10 MM/HR (ref 0–23)
EST. GFR  (NO RACE VARIABLE): >60 ML/MIN/1.73 M^2
GLUCOSE SERPL-MCNC: 134 MG/DL (ref 70–110)
HCT VFR BLD AUTO: 28.8 % (ref 40–54)
HGB BLD-MCNC: 8.9 G/DL (ref 14–18)
IMM GRANULOCYTES # BLD AUTO: 0.03 K/UL (ref 0–0.04)
IMM GRANULOCYTES NFR BLD AUTO: 0.6 % (ref 0–0.5)
LYMPHOCYTES # BLD AUTO: 0.4 K/UL (ref 1–4.8)
LYMPHOCYTES NFR BLD: 7.1 % (ref 18–48)
MCH RBC QN AUTO: 29.6 PG (ref 27–31)
MCHC RBC AUTO-ENTMCNC: 30.9 G/DL (ref 32–36)
MCV RBC AUTO: 96 FL (ref 82–98)
MONOCYTES # BLD AUTO: 0.5 K/UL (ref 0.3–1)
MONOCYTES NFR BLD: 9.9 % (ref 4–15)
MYCOBACTERIUM SPEC QL CULT: NORMAL
MYCOBACTERIUM SPEC QL CULT: NORMAL
NEUTROPHILS # BLD AUTO: 4.4 K/UL (ref 1.8–7.7)
NEUTROPHILS NFR BLD: 82.2 % (ref 38–73)
NRBC BLD-RTO: 0 /100 WBC
PLATELET # BLD AUTO: 102 K/UL (ref 150–450)
PMV BLD AUTO: 9.7 FL (ref 9.2–12.9)
POCT GLUCOSE: 111 MG/DL (ref 70–110)
POCT GLUCOSE: 120 MG/DL (ref 70–110)
POTASSIUM SERPL-SCNC: 4.7 MMOL/L (ref 3.5–5.1)
RBC # BLD AUTO: 3.01 M/UL (ref 4.6–6.2)
SODIUM SERPL-SCNC: 135 MMOL/L (ref 136–145)
VANCOMYCIN SERPL-MCNC: 8.8 UG/ML
WBC # BLD AUTO: 5.36 K/UL (ref 3.9–12.7)

## 2024-08-02 PROCEDURE — 97535 SELF CARE MNGMENT TRAINING: CPT

## 2024-08-02 PROCEDURE — C1751 CATH, INF, PER/CENT/MIDLINE: HCPCS

## 2024-08-02 PROCEDURE — 85025 COMPLETE CBC W/AUTO DIFF WBC: CPT | Performed by: ORTHOPAEDIC SURGERY

## 2024-08-02 PROCEDURE — 11000001 HC ACUTE MED/SURG PRIVATE ROOM

## 2024-08-02 PROCEDURE — 25000003 PHARM REV CODE 250: Performed by: ORTHOPAEDIC SURGERY

## 2024-08-02 PROCEDURE — 85652 RBC SED RATE AUTOMATED: CPT | Performed by: ORTHOPAEDIC SURGERY

## 2024-08-02 PROCEDURE — 97166 OT EVAL MOD COMPLEX 45 MIN: CPT

## 2024-08-02 PROCEDURE — 94761 N-INVAS EAR/PLS OXIMETRY MLT: CPT

## 2024-08-02 PROCEDURE — 80048 BASIC METABOLIC PNL TOTAL CA: CPT | Performed by: ORTHOPAEDIC SURGERY

## 2024-08-02 PROCEDURE — 63600175 PHARM REV CODE 636 W HCPCS: Performed by: ORTHOPAEDIC SURGERY

## 2024-08-02 PROCEDURE — 97110 THERAPEUTIC EXERCISES: CPT | Mod: CQ

## 2024-08-02 PROCEDURE — 97116 GAIT TRAINING THERAPY: CPT | Mod: CQ

## 2024-08-02 PROCEDURE — 02HV33Z INSERTION OF INFUSION DEVICE INTO SUPERIOR VENA CAVA, PERCUTANEOUS APPROACH: ICD-10-PCS | Performed by: ORTHOPAEDIC SURGERY

## 2024-08-02 PROCEDURE — 99223 1ST HOSP IP/OBS HIGH 75: CPT | Mod: ,,, | Performed by: STUDENT IN AN ORGANIZED HEALTH CARE EDUCATION/TRAINING PROGRAM

## 2024-08-02 PROCEDURE — 86141 C-REACTIVE PROTEIN HS: CPT | Performed by: ORTHOPAEDIC SURGERY

## 2024-08-02 PROCEDURE — 25000242 PHARM REV CODE 250 ALT 637 W/ HCPCS: Performed by: NURSE PRACTITIONER

## 2024-08-02 PROCEDURE — 36569 INSJ PICC 5 YR+ W/O IMAGING: CPT

## 2024-08-02 PROCEDURE — 80202 ASSAY OF VANCOMYCIN: CPT | Performed by: ORTHOPAEDIC SURGERY

## 2024-08-02 PROCEDURE — 99900035 HC TECH TIME PER 15 MIN (STAT)

## 2024-08-02 PROCEDURE — 97530 THERAPEUTIC ACTIVITIES: CPT | Mod: CQ

## 2024-08-02 RX ORDER — MEPERIDINE HYDROCHLORIDE 25 MG/ML
12.5 INJECTION INTRAMUSCULAR; INTRAVENOUS; SUBCUTANEOUS ONCE AS NEEDED
Status: ACTIVE | OUTPATIENT
Start: 2024-08-02 | End: 2024-08-03

## 2024-08-02 RX ORDER — GLUCAGON 1 MG
1 KIT INJECTION
Status: DISCONTINUED | OUTPATIENT
Start: 2024-08-02 | End: 2024-08-04 | Stop reason: HOSPADM

## 2024-08-02 RX ORDER — OXYCODONE HYDROCHLORIDE 5 MG/1
5 TABLET ORAL
Status: DISCONTINUED | OUTPATIENT
Start: 2024-08-02 | End: 2024-08-04 | Stop reason: HOSPADM

## 2024-08-02 RX ORDER — SODIUM CHLORIDE 0.9 % (FLUSH) 0.9 %
3 SYRINGE (ML) INJECTION
Status: DISCONTINUED | OUTPATIENT
Start: 2024-08-02 | End: 2024-08-04 | Stop reason: HOSPADM

## 2024-08-02 RX ORDER — HYDROMORPHONE HYDROCHLORIDE 2 MG/ML
0.4 INJECTION, SOLUTION INTRAMUSCULAR; INTRAVENOUS; SUBCUTANEOUS EVERY 5 MIN PRN
Status: DISCONTINUED | OUTPATIENT
Start: 2024-08-02 | End: 2024-08-02

## 2024-08-02 RX ORDER — OXYCODONE HYDROCHLORIDE 5 MG/1
5 TABLET ORAL EVERY 4 HOURS PRN
Qty: 50 TABLET | Refills: 0 | Status: SHIPPED | OUTPATIENT
Start: 2024-08-02

## 2024-08-02 RX ORDER — PROCHLORPERAZINE EDISYLATE 5 MG/ML
5 INJECTION INTRAMUSCULAR; INTRAVENOUS EVERY 30 MIN PRN
Status: DISCONTINUED | OUTPATIENT
Start: 2024-08-02 | End: 2024-08-04 | Stop reason: HOSPADM

## 2024-08-02 RX ORDER — NAPROXEN SODIUM 220 MG/1
81 TABLET, FILM COATED ORAL 2 TIMES DAILY
Qty: 60 TABLET | Refills: 0 | Status: SHIPPED | OUTPATIENT
Start: 2024-08-02 | End: 2025-08-02

## 2024-08-02 RX ORDER — OXYCODONE HYDROCHLORIDE 5 MG/1
5 TABLET ORAL EVERY 4 HOURS PRN
Status: DISCONTINUED | OUTPATIENT
Start: 2024-08-02 | End: 2024-08-04 | Stop reason: HOSPADM

## 2024-08-02 RX ADMIN — CEFAZOLIN 2 G: 2 INJECTION, POWDER, FOR SOLUTION INTRAMUSCULAR; INTRAVENOUS at 01:08

## 2024-08-02 RX ADMIN — CEFTRIAXONE SODIUM 1 G: 1 INJECTION, POWDER, FOR SOLUTION INTRAMUSCULAR; INTRAVENOUS at 10:08

## 2024-08-02 RX ADMIN — MUPIROCIN: 20 OINTMENT TOPICAL at 09:08

## 2024-08-02 RX ADMIN — VANCOMYCIN HYDROCHLORIDE 1000 MG: 1 INJECTION, POWDER, LYOPHILIZED, FOR SOLUTION INTRAVENOUS at 10:08

## 2024-08-02 RX ADMIN — MINOCYCLINE HYDROCHLORIDE 100 MG: 100 CAPSULE ORAL at 09:08

## 2024-08-02 RX ADMIN — HYDROCODONE BITARTRATE AND ACETAMINOPHEN 1 TABLET: 10; 325 TABLET ORAL at 07:08

## 2024-08-02 RX ADMIN — DEXTROSE AND SODIUM CHLORIDE: 5; 900 INJECTION, SOLUTION INTRAVENOUS at 04:08

## 2024-08-02 RX ADMIN — CEFTRIAXONE SODIUM 1 G: 1 INJECTION, POWDER, FOR SOLUTION INTRAMUSCULAR; INTRAVENOUS at 12:08

## 2024-08-02 RX ADMIN — ASPIRIN 81 MG CHEWABLE TABLET 81 MG: 81 TABLET CHEWABLE at 10:08

## 2024-08-02 RX ADMIN — POLYETHYLENE GLYCOL 3350 17 G: 17 POWDER, FOR SOLUTION ORAL at 09:08

## 2024-08-02 RX ADMIN — VANCOMYCIN HYDROCHLORIDE 2000 MG: 500 INJECTION, POWDER, LYOPHILIZED, FOR SOLUTION INTRAVENOUS at 11:08

## 2024-08-02 RX ADMIN — ASPIRIN 81 MG CHEWABLE TABLET 81 MG: 81 TABLET CHEWABLE at 09:08

## 2024-08-02 RX ADMIN — FAMOTIDINE 20 MG: 20 TABLET ORAL at 09:08

## 2024-08-02 RX ADMIN — FLUTICASONE PROPIONATE 50 MCG: 50 SPRAY, METERED NASAL at 10:08

## 2024-08-02 RX ADMIN — MUPIROCIN: 20 OINTMENT TOPICAL at 10:08

## 2024-08-03 LAB
ANION GAP SERPL CALC-SCNC: 5 MMOL/L (ref 8–16)
BASOPHILS # BLD AUTO: 0.02 K/UL (ref 0–0.2)
BASOPHILS NFR BLD: 0.3 % (ref 0–1.9)
BUN SERPL-MCNC: 22 MG/DL (ref 6–20)
CALCIUM SERPL-MCNC: 8.5 MG/DL (ref 8.7–10.5)
CHLORIDE SERPL-SCNC: 107 MMOL/L (ref 95–110)
CO2 SERPL-SCNC: 24 MMOL/L (ref 23–29)
CREAT SERPL-MCNC: 0.8 MG/DL (ref 0.5–1.4)
DIFFERENTIAL METHOD BLD: ABNORMAL
EOSINOPHIL # BLD AUTO: 0.1 K/UL (ref 0–0.5)
EOSINOPHIL NFR BLD: 0.8 % (ref 0–8)
ERYTHROCYTE [DISTWIDTH] IN BLOOD BY AUTOMATED COUNT: 17.9 % (ref 11.5–14.5)
EST. GFR  (NO RACE VARIABLE): >60 ML/MIN/1.73 M^2
GLUCOSE SERPL-MCNC: 80 MG/DL (ref 70–110)
HCT VFR BLD AUTO: 29.8 % (ref 40–54)
HGB BLD-MCNC: 9.1 G/DL (ref 14–18)
IMM GRANULOCYTES # BLD AUTO: 0.03 K/UL (ref 0–0.04)
IMM GRANULOCYTES NFR BLD AUTO: 0.5 % (ref 0–0.5)
LYMPHOCYTES # BLD AUTO: 0.9 K/UL (ref 1–4.8)
LYMPHOCYTES NFR BLD: 14.6 % (ref 18–48)
MCH RBC QN AUTO: 29.6 PG (ref 27–31)
MCHC RBC AUTO-ENTMCNC: 30.5 G/DL (ref 32–36)
MCV RBC AUTO: 97 FL (ref 82–98)
MONOCYTES # BLD AUTO: 0.8 K/UL (ref 0.3–1)
MONOCYTES NFR BLD: 12.1 % (ref 4–15)
NEUTROPHILS # BLD AUTO: 4.6 K/UL (ref 1.8–7.7)
NEUTROPHILS NFR BLD: 71.7 % (ref 38–73)
NRBC BLD-RTO: 0 /100 WBC
PLATELET # BLD AUTO: 151 K/UL (ref 150–450)
PMV BLD AUTO: 12.5 FL (ref 9.2–12.9)
POTASSIUM SERPL-SCNC: 4.2 MMOL/L (ref 3.5–5.1)
RBC # BLD AUTO: 3.07 M/UL (ref 4.6–6.2)
SODIUM SERPL-SCNC: 136 MMOL/L (ref 136–145)
VANCOMYCIN TROUGH SERPL-MCNC: 18 UG/ML (ref 10–22)
WBC # BLD AUTO: 6.36 K/UL (ref 3.9–12.7)

## 2024-08-03 PROCEDURE — C1751 CATH, INF, PER/CENT/MIDLINE: HCPCS

## 2024-08-03 PROCEDURE — 80202 ASSAY OF VANCOMYCIN: CPT | Performed by: ORTHOPAEDIC SURGERY

## 2024-08-03 PROCEDURE — 94799 UNLISTED PULMONARY SVC/PX: CPT

## 2024-08-03 PROCEDURE — 25000003 PHARM REV CODE 250: Performed by: ORTHOPAEDIC SURGERY

## 2024-08-03 PROCEDURE — 11000001 HC ACUTE MED/SURG PRIVATE ROOM

## 2024-08-03 PROCEDURE — 80048 BASIC METABOLIC PNL TOTAL CA: CPT | Performed by: ORTHOPAEDIC SURGERY

## 2024-08-03 PROCEDURE — 85025 COMPLETE CBC W/AUTO DIFF WBC: CPT | Performed by: ORTHOPAEDIC SURGERY

## 2024-08-03 PROCEDURE — 94761 N-INVAS EAR/PLS OXIMETRY MLT: CPT

## 2024-08-03 PROCEDURE — 63600175 PHARM REV CODE 636 W HCPCS: Performed by: ORTHOPAEDIC SURGERY

## 2024-08-03 PROCEDURE — 36415 COLL VENOUS BLD VENIPUNCTURE: CPT | Performed by: ORTHOPAEDIC SURGERY

## 2024-08-03 RX ADMIN — POLYETHYLENE GLYCOL 3350 17 G: 17 POWDER, FOR SOLUTION ORAL at 09:08

## 2024-08-03 RX ADMIN — ASPIRIN 81 MG CHEWABLE TABLET 81 MG: 81 TABLET CHEWABLE at 09:08

## 2024-08-03 RX ADMIN — OXYCODONE HYDROCHLORIDE 5 MG: 5 TABLET ORAL at 05:08

## 2024-08-03 RX ADMIN — MUPIROCIN: 20 OINTMENT TOPICAL at 09:08

## 2024-08-03 RX ADMIN — VANCOMYCIN HYDROCHLORIDE 2000 MG: 500 INJECTION, POWDER, LYOPHILIZED, FOR SOLUTION INTRAVENOUS at 09:08

## 2024-08-03 RX ADMIN — FAMOTIDINE 20 MG: 20 TABLET ORAL at 09:08

## 2024-08-03 RX ADMIN — OXYCODONE HYDROCHLORIDE 5 MG: 5 TABLET ORAL at 09:08

## 2024-08-03 RX ADMIN — CEFTRIAXONE SODIUM 1 G: 1 INJECTION, POWDER, FOR SOLUTION INTRAMUSCULAR; INTRAVENOUS at 12:08

## 2024-08-03 RX ADMIN — FLUTICASONE PROPIONATE 50 MCG: 50 SPRAY, METERED NASAL at 09:08

## 2024-08-04 VITALS
HEIGHT: 71 IN | SYSTOLIC BLOOD PRESSURE: 127 MMHG | WEIGHT: 280 LBS | HEART RATE: 92 BPM | OXYGEN SATURATION: 98 % | TEMPERATURE: 99 F | DIASTOLIC BLOOD PRESSURE: 57 MMHG | BODY MASS INDEX: 39.2 KG/M2 | RESPIRATION RATE: 17 BRPM

## 2024-08-04 LAB
FUNGUS SPEC CULT: NORMAL
FUNGUS SPEC CULT: NORMAL

## 2024-08-04 PROCEDURE — 97530 THERAPEUTIC ACTIVITIES: CPT

## 2024-08-04 PROCEDURE — C1751 CATH, INF, PER/CENT/MIDLINE: HCPCS

## 2024-08-04 PROCEDURE — 25000003 PHARM REV CODE 250: Performed by: ORTHOPAEDIC SURGERY

## 2024-08-04 PROCEDURE — 97116 GAIT TRAINING THERAPY: CPT

## 2024-08-04 PROCEDURE — 97110 THERAPEUTIC EXERCISES: CPT

## 2024-08-04 PROCEDURE — 63600175 PHARM REV CODE 636 W HCPCS: Performed by: ORTHOPAEDIC SURGERY

## 2024-08-04 RX ADMIN — CEFTRIAXONE SODIUM 1 G: 1 INJECTION, POWDER, FOR SOLUTION INTRAMUSCULAR; INTRAVENOUS at 12:08

## 2024-08-04 RX ADMIN — ASPIRIN 81 MG CHEWABLE TABLET 81 MG: 81 TABLET CHEWABLE at 08:08

## 2024-08-04 RX ADMIN — FLUTICASONE PROPIONATE 50 MCG: 50 SPRAY, METERED NASAL at 08:08

## 2024-08-04 RX ADMIN — CEFTRIAXONE SODIUM 1 G: 1 INJECTION, POWDER, FOR SOLUTION INTRAMUSCULAR; INTRAVENOUS at 11:08

## 2024-08-04 RX ADMIN — OXYCODONE HYDROCHLORIDE 5 MG: 5 TABLET ORAL at 05:08

## 2024-08-04 RX ADMIN — POLYETHYLENE GLYCOL 3350 17 G: 17 POWDER, FOR SOLUTION ORAL at 08:08

## 2024-08-04 RX ADMIN — FAMOTIDINE 20 MG: 20 TABLET ORAL at 08:08

## 2024-08-04 RX ADMIN — MUPIROCIN: 20 OINTMENT TOPICAL at 08:08

## 2024-08-04 RX ADMIN — VANCOMYCIN HYDROCHLORIDE 2000 MG: 500 INJECTION, POWDER, LYOPHILIZED, FOR SOLUTION INTRAVENOUS at 08:08

## 2024-08-05 ENCOUNTER — LAB VISIT (OUTPATIENT)
Dept: LAB | Facility: HOSPITAL | Age: 59
End: 2024-08-05
Attending: STUDENT IN AN ORGANIZED HEALTH CARE EDUCATION/TRAINING PROGRAM
Payer: OTHER GOVERNMENT

## 2024-08-05 DIAGNOSIS — T84.53XA INFECTION OF TOTAL RIGHT KNEE REPLACEMENT: Primary | ICD-10-CM

## 2024-08-05 LAB
ALBUMIN SERPL BCP-MCNC: 2.1 G/DL (ref 3.5–5.2)
ALP SERPL-CCNC: 167 U/L (ref 55–135)
ALT SERPL W/O P-5'-P-CCNC: 35 U/L (ref 10–44)
ANION GAP SERPL CALC-SCNC: 7 MMOL/L (ref 8–16)
AST SERPL-CCNC: 84 U/L (ref 10–40)
BACTERIA SPEC AEROBE CULT: NO GROWTH
BACTERIA SPEC AEROBE CULT: NO GROWTH
BACTERIA SPEC ANAEROBE CULT: NORMAL
BACTERIA SPEC ANAEROBE CULT: NORMAL
BASOPHILS # BLD AUTO: 0.01 K/UL (ref 0–0.2)
BASOPHILS NFR BLD: 0.2 % (ref 0–1.9)
BILIRUB SERPL-MCNC: 1.9 MG/DL (ref 0.1–1)
BUN SERPL-MCNC: 17 MG/DL (ref 6–20)
CALCIUM SERPL-MCNC: 8.6 MG/DL (ref 8.7–10.5)
CHLORIDE SERPL-SCNC: 106 MMOL/L (ref 95–110)
CO2 SERPL-SCNC: 24 MMOL/L (ref 23–29)
CREAT SERPL-MCNC: 0.7 MG/DL (ref 0.5–1.4)
CRP SERPL-MCNC: 33.5 MG/L (ref 0–8.2)
DIFFERENTIAL METHOD BLD: ABNORMAL
EOSINOPHIL # BLD AUTO: 0.2 K/UL (ref 0–0.5)
EOSINOPHIL NFR BLD: 5.3 % (ref 0–8)
ERYTHROCYTE [DISTWIDTH] IN BLOOD BY AUTOMATED COUNT: 17.6 % (ref 11.5–14.5)
EST. GFR  (NO RACE VARIABLE): >60 ML/MIN/1.73 M^2
GLUCOSE SERPL-MCNC: 78 MG/DL (ref 70–110)
HCT VFR BLD AUTO: 26.2 % (ref 40–54)
HGB BLD-MCNC: 8.3 G/DL (ref 14–18)
IMM GRANULOCYTES # BLD AUTO: 0.03 K/UL (ref 0–0.04)
IMM GRANULOCYTES NFR BLD AUTO: 0.7 % (ref 0–0.5)
LYMPHOCYTES # BLD AUTO: 0.7 K/UL (ref 1–4.8)
LYMPHOCYTES NFR BLD: 16.7 % (ref 18–48)
MCH RBC QN AUTO: 29.9 PG (ref 27–31)
MCHC RBC AUTO-ENTMCNC: 31.7 G/DL (ref 32–36)
MCV RBC AUTO: 94 FL (ref 82–98)
MONOCYTES # BLD AUTO: 0.7 K/UL (ref 0.3–1)
MONOCYTES NFR BLD: 16.2 % (ref 4–15)
NEUTROPHILS # BLD AUTO: 2.6 K/UL (ref 1.8–7.7)
NEUTROPHILS NFR BLD: 60.9 % (ref 38–73)
NRBC BLD-RTO: 0 /100 WBC
PLATELET # BLD AUTO: 105 K/UL (ref 150–450)
PMV BLD AUTO: 10.6 FL (ref 9.2–12.9)
POTASSIUM SERPL-SCNC: 3.8 MMOL/L (ref 3.5–5.1)
PROT SERPL-MCNC: 5.2 G/DL (ref 6–8.4)
RBC # BLD AUTO: 2.78 M/UL (ref 4.6–6.2)
SODIUM SERPL-SCNC: 137 MMOL/L (ref 136–145)
VANCOMYCIN TROUGH SERPL-MCNC: 17.6 UG/ML (ref 10–22)
WBC # BLD AUTO: 4.19 K/UL (ref 3.9–12.7)

## 2024-08-05 PROCEDURE — 85025 COMPLETE CBC W/AUTO DIFF WBC: CPT | Performed by: STUDENT IN AN ORGANIZED HEALTH CARE EDUCATION/TRAINING PROGRAM

## 2024-08-05 PROCEDURE — 86140 C-REACTIVE PROTEIN: CPT | Performed by: STUDENT IN AN ORGANIZED HEALTH CARE EDUCATION/TRAINING PROGRAM

## 2024-08-05 PROCEDURE — 80053 COMPREHEN METABOLIC PANEL: CPT | Performed by: STUDENT IN AN ORGANIZED HEALTH CARE EDUCATION/TRAINING PROGRAM

## 2024-08-05 PROCEDURE — 80202 ASSAY OF VANCOMYCIN: CPT | Performed by: STUDENT IN AN ORGANIZED HEALTH CARE EDUCATION/TRAINING PROGRAM

## 2024-08-06 ENCOUNTER — PATIENT OUTREACH (OUTPATIENT)
Dept: ADMINISTRATIVE | Facility: CLINIC | Age: 59
End: 2024-08-06
Payer: OTHER GOVERNMENT

## 2024-08-07 LAB
FINAL PATHOLOGIC DIAGNOSIS: NORMAL
GROSS: NORMAL
Lab: NORMAL

## 2024-08-08 ENCOUNTER — HOSPITAL ENCOUNTER (OUTPATIENT)
Facility: OTHER | Age: 59
Discharge: HOME HEALTH | End: 2024-08-10
Attending: EMERGENCY MEDICINE | Admitting: HOSPITALIST
Payer: OTHER GOVERNMENT

## 2024-08-08 DIAGNOSIS — T84.53XA INFECTION OF PROSTHETIC RIGHT KNEE JOINT: Primary | ICD-10-CM

## 2024-08-08 PROBLEM — G47.33 OBSTRUCTIVE SLEEP APNEA: Status: ACTIVE | Noted: 2024-08-08

## 2024-08-08 PROBLEM — K21.9 GASTROESOPHAGEAL REFLUX DISEASE: Status: ACTIVE | Noted: 2024-08-08

## 2024-08-08 LAB
ALBUMIN SERPL BCP-MCNC: 2.1 G/DL (ref 3.5–5.2)
ALP SERPL-CCNC: 163 U/L (ref 55–135)
ALT SERPL W/O P-5'-P-CCNC: 30 U/L (ref 10–44)
ANION GAP SERPL CALC-SCNC: 7 MMOL/L (ref 8–16)
AST SERPL-CCNC: 61 U/L (ref 10–40)
BASOPHILS # BLD AUTO: 0.03 K/UL (ref 0–0.2)
BASOPHILS NFR BLD: 0.8 % (ref 0–1.9)
BILIRUB SERPL-MCNC: 1.9 MG/DL (ref 0.1–1)
BUN SERPL-MCNC: 19 MG/DL (ref 6–20)
CALCIUM SERPL-MCNC: 8 MG/DL (ref 8.7–10.5)
CHLORIDE SERPL-SCNC: 112 MMOL/L (ref 95–110)
CO2 SERPL-SCNC: 22 MMOL/L (ref 23–29)
CREAT SERPL-MCNC: 0.8 MG/DL (ref 0.5–1.4)
CRP SERPL-MCNC: 54.4 MG/L (ref 0–8.2)
DIFFERENTIAL METHOD BLD: ABNORMAL
EOSINOPHIL # BLD AUTO: 0.2 K/UL (ref 0–0.5)
EOSINOPHIL NFR BLD: 5.7 % (ref 0–8)
ERYTHROCYTE [DISTWIDTH] IN BLOOD BY AUTOMATED COUNT: 17.5 % (ref 11.5–14.5)
ERYTHROCYTE [SEDIMENTATION RATE] IN BLOOD BY PHOTOMETRIC METHOD: 7 MM/HR (ref 0–23)
EST. GFR  (NO RACE VARIABLE): >60 ML/MIN/1.73 M^2
GLUCOSE SERPL-MCNC: 83 MG/DL (ref 70–110)
HCT VFR BLD AUTO: 26.3 % (ref 40–54)
HGB BLD-MCNC: 8.1 G/DL (ref 14–18)
IMM GRANULOCYTES # BLD AUTO: 0.01 K/UL (ref 0–0.04)
IMM GRANULOCYTES NFR BLD AUTO: 0.3 % (ref 0–0.5)
LYMPHOCYTES # BLD AUTO: 0.6 K/UL (ref 1–4.8)
LYMPHOCYTES NFR BLD: 16.9 % (ref 18–48)
MCH RBC QN AUTO: 30 PG (ref 27–31)
MCHC RBC AUTO-ENTMCNC: 30.8 G/DL (ref 32–36)
MCV RBC AUTO: 97 FL (ref 82–98)
MONOCYTES # BLD AUTO: 0.7 K/UL (ref 0.3–1)
MONOCYTES NFR BLD: 18 % (ref 4–15)
NEUTROPHILS # BLD AUTO: 2.1 K/UL (ref 1.8–7.7)
NEUTROPHILS NFR BLD: 58.3 % (ref 38–73)
NRBC BLD-RTO: 0 /100 WBC
PLATELET # BLD AUTO: 96 K/UL (ref 150–450)
PMV BLD AUTO: 10.1 FL (ref 9.2–12.9)
POTASSIUM SERPL-SCNC: 3.9 MMOL/L (ref 3.5–5.1)
PROT SERPL-MCNC: 5.4 G/DL (ref 6–8.4)
RBC # BLD AUTO: 2.7 M/UL (ref 4.6–6.2)
SODIUM SERPL-SCNC: 141 MMOL/L (ref 136–145)
VANCOMYCIN SERPL-MCNC: 21 UG/ML
WBC # BLD AUTO: 3.66 K/UL (ref 3.9–12.7)

## 2024-08-08 PROCEDURE — 85652 RBC SED RATE AUTOMATED: CPT | Performed by: EMERGENCY MEDICINE

## 2024-08-08 PROCEDURE — 96367 TX/PROPH/DG ADDL SEQ IV INF: CPT

## 2024-08-08 PROCEDURE — 80202 ASSAY OF VANCOMYCIN: CPT | Performed by: EMERGENCY MEDICINE

## 2024-08-08 PROCEDURE — 86140 C-REACTIVE PROTEIN: CPT | Performed by: EMERGENCY MEDICINE

## 2024-08-08 PROCEDURE — 96365 THER/PROPH/DIAG IV INF INIT: CPT

## 2024-08-08 PROCEDURE — 99285 EMERGENCY DEPT VISIT HI MDM: CPT

## 2024-08-08 PROCEDURE — 25000003 PHARM REV CODE 250: Performed by: HOSPITALIST

## 2024-08-08 PROCEDURE — 80053 COMPREHEN METABOLIC PANEL: CPT | Performed by: EMERGENCY MEDICINE

## 2024-08-08 PROCEDURE — 96366 THER/PROPH/DIAG IV INF ADDON: CPT

## 2024-08-08 PROCEDURE — 63600175 PHARM REV CODE 636 W HCPCS: Performed by: HOSPITALIST

## 2024-08-08 PROCEDURE — G0378 HOSPITAL OBSERVATION PER HR: HCPCS

## 2024-08-08 PROCEDURE — 85025 COMPLETE CBC W/AUTO DIFF WBC: CPT | Performed by: EMERGENCY MEDICINE

## 2024-08-08 RX ORDER — HYDROXYZINE HYDROCHLORIDE 25 MG/1
25 TABLET, FILM COATED ORAL 3 TIMES DAILY PRN
Status: DISCONTINUED | OUTPATIENT
Start: 2024-08-08 | End: 2024-08-10 | Stop reason: HOSPADM

## 2024-08-08 RX ORDER — FLUTICASONE PROPIONATE 50 MCG
1 SPRAY, SUSPENSION (ML) NASAL DAILY
Status: DISCONTINUED | OUTPATIENT
Start: 2024-08-08 | End: 2024-08-10 | Stop reason: HOSPADM

## 2024-08-08 RX ORDER — OXYCODONE HYDROCHLORIDE 5 MG/1
5 TABLET ORAL EVERY 4 HOURS PRN
Status: DISCONTINUED | OUTPATIENT
Start: 2024-08-08 | End: 2024-08-10 | Stop reason: HOSPADM

## 2024-08-08 RX ORDER — ASPIRIN 81 MG/1
81 TABLET ORAL 2 TIMES DAILY
Status: DISCONTINUED | OUTPATIENT
Start: 2024-08-08 | End: 2024-08-10 | Stop reason: HOSPADM

## 2024-08-08 RX ORDER — PANTOPRAZOLE SODIUM 40 MG/1
40 TABLET, DELAYED RELEASE ORAL DAILY
Status: DISCONTINUED | OUTPATIENT
Start: 2024-08-08 | End: 2024-08-10 | Stop reason: HOSPADM

## 2024-08-08 RX ORDER — ACETAMINOPHEN 500 MG
1000 TABLET ORAL EVERY 8 HOURS PRN
Status: DISCONTINUED | OUTPATIENT
Start: 2024-08-08 | End: 2024-08-10 | Stop reason: HOSPADM

## 2024-08-08 RX ORDER — NALOXONE HCL 0.4 MG/ML
0.02 VIAL (ML) INJECTION
Status: DISCONTINUED | OUTPATIENT
Start: 2024-08-08 | End: 2024-08-10 | Stop reason: HOSPADM

## 2024-08-08 RX ORDER — LACTULOSE 10 G/15ML
15 SOLUTION ORAL DAILY
Status: DISCONTINUED | OUTPATIENT
Start: 2024-08-09 | End: 2024-08-10 | Stop reason: HOSPADM

## 2024-08-08 RX ORDER — ONDANSETRON HYDROCHLORIDE 2 MG/ML
8 INJECTION, SOLUTION INTRAVENOUS EVERY 8 HOURS PRN
Status: DISCONTINUED | OUTPATIENT
Start: 2024-08-08 | End: 2024-08-10 | Stop reason: HOSPADM

## 2024-08-08 RX ADMIN — ACETAMINOPHEN 1000 MG: 500 TABLET ORAL at 09:08

## 2024-08-08 RX ADMIN — CEFTRIAXONE SODIUM 2 G: 2 INJECTION, POWDER, FOR SOLUTION INTRAMUSCULAR; INTRAVENOUS at 10:08

## 2024-08-08 RX ADMIN — ASPIRIN 81 MG: 81 TABLET, COATED ORAL at 02:08

## 2024-08-08 RX ADMIN — PANTOPRAZOLE SODIUM 40 MG: 40 TABLET, DELAYED RELEASE ORAL at 10:08

## 2024-08-08 RX ADMIN — VANCOMYCIN HYDROCHLORIDE 1750 MG: 500 INJECTION, POWDER, LYOPHILIZED, FOR SOLUTION INTRAVENOUS at 09:08

## 2024-08-08 RX ADMIN — ASPIRIN 81 MG: 81 TABLET, COATED ORAL at 09:08

## 2024-08-08 RX ADMIN — OXYCODONE HYDROCHLORIDE 5 MG: 5 TABLET ORAL at 10:08

## 2024-08-08 NOTE — CONSULTS
Assessment/Plan:  Status Post right Total Knee Arthroplasty. Complications: recent irrigation and debridement with one stage total knee revision due to wound Infection.  Some persistent postoperative serous sanguinous drainage.    I discussed with Dr. Liao.  Plan to continue IV antibiotics per infectious disease recommendations which can be continued at home.  Right knee incisional wound VAC to help manage and monitor any persistent drainage. Continue home health to manage the wound VAC and home IV therapy.  He will call or return if there is any increased pain or increased drainage or any other symptoms or problems in the next couple of days.  Otherwise he will return to see me in my office on Monday August 12.    I appreciate the wound care assistance in obtaining and applying the home VAC dressing.       LOS: 0 days     Mr. Peter is a 58-year-old man with a history of right total knee arthroplasty November 2023 with a partner Dr. Darnell Liao. He did well initially postoperatively despite multiple comorbidities including pulmonary sarcoid, obesity, hepatitis, chronic lymphedema and heart failure.  Last month he was diagnosed with right knee infection and underwent irrigation and debridement with one stage total knee revision August 1, 2024.  He was started on an antibiotic therapy with home PICC line including vancomycin and ceftriaxone.  He was discharged last Friday and over the weekend and was noted to have some drainage which has persisted.    He reports no fever.  He is not really having much pain.  His appetite has been good.  In discussion with his wife he has had some drainage on the dressing not necessarily increasing.  The drainage has been serosanguineous.  Alters from his recent surgery have shown no growth to date.  He has not had any subjective fever chills or other constitutional symptoms.    Objective:  Vital signs in last 24 hours:  Temp:  [97.7 °F (36.5 °C)-99.5 °F (37.5 °C)] 99.5 °F (37.5  °C)  Pulse:  [] 86  Resp:  [15-20] 18  SpO2:  [93 %-98 %] 98 %  BP: (114-146)/(51-65) 122/52    General: alert, appears stated age, and cooperative   Wound: Wound Intact and Positive for Edema   Motion: Extension: Full Extension   DVT Exam: No evidence of DVT seen on physical exam.     Both lower legs have significant edema.  Right knee incision is intact with sutures in place.  No dehiscence.  There is no active drainage but his wife does show me pictures of serosanguineous drainage on the previous dressing.  No significant erythema or warmth.  No significant tenderness about the wound.  Calf is soft, Homans sign is negative.  He is moving his foot without discomfort.    Data Review  CBC:   Lab Results   Component Value Date    WBC 3.66 (L) 08/08/2024    RBC 2.70 (L) 08/08/2024    HGB 8.1 (L) 08/08/2024    HCT 26.3 (L) 08/08/2024    PLT 96 (L) 08/08/2024

## 2024-08-08 NOTE — ED PROVIDER NOTES
"Encounter Date: 8/8/2024    SCRIBE #1 NOTE: IMaddison, [place] scribing for, and in the presence of,  Loraine Salinas MD. I have scribed the following portions of the note - Other sections scribed: HPI, ROS, PE.       History     Chief Complaint   Patient presents with    Post-op Problem     Presents via pov reporting active bleeding/drainage to RLE status post knee revision Thursday. Pt reports that last dressing change was 5 pm yesterday. Bleeding controlled with bandages from home. Denies thinners.      Time seen by provider: 6:15 AM    This is a 58 y.o. male who presents with complaint of increased drainage from knee incision.  He was discharged from the hospital earlier this week after having surgical revision for infection.  He reports having his initial total knee replacement on his right knee last November by Dr. Liao. On 8/1, he had a revision of his total knee replacement due to an infection, and he reports receiving IV antibiotics since. He describes that his dressing pad "filled up" two days ago and was given dressing by nurse at home. Yesterday, his drainage was minimal and manageable throughout the day until late afternoon when the drainage increased. He called Dr. Liao's office where he was told to come to the ED early this morning. He describes the drainage as "watery and bloody". He reports the pain has not significantly increased this week. He denies any fever or trauma to right knee. His Hx includes sarcoidosis, but he is no longer on chronic steroids.  He denies tobacco use or alcohol consumption. He denies any allergies to medications. This is the extent of the patient's complaints at this time.      The history is provided by the patient.     Review of patient's allergies indicates:  No Known Allergies  Past Medical History:   Diagnosis Date    Anxiety     BMI 38.0-38.9,adult     Hyperlipidemia     Hypervolemia 07/22/2022    Multiple pulmonary nodules     Obesity     Other cirrhosis of " liver 01/13/2021    Portal hypertension 02/11/2021    Rectal bleeding 02/11/2021    Sarcoidosis of lung with sarcoidosis of lymph nodes     Sleep apnea     CPAP USED    Splenomegaly 12/22/2020    Thrombocytopenia 12/22/2020     Past Surgical History:   Procedure Laterality Date    ANTERIOR CRUCIATE LIGAMENT REPAIR  2007    right knee    COLONOSCOPY N/A 4/8/2021    Procedure: COLONOSCOPY;  Surgeon: Jeff Olvera MD;  [facility]  Service: Endoscopy;  Laterality: N/A;  rectal bleeding,   2nd floor BMI 50 (354 lbs)  COVID test on 4/5/21 at [facility]    ESOPHAGOGASTRODUODENOSCOPY N/A 4/8/2021    Procedure: EGD (ESOPHAGOGASTRODUODENOSCOPY);  Surgeon: Jeff Olvera MD;  [facility]  Service: Endoscopy;  Laterality: N/A;  variceal screening/ labs the am of procedure  2nd floor BMI 50 (354 lbs)    ESOPHAGOGASTRODUODENOSCOPY N/A 3/31/2022    Procedure: EGD (ESOPHAGOGASTRODUODENOSCOPY);  Surgeon: Jeff Olvera MD;  [facility]  Service: Endoscopy;  Laterality: N/A;  BMI-52    Wt:375#      cirrhosis, variceal screening-labs done on 3/29-GT  vaccinated-GT    ESOPHAGOGASTRODUODENOSCOPY N/A 4/21/2023    Procedure: EGD (ESOPHAGOGASTRODUODENOSCOPY);  Surgeon: Christopher Britton MD;  Location: [facility]  Service: Endoscopy;  Laterality: N/A;  pt requested Friday due to work schedule  ECHO PA 44-51  BMI 47.97 Wt 343 lbs  inst portal-RB  last CBC PT/INR 1/24/23 4/17/23- [facility] confirmed.    ESOPHAGOGASTRODUODENOSCOPY N/A 1/10/2024    Procedure: EGD (ESOPHAGOGASTRODUODENOSCOPY);  Surgeon: Christopher Conley MD;  [facility]  Service: Endoscopy;  Laterality: N/A;    INCISION AND DRAINAGE Right 8/1/2024    Procedure: INCISION AND DRAINAGE;  Surgeon: Darnell Liao MD;  Location: [facility] OR;  Service: Orthopedics;  Laterality: Right;    LYMPHADENECTOMY  1985    MENISCECTOMY      left knee    NASAL SEPTUM SURGERY  1985    REVISION OF KNEE  ARTHROPLASTY Right 8/1/2024    Procedure: REVISION, ARTHROPLASTY, KNEE TOTAL;  Surgeon: Darnell Liao MD;  Location: [facility] OR;  Service: Orthopedics;  Laterality: Right;    TOTAL KNEE ARTHROPLASTY Right 11/7/2023    Procedure: ARTHROPLASTY, KNEE, TOTAL;  Surgeon: Darnell Liao MD;  Location: [facility] OR;  Service: Orthopedics;  Laterality: Right;    WISDOM TOOTH EXTRACTION       Family History   Problem Relation Name Age of Onset    Hypertension Father      Heart attack Father  55    Diabetes Paternal Grandmother      Aneurysm Mother          eye    Dementia Mother      Colon cancer Neg Hx      Prostate cancer Neg Hx      Sarcoidosis Neg Hx       Social History     Tobacco Use    Smoking status: Never     Passive exposure: Never    Smokeless tobacco: Never   Substance Use Topics    Alcohol use: No    Drug use: No     Review of Systems   Constitutional:  Negative for chills and fever.   HENT:  Negative for congestion and sore throat.    Eyes:  Negative for visual disturbance.   Respiratory:  Negative for cough and shortness of breath.    Cardiovascular:  Negative for chest pain and palpitations.   Gastrointestinal:  Negative for abdominal pain, diarrhea, nausea and vomiting.   Genitourinary:  Negative for decreased urine volume, dysuria and frequency.   Musculoskeletal:  Negative for back pain, joint swelling, neck pain and neck stiffness.   Skin:  Positive for wound (With drainage). Negative for rash.   Neurological:  Negative for weakness, numbness and headaches.   Hematological:  Does not bruise/bleed easily.   Psychiatric/Behavioral:  Negative for behavioral problems and confusion.        Physical Exam     Initial Vitals [08/08/24 0600]   BP Pulse Resp Temp SpO2   137/65 103 20 98.2 °F (36.8 °C) 95 %      MAP       --         Physical Exam    Constitutional: He appears well-developed and well-nourished.   Obese.    HENT:   Head: Normocephalic and atraumatic.   Nose: Nose normal.   Mouth/Throat: Oropharynx  is clear and moist.   Eyes: Conjunctivae and EOM are normal. Pupils are equal, round, and reactive to light.   Neck: Neck supple.   Normal range of motion.  Cardiovascular:  Normal rate and regular rhythm.     Exam reveals no gallop and no friction rub.       Murmur heard.  Pulmonary/Chest: Breath sounds normal. No respiratory distress. He has no wheezes. He has no rales.   Abdominal: Abdomen is soft. Bowel sounds are normal. There is no abdominal tenderness. There is no rebound and no guarding.   Musculoskeletal:         General: Edema (Bilateral lower extremity edema present, symmetrical.) present. No tenderness.      Cervical back: Normal range of motion and neck supple.     Neurological: He is alert and oriented to person, place, and time. He has normal strength. No cranial nerve deficit or sensory deficit. Gait normal. GCS score is 15. GCS eye subscore is 4. GCS verbal subscore is 5. GCS motor subscore is 6.   Skin: Skin is warm and dry. No rash noted.   Right lower extremity with large vertical incision to anterior aspect of the knee with sutures in place. Dressing saturated with serosanguineous drainage. No purulence.   Psychiatric: He has a normal mood and affect. His speech is normal and behavior is normal.         ED Course   Procedures  Labs Reviewed   CBC W/ AUTO DIFFERENTIAL - Abnormal       Result Value    WBC 3.66 (*)     RBC 2.70 (*)     Hemoglobin 8.1 (*)     Hematocrit 26.3 (*)     MCV 97      MCH 30.0      MCHC 30.8 (*)     RDW 17.5 (*)     Platelets 96 (*)     MPV 10.1      Immature Granulocytes 0.3      Gran # (ANC) 2.1      Immature Grans (Abs) 0.01      Lymph # 0.6 (*)     Mono # 0.7      Eos # 0.2      Baso # 0.03      nRBC 0      Gran % 58.3      Lymph % 16.9 (*)     Mono % 18.0 (*)     Eosinophil % 5.7      Basophil % 0.8      Differential Method Automated     COMPREHENSIVE METABOLIC PANEL - Abnormal    Sodium 141      Potassium 3.9      Chloride 112 (*)     CO2 22 (*)     Glucose 83       BUN 19      Creatinine 0.8      Calcium 8.0 (*)     Total Protein 5.4 (*)     Albumin 2.1 (*)     Total Bilirubin 1.9 (*)     Alkaline Phosphatase 163 (*)     AST 61 (*)     ALT 30      eGFR >60      Anion Gap 7 (*)    C-REACTIVE PROTEIN - Abnormal    CRP 54.4 (*)    SEDIMENTATION RATE    Sed Rate 7     VANCOMYCIN, RANDOM   VANCOMYCIN, RANDOM    Vancomycin, Random 21.0            Imaging Results    None          Medications   fluticasone propionate 50 mcg/actuation nasal spray 50 mcg (50 mcg Each Nostril Not Given 8/8/24 0830)   hydrOXYzine HCL tablet 25 mg (has no administration in time range)   lactulose 20 gram/30 mL solution Soln 15 g (has no administration in time range)   pantoprazole EC tablet 40 mg (40 mg Oral Given 8/8/24 1000)   naloxone 0.4 mg/mL injection 0.02 mg (has no administration in time range)   cefTRIAXone (ROCEPHIN) 2 g in D5W 100 mL IVPB (MB+) (0 g Intravenous Stopped 8/8/24 1040)   vancomycin - pharmacy to dose (has no administration in time range)   acetaminophen tablet 1,000 mg (has no administration in time range)   oxyCODONE immediate release tablet 5 mg (5 mg Oral Given 8/8/24 1022)   ondansetron injection 8 mg (has no administration in time range)   vancomycin 1.75 g in 5 % dextrose 500 mL IVPB (1,750 mg Intravenous Trough Due As Scheduled Before Dose 8/10/24 0800)   aspirin EC tablet 81 mg (has no administration in time range)     Medical Decision Making  My differential diagnosis includes infection, seroma, hematoma    Urgent evaluation a 58-year-old male who is 1 week status post revision of right knee TKA for infection.  Vital signs are benign, afebrile.  On exam there is no significant erythema or induration around the incision, but dressing is saturated with serosanguineous fluid.  Patient was told by orthopedic surgery office to come in today.  I discussed the case with orthopedic surgeon, they will take him to the operating room for treatment today.  Labs are reassuring -  abnormal but similar to baseline.  CRP is elevated and ESR within normal limits.  He is admitted in stable condition for further care.      Amount and/or Complexity of Data Reviewed  Labs: ordered. Decision-making details documented in ED Course.     Details: Additionally, ESR is within normal limits.  Discussion of management or test interpretation with external provider(s): I discussed the case with hospitalist MD, Dr. Olmos  I discussed the case with orthopedics MD, Dr. Lala   I discussed the case with orthopedics MD Dr. Hugo IV            Scribe Attestation:   Scribe #1: I performed the above scribed service and the documentation accurately describes the services I performed. I attest to the accuracy of the note.    Physician Attestation for Scribe: I, Loraine Salinas, reviewed documentation as scribed in my presence, which is both accurate and complete.        ED Course as of 08/08/24 1352   Thu Aug 08, 2024   0628 I paged Orthopedics to discuss the case. [AK]   0632 I discussed the case with Dr. Lala.  He will call back. [AK]   0703 I discussed the case with Dr. Lala.  He states to make the patient NPO, and that Dr. Hugo will take him for surgery later today.  He states to admit the patient to the hospitalist service. [AK]   0732 CBC auto differential(!)  Reviewed.  There is anemia present which is stable from his baseline.  There is mild thrombocytopenia which is similar to baseline.  There is mild leukopenia which is new. [AK]   0828 C-reactive protein(!)  Reviewed.  Elevated.  Increase from value 3 days ago. [AK]   0829 Comprehensive metabolic panel(!)  Reviewed.  LFTs are elevated similar to baseline.  There is no azotemia or MATIAS, no major electrolyte disturbance. [AK]      ED Course User Index  [AK] Loraine Salinas MD                             Clinical Impression:  Final diagnoses:  [T84.53XA] Infection of prosthetic right knee joint          ED Disposition Condition    Observation Stable                 Loraine Salinas MD  08/08/24 2220

## 2024-08-08 NOTE — H&P
[facility] Medicine  History & Physical      Patient Name: Ashwin Peter  MRN: [id number]  Patient Class: Emergency  Admission Date: 8/8/2024  Attending Physician: Alvarado Koch MD   Primary Care Provider: Sergio Guo III, MD         Patient information was obtained from patient, past medical records, and ER records.     Subjective:     Principal Problem:Infection of prosthetic right knee joint    Chief Complaint:   Chief Complaint   Patient presents with    Post-op Problem     Presents via pov reporting active bleeding/drainage to RLE status post knee revision Thursday. Pt reports that last dressing change was 5 pm yesterday. Bleeding controlled with bandages from home. Denies thinners.         HPI: Patient is a 58-year-old man with a history of pulmonary sarcoid, chronic diastolic heart failure, obesity, nonalcoholic steatohepatitis, chronic lymphedema, and osteoarthritis who underwent total right knee arthroplasty on 11/7/2023 performed by Dr. Darnell Liao.      Patient had infection of prosthetic right knee joint for which he underwent incision and drainage and total revision of the right knee performed on 8/1/2024.  Spacer not utilized and new hardware placed.  Infectious Disease service consulted.  Patient was discharged on recommended antibiotic therapy intravenous vancomycin (2 grams every 12 hours) and ceftriaxone (2 grams every morning).  Cultures obtained from recent incision and drainage no growth to date.    Patient presents emergency department with reported increased drainage from his surgical wound.  Patient reports no increase pain. No fever.  Reports episode of subjective chills which he attributes to the fact that and air conditioning event is over his bed.    He reports chronic bilateral lower extremity swelling with right lower extremity more swollen than left at baseline.  No change in his baseline swelling.  Reports he management swelling with compression stockings  along with diuretic therapy.  No shortness a breath.    Patient previously been on prednisone for pulmonary sarcoidosis but was weaned off chronic steroids and has been off steroids about 5 months ago.    Past Medical History:   Diagnosis Date    Anxiety     BMI 38.0-38.9,adult     Hyperlipidemia     Hypervolemia 07/22/2022    Multiple pulmonary nodules     Obesity     Other cirrhosis of liver 01/13/2021    Portal hypertension 02/11/2021    Rectal bleeding 02/11/2021    Sarcoidosis of lung with sarcoidosis of lymph nodes     Sleep apnea     CPAP USED    Splenomegaly 12/22/2020    Thrombocytopenia 12/22/2020       Past Surgical History:   Procedure Laterality Date    ANTERIOR CRUCIATE LIGAMENT REPAIR  2007    right knee    COLONOSCOPY N/A 4/8/2021    Procedure: COLONOSCOPY;  Surgeon: Jeff Olvera MD;  Location: [facility] ENDO (2ND FLR);  Service: Endoscopy;  Laterality: N/A;  rectal bleeding,   2nd floor BMI 50 (354 lbs)  COVID test on 4/5/21 at [facility]    ESOPHAGOGASTRODUODENOSCOPY N/A 4/8/2021    Procedure: EGD (ESOPHAGOGASTRODUODENOSCOPY);  Surgeon: Jeff Olvera MD;  Location: [facility] ADIEL (2ND FLR);  Service: Endoscopy;  Laterality: N/A;  variceal screening/ labs the am of procedure  2nd floor BMI 50 (354 lbs)    ESOPHAGOGASTRODUODENOSCOPY N/A 3/31/2022    Procedure: EGD (ESOPHAGOGASTRODUODENOSCOPY);  Surgeon: Jeff Olvera MD;  Location: [facility] ENDO (2ND FLR);  Service: Endoscopy;  Laterality: N/A;  BMI-52    Wt:375#      cirrhosis, variceal screening-labs done on 3/29-GT  vaccinated-GT    ESOPHAGOGASTRODUODENOSCOPY N/A 4/21/2023    Procedure: EGD (ESOPHAGOGASTRODUODENOSCOPY);  Surgeon: Christopher Britton MD;  Location: [facility] ENDO (2ND FLR);  Service: Endoscopy;  Laterality: N/A;  pt requested Friday due to work schedule  ECHO PA 44-51  BMI 47.97 Wt 343 lbs  inst portal-RB  last CBC PT/INR 1/24/23 4/17/23- Precall confirmed.    ESOPHAGOGASTRODUODENOSCOPY N/A 1/10/2024    Procedure: EGD  (ESOPHAGOGASTRODUODENOSCOPY);  Surgeon: Christopher Conley MD;  [facility]  Service: Endoscopy;  Laterality: N/A;    INCISION AND DRAINAGE Right 8/1/2024    Procedure: INCISION AND DRAINAGE;  Surgeon: Darnell Liao MD;  Location: [facility];  Service: Orthopedics;  Laterality: Right;    LYMPHADENECTOMY  1985    MENISCECTOMY      left knee    NASAL SEPTUM SURGERY  1985    REVISION OF KNEE ARTHROPLASTY Right 8/1/2024    Procedure: REVISION, ARTHROPLASTY, KNEE TOTAL;  Surgeon: Darnell Liao MD;  Location: [facility];  Service: Orthopedics;  Laterality: Right;    TOTAL KNEE ARTHROPLASTY Right 11/7/2023    Procedure: ARTHROPLASTY, KNEE, TOTAL;  Surgeon: Darnell Liao MD;  Location: [facility];  Service: Orthopedics;  Laterality: Right;    WISDOM TOOTH EXTRACTION         Review of patient's allergies indicates:  No Known Allergies    No current facility-administered medications on file prior to encounter.     Current Outpatient Medications on File Prior to Encounter   Medication Sig    acetaminophen (TYLENOL ORAL) Take by mouth as needed.    aspirin 81 MG Chew Take 1 tablet (81 mg total) by mouth 2 (two) times daily.    fluticasone propionate (FLONASE) 50 mcg/actuation nasal spray 1 spray by Each Nostril route as needed for Rhinitis.    hydrOXYzine HCL (ATARAX) 25 MG tablet Take 1 tablet (25 mg total) by mouth 3 (three) times daily as needed for Anxiety.    lactulose (CONSTULOSE) 10 gram/15 mL solution Take 15 mLs (10 g total) by mouth 3 (three) times daily.    omeprazole (PRILOSEC) 40 MG capsule Take 40 mg by mouth once daily.    oxyCODONE (ROXICODONE) 5 MG immediate release tablet Take 1 tablet (5 mg total) by mouth every 4 (four) hours as needed for Pain.    potassium chloride (KLOR-CON) 10 MEQ TbSR TAKE 1 TABLET(10 MEQ) BY MOUTH EVERY DAY    torsemide (DEMADEX) 20 MG Tab Take 3 tablets (60 mg total) by mouth after dinner.    [DISCONTINUED] pantoprazole (PROTONIX) 40 MG tablet Take 1 tablet  (40 mg total) by mouth once daily. (Patient not taking: Reported on 6/15/2022)     Family History       Problem Relation (Age of Onset)    Aneurysm Mother    Dementia Mother    Diabetes Paternal Grandmother    Heart attack Father (55)    Hypertension Father          Tobacco Use    Smoking status: Never     Passive exposure: Never    Smokeless tobacco: Never   Substance and Sexual Activity    Alcohol use: No    Drug use: No    Sexual activity: Not on file     Review of Systems   Constitutional:  Negative for chills, diaphoresis, fatigue and fever.   HENT:  Negative for congestion, ear pain, hearing loss and tinnitus.    Eyes:  Negative for photophobia, pain, discharge and visual disturbance.   Respiratory:  Negative for chest tightness, shortness of breath and wheezing.    Cardiovascular:  Negative for chest pain, palpitations and leg swelling.   Gastrointestinal:  Negative for abdominal distention, abdominal pain, blood in stool, constipation, diarrhea, nausea and vomiting.   Endocrine: Negative for cold intolerance, heat intolerance, polydipsia, polyphagia and polyuria.   Genitourinary:  Negative for dysuria, flank pain, frequency, hematuria and urgency.   Musculoskeletal:  Positive for arthralgias. Negative for back pain, gait problem, myalgias and neck pain.   Skin:  Negative for color change, pallor, rash and wound.   Allergic/Immunologic: Negative for environmental allergies, food allergies and immunocompromised state.   Neurological:  Negative for seizures, syncope, facial asymmetry, weakness and headaches.   Psychiatric/Behavioral:  Negative for agitation, behavioral problems, confusion and hallucinations.      Objective:     Vital Signs (Most Recent):  Temp: 98.2 °F (36.8 °C) (08/08/24 0600)  Pulse: 83 (08/08/24 0732)  Resp: 20 (08/08/24 0600)  BP: (!) 122/56 (08/08/24 0732)  SpO2: 95 % (08/08/24 0732) Vital Signs (24h Range):  Temp:  [98.2 °F (36.8 °C)] 98.2 °F (36.8 °C)  Pulse:  [] 83  Resp:  [20]  20  SpO2:  [95 %-96 %] 95 %  BP: (114-137)/(56-65) 122/56     Weight: 124.7 kg (275 lb)  Body mass index is 38.35 kg/m².     Physical Exam  Constitutional:       General: He is not in acute distress.     Appearance: He is obese. He is not diaphoretic.   HENT:      Head: Normocephalic and atraumatic.      Nose: Nose normal.      Mouth/Throat:      Pharynx: No oropharyngeal exudate.   Eyes:      General: No scleral icterus.        Right eye: No discharge.         Left eye: No discharge.      Conjunctiva/sclera: Conjunctivae normal.      Pupils: Pupils are equal, round, and reactive to light.   Neck:      Thyroid: No thyromegaly.      Vascular: No JVD.      Trachea: No tracheal deviation.   Cardiovascular:      Rate and Rhythm: Normal rate and regular rhythm.      Heart sounds: Normal heart sounds. No murmur heard.     No friction rub. No gallop.   Pulmonary:      Effort: Pulmonary effort is normal. No respiratory distress.      Breath sounds: Normal breath sounds. No stridor. No wheezing or rales.   Chest:      Chest wall: No tenderness.   Abdominal:      General: Bowel sounds are normal. There is no distension.      Palpations: Abdomen is soft. There is no mass.      Tenderness: There is no abdominal tenderness. There is no guarding or rebound.   Musculoskeletal:         General: No tenderness. Normal range of motion.      Cervical back: Normal range of motion and neck supple.      Right lower leg: Edema present.      Left lower leg: Edema present.      Comments: Right lower extremity more swollen than left, surgical incision intact without significant erythema or discharge noted on exam   Lymphadenopathy:      Cervical: No cervical adenopathy.   Skin:     General: Skin is warm and dry.      Coloration: Skin is not pale.      Findings: No erythema or rash.   Neurological:      Mental Status: He is alert and oriented to person, place, and time.      Cranial Nerves: No cranial nerve deficit.      Motor: No abnormal  muscle tone.      Coordination: Coordination normal.      Deep Tendon Reflexes: Reflexes are normal and symmetric. Reflexes normal.   Psychiatric:         Behavior: Behavior normal.         Thought Content: Thought content normal.         Judgment: Judgment normal.              CRANIAL NERVES     CN III, IV, VI   Pupils are equal, round, and reactive to light.       Significant Labs: All pertinent labs within the past 24 hours have been reviewed.    Significant Imaging: I have reviewed all pertinent imaging results/findings within the past 24 hours.  Assessment/Plan:     * Infection of prosthetic right knee joint  Patient with increased drainage from his right knee with concern for possible treatment failure with current antibiotic therapy.  However no leukocytosis and no fever.  CRP mildly more elvated at 54.4 mg/L (previously 33.5 mg/L on 8/5/2024).  Sedimentation rate previously normal.  Repeat pending.  Cultures from recent surgery no growth today.  Unclear patient is having treatment failure however did not manifest significant systemic inflammatory response when he had purulence in his knee previously.  Continue current antibiotic therapy with intravenous vancomycin ceftriaxone.  Consult Orthopedic surgery and Infectious Disease Services.  NPO pending surgical evaluation.    Liver cirrhosis secondary to nonalcoholic steatohepatitis (GUAJARDO)  Denies any change in chronic lower extremity edema.  Denies any abdominal pain or abdominal distention.  Continue home diuretic therapy.  History of hepatic encephalopathy.  Not encephalopathic at this time.  Continue home dose of lactulose.    Sarcoidosis of lung with sarcoidosis of lymph nodes  Previously steroids but weaned off steroids about 5 months ago.  No shortness a breath.  Clinically stable.    Gastroesophageal reflux disease  Stable.  Continue proton pump inhibitor therapy.    Obstructive sleep apnea  Continue CPAP at night.        DVT prophylaxis.  Thrombo  Embolic Deterrent hose (JOSLYN® hose) and sequential compression devices for now pending surgical evaluation.         Alvarado Koch MD  Department of Hospital Medicine

## 2024-08-08 NOTE — SUBJECTIVE & OBJECTIVE
Past Medical History:   Diagnosis Date    Anxiety     BMI 38.0-38.9,adult     Hyperlipidemia     Hypervolemia 07/22/2022    Multiple pulmonary nodules     Obesity     Other cirrhosis of liver 01/13/2021    Portal hypertension 02/11/2021    Rectal bleeding 02/11/2021    Sarcoidosis of lung with sarcoidosis of lymph nodes     Sleep apnea     CPAP USED    Splenomegaly 12/22/2020    Thrombocytopenia 12/22/2020       Past Surgical History:   Procedure Laterality Date    ANTERIOR CRUCIATE LIGAMENT REPAIR  2007    right knee    COLONOSCOPY N/A 4/8/2021    Procedure: COLONOSCOPY;  Surgeon: Jeff Olvera MD;  Location: [facility] ENDO (2ND FLR);  Service: Endoscopy;  Laterality: N/A;  rectal bleeding,   2nd floor BMI 50 (354 lbs)  COVID test on 4/5/21 at [facility]    ESOPHAGOGASTRODUODENOSCOPY N/A 4/8/2021    Procedure: EGD (ESOPHAGOGASTRODUODENOSCOPY);  Surgeon: Jeff Olvera MD;  Location: [facility] ADIEL (2ND FLR);  Service: Endoscopy;  Laterality: N/A;  variceal screening/ labs the am of procedure  2nd floor BMI 50 (354 lbs)    ESOPHAGOGASTRODUODENOSCOPY N/A 3/31/2022    Procedure: EGD (ESOPHAGOGASTRODUODENOSCOPY);  Surgeon: Jeff Olvera MD;  Location: [facility] ENDO (2ND FLR);  Service: Endoscopy;  Laterality: N/A;  BMI-52    Wt:375#      cirrhosis, variceal screening-labs done on 3/29-GT  vaccinated-GT    ESOPHAGOGASTRODUODENOSCOPY N/A 4/21/2023    Procedure: EGD (ESOPHAGOGASTRODUODENOSCOPY);  Surgeon: Christopher Britton MD;  Location: [facility] ENDO (2ND FLR);  Service: Endoscopy;  Laterality: N/A;  pt requested Friday due to work schedule  ECHO PA 44-51  BMI 47.97 Wt 343 lbs  inst portal-RB  last CBC PT/INR 1/24/23 4/17/23- Precall confirmed.    ESOPHAGOGASTRODUODENOSCOPY N/A 1/10/2024    Procedure: EGD (ESOPHAGOGASTRODUODENOSCOPY);  Surgeon: Christopher Conley MD;  Location: [facility] ADIEL (2ND FLR);  Service: Endoscopy;  Laterality: N/A;    INCISION AND DRAINAGE Right 8/1/2024    Procedure: INCISION AND DRAINAGE;  Surgeon:  Darnell Liao MD;  Location: [facility];  Service: Orthopedics;  Laterality: Right;    LYMPHADENECTOMY  1985    MENISCECTOMY      left knee    NASAL SEPTUM SURGERY  1985    REVISION OF KNEE ARTHROPLASTY Right 8/1/2024    Procedure: REVISION, ARTHROPLASTY, KNEE TOTAL;  Surgeon: Darnell Liao MD;  Location: [facility];  Service: Orthopedics;  Laterality: Right;    TOTAL KNEE ARTHROPLASTY Right 11/7/2023    Procedure: ARTHROPLASTY, KNEE, TOTAL;  Surgeon: Darnell Liao MD;  Location: [facility];  Service: Orthopedics;  Laterality: Right;    WISDOM TOOTH EXTRACTION         Review of patient's allergies indicates:  No Known Allergies    No current facility-administered medications on file prior to encounter.     Current Outpatient Medications on File Prior to Encounter   Medication Sig    acetaminophen (TYLENOL ORAL) Take by mouth as needed.    aspirin 81 MG Chew Take 1 tablet (81 mg total) by mouth 2 (two) times daily.    fluticasone propionate (FLONASE) 50 mcg/actuation nasal spray 1 spray by Each Nostril route as needed for Rhinitis.    hydrOXYzine HCL (ATARAX) 25 MG tablet Take 1 tablet (25 mg total) by mouth 3 (three) times daily as needed for Anxiety.    lactulose (CONSTULOSE) 10 gram/15 mL solution Take 15 mLs (10 g total) by mouth 3 (three) times daily.    omeprazole (PRILOSEC) 40 MG capsule Take 40 mg by mouth once daily.    oxyCODONE (ROXICODONE) 5 MG immediate release tablet Take 1 tablet (5 mg total) by mouth every 4 (four) hours as needed for Pain.    potassium chloride (KLOR-CON) 10 MEQ TbSR TAKE 1 TABLET(10 MEQ) BY MOUTH EVERY DAY    torsemide (DEMADEX) 20 MG Tab Take 3 tablets (60 mg total) by mouth after dinner.    [DISCONTINUED] pantoprazole (PROTONIX) 40 MG tablet Take 1 tablet (40 mg total) by mouth once daily. (Patient not taking: Reported on 6/15/2022)     Family History       Problem Relation (Age of Onset)    Aneurysm Mother    Dementia Mother    Diabetes Paternal Grandmother    Heart  attack Father (55)    Hypertension Father          Tobacco Use    Smoking status: Never     Passive exposure: Never    Smokeless tobacco: Never   Substance and Sexual Activity    Alcohol use: No    Drug use: No    Sexual activity: Not on file     Review of Systems   Constitutional:  Negative for chills, diaphoresis, fatigue and fever.   HENT:  Negative for congestion, ear pain, hearing loss and tinnitus.    Eyes:  Negative for photophobia, pain, discharge and visual disturbance.   Respiratory:  Negative for chest tightness, shortness of breath and wheezing.    Cardiovascular:  Negative for chest pain, palpitations and leg swelling.   Gastrointestinal:  Negative for abdominal distention, abdominal pain, blood in stool, constipation, diarrhea, nausea and vomiting.   Endocrine: Negative for cold intolerance, heat intolerance, polydipsia, polyphagia and polyuria.   Genitourinary:  Negative for dysuria, flank pain, frequency, hematuria and urgency.   Musculoskeletal:  Positive for arthralgias. Negative for back pain, gait problem, myalgias and neck pain.   Skin:  Negative for color change, pallor, rash and wound.   Allergic/Immunologic: Negative for environmental allergies, food allergies and immunocompromised state.   Neurological:  Negative for seizures, syncope, facial asymmetry, weakness and headaches.   Psychiatric/Behavioral:  Negative for agitation, behavioral problems, confusion and hallucinations.      Objective:     Vital Signs (Most Recent):  Temp: 98.2 °F (36.8 °C) (08/08/24 0600)  Pulse: 83 (08/08/24 0732)  Resp: 20 (08/08/24 0600)  BP: (!) 122/56 (08/08/24 0732)  SpO2: 95 % (08/08/24 0732) Vital Signs (24h Range):  Temp:  [98.2 °F (36.8 °C)] 98.2 °F (36.8 °C)  Pulse:  [] 83  Resp:  [20] 20  SpO2:  [95 %-96 %] 95 %  BP: (114-137)/(56-65) 122/56     Weight: 124.7 kg (275 lb)  Body mass index is 38.35 kg/m².     Physical Exam  Constitutional:       General: He is not in acute distress.     Appearance: He  is obese. He is not diaphoretic.   HENT:      Head: Normocephalic and atraumatic.      Nose: Nose normal.      Mouth/Throat:      Pharynx: No oropharyngeal exudate.   Eyes:      General: No scleral icterus.        Right eye: No discharge.         Left eye: No discharge.      Conjunctiva/sclera: Conjunctivae normal.      Pupils: Pupils are equal, round, and reactive to light.   Neck:      Thyroid: No thyromegaly.      Vascular: No JVD.      Trachea: No tracheal deviation.   Cardiovascular:      Rate and Rhythm: Normal rate and regular rhythm.      Heart sounds: Normal heart sounds. No murmur heard.     No friction rub. No gallop.   Pulmonary:      Effort: Pulmonary effort is normal. No respiratory distress.      Breath sounds: Normal breath sounds. No stridor. No wheezing or rales.   Chest:      Chest wall: No tenderness.   Abdominal:      General: Bowel sounds are normal. There is no distension.      Palpations: Abdomen is soft. There is no mass.      Tenderness: There is no abdominal tenderness. There is no guarding or rebound.   Musculoskeletal:         General: No tenderness. Normal range of motion.      Cervical back: Normal range of motion and neck supple.      Right lower leg: Edema present.      Left lower leg: Edema present.      Comments: Right lower extremity more swollen than left, surgical incision intact without significant erythema or discharge noted on exam   Lymphadenopathy:      Cervical: No cervical adenopathy.   Skin:     General: Skin is warm and dry.      Coloration: Skin is not pale.      Findings: No erythema or rash.   Neurological:      Mental Status: He is alert and oriented to person, place, and time.      Cranial Nerves: No cranial nerve deficit.      Motor: No abnormal muscle tone.      Coordination: Coordination normal.      Deep Tendon Reflexes: Reflexes are normal and symmetric. Reflexes normal.   Psychiatric:         Behavior: Behavior normal.         Thought Content: Thought content  normal.         Judgment: Judgment normal.              CRANIAL NERVES     CN III, IV, VI   Pupils are equal, round, and reactive to light.       Significant Labs: All pertinent labs within the past 24 hours have been reviewed.    Significant Imaging: I have reviewed all pertinent imaging results/findings within the past 24 hours.

## 2024-08-08 NOTE — PLAN OF CARE
MSW met with the patient at the bedside. The patient's wife Daysi is also at the bedside.     Patient is alert and oriented with no communication barriers.     Patient reported having DME at home.     Patients PCP is correct on the face sheet. Patient choice pharmacy is bedside delivery.     Patients' family will transport the patient home at discharge.     CM team will continue to follow.       Patient is current with [facility].    08/08/24 1247   Discharge Assessment   Assessment Type Discharge Planning Assessment   Confirmed/corrected address, phone number and insurance Yes   Confirmed Demographics Correct on Facesheet   Source of Information patient;family;health record   People in Home spouse   Do you expect to return to your current living situation? Yes   Do you have help at home or someone to help you manage your care at home? Yes   Prior to hospitilization cognitive status: Alert/Oriented   Current cognitive status: Alert/Oriented   Walking or Climbing Stairs Difficulty yes   Walking or Climbing Stairs ambulation difficulty, requires equipment   Dressing/Bathing Difficulty no   Equipment Currently Used at Home walker, rolling;cane, straight;crutches   Readmission within 30 days? No   Patient currently being followed by outpatient case management? No   Do you currently have service(s) that help you manage your care at home? Yes   Name and Contact number of agency [facility]   Do you take prescription medications? Yes   Do you have prescription coverage? Yes   Do you have any problems affording any of your prescribed medications? No   Is the patient taking medications as prescribed? yes   How do you get to doctors appointments? car, drives self;family or friend will provide   Are you on dialysis? No   Do you take coumadin? No   Discharge Plan A Home with family   Discharge Plan B Home Health   DME Needed Upon Discharge  none   Discharge Plan discussed with: Patient   Transition of Care  Barriers None

## 2024-08-08 NOTE — ASSESSMENT & PLAN NOTE
Previously steroids but weaned off steroids about 5 months ago.  No shortness a breath.  Clinically stable.

## 2024-08-08 NOTE — ASSESSMENT & PLAN NOTE
Patient with increased drainage from his right knee with concern for possible treatment failure with current antibiotic therapy.  However no leukocytosis and no fever.  CRP mildly more elvated at 54.4 mg/L (previously 33.5 mg/L on 8/5/2024).  Sedimentation rate previously normal.  Repeat pending.  Cultures from recent surgery no growth today.  Unclear patient is having treatment failure however did not manifest significant systemic inflammatory response when he had purulence in his knee previously.  Continue current antibiotic therapy with intravenous vancomycin ceftriaxone.  Consult Orthopedic surgery and Infectious Disease Services.  NPO pending surgical evaluation.

## 2024-08-08 NOTE — ASSESSMENT & PLAN NOTE
Denies any change in chronic lower extremity edema.  Denies any abdominal pain or abdominal distention.  Continue home diuretic therapy.  History of hepatic encephalopathy.  Not encephalopathic at this time.  Continue home dose of lactulose.

## 2024-08-08 NOTE — ED TRIAGE NOTES
57 yo male reports to ed with co bleeding and drainage coming from incision site post-op R knee revision on Thursday. Last dressing change was done at 5pm yesterday. Has been receiving ABX through PICC at home. No bleeding at this time. Small amount of drainage noted on gauze covering wound. Denies blood thinner use. Reports 4/10 pain to area. Aaox4.

## 2024-08-08 NOTE — PROGRESS NOTES
[facility] - Emergency Dept  Wound Care    Patient Name:  Ashwin Peter   MRN:  [id number]  Date: 8/8/2024  Diagnosis: Infection of prosthetic right knee joint    History:     Past Medical History:   Diagnosis Date    Anxiety     BMI 38.0-38.9,adult     Hyperlipidemia     Hypervolemia 07/22/2022    Multiple pulmonary nodules     Obesity     Other cirrhosis of liver 01/13/2021    Portal hypertension 02/11/2021    Rectal bleeding 02/11/2021    Sarcoidosis of lung with sarcoidosis of lymph nodes     Sleep apnea     CPAP USED    Splenomegaly 12/22/2020    Thrombocytopenia 12/22/2020       Social History     Socioeconomic History    Marital status:     Number of children: 2   Occupational History    Occupation: [employer] supervisor     Employer: [employer]   Tobacco Use    Smoking status: Never     Passive exposure: Never    Smokeless tobacco: Never   Substance and Sexual Activity    Alcohol use: No    Drug use: No   Social History Narrative    Patient is originally from   [place]        School at ; [employer] ; [employer]         Working ; [employer] [place] [employer]            Daysi 22 yrs         Children    Maksim Funez        Lives with     Wife         D[employer]                Works as a  and .  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     Social Determinants of Health     Financial Resource Strain: Medium Risk (1/22/2024)    Overall Financial Resource Strain (CARDIA)     Difficulty of Paying Living Expenses: Somewhat hard   Food Insecurity: Patient Declined (1/22/2024)    Hunger Vital Sign     Worried About Running Out of Food in the Last Year: Patient declined     Ran Out of Food in the Last Year: Patient declined   Transportation Needs: No Transportation Needs (1/22/2024)    PRAPARE - Transportation     Lack of Transportation (Medical): No     Lack of Transportation (Non-Medical): No   Physical  Activity: Insufficiently Active (1/22/2024)    Exercise Vital Sign     Days of Exercise per Week: 1 day     Minutes of Exercise per Session: 10 min   Stress: Stress Concern Present (1/22/2024)    [facility] - Occupational Stress Questionnaire     Feeling of Stress : To some extent   Housing Stability: High Risk (1/22/2024)    Housing Stability Vital Sign     Unable to Pay for Housing in the Last Year: Yes     Number of Places Lived in the Last Year: 1     Unstable Housing in the Last Year: No       Precautions:     Allergies as of 08/08/2024    (No Known Allergies)       [facility] Assessment Details/Treatment   58 y.o. male who presents with complaint of increased drainage from knee incision. He was discharged from the hospital earlier this week after having surgical revision for infection on 8/1.  Pt reports receiving IV antibiotics since surgery. States his dressing filled up with blood tinged drainage and he called Dr. Liao's office and told to come to the ED early this morning.     Wound care consulted to apply a Prevena incisional Vac to week old right knee incision.   Small area of oozing tissue noted at mid incision.Small amount of serous exudate noted on old dressing. No odor detected. Incision line cleansed with wound cleanser and mallory wound prepped with skin prep.Prevena dressing applied and connected to portable home vac unit. Good seal achieved. Discussed disconnecting form vac to shower and demonstrated on and off button. Prevena instructional manual and leftover drape sent with patient.          08/08/24 1114        Incision/Site 11/07/23 0725 Right Leg   Date First Assessed/Time First Assessed: 11/07/23 0725   Side: Right  Location: Leg   Wound Image    Incision WDL ex   Dressing Appearance Moist drainage;Intact   Drainage Amount Small   Drainage Characteristics/Odor Serous;No odor   Appearance Red;Sutures intact  (small area of separation @ mid incision with serous exudate)    Periwound Area Intact;Swelling   Wound Edges Open;Approximated  (small area of oozing incision)   Wound Length (cm) 2 cm  (area of oozing tissue at mid incision)   Wound Width (cm) 0.3 cm   Wound Surface Area (cm^2) 0.6 cm^2   Care Cleansed with:;Wound cleanser;Applied:;Skin Barrier  (cavilon skin prep)   Dressing Other (comment)  (Applied Prevena Vac for incisional management)         08/08/2024

## 2024-08-08 NOTE — HPI
Patient is a 58-year-old man with a history of pulmonary sarcoid, chronic diastolic heart failure, obesity, nonalcoholic steatohepatitis, chronic lymphedema, and osteoarthritis who underwent total right knee arthroplasty on 11/7/2023 performed by Dr. Darnell Liao.      Patient had infection of prosthetic right knee joint for which he underwent incision and drainage and total revision of the right knee performed on 8/1/2024.  Spacer not utilized and new hardware placed.  Infectious Disease service consulted.  Patient was discharged on recommended antibiotic therapy intravenous vancomycin (2 grams every 12 hours) and ceftriaxone (2 grams every morning).  Cultures obtained from recent incision and drainage no growth to date.    Patient presents emergency department with reported increased drainage from his surgical wound.  Patient reports no increase pain. No fever.  Reports episode of subjective chills which he attributes to the fact that and air conditioning event is over his bed.    He reports chronic bilateral lower extremity swelling with right lower extremity more swollen than left at baseline.  No change in his baseline swelling.  Reports he management swelling with compression stockings along with diuretic therapy.  No shortness a breath.    Patient previously been on prednisone for pulmonary sarcoidosis but was weaned off chronic steroids and has been off steroids about 5 months ago.

## 2024-08-08 NOTE — PROGRESS NOTES
Pharmacokinetic Initial Assessment: IV Vancomycin    Assessment/Plan:    Patient was on vancomycin therapy prior to consult.  The random level was drawn and can be used to guide therapy at this time. The measurement is above the desired definitive target range of 10 to 20 mcg/mL.  Initiate intravenous vancomycin maintenance dose of vancomycin 1750mg IV every 12 hours starting on 8/8 @ 2100  Desired empiric serum trough concentration is 10 to 20 mcg/mL  Draw vancomycin trough level 60 min prior to 0900 dose on 8/10 at approximately 0800  Pharmacy will continue to follow and monitor vancomycin.      Please contact pharmacy at extension [phone] with any questions regarding this assessment.     Thank you for the consult,   Felicia Rooney       Patient brief summary:  Ashwin Peter is a 58 y.o. male initiated on antimicrobial therapy with IV Vancomycin for treatment of suspected bone/joint infection    Drug Allergies:   Review of patient's allergies indicates:  No Known Allergies    Actual Body Weight:   124.7kg    Renal Function:   Estimated Creatinine Clearance: 138.7 mL/min (based on SCr of 0.8 mg/dL).,     Dialysis Method (if applicable):  N/A    CBC (last 72 hours):  Recent Labs   Lab Result Units 08/05/24  1001 08/08/24  0718   WBC K/uL 4.19 3.66*   Hemoglobin g/dL 8.3* 8.1*   Hematocrit % 26.2* 26.3*   Platelets K/uL 105* 96*   Gran % % 60.9 58.3   Lymph % % 16.7* 16.9*   Mono % % 16.2* 18.0*   Eosinophil % % 5.3 5.7   Basophil % % 0.2 0.8   Differential Method  Automated Automated       Metabolic Panel (last 72 hours):  Recent Labs   Lab Result Units 08/05/24  1001 08/08/24  0718   Sodium mmol/L 137 141   Potassium mmol/L 3.8 3.9   Chloride mmol/L 106 112*   CO2 mmol/L 24 22*   Glucose mg/dL 78 83   BUN mg/dL 17 19   Creatinine mg/dL 0.7 0.8   Albumin g/dL 2.1* 2.1*   Total Bilirubin mg/dL 1.9* 1.9*   Alkaline Phosphatase U/L 167* 163*   AST U/L 84* 61*   ALT U/L 35 30       Drug levels (last 3 results):  Recent  Labs   Lab Result Units 08/05/24  1001 08/08/24  0718   Vancomycin, Random ug/mL  --  21.0   Vancomycin-Trough ug/mL 17.6  --        Microbiologic Results:  Microbiology Results (last 7 days)       ** No results found for the last 168 hours. **

## 2024-08-09 LAB
ANION GAP SERPL CALC-SCNC: 6 MMOL/L (ref 8–16)
BASOPHILS # BLD AUTO: 0.02 K/UL (ref 0–0.2)
BASOPHILS NFR BLD: 0.5 % (ref 0–1.9)
BUN SERPL-MCNC: 16 MG/DL (ref 6–20)
CALCIUM SERPL-MCNC: 7.9 MG/DL (ref 8.7–10.5)
CHLORIDE SERPL-SCNC: 110 MMOL/L (ref 95–110)
CO2 SERPL-SCNC: 22 MMOL/L (ref 23–29)
CREAT SERPL-MCNC: 0.8 MG/DL (ref 0.5–1.4)
DIFFERENTIAL METHOD BLD: ABNORMAL
EOSINOPHIL # BLD AUTO: 0.2 K/UL (ref 0–0.5)
EOSINOPHIL NFR BLD: 4.5 % (ref 0–8)
ERYTHROCYTE [DISTWIDTH] IN BLOOD BY AUTOMATED COUNT: 17.6 % (ref 11.5–14.5)
EST. GFR  (NO RACE VARIABLE): >60 ML/MIN/1.73 M^2
GLUCOSE SERPL-MCNC: 88 MG/DL (ref 70–110)
HCT VFR BLD AUTO: 25.9 % (ref 40–54)
HGB BLD-MCNC: 8.2 G/DL (ref 14–18)
IMM GRANULOCYTES # BLD AUTO: 0.01 K/UL (ref 0–0.04)
IMM GRANULOCYTES NFR BLD AUTO: 0.2 % (ref 0–0.5)
LYMPHOCYTES # BLD AUTO: 0.7 K/UL (ref 1–4.8)
LYMPHOCYTES NFR BLD: 18.5 % (ref 18–48)
MAGNESIUM SERPL-MCNC: 1.6 MG/DL (ref 1.6–2.6)
MCH RBC QN AUTO: 29.9 PG (ref 27–31)
MCHC RBC AUTO-ENTMCNC: 31.7 G/DL (ref 32–36)
MCV RBC AUTO: 95 FL (ref 82–98)
MONOCYTES # BLD AUTO: 0.6 K/UL (ref 0.3–1)
MONOCYTES NFR BLD: 15.5 % (ref 4–15)
NEUTROPHILS # BLD AUTO: 2.4 K/UL (ref 1.8–7.7)
NEUTROPHILS NFR BLD: 60.8 % (ref 38–73)
NRBC BLD-RTO: 0 /100 WBC
PHOSPHATE SERPL-MCNC: 2.6 MG/DL (ref 2.7–4.5)
PLATELET # BLD AUTO: 89 K/UL (ref 150–450)
PMV BLD AUTO: 13.2 FL (ref 9.2–12.9)
POCT GLUCOSE: 103 MG/DL (ref 70–110)
POTASSIUM SERPL-SCNC: 4.1 MMOL/L (ref 3.5–5.1)
RBC # BLD AUTO: 2.74 M/UL (ref 4.6–6.2)
SODIUM SERPL-SCNC: 138 MMOL/L (ref 136–145)
WBC # BLD AUTO: 4.01 K/UL (ref 3.9–12.7)

## 2024-08-09 PROCEDURE — 84100 ASSAY OF PHOSPHORUS: CPT | Performed by: HOSPITALIST

## 2024-08-09 PROCEDURE — 83735 ASSAY OF MAGNESIUM: CPT | Performed by: HOSPITALIST

## 2024-08-09 PROCEDURE — 96367 TX/PROPH/DG ADDL SEQ IV INF: CPT

## 2024-08-09 PROCEDURE — 63600175 PHARM REV CODE 636 W HCPCS: Performed by: HOSPITALIST

## 2024-08-09 PROCEDURE — 36415 COLL VENOUS BLD VENIPUNCTURE: CPT | Performed by: HOSPITALIST

## 2024-08-09 PROCEDURE — 25000003 PHARM REV CODE 250: Performed by: INTERNAL MEDICINE

## 2024-08-09 PROCEDURE — 96365 THER/PROPH/DIAG IV INF INIT: CPT | Mod: 59

## 2024-08-09 PROCEDURE — 96366 THER/PROPH/DIAG IV INF ADDON: CPT

## 2024-08-09 PROCEDURE — 25000003 PHARM REV CODE 250: Performed by: HOSPITALIST

## 2024-08-09 PROCEDURE — 85025 COMPLETE CBC W/AUTO DIFF WBC: CPT | Performed by: HOSPITALIST

## 2024-08-09 PROCEDURE — G0378 HOSPITAL OBSERVATION PER HR: HCPCS

## 2024-08-09 PROCEDURE — 80048 BASIC METABOLIC PNL TOTAL CA: CPT | Performed by: HOSPITALIST

## 2024-08-09 PROCEDURE — 99214 OFFICE O/P EST MOD 30 MIN: CPT | Mod: ,,, | Performed by: INTERNAL MEDICINE

## 2024-08-09 PROCEDURE — 25000242 PHARM REV CODE 250 ALT 637 W/ HCPCS: Performed by: HOSPITALIST

## 2024-08-09 PROCEDURE — 63600175 PHARM REV CODE 636 W HCPCS: Performed by: INTERNAL MEDICINE

## 2024-08-09 RX ORDER — TORSEMIDE 20 MG/1
40 TABLET ORAL DAILY
Status: DISCONTINUED | OUTPATIENT
Start: 2024-08-09 | End: 2024-08-10 | Stop reason: HOSPADM

## 2024-08-09 RX ORDER — TORSEMIDE 20 MG/1
20 TABLET ORAL
Status: DISCONTINUED | OUTPATIENT
Start: 2024-08-10 | End: 2024-08-10 | Stop reason: HOSPADM

## 2024-08-09 RX ADMIN — PANTOPRAZOLE SODIUM 40 MG: 40 TABLET, DELAYED RELEASE ORAL at 08:08

## 2024-08-09 RX ADMIN — LACTULOSE 15 G: 20 SOLUTION ORAL at 08:08

## 2024-08-09 RX ADMIN — CEFTRIAXONE SODIUM 2 G: 2 INJECTION, POWDER, FOR SOLUTION INTRAMUSCULAR; INTRAVENOUS at 08:08

## 2024-08-09 RX ADMIN — CEFEPIME 2 G: 2 INJECTION, POWDER, FOR SOLUTION INTRAVENOUS at 12:08

## 2024-08-09 RX ADMIN — OXYCODONE HYDROCHLORIDE 5 MG: 5 TABLET ORAL at 10:08

## 2024-08-09 RX ADMIN — ACETAMINOPHEN 1000 MG: 500 TABLET ORAL at 08:08

## 2024-08-09 RX ADMIN — ASPIRIN 81 MG: 81 TABLET, COATED ORAL at 08:08

## 2024-08-09 RX ADMIN — DAPTOMYCIN 1000 MG: 350 INJECTION, POWDER, LYOPHILIZED, FOR SOLUTION INTRAVENOUS at 12:08

## 2024-08-09 RX ADMIN — CEFEPIME 2 G: 2 INJECTION, POWDER, FOR SOLUTION INTRAVENOUS at 08:08

## 2024-08-09 RX ADMIN — TORSEMIDE 40 MG: 20 TABLET ORAL at 12:08

## 2024-08-09 RX ADMIN — FLUTICASONE PROPIONATE 50 MCG: 50 SPRAY, METERED NASAL at 08:08

## 2024-08-09 NOTE — SUBJECTIVE & OBJECTIVE
Interval History: No acute events.  Surgical wound VAC placed.    Review of Systems   Constitutional:  Negative for chills and fever.   Respiratory:  Negative for shortness of breath.    Cardiovascular:  Negative for chest pain.   Gastrointestinal:  Negative for abdominal pain, constipation, diarrhea, nausea and vomiting.     Objective:     Vital Signs (Most Recent):  Temp: 98.1 °F (36.7 °C) (08/09/24 1600)  Pulse: 84 (08/09/24 1600)  Resp: 18 (08/09/24 1600)  BP: (!) 115/47 (08/09/24 1600)  SpO2: 99 % (08/09/24 1600) Vital Signs (24h Range):  Temp:  [98.1 °F (36.7 °C)-100.4 °F (38 °C)] 98.1 °F (36.7 °C)  Pulse:  [] 84  Resp:  [14-23] 18  SpO2:  [92 %-99 %] 99 %  BP: (115-129)/(47-58) 115/47     Weight: 124.7 kg (275 lb)  Body mass index is 38.35 kg/m².    Intake/Output Summary (Last 24 hours) at 8/9/2024 1822  Last data filed at 8/9/2024 1645  Gross per 24 hour   Intake 620 ml   Output 3675 ml   Net -3055 ml         Physical Exam  Cardiovascular:      Rate and Rhythm: Normal rate and regular rhythm.      Heart sounds: Normal heart sounds. No murmur heard.     No friction rub. No gallop.   Pulmonary:      Effort: Pulmonary effort is normal. No respiratory distress.      Breath sounds: Normal breath sounds. No wheezing or rales.   Abdominal:      General: Bowel sounds are normal. There is no distension.      Palpations: Abdomen is soft.      Tenderness: There is no abdominal tenderness.   Musculoskeletal:         General: No tenderness. Normal range of motion.      Right lower leg: Edema present.      Left lower leg: Edema present.      Comments: Surgical wound VAC placed of the right knee.   Skin:     General: Skin is warm and dry.      Coloration: Skin is not pale.      Findings: No erythema or rash.   Neurological:      Mental Status: He is alert and oriented to person, place, and time.             Significant Labs: All pertinent labs within the past 24 hours have been reviewed.    Significant Imaging: I  have reviewed all pertinent imaging results/findings within the past 24 hours.

## 2024-08-09 NOTE — PLAN OF CARE
Outpatient Antibiotic Therapy Plan:    Please send referral to [facility].    1) Infection: Prosthetic knee infection    2) Discharge Antibiotics:    Intravenous antibiotics:  Daptomycin 1000 mg IV q 24 hours   Cefepime 2 grams IV q 8 hours       3) Therapy Duration:  6 weeks    Estimated end date of IV antibiotics: 09/11/2024    4) Outpatient Weekly Labs:    Order the following labs to be drawn on Mondays:   CBC  CMP   CRP  CPK     5) Fax Lab Results to Infectious Diseases Provider: Dr. Johnson    [facility] Fax Number: [phone]    6) Outpatient Infectious Diseases Follow-up    Follow-up appointment will be arranged by the ID clinic and will be found in the patient's appointments tab.    Prior to discharge, please ensure the patient's follow-up has been scheduled.    If there is still no follow-up scheduled prior to discharge, please send an [employer] message to [facility] Clinical Pool or Call Infectious Diseases Dept.

## 2024-08-09 NOTE — HPI
58 year old male with a history of obesity and right total knee arthroplasty in November of 2023.  He was evaluated in July of 2024 for significant pain of the right knee.  He ultimately was admitted and underwent revision of the right knee arthoplasty on August 1.  Per operative note, purulence was encountered in the prepatellar bursa area.  He underwent I&D.  Due to the patient's size, a decision was made not to place a spacer.  Instead, the old artifical knee was removed and a new artificial knee of the same size with antibiotic impregnated cement.  Surgical cultures were obtained which were negative.  Infectious diseases was consulted and the patient was placed on the patient was started on empiric vancomycin and ceftriaxone.  He is now re-admitted to hospital with worsening pain in his right knee.

## 2024-08-09 NOTE — CONSULTS
[facility] ([address])  Infectious Disease  Consult Note    Patient Name: Ashwin Peter  MRN: [id number]  Admission Date: 8/8/2024  Hospital Length of Stay: 0 days  Attending Physician: Alvarado Koch MD  Primary Care Provider: Sergio Guo III, MD     Isolation Status: No active isolations    Patient information was obtained from patient, past medical records, and ER records.      Inpatient consult to Infectious Diseases  Consult performed by: Meet Johnson MD  Consult ordered by: Alvarado Koch MD        Assessment/Plan:     ID  * Infection of prosthetic right knee joint  58 year old male with a history of TKA to right knee in November of 2023.  Did well initially but then developed significant pain in the knee around February of 2024.  This progressively worsened to were he couldn't bare weight on the knee.  He was taken to the OR on August 1, 2024.  Purulence was noted in the pre-patellar bursa.  Cultures were obtained which later returned negative.  The artificial knee components were removed and a new knee was placed along with some antibiotic impregnated cement.  Patient was discharged on IV vancomycin and cefepime.  He has continued to have pain in the knee.  On August 6, he noticed a lot of drainage coming from the surgical wound.  He ultimately presented to the ER and was admitted.      It is possible that our antibiotic regimen may have not been covering the causative bacteria for his knee infection.  I will broaden his antibiotic regimen to IV daptomycin and IV cefepime.  Anticipate 6 weeks of IV antibiotics as previously planned.      Plan  Daptomycin IV and cefepime IV  Will arrange follow up in ID clinic.        Thank you for your consult. I will follow-up with patient. Please contact us if you have any additional questions.    Meet Johnson MD  Infectious Disease  [facility] ([address])    Subjective:     Principal Problem: Infection of prosthetic right knee  joint    HPI: 58 year old male with a history of obesity and right total knee arthroplasty in November of 2023.  He was evaluated in July of 2024 for significant pain of the right knee.  He ultimately was admitted and underwent revision of the right knee arthoplasty on August 1.  Per operative note, purulence was encountered in the prepatellar bursa area.  He underwent I&D.  Due to the patient's size, a decision was made not to place a spacer.  Instead, the old artifical knee was removed and a new artificial knee of the same size with antibiotic impregnated cement.  Surgical cultures were obtained which were negative.  Infectious diseases was consulted and the patient was placed on the patient was started on empiric vancomycin and ceftriaxone.  He is now re-admitted to hospital with worsening pain in his right knee.    Past Medical History:   Diagnosis Date    Anxiety     BMI 38.0-38.9,adult     Hyperlipidemia     Hypervolemia 07/22/2022    Multiple pulmonary nodules     Obesity     Other cirrhosis of liver 01/13/2021    Portal hypertension 02/11/2021    Rectal bleeding 02/11/2021    Sarcoidosis of lung with sarcoidosis of lymph nodes     Sleep apnea     CPAP USED    Splenomegaly 12/22/2020    Thrombocytopenia 12/22/2020       Past Surgical History:   Procedure Laterality Date    ANTERIOR CRUCIATE LIGAMENT REPAIR  2007    right knee    COLONOSCOPY N/A 4/8/2021    Procedure: COLONOSCOPY;  Surgeon: Jeff Olvera MD;  Location: [facility] ([address] [place] [address]);  Service: Endoscopy;  Laterality: N/A;  rectal bleeding,   2nd floor BMI 50 (354 lbs)  COVID test on 4/5/21 at [facility]    ESOPHAGOGASTRODUODENOSCOPY N/A 4/8/2021    Procedure: EGD (ESOPHAGOGASTRODUODENOSCOPY);  Surgeon: Jeff Olvera MD;  Location: [address]);  Service: Endoscopy;  Laterality: N/A;  variceal screening/ labs the am of procedure  2nd floor BMI 50 (354 lbs)    ESOPHAGOGASTRODUODENOSCOPY N/A 3/31/2022    Procedure: EGD  (ESOPHAGOGASTRODUODENOSCOPY);  Surgeon: Jeff Olvera MD;  Location: [facility] ENDO (2ND FLR);  Service: Endoscopy;  Laterality: N/A;  BMI-52    Wt:375#      cirrhosis, variceal screening-labs done on 3/29-GT  vaccinated-GT    ESOPHAGOGASTRODUODENOSCOPY N/A 4/21/2023    Procedure: EGD (ESOPHAGOGASTRODUODENOSCOPY);  Surgeon: Christopher Britton MD;  Location: [facility] ENDO (2ND FLR);  Service: Endoscopy;  Laterality: N/A;  pt requested Friday due to work schedule  ECHO PA 44-51  BMI 47.97 Wt 343 lbs  inst portal-RB  last CBC PT/INR 1/24/23 4/17/23- Precall confirmed.    ESOPHAGOGASTRODUODENOSCOPY N/A 1/10/2024    Procedure: EGD (ESOPHAGOGASTRODUODENOSCOPY);  Surgeon: Christopher Conley MD;  Location: [facility] (2ND FLR);  Service: Endoscopy;  Laterality: N/A;    INCISION AND DRAINAGE Right 8/1/2024    Procedure: INCISION AND DRAINAGE;  Surgeon: Darnell Liao MD;  Location: [facility];  Service: Orthopedics;  Laterality: Right;    LYMPHADENECTOMY  1985    MENISCECTOMY      left knee    NASAL SEPTUM SURGERY  1985    REVISION OF KNEE ARTHROPLASTY Right 8/1/2024    Procedure: REVISION, ARTHROPLASTY, KNEE TOTAL;  Surgeon: Darnell Liao MD;  Location: [facility];  Service: Orthopedics;  Laterality: Right;    TOTAL KNEE ARTHROPLASTY Right 11/7/2023    Procedure: ARTHROPLASTY, KNEE, TOTAL;  Surgeon: Darnell Liao MD;  Location: [facility];  Service: Orthopedics;  Laterality: Right;    WISDOM TOOTH EXTRACTION         Review of patient's allergies indicates:  No Known Allergies    Medications:  Medications Prior to Admission   Medication Sig    acetaminophen (TYLENOL ORAL) Take by mouth as needed.    aspirin 81 MG Chew Take 1 tablet (81 mg total) by mouth 2 (two) times daily.    fluticasone propionate (FLONASE) 50 mcg/actuation nasal spray 1 spray by Each Nostril route as needed for Rhinitis.    hydrOXYzine HCL (ATARAX) 25 MG tablet Take 1 tablet (25 mg total) by mouth 3 (three) times daily as needed for Anxiety.     lactulose (CONSTULOSE) 10 gram/15 mL solution Take 15 mLs (10 g total) by mouth 3 (three) times daily.    omeprazole (PRILOSEC) 40 MG capsule Take 40 mg by mouth once daily.    oxyCODONE (ROXICODONE) 5 MG immediate release tablet Take 1 tablet (5 mg total) by mouth every 4 (four) hours as needed for Pain.    potassium chloride (KLOR-CON) 10 MEQ TbSR TAKE 1 TABLET(10 MEQ) BY MOUTH EVERY DAY    torsemide (DEMADEX) 20 MG Tab Take 3 tablets (60 mg total) by mouth after dinner.     Antibiotics (From admission, onward)      Start     Stop Route Frequency Ordered    08/09/24 1200  DAPTOmycin (CUBICIN) 1,000 mg in 0.9% NaCl SolP 50 mL IVPB         -- IV Every 24 hours (non-standard times) 08/09/24 1054    08/09/24 1200  ceFEPIme (MAXIPIME) 2 g in D5W 100 mL IVPB (MB+)         -- IV Every 8 hours (non-standard times) 08/09/24 1054          Antifungals (From admission, onward)      None          Antivirals (From admission, onward)      None             Immunization History   Administered Date(s) Administered    COVID-19, MRNA, LN-S, PF (MODERNA FULL 0.5 ML DOSE) 12/09/2021    COVID-19, MRNA, LN-S, PF (Pfizer) (Purple Cap) 01/19/2021, 02/02/2021, 02/02/2021    Hepatitis A / Hepatitis B 02/12/2021, 03/17/2021, 08/16/2021    Influenza 10/08/2020    Influenza - Quadrivalent - MDCK - PF 09/21/2020    Influenza - Quadrivalent - PF *Preferred* (6 months and older) 10/25/2017, 09/17/2021, 10/25/2022, 11/16/2023    Pneumococcal Polysaccharide - 23 Valent 09/17/2021    Tdap 07/14/2014, 12/13/2023       Family History       Problem Relation (Age of Onset)    Aneurysm Mother    Dementia Mother    Diabetes Paternal Grandmother    Heart attack Father (55)    Hypertension Father          Social History     Socioeconomic History    Marital status:     Number of children: 2   Occupational History    Occupation: maintance supervisor     Employer: [employer]   Tobacco Use    Smoking status: Never     Passive exposure:  Never    Smokeless tobacco: Never   Substance and Sexual Activity    Alcohol use: No    Drug use: No   Social History Narrative    Patient is originally from   [place]        School at ; [employer]         Working ; [employer]            Daysi 22 yrs         Children    Maksim Funez        Lives with     Wife         [employer]                Works as a  and .  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     Social Determinants of Health     Financial Resource Strain: Medium Risk (1/22/2024)    Overall Financial Resource Strain (CARDIA)     Difficulty of Paying Living Expenses: Somewhat hard   Food Insecurity: Patient Declined (1/22/2024)    Hunger Vital Sign     Worried About Running Out of Food in the Last Year: Patient declined     Ran Out of Food in the Last Year: Patient declined   Transportation Needs: No Transportation Needs (1/22/2024)    PRAPARE - Transportation     Lack of Transportation (Medical): No     Lack of Transportation (Non-Medical): No   Physical Activity: Insufficiently Active (1/22/2024)    Exercise Vital Sign     Days of Exercise per Week: 1 day     Minutes of Exercise per Session: 10 min   Stress: Stress Concern Present (1/22/2024)    [place] [employer] - Occupational Stress Questionnaire     Feeling of Stress : To some extent   Housing Stability: High Risk (1/22/2024)    Housing Stability Vital Sign     Unable to Pay for Housing in the Last Year: Yes     Number of Places Lived in the Last Year: 1     Unstable Housing in the Last Year: No     Review of Systems   Musculoskeletal:  Positive for arthralgias and myalgias.   All other systems reviewed and are negative.    Objective:     Vital Signs (Most Recent):  Temp: 98.1 °F (36.7 °C) (08/09/24 1600)  Pulse: 84 (08/09/24 1600)  Resp: 18 (08/09/24 1600)  BP: (!) 115/47 (08/09/24 1600)  SpO2: 99 % (08/09/24  1600) Vital Signs (24h Range):  Temp:  [98.1 °F (36.7 °C)-100.4 °F (38 °C)] 98.1 °F (36.7 °C)  Pulse:  [] 84  Resp:  [14-23] 18  SpO2:  [92 %-99 %] 99 %  BP: (115-129)/(47-58) 115/47     Weight: 124.7 kg (275 lb)  Body mass index is 38.35 kg/m².    Estimated Creatinine Clearance: 138.7 mL/min (based on SCr of 0.8 mg/dL).     Physical Exam  Vitals and nursing note reviewed.   Constitutional:       General: He is not in acute distress.     Appearance: He is well-developed. He is obese. He is not diaphoretic.   HENT:      Head: Normocephalic and atraumatic.      Right Ear: External ear normal.      Left Ear: External ear normal.      Nose: Nose normal.      Mouth/Throat:      Pharynx: No oropharyngeal exudate.   Eyes:      General: No scleral icterus.        Right eye: No discharge.         Left eye: No discharge.      Conjunctiva/sclera: Conjunctivae normal.      Pupils: Pupils are equal, round, and reactive to light.   Neck:      Thyroid: No thyromegaly.      Vascular: No JVD.      Trachea: No tracheal deviation.   Cardiovascular:      Rate and Rhythm: Normal rate and regular rhythm.      Heart sounds: No murmur heard.     No friction rub. No gallop.   Pulmonary:      Effort: Pulmonary effort is normal. No respiratory distress.      Breath sounds: Normal breath sounds. No stridor. No wheezing or rales.   Chest:      Chest wall: No tenderness.   Abdominal:      General: Bowel sounds are normal. There is no distension.      Palpations: Abdomen is soft. There is no mass.      Tenderness: There is no abdominal tenderness. There is no guarding or rebound.   Musculoskeletal:         General: Tenderness (right knee) present.      Cervical back: Normal range of motion and neck supple.   Lymphadenopathy:      Cervical: No cervical adenopathy.   Skin:     Comments: Right knee with wound vac covering it.  Pictures reviewed.   Neurological:      Mental Status: He is alert and oriented to person, place, and time.       Cranial Nerves: No cranial nerve deficit.      Motor: No abnormal muscle tone.      Coordination: Coordination normal.      Deep Tendon Reflexes: Reflexes are normal and symmetric. Reflexes normal.   Psychiatric:         Behavior: Behavior normal.         Thought Content: Thought content normal.         Judgment: Judgment normal.          Significant Labs:   Microbiology Results (last 7 days)       ** No results found for the last 168 hours. **            Significant Imaging: I have reviewed all pertinent imaging results/findings within the past 24 hours.

## 2024-08-09 NOTE — ASSESSMENT & PLAN NOTE
58 year old male with a history of TKA to right knee in November of 2023.  Did well initially but then developed significant pain in the knee around February of 2024.  This progressively worsened to were he couldn't bare weight on the knee.  He was taken to the OR on August 1, 2024.  Purulence was noted in the pre-patellar bursa.  Cultures were obtained which later returned negative.  The artificial knee components were removed and a new knee was placed along with some antibiotic impregnated cement.  Patient was discharged on IV vancomycin and cefepime.  He has continued to have pain in the knee.  On August 6, he noticed a lot of drainage coming from the surgical wound.  He ultimately presented to the ER and was admitted.      It is possible that our antibiotic regimen may have not been covering the causative bacteria for his knee infection.  I will broaden his antibiotic regimen to IV daptomycin and IV cefepime.  Anticipate 6 weeks of IV antibiotics as previously planned.      Plan  Daptomycin IV and cefepime IV  Will arrange follow up in ID clinic.

## 2024-08-09 NOTE — ASSESSMENT & PLAN NOTE
Denies any change in chronic lower extremity edema.  Denies any abdominal pain or abdominal distention.  Continue home diuretic therapy.  History of hepatic encephalopathy.  Not encephalopathic at this time.  Continue home dose of lactulose.  Restart oral diuretics.

## 2024-08-09 NOTE — PROGRESS NOTES
[facility] [address] Medicine  Progress Note    Patient Name: Ashwin Peter  MRN: [id number]  Patient Class: OP- Observation   Admission Date: 8/8/2024  Length of Stay: 0 days  Attending Physician: Alvarado Koch MD  Primary Care Provider: Sergio Guo III, MD        Subjective:     Principal Problem:Infection of prosthetic right knee joint        HPI:  Patient is a 58-year-old man with a history of pulmonary sarcoid, chronic diastolic heart failure, obesity, nonalcoholic steatohepatitis, chronic lymphedema, and osteoarthritis who underwent total right knee arthroplasty on 11/7/2023 performed by Dr. Darnell Liao.      Patient had infection of prosthetic right knee joint for which he underwent incision and drainage and total revision of the right knee performed on 8/1/2024.  Spacer not utilized and new hardware placed.  Infectious Disease service consulted.  Patient was discharged on recommended antibiotic therapy intravenous vancomycin (2 grams every 12 hours) and ceftriaxone (2 grams every morning).  Cultures obtained from recent incision and drainage no growth to date.    Patient presents emergency department with reported increased drainage from his surgical wound.  Patient reports no increase pain. No fever.  Reports episode of subjective chills which he attributes to the fact that and air conditioning event is over his bed.    He reports chronic bilateral lower extremity swelling with right lower extremity more swollen than left at baseline.  No change in his baseline swelling.  Reports he management swelling with compression stockings along with diuretic therapy.  No shortness a breath.    Patient previously been on prednisone for pulmonary sarcoidosis but was weaned off chronic steroids and has been off steroids about 5 months ago.    Overview/Hospital Course:  Patient is a 58-year-old man with a history of pulmonary sarcoid, chronic diastolic heart failure, obesity,  nonalcoholic steatohepatitis, chronic lymphedema, and osteoarthritis who underwent total right knee arthroplasty on 11/7/2023 performed by Dr. Darnell Liao.  Patient admitted due to concern for possible treatment failure.    Orthopedic surgery evaluated recommended no surgical intervention at this time.  Infectious Disease consult recommended adjustment in antibiotic therapy.    Interval History: No acute events.  Surgical wound VAC placed.    Review of Systems   Constitutional:  Negative for chills and fever.   Respiratory:  Negative for shortness of breath.    Cardiovascular:  Negative for chest pain.   Gastrointestinal:  Negative for abdominal pain, constipation, diarrhea, nausea and vomiting.     Objective:     Vital Signs (Most Recent):  Temp: 98.1 °F (36.7 °C) (08/09/24 1600)  Pulse: 84 (08/09/24 1600)  Resp: 18 (08/09/24 1600)  BP: (!) 115/47 (08/09/24 1600)  SpO2: 99 % (08/09/24 1600) Vital Signs (24h Range):  Temp:  [98.1 °F (36.7 °C)-100.4 °F (38 °C)] 98.1 °F (36.7 °C)  Pulse:  [] 84  Resp:  [14-23] 18  SpO2:  [92 %-99 %] 99 %  BP: (115-129)/(47-58) 115/47     Weight: 124.7 kg (275 lb)  Body mass index is 38.35 kg/m².    Intake/Output Summary (Last 24 hours) at 8/9/2024 1822  Last data filed at 8/9/2024 1645  Gross per 24 hour   Intake 620 ml   Output 3675 ml   Net -3055 ml         Physical Exam  Cardiovascular:      Rate and Rhythm: Normal rate and regular rhythm.      Heart sounds: Normal heart sounds. No murmur heard.     No friction rub. No gallop.   Pulmonary:      Effort: Pulmonary effort is normal. No respiratory distress.      Breath sounds: Normal breath sounds. No wheezing or rales.   Abdominal:      General: Bowel sounds are normal. There is no distension.      Palpations: Abdomen is soft.      Tenderness: There is no abdominal tenderness.   Musculoskeletal:         General: No tenderness. Normal range of motion.      Right lower leg: Edema present.      Left lower leg: Edema present.       Comments: Surgical wound VAC placed of the right knee.   Skin:     General: Skin is warm and dry.      Coloration: Skin is not pale.      Findings: No erythema or rash.   Neurological:      Mental Status: He is alert and oriented to person, place, and time.             Significant Labs: All pertinent labs within the past 24 hours have been reviewed.    Significant Imaging: I have reviewed all pertinent imaging results/findings within the past 24 hours.    Assessment/Plan:      * Infection of prosthetic right knee joint  No surgery advised.  Wound VAC applied to his incision to help with drainage  Antibiotic therapy adjusted per Infectious Disease.  If he tolerates this well plan to discharge home on new antibiotic therapy.    Liver cirrhosis secondary to nonalcoholic steatohepatitis (GUAJARDO)  Denies any change in chronic lower extremity edema.  Denies any abdominal pain or abdominal distention.  Continue home diuretic therapy.  History of hepatic encephalopathy.  Not encephalopathic at this time.  Continue home dose of lactulose.  Restart oral diuretics.    Sarcoidosis of lung with sarcoidosis of lymph nodes  Previously steroids but weaned off steroids about 5 months ago.  No shortness a breath.  Clinically stable.    Gastroesophageal reflux disease  Stable.  Continue proton pump inhibitor therapy.    Obstructive sleep apnea  Continue CPAP at night.        VTE Risk Mitigation (From admission, onward)           Ordered     Place JOSLYN hose  Until discontinued         08/08/24 0827     Reason for No Pharmacological VTE Prophylaxis  Once        Question:  Reasons:  Answer:  Physician Provided (leave comment)  Comment:  Possible surgery today    08/08/24 0827     IP VTE HIGH RISK PATIENT  Once         08/08/24 0827     Place sequential compression device  Until discontinued         08/08/24 0827                      Alvarado Koch MD  Department of Hospital Medicine   [facility] ([address])

## 2024-08-09 NOTE — HOSPITAL COURSE
Patient is a 58-year-old man with a history of pulmonary sarcoid, chronic diastolic heart failure, obesity, nonalcoholic steatohepatitis, chronic lymphedema, and osteoarthritis who underwent total right knee arthroplasty on 11/7/2023 performed by Dr. Darnell Liao.  Patient admitted due to concern for possible treatment failure.    Orthopedic surgery evaluated and recommended no surgical intervention at this time.  Patient has had some drainage but not necessarily more than expected at this point.  Infectious Disease consulted and recommended adjustment in antibiotic therapy to provide for empiric broader coverage.  Cultures remain no growth to date. Incisional wound VAC applied to help manage and monitor drainage.    Patient discharge home in stable condition to continue intravenous antibiotic with recommended adjustment in regimen and with incisional wound VAC.    Close outpatient follow-up with Dr. Liao and ID advised.

## 2024-08-09 NOTE — SUBJECTIVE & OBJECTIVE
Past Medical History:   Diagnosis Date    Anxiety     BMI 38.0-38.9,adult     Hyperlipidemia     Hypervolemia 07/22/2022    Multiple pulmonary nodules     Obesity     Other cirrhosis of liver 01/13/2021    Portal hypertension 02/11/2021    Rectal bleeding 02/11/2021    Sarcoidosis of lung with sarcoidosis of lymph nodes     Sleep apnea     CPAP USED    Splenomegaly 12/22/2020    Thrombocytopenia 12/22/2020       Past Surgical History:   Procedure Laterality Date    ANTERIOR CRUCIATE LIGAMENT REPAIR  2007    right knee    COLONOSCOPY N/A 4/8/2021    Procedure: COLONOSCOPY;  Surgeon: Jeff Olvera MD;  Location: [facility] ENDO (2ND FLR);  Service: Endoscopy;  Laterality: N/A;  rectal bleeding,   2nd floor BMI 50 (354 lbs)  COVID test on 4/5/21 at [facility]    ESOPHAGOGASTRODUODENOSCOPY N/A 4/8/2021    Procedure: EGD (ESOPHAGOGASTRODUODENOSCOPY);  Surgeon: Jeff Olvera MD;  Location: [facility] ADIEL (2ND FLR);  Service: Endoscopy;  Laterality: N/A;  variceal screening/ labs the am of procedure  2nd floor BMI 50 (354 lbs)    ESOPHAGOGASTRODUODENOSCOPY N/A 3/31/2022    Procedure: EGD (ESOPHAGOGASTRODUODENOSCOPY);  Surgeon: Jeff Olvera MD;  Location: [facility] ENDO (2ND FLR);  Service: Endoscopy;  Laterality: N/A;  BMI-52    Wt:375#      cirrhosis, variceal screening-labs done on 3/29-GT  vaccinated-GT    ESOPHAGOGASTRODUODENOSCOPY N/A 4/21/2023    Procedure: EGD (ESOPHAGOGASTRODUODENOSCOPY);  Surgeon: Christopher Britton MD;  Location: [facility] ENDO (2ND FLR);  Service: Endoscopy;  Laterality: N/A;  pt requested Friday due to work schedule  ECHO PA 44-51  BMI 47.97 Wt 343 lbs  inst portal-RB  last CBC PT/INR 1/24/23 4/17/23- Precall confirmed.    ESOPHAGOGASTRODUODENOSCOPY N/A 1/10/2024    Procedure: EGD (ESOPHAGOGASTRODUODENOSCOPY);  Surgeon: Christopher Conley MD;  Location: [facility] ADIEL (2ND FLR);  Service: Endoscopy;  Laterality: N/A;    INCISION AND DRAINAGE Right 8/1/2024    Procedure: INCISION AND DRAINAGE;  Surgeon:  Darnell Liao MD;  Location: [facility];  Service: Orthopedics;  Laterality: Right;    LYMPHADENECTOMY  1985    MENISCECTOMY      left knee    NASAL SEPTUM SURGERY  1985    REVISION OF KNEE ARTHROPLASTY Right 8/1/2024    Procedure: REVISION, ARTHROPLASTY, KNEE TOTAL;  Surgeon: Darnell Liao MD;  Location: [facility];  Service: Orthopedics;  Laterality: Right;    TOTAL KNEE ARTHROPLASTY Right 11/7/2023    Procedure: ARTHROPLASTY, KNEE, TOTAL;  Surgeon: Darnell Liao MD;  Location: [facility];  Service: Orthopedics;  Laterality: Right;    WISDOM TOOTH EXTRACTION         Review of patient's allergies indicates:  No Known Allergies    Medications:  Medications Prior to Admission   Medication Sig    acetaminophen (TYLENOL ORAL) Take by mouth as needed.    aspirin 81 MG Chew Take 1 tablet (81 mg total) by mouth 2 (two) times daily.    fluticasone propionate (FLONASE) 50 mcg/actuation nasal spray 1 spray by Each Nostril route as needed for Rhinitis.    hydrOXYzine HCL (ATARAX) 25 MG tablet Take 1 tablet (25 mg total) by mouth 3 (three) times daily as needed for Anxiety.    lactulose (CONSTULOSE) 10 gram/15 mL solution Take 15 mLs (10 g total) by mouth 3 (three) times daily.    omeprazole (PRILOSEC) 40 MG capsule Take 40 mg by mouth once daily.    oxyCODONE (ROXICODONE) 5 MG immediate release tablet Take 1 tablet (5 mg total) by mouth every 4 (four) hours as needed for Pain.    potassium chloride (KLOR-CON) 10 MEQ TbSR TAKE 1 TABLET(10 MEQ) BY MOUTH EVERY DAY    torsemide (DEMADEX) 20 MG Tab Take 3 tablets (60 mg total) by mouth after dinner.     Antibiotics (From admission, onward)      Start     Stop Route Frequency Ordered    08/09/24 1200  DAPTOmycin (CUBICIN) 1,000 mg in 0.9% NaCl SolP 50 mL IVPB         -- IV Every 24 hours (non-standard times) 08/09/24 1054    08/09/24 1200  ceFEPIme (MAXIPIME) 2 g in D5W 100 mL IVPB (MB+)         -- IV Every 8 hours (non-standard times) 08/09/24 1054          Antifungals  (From admission, onward)      None          Antivirals (From admission, onward)      None             Immunization History   Administered Date(s) Administered    COVID-19, MRNA, LN-S, PF (MODERNA FULL 0.5 ML DOSE) 12/09/2021    COVID-19, MRNA, LN-S, PF (Pfizer) (Purple Cap) 01/19/2021, 02/02/2021, 02/02/2021    Hepatitis A / Hepatitis B 02/12/2021, 03/17/2021, 08/16/2021    Influenza 10/08/2020    Influenza - Quadrivalent - MDCK - PF 09/21/2020    Influenza - Quadrivalent - PF *Preferred* (6 months and older) 10/25/2017, 09/17/2021, 10/25/2022, 11/16/2023    Pneumococcal Polysaccharide - 23 Valent 09/17/2021    Tdap 07/14/2014, 12/13/2023       Family History       Problem Relation (Age of Onset)    Aneurysm Mother    Dementia Mother    Diabetes Paternal Grandmother    Heart attack Father (55)    Hypertension Father          Social History     Socioeconomic History    Marital status:     Number of children: 2   Occupational History    Occupation: [employer] supervisor     Employer: [employer]   Tobacco Use    Smoking status: Never     Passive exposure: Never    Smokeless tobacco: Never   Substance and Sexual Activity    Alcohol use: No    Drug use: No   Social History Narrative    Patient is originally from   [place]        School at ; [employer] ; [employer]         Working ; 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     Social Determinants of Health     Financial Resource Strain: Medium Risk (1/22/2024)    Overall Financial Resource Strain (CARDIA)     Difficulty of Paying Living Expenses: Somewhat hard   Food Insecurity: Patient Declined (1/22/2024)    Hunger Vital Sign     Worried About Running Out of Food in the Last Year: Patient declined      Ran Out of Food in the Last Year: Patient declined   Transportation Needs: No Transportation Needs (1/22/2024)    PRAPARE - Transportation     Lack of Transportation (Medical): No     Lack of Transportation (Non-Medical): No   Physical Activity: Insufficiently Active (1/22/2024)    Exercise Vital Sign     Days of Exercise per Week: 1 day     Minutes of Exercise per Session: 10 min   Stress: Stress Concern Present (1/22/2024)    [place] [employer] - Occupational Stress Questionnaire     Feeling of Stress : To some extent   Housing Stability: High Risk (1/22/2024)    Housing Stability Vital Sign     Unable to Pay for Housing in the Last Year: Yes     Number of Places Lived in the Last Year: 1     Unstable Housing in the Last Year: No     Review of Systems   Musculoskeletal:  Positive for arthralgias and myalgias.   All other systems reviewed and are negative.    Objective:     Vital Signs (Most Recent):  Temp: 98.1 °F (36.7 °C) (08/09/24 1600)  Pulse: 84 (08/09/24 1600)  Resp: 18 (08/09/24 1600)  BP: (!) 115/47 (08/09/24 1600)  SpO2: 99 % (08/09/24 1600) Vital Signs (24h Range):  Temp:  [98.1 °F (36.7 °C)-100.4 °F (38 °C)] 98.1 °F (36.7 °C)  Pulse:  [] 84  Resp:  [14-23] 18  SpO2:  [92 %-99 %] 99 %  BP: (115-129)/(47-58) 115/47     Weight: 124.7 kg (275 lb)  Body mass index is 38.35 kg/m².    Estimated Creatinine Clearance: 138.7 mL/min (based on SCr of 0.8 mg/dL).     Physical Exam  Vitals and nursing note reviewed.   Constitutional:       General: He is not in acute distress.     Appearance: He is well-developed. He is obese. He is not diaphoretic.   HENT:      Head: Normocephalic and atraumatic.      Right Ear: External ear normal.      Left Ear: External ear normal.      Nose: Nose normal.      Mouth/Throat:      Pharynx: No oropharyngeal exudate.   Eyes:      General: No scleral icterus.        Right eye: No discharge.         Left eye: No discharge.      Conjunctiva/sclera:  Conjunctivae normal.      Pupils: Pupils are equal, round, and reactive to light.   Neck:      Thyroid: No thyromegaly.      Vascular: No JVD.      Trachea: No tracheal deviation.   Cardiovascular:      Rate and Rhythm: Normal rate and regular rhythm.      Heart sounds: No murmur heard.     No friction rub. No gallop.   Pulmonary:      Effort: Pulmonary effort is normal. No respiratory distress.      Breath sounds: Normal breath sounds. No stridor. No wheezing or rales.   Chest:      Chest wall: No tenderness.   Abdominal:      General: Bowel sounds are normal. There is no distension.      Palpations: Abdomen is soft. There is no mass.      Tenderness: There is no abdominal tenderness. There is no guarding or rebound.   Musculoskeletal:         General: Tenderness (right knee) present.      Cervical back: Normal range of motion and neck supple.   Lymphadenopathy:      Cervical: No cervical adenopathy.   Skin:     Comments: Right knee with wound vac covering it.  Pictures reviewed.   Neurological:      Mental Status: He is alert and oriented to person, place, and time.      Cranial Nerves: No cranial nerve deficit.      Motor: No abnormal muscle tone.      Coordination: Coordination normal.      Deep Tendon Reflexes: Reflexes are normal and symmetric. Reflexes normal.   Psychiatric:         Behavior: Behavior normal.         Thought Content: Thought content normal.         Judgment: Judgment normal.          Significant Labs:   Microbiology Results (last 7 days)       ** No results found for the last 168 hours. **            Significant Imaging: I have reviewed all pertinent imaging results/findings within the past 24 hours.

## 2024-08-09 NOTE — ASSESSMENT & PLAN NOTE
No surgery advised.  Wound VAC applied to his incision to help with drainage  Antibiotic therapy adjusted per Infectious Disease.  If he tolerates this well plan to discharge home on new antibiotic therapy.

## 2024-08-10 ENCOUNTER — PATIENT MESSAGE (OUTPATIENT)
Dept: PHARMACY | Facility: OTHER | Age: 59
End: 2024-08-10
Payer: OTHER GOVERNMENT

## 2024-08-10 VITALS
SYSTOLIC BLOOD PRESSURE: 118 MMHG | HEART RATE: 84 BPM | WEIGHT: 275 LBS | RESPIRATION RATE: 20 BRPM | TEMPERATURE: 98 F | DIASTOLIC BLOOD PRESSURE: 53 MMHG | BODY MASS INDEX: 38.5 KG/M2 | OXYGEN SATURATION: 94 % | HEIGHT: 71 IN

## 2024-08-10 LAB — VANCOMYCIN TROUGH SERPL-MCNC: 5.1 UG/ML (ref 10–22)

## 2024-08-10 PROCEDURE — 25000003 PHARM REV CODE 250: Performed by: INTERNAL MEDICINE

## 2024-08-10 PROCEDURE — 96366 THER/PROPH/DIAG IV INF ADDON: CPT

## 2024-08-10 PROCEDURE — 25000003 PHARM REV CODE 250: Performed by: HOSPITALIST

## 2024-08-10 PROCEDURE — 80202 ASSAY OF VANCOMYCIN: CPT | Performed by: HOSPITALIST

## 2024-08-10 PROCEDURE — G0378 HOSPITAL OBSERVATION PER HR: HCPCS

## 2024-08-10 PROCEDURE — 63600175 PHARM REV CODE 636 W HCPCS: Performed by: INTERNAL MEDICINE

## 2024-08-10 PROCEDURE — 36415 COLL VENOUS BLD VENIPUNCTURE: CPT | Performed by: HOSPITALIST

## 2024-08-10 PROCEDURE — 96376 TX/PRO/DX INJ SAME DRUG ADON: CPT

## 2024-08-10 RX ORDER — TORSEMIDE 20 MG/1
40 TABLET ORAL EVERY MORNING
Start: 2024-08-10

## 2024-08-10 RX ORDER — ACETAMINOPHEN 500 MG
1000 TABLET ORAL EVERY 8 HOURS PRN
COMMUNITY
Start: 2024-08-10

## 2024-08-10 RX ADMIN — PANTOPRAZOLE SODIUM 40 MG: 40 TABLET, DELAYED RELEASE ORAL at 10:08

## 2024-08-10 RX ADMIN — DAPTOMYCIN 1000 MG: 350 INJECTION, POWDER, LYOPHILIZED, FOR SOLUTION INTRAVENOUS at 01:08

## 2024-08-10 RX ADMIN — FLUTICASONE PROPIONATE 50 MCG: 50 SPRAY, METERED NASAL at 10:08

## 2024-08-10 RX ADMIN — CEFEPIME 2 G: 2 INJECTION, POWDER, FOR SOLUTION INTRAVENOUS at 02:08

## 2024-08-10 RX ADMIN — TORSEMIDE 40 MG: 20 TABLET ORAL at 10:08

## 2024-08-10 RX ADMIN — TORSEMIDE 20 MG: 20 TABLET ORAL at 02:08

## 2024-08-10 RX ADMIN — CEFEPIME 2 G: 2 INJECTION, POWDER, FOR SOLUTION INTRAVENOUS at 07:08

## 2024-08-10 RX ADMIN — LACTULOSE 15 G: 20 SOLUTION ORAL at 10:08

## 2024-08-10 RX ADMIN — CEFEPIME 2 G: 2 INJECTION, POWDER, FOR SOLUTION INTRAVENOUS at 04:08

## 2024-08-10 RX ADMIN — ASPIRIN 81 MG: 81 TABLET, COATED ORAL at 10:08

## 2024-08-10 NOTE — PLAN OF CARE
[facility] - [facility] ([address])  HOME HEALTH ORDERS  FACE TO FACE ENCOUNTER    Patient Name: Ashwin Peter  YOB: 1965  PCP: Sergio Guo III, MD   PCP Address: [address] [facility] / [place] [address]  PCP Phone Number: [phone]  PCP Fax: [phone]    Encounter Date: 8/8/24    Admit to Home Health    Diagnoses:  Active Hospital Problems    Diagnosis  POA    *Infection of prosthetic right knee joint [T84.53XA]  Not Applicable     Priority: 1 - High    Liver cirrhosis secondary to nonalcoholic steatohepatitis (GUAJARDO) [K75.81, K74.60]  Yes     Priority: 5     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       Follow Up Appointments:  Future Appointments   Date Time Provider Department Center   8/19/2024  8:00 AM Jyothi Sargent NP [facility] [place]   9/16/2024  8:30 AM Coretta Cheung MD [facility] [address]   9/25/2024  1:40 PM Sergio Guo III, MD [facility]       Allergies:Review of patient's allergies indicates:  No Known Allergies    Medications: Review discharge medications with patient and family and provide education.       Medication List        START taking these medications      0.9% NaCl SolP 50 mL with DAPTOmycin 500 mg SolR 997.5 mg  Inject 997.5 mg into the vein once daily.     D5W PgBk 100 mL with ceFEPIme 2 gram SolR 2 g  Inject 2 g into the vein every 8 (eight) hours.            CHANGE how you take these medications      acetaminophen 500 MG tablet  Commonly known as: TYLENOL  Take 2 tablets (1,000 mg total) by mouth every 8 (eight) hours as needed for Pain.  What changed:   medication strength  how much to take  when to take this  reasons to take this     *  torsemide 20 MG Tab  Commonly known as: DEMADEX  Take 2 tablets (40 mg total) by mouth every morning.  What changed:   how much to take  when to take this     * torsemide 40 mg Tab  Take 20 mg by mouth after lunch.  What changed: You were already taking a medication with the same name, and this prescription was added. Make sure you understand how and when to take each.           * This list has 2 medication(s) that are the same as other medications prescribed for you. Read the directions carefully, and ask your doctor or other care provider to review them with you.                CONTINUE taking these medications      aspirin 81 MG Chew  Take 1 tablet (81 mg total) by mouth 2 (two) times daily.     CONSTULOSE 10 gram/15 mL solution  Generic drug: lactulose  Take 15 mLs (10 g total) by mouth 3 (three) times daily.     fluticasone propionate 50 mcg/actuation nasal spray  Commonly known as: FLONASE  1 spray by Each Nostril route as needed for Rhinitis.     hydrOXYzine HCL 25 MG tablet  Commonly known as: ATARAX  Take 1 tablet (25 mg total) by mouth 3 (three) times daily as needed for Anxiety.     omeprazole 40 MG capsule  Commonly known as: PRILOSEC  Take 40 mg by mouth once daily.     oxyCODONE 5 MG immediate release tablet  Commonly known as: ROXICODONE  Take 1 tablet (5 mg total) by mouth every 4 (four) hours as needed for Pain.            STOP taking these medications      potassium chloride 10 MEQ Tbsr  Commonly known as: KLOR-CON                I have seen and examined this patient within the last 30 days. My clinical findings that support the need for the home health skilled services and home bound status are the following:no   Weakness/numbness causing balance and gait disturbance due to Infection and Weakness/Debility making it taxing to leave home.     Diet:   2 gram sodium diet    Labs:  CBC, CMP, CRP, CPK every Monday while on IV antibiotics, Fax results of [facility] Attention Dr. Dsouza  [place].    Referrals/ Consults  Physical Therapy to evaluate and treat. Evaluate for home safety and equipment needs; Establish/upgrade home exercise program. Perform / instruct on therapeutic exercises, gait training, transfer training, and Range of Motion.  Occupational Therapy to evaluate and treat. Evaluate home environment for safety and equipment needs. Perform/Instruct on transfers, ADL training, ROM, and therapeutic exercises.   to evaluate for community resources/long-range planning.  Aide to provide assistance with personal care, ADLs, and vital signs.    Activities:   activity as tolerated    Nursing:   Agency to admit patient within 24 hours of hospital discharge unless specified on physician order or at patient request    SN to complete comprehensive assessment including routine vital signs. Instruct on disease process and s/s of complications to report to MD. Review/verify medication list sent home with the patient at time of discharge  and instruct patient/caregiver as needed. Frequency may be adjusted depending on start of care date.     Skilled nurse to perform up to 3 visits PRN for symptoms related to diagnosis    Notify MD if SBP > 160 or < 90; DBP > 90 or < 50; HR > 120 or < 50; Temp > 101; O2 < 88%      Miscellaneous   Home Infusion Therapy:   SN to perform Infusion Therapy/Central Line Care.  Review Central Line Care & Central Line Flush with patient.    1.Daptomycin 1000 mg IV q 24 hours x 6 weeks (next dose due 8/11/2024 at 1200 hours)  2. Cefepime 2 grams IV q 8 hours x 6 weeks (next dose due 8/10/2024 2000 hours)  Estimated end date of IV antibiotics: 09/11/2024    Scrub the Hub: Prior to accessing the line, always perform a 30 second alcohol scrub  Each lumen of the central line is to be flushed at least daily with 10 mL Normal Saline and 3 mL Heparin flush (10 units/mL)  Skilled Nurse (SN) may draw blood from IV access  Blood Draw Procedure:   - Aspirate at least 5 mL of  blood   - Discard   - Obtain specimen   - Change injection cap   - Flush with 20 mL Normal Saline followed by a                 3-5 mL Heparin flush (10 units/mL)  Central :   - Sterile dressing changes are done weekly and as needed.   - Use chlor-hexadine scrub to cleanse site, apply Biopatch to insertion site,       apply securement device dressing   - Injection caps are changed weekly and after EVERY lab draw.   - If sterile gauze is under dressing to control oozing,                 dressing change must be performed every 24 hours until gauze is not needed.    Home Health Aide:  Nursing Twice weekly, Physical Therapy Three times weekly, and Occupational Therapy Three times weekly    Wound Care Orders  Keep surgical wound VAC in place until exchanged in clinic.  Document daily output.    I certify that this patient is confined to his home and needs intermittent skilled nursing care, physical therapy, and speech therapy.

## 2024-08-10 NOTE — PROGRESS NOTES
[facility] nurse educator spoke with patient via phone to discuss discharge plan for home IVATB. Ashwin Peter will dc home with family support and was currently on service with us for vancomycin and CTX. He was rehospitalized and medications have been changed to Daptomycin 1000mg once daily and Cefepime 2 grams every 8 hours. Patient would like to do cefepime 6 grams continuous at home. Estimated EOC is 9/11/24. Patient will infuse medication via Elastomeric Pump. Patient previously educated on S.A.S.H procedure and a S.A.S.H mat was provided. Verbally reviewed process with patient over the phone. He verbalized understanding and states infusions had been going well at home prior to admit. Patient feels comfortable with infusion. [facility]  will follow patient for weekly dressing changes and lab draws. Time allotted for questions. Patients case management team notified teaching has been completed.     Medication delivery will be made to home. Patient instructed to discard all previous medications. He verbalized understanding.     Arline Patel RN, BSN  Clinical Liaison   [facility]  Cell [phone]  Available M-F 8:30-5pm  Office [phone]  Available 24/7

## 2024-08-10 NOTE — PLAN OF CARE
[facility] - [facility] ([address])  Discharge Final Note    Primary Care Provider: Sergio Guo III, MD    Expected Discharge Date: 8/10/2024    Final Discharge Note (most recent)       Final Note - 08/10/24 1454          Final Note    Assessment Type Final Discharge Note (P)      Anticipated Discharge Disposition Home-Health Care Svc (P)      Hospital Resources/Appts/Education Provided Provided patient/caregiver with written discharge plan information;Appointments scheduled and added to AVS (P)         Post-Acute Status    Post-Acute Authorization Home Health;IV Infusion (P)      Home Health Status Set-up Complete/Auth obtained (P)      IV Infusion Status Set-up Complete/Auth obtained (P)      Discharge Delays None known at this time (P)                      Important Message from Medicare             Contact Info       Darnell Liao MD   Specialty: Orthopedic Surgery   Relationship: Consulting Physician    [address] [place] [address]   Phone: [phone]       Next Steps: Schedule an appointment as soon as possible for a visit in 1 week(s)            Patient will discharge home with family.     Patient will resume home health & IV Home abx at discharge. ([facility])     Patient family will provide transportation at discharge.     Patient needs/orders were addressed from a SW/CM standpoint.     SW explained to patient their PCP office is closed. Patient stated they understand how to make his own PCP follow up.

## 2024-08-11 NOTE — DISCHARGE SUMMARY
[facility] [address] Medicine  Discharge Summary      Patient Name: Ashwin Peter  MRN: [id number]  [facility]: [id number]  Patient Class: OP- Observation  Admission Date: 8/8/2024  Hospital Length of Stay: 0 days  Discharge Date and Time: 8/10/2024  8:30 PM  Attending Physician: Alvarado Koch MD   Discharging Provider: Alvarado Koch MD  Primary Care Provider: Sergio Guo III, MD    HPI:   Patient is a 58-year-old man with a history of pulmonary sarcoid, chronic diastolic heart failure, obesity, nonalcoholic steatohepatitis, chronic lymphedema, and osteoarthritis who underwent total right knee arthroplasty on 11/7/2023 performed by Dr. Darnell Liao.      Patient had infection of prosthetic right knee joint for which he underwent incision and drainage and total revision of the right knee performed on 8/1/2024.  Spacer not utilized and new hardware placed.  Infectious Disease service consulted.  Patient was discharged on recommended antibiotic therapy intravenous vancomycin (2 grams every 12 hours) and ceftriaxone (2 grams every morning).  Cultures obtained from recent incision and drainage no growth to date.    Patient presents emergency department with reported increased drainage from his surgical wound.  Patient reports no increase pain. No fever.  Reports episode of subjective chills which he attributes to the fact that and air conditioning event is over his bed.    He reports chronic bilateral lower extremity swelling with right lower extremity more swollen than left at baseline.  No change in his baseline swelling.  Reports he management swelling with compression stockings along with diuretic therapy.  No shortness a breath.    Patient previously been on prednisone for pulmonary sarcoidosis but was weaned off chronic steroids and has been off steroids about 5 months ago.    Hospital Course:   Patient is a 58-year-old man with a history of pulmonary sarcoid, chronic diastolic heart  failure, obesity, nonalcoholic steatohepatitis, chronic lymphedema, and osteoarthritis who underwent total right knee arthroplasty on 11/7/2023 performed by Dr. Darnell Liao.  Patient admitted due to concern for possible treatment failure.    Orthopedic surgery evaluated and recommended no surgical intervention at this time.  Patient has had some drainage but not necessarily more than expected at this point.  Infectious Disease consulted and recommended adjustment in antibiotic therapy to provide for empiric broader coverage.  Cultures remain no growth to date. Incisional wound VAC applied to help manage and monitor drainage.    Patient discharge home in stable condition to continue intravenous antibiotic with recommended adjustment in regimen and with incisional wound VAC.    Close outpatient follow-up with Dr. Liao and ID advised.     Goals of Care Treatment Preferences:  Code Status: Full Code    Living Will: Yes                 Consults:   Consults (From admission, onward)          Status Ordering Provider     Inpatient consult to Infectious Diseases  Once        Provider:  Meet Johnson MD    Completed SHANNON MAY     Inpatient consult to Orthopedic Surgery  Once        Provider:  Williams, Claude S. 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 Schedule an appointment as soon as possible  for a visit in 1 week(s).    Specialty: Orthopedic Surgery  Contact information:  [address] [place] [address]  [phone]                           Patient Instructions:      Diet Cardiac   Order Comments: Low sodium (less than 2 grams per day)     Notify your health care provider if you experience any of the following:  temperature >100.4     Notify your health care provider if you experience any of the following:  persistent nausea and vomiting or diarrhea     Notify your health care provider if you experience any of the following:  severe uncontrolled pain     Notify your health care provider if you experience any of the following:  redness, tenderness, or signs of infection (pain, swelling, redness, odor or green/yellow discharge around incision site)     Notify your health care provider if you experience any of the following:  difficulty breathing or increased cough     Notify your health care provider if you experience any of the following:  severe persistent headache     Notify your health care provider if you experience any of the following:  worsening rash     Notify your health care provider if you experience any of the following:  persistent dizziness, light-headedness, or visual disturbances     Notify your health care provider if you experience any of the following:  increased confusion or weakness     Activity as tolerated        Medications:  Reconciled Home Medications:      Medication List        START taking these medications      0.9% NaCl SolP 50 mL with DAPTOmycin 500 mg SolR 997.5 mg  Inject 997.5 mg into the vein once daily.     D5W PgBk 100 mL with ceFEPIme 2 gram SolR 2 g  Inject 2 g into the vein every 8 (eight) hours.            CHANGE how you take these medications      acetaminophen 500 MG tablet  Commonly known as: TYLENOL  Take 2 tablets (1,000 mg total) by mouth every 8 (eight) hours as needed for Pain.  What changed:   medication strength  how much to take  when to  take this  reasons to take this     * torsemide 20 MG Tab  Commonly known as: DEMADEX  Take 2 tablets (40 mg total) by mouth every morning.  What changed:   how much to take  when to take this     * torsemide 40 mg Tab  Take 20 mg by mouth after lunch.  What changed: You were already taking a medication with the same name, and this prescription was added. Make sure you understand how and when to take each.           * This list has 2 medication(s) that are the same as other medications prescribed for you. Read the directions carefully, and ask your doctor or other care provider to review them with you.                CONTINUE taking these medications      aspirin 81 MG Chew  Take 1 tablet (81 mg total) by mouth 2 (two) times daily.     CONSTULOSE 10 gram/15 mL solution  Generic drug: lactulose  Take 15 mLs (10 g total) by mouth 3 (three) times daily.     fluticasone propionate 50 mcg/actuation nasal spray  Commonly known as: FLONASE  1 spray by Each Nostril route as needed for Rhinitis.     hydrOXYzine HCL 25 MG tablet  Commonly known as: ATARAX  Take 1 tablet (25 mg total) by mouth 3 (three) times daily as needed for Anxiety.     omeprazole 40 MG capsule  Commonly known as: PRILOSEC  Take 40 mg by mouth once daily.     oxyCODONE 5 MG immediate release tablet  Commonly known as: ROXICODONE  Take 1 tablet (5 mg total) by mouth every 4 (four) hours as needed for Pain.            STOP taking these medications      potassium chloride 10 MEQ Tbsr  Commonly known as: KLOR-CON              Indwelling Lines/Drains at time of discharge:   Lines/Drains/Airways       Peripherally Inserted Central Catheter Line  Duration             PICC Double Lumen 08/02/24 1630 right basilic 8 days                    Time spent on the discharge of patient: 45 minutes         Alvarado Koch MD  Department of Hospital Medicine  [facility] ([address])

## 2024-08-11 NOTE — NURSING
Finished last dose of abx, PICC Line flushed and placed with green cap; no distress noted. AVS given to patient and wheeled downstairs, patient left with spouse in front of hospital.

## 2024-08-12 ENCOUNTER — LAB VISIT (OUTPATIENT)
Dept: LAB | Facility: HOSPITAL | Age: 59
End: 2024-08-12
Attending: ORTHOPAEDIC SURGERY
Payer: OTHER GOVERNMENT

## 2024-08-12 DIAGNOSIS — T84.53XA INFECTION OF TOTAL RIGHT KNEE REPLACEMENT: Primary | ICD-10-CM

## 2024-08-12 LAB
ALBUMIN SERPL BCP-MCNC: 2 G/DL (ref 3.5–5.2)
ALP SERPL-CCNC: 162 U/L (ref 55–135)
ALT SERPL W/O P-5'-P-CCNC: 18 U/L (ref 10–44)
ANION GAP SERPL CALC-SCNC: 6 MMOL/L (ref 8–16)
AST SERPL-CCNC: 42 U/L (ref 10–40)
BASOPHILS # BLD AUTO: 0.04 K/UL (ref 0–0.2)
BASOPHILS NFR BLD: 1.1 % (ref 0–1.9)
BILIRUB SERPL-MCNC: 1.7 MG/DL (ref 0.1–1)
BUN SERPL-MCNC: 24 MG/DL (ref 6–20)
CALCIUM SERPL-MCNC: 7.7 MG/DL (ref 8.7–10.5)
CHLORIDE SERPL-SCNC: 110 MMOL/L (ref 95–110)
CK SERPL-CCNC: 34 U/L (ref 20–200)
CO2 SERPL-SCNC: 23 MMOL/L (ref 23–29)
CREAT SERPL-MCNC: 0.9 MG/DL (ref 0.5–1.4)
DIFFERENTIAL METHOD BLD: ABNORMAL
EOSINOPHIL # BLD AUTO: 0.2 K/UL (ref 0–0.5)
EOSINOPHIL NFR BLD: 5.7 % (ref 0–8)
ERYTHROCYTE [DISTWIDTH] IN BLOOD BY AUTOMATED COUNT: 16.7 % (ref 11.5–14.5)
EST. GFR  (NO RACE VARIABLE): >60 ML/MIN/1.73 M^2
GLUCOSE SERPL-MCNC: 136 MG/DL (ref 70–110)
HCT VFR BLD AUTO: 30.5 % (ref 40–54)
HGB BLD-MCNC: 9.6 G/DL (ref 14–18)
IMM GRANULOCYTES # BLD AUTO: 0.02 K/UL (ref 0–0.04)
IMM GRANULOCYTES NFR BLD AUTO: 0.5 % (ref 0–0.5)
LYMPHOCYTES # BLD AUTO: 0.6 K/UL (ref 1–4.8)
LYMPHOCYTES NFR BLD: 15.1 % (ref 18–48)
MCH RBC QN AUTO: 29.5 PG (ref 27–31)
MCHC RBC AUTO-ENTMCNC: 31.5 G/DL (ref 32–36)
MCV RBC AUTO: 94 FL (ref 82–98)
MONOCYTES # BLD AUTO: 0.5 K/UL (ref 0.3–1)
MONOCYTES NFR BLD: 13.2 % (ref 4–15)
NEUTROPHILS # BLD AUTO: 2.4 K/UL (ref 1.8–7.7)
NEUTROPHILS NFR BLD: 64.4 % (ref 38–73)
NRBC BLD-RTO: 0 /100 WBC
PLATELET # BLD AUTO: 90 K/UL (ref 150–450)
PMV BLD AUTO: 13.8 FL (ref 9.2–12.9)
POTASSIUM SERPL-SCNC: 3.8 MMOL/L (ref 3.5–5.1)
PROT SERPL-MCNC: 5.6 G/DL (ref 6–8.4)
RBC # BLD AUTO: 3.25 M/UL (ref 4.6–6.2)
SODIUM SERPL-SCNC: 139 MMOL/L (ref 136–145)
WBC # BLD AUTO: 3.71 K/UL (ref 3.9–12.7)

## 2024-08-12 PROCEDURE — 80053 COMPREHEN METABOLIC PANEL: CPT | Performed by: ORTHOPAEDIC SURGERY

## 2024-08-12 PROCEDURE — 36415 COLL VENOUS BLD VENIPUNCTURE: CPT | Performed by: ORTHOPAEDIC SURGERY

## 2024-08-12 PROCEDURE — 85025 COMPLETE CBC W/AUTO DIFF WBC: CPT | Performed by: ORTHOPAEDIC SURGERY

## 2024-08-12 PROCEDURE — 82550 ASSAY OF CK (CPK): CPT | Performed by: ORTHOPAEDIC SURGERY

## 2024-08-15 ENCOUNTER — OFFICE VISIT (OUTPATIENT)
Dept: HOME HEALTH SERVICES | Facility: CLINIC | Age: 59
End: 2024-08-15
Payer: OTHER GOVERNMENT

## 2024-08-15 VITALS
DIASTOLIC BLOOD PRESSURE: 72 MMHG | SYSTOLIC BLOOD PRESSURE: 132 MMHG | HEART RATE: 88 BPM | TEMPERATURE: 97 F | OXYGEN SATURATION: 97 % | RESPIRATION RATE: 16 BRPM

## 2024-08-15 DIAGNOSIS — T84.53XA INFECTION ASSOCIATED WITH INTERNAL RIGHT KNEE PROSTHESIS, INITIAL ENCOUNTER: Primary | ICD-10-CM

## 2024-08-15 DIAGNOSIS — E66.01 SEVERE OBESITY (BMI >= 40): ICD-10-CM

## 2024-08-15 PROCEDURE — 99349 HOME/RES VST EST MOD MDM 40: CPT | Mod: S$GLB,,, | Performed by: NURSE PRACTITIONER

## 2024-08-15 NOTE — PROGRESS NOTES
[facility]  Transitional Care Management (TCM) Home Visit    Encounter Provider: Jyothi Sargent   PCP: Sergio Guo III, MD  Consult Requested By: No ref. provider found  Admit Date: 8/8/24   IP Discharge Date: 8/10/24  Hospital Length of Stay:2 days  Days since discharge (from IP or SNF): 5  [facility] Contact Note: 8/12/24  Hospital Diagnosis: No admission diagnoses are documented for this encounter.     HISTORY OF PRESENT ILLNESS      Patient ID: Ashwin Peter is a 59 y.o. male was recently admitted to the hospital, this is their TCM encounter.    Hospital Course Synopsis:    Patient is a 58-year-old man with a history of pulmonary sarcoid, chronic diastolic heart failure, obesity, nonalcoholic steatohepatitis, chronic lymphedema, and osteoarthritis who underwent total right knee arthroplasty on 11/7/2023 performed by Dr. Darnell Liao.  Patient admitted due to concern for possible treatment failure.     Orthopedic surgery evaluated and recommended no surgical intervention at this time.  Patient has had some drainage but not necessarily more than expected at this point.  Infectious Disease consulted and recommended adjustment in antibiotic therapy to provide for empiric broader coverage.  Cultures remain no growth to date. Incisional wound VAC applied to help manage and monitor drainage.    Today pt is doing well. Wound vac in place. Pain controlled  Anti-biotics in place.  Pt has follow up with ortho tomorrow    DECISION MAKING TODAY       Assessment & Plan:  1. Infection associated with internal right knee prosthesis, initial encounter  Assessment & Plan:  No surgery advised.  Wound VAC applied to his incision to help with drainage  Antibiotic therapy adjusted per Infectious Disease.  If he tolerates this well plan to discharge home on new antibiotic therapy.      2. Severe obesity (BMI >= 40)  Assessment & Plan:  There is no height or weight on file to calculate BMI. Morbid obesity  complicates all aspects of disease management from diagnostic modalities to treatment.     Patient continues on ozempic as outpatient for weight loss.                 Medication List on Discharge:     Medication List            Accurate as of August 15, 2024 11:59 PM. If you have any questions, ask your nurse or doctor.                CONTINUE taking these medications      0.9% NaCl SolP 50 mL with DAPTOmycin 500 mg SolR 997.5 mg  Inject 997.5 mg into the vein once daily.     acetaminophen 500 MG tablet  Commonly known as: TYLENOL  Take 2 tablets (1,000 mg total) by mouth every 8 (eight) hours as needed for Pain.     aspirin 81 MG Chew  Take 1 tablet (81 mg total) by mouth 2 (two) times daily.     CONSTULOSE 10 gram/15 mL solution  Generic drug: lactulose  Take 15 mLs (10 g total) by mouth 3 (three) times daily.     D5W PgBk 100 mL with ceFEPIme 2 gram SolR 2 g  Inject 2 g into the vein every 8 (eight) hours.     fluticasone propionate 50 mcg/actuation nasal spray  Commonly known as: FLONASE  1 spray by Each Nostril route as needed for Rhinitis.     hydrOXYzine HCL 25 MG tablet  Commonly known as: ATARAX  Take 1 tablet (25 mg total) by mouth 3 (three) times daily as needed for Anxiety.     omeprazole 40 MG capsule  Commonly known as: PRILOSEC  Take 40 mg by mouth once daily.     oxyCODONE 5 MG immediate release tablet  Commonly known as: ROXICODONE  Take 1 tablet (5 mg total) by mouth every 4 (four) hours as needed for Pain.     * torsemide 20 MG Tab  Commonly known as: DEMADEX  Take 2 tablets (40 mg total) by mouth every morning.     * torsemide 40 mg Tab  Take 20 mg by mouth after lunch.           * This list has 2 medication(s) that are the same as other medications prescribed for you. Read the directions carefully, and ask your doctor or other care provider to review them with you.                  Medication Reconciliation:  Were medications changed on discharge? Yes  Were medications in the home? Yes  Is the  patient taking the medications as directed? Yes  Does the patient understand the medications and changes? Yes  Does updated med list accurately reflects meds patient is currently taking? Yes    ENVIRONMENT OF CARE      Family and/or Caregiver present at visit?  No  Name of Caregiver:   History provided by: patient    Advance Care Planning   Advanced Care Planning Status:  Patient has had an ACP conversation  Living Will: Yes  Power of : Yes  [place]: No    Does Caregiver have HCPoA: Yes  Changes today:   Is patient hospice appropriate: No  (If needed, use PPS <30 or FAST score >7)  Was referral to hospice placed: No       Impression upon entering the home:  Physical Dwelling: single family home   Appearance of home environment: cleaniness: clean  Functional Status: independent  Mobility: ambulatory with device  Nutritional access: adequate intake and access  Home Health: Yes,  Agency [facility]    DME/Supplies: rolling walker     Diagnostic tests reviewed/disposition: No diagnosic tests pending after this hospitalization.  Disease/illness education: Wound care  Establishment or re-establishment of referral orders for community resources: No other necessary community resources.   Discussion with other health care providers: No discussion with other health care providers necessary.   Does patient have a PCP at [facility]? Yes   Repatriation plan with PCP? follow-up with PCP within 90d   Does patient have an ostomy (ileostomy, colostomy, suprapubic catheter, nephrostomy tube, tracheostomy, PEG tube, pleurex catheter, cholecystostomy, etc)? No  Were BPAs reviewed? Yes    Social History     Socioeconomic History    Marital status:     Number of children: 2   Occupational History    Occupation: maintance supervisor     Employer: [employer]   Tobacco Use    Smoking status: Never     Passive exposure: Never    Smokeless tobacco: Never   Substance and Sexual Activity    Alcohol use: No    Drug use: No    Social History Narrative    Patient is originally from   [place]        School at ; [employer] ; [employer]         Working ; maintance [employer],  [employer]            Daysi 22 yrs         Children    Maksim Funez        Lives with     Wife         Diet/Exericse                Works as a  and .  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     Social Determinants of Health     Financial Resource Strain: Medium Risk (1/22/2024)    Overall Financial Resource Strain (CARDIA)     Difficulty of Paying Living Expenses: Somewhat hard   Food Insecurity: Patient Declined (1/22/2024)    Hunger Vital Sign     Worried About Running Out of Food in the Last Year: Patient declined     Ran Out of Food in the Last Year: Patient declined   Transportation Needs: No Transportation Needs (1/22/2024)    PRAPARE - Transportation     Lack of Transportation (Medical): No     Lack of Transportation (Non-Medical): No   Physical Activity: Insufficiently Active (1/22/2024)    Exercise Vital Sign     Days of Exercise per Week: 1 day     Minutes of Exercise per Session: 10 min   Stress: Stress Concern Present (1/22/2024)    [place] [employer] - Occupational Stress Questionnaire     Feeling of Stress : To some extent   Housing Stability: High Risk (1/22/2024)    Housing Stability Vital Sign     Unable to Pay for Housing in the Last Year: Yes     Number of Places Lived in the Last Year: 1     Unstable Housing in the Last Year: No       OBJECTIVE:     Vital Signs:  Vitals:    08/15/24 1143   BP: 132/72   Pulse: 88   Resp: 16   Temp: 97.2 °F (36.2 °C)       Review of Systems   Constitutional:  Negative for activity change, fatigue and fever.   HENT: Negative.     Eyes: Negative.    Respiratory:  Negative for chest tightness.    Cardiovascular: Negative.  Negative for leg swelling.   Gastrointestinal: Negative.    Endocrine: Negative.     Genitourinary: Negative.    Musculoskeletal:  Positive for arthralgias (right knee) and joint swelling (right knee and calf).   Skin: Negative.    Allergic/Immunologic: Negative.    Neurological: Negative.    Hematological: Negative.    Psychiatric/Behavioral: Negative.  Negative for agitation.    All other systems reviewed and are negative.      Physical Exam:  Physical Exam  Vitals reviewed.   Constitutional:       General: He is not in acute distress.     Appearance: He is well-developed.   HENT:      Head: Normocephalic and atraumatic.      Nose: Nose normal.      Mouth/Throat:      Mouth: Mucous membranes are dry.   Eyes:      Pupils: Pupils are equal, round, and reactive to light.   Cardiovascular:      Rate and Rhythm: Normal rate and regular rhythm.      Heart sounds: Normal heart sounds.      Comments: Edema to right leg  Pulmonary:      Effort: Pulmonary effort is normal.      Breath sounds: Normal breath sounds.   Abdominal:      General: Bowel sounds are normal.      Palpations: Abdomen is soft.   Musculoskeletal:      Cervical back: Normal range of motion and neck supple.      Comments: Wound vac in place to right knee draining   Skin:     General: Skin is warm and dry.   Neurological:      Mental Status: He is alert and oriented to person, place, and time.      Cranial Nerves: No cranial nerve deficit.   Psychiatric:         Behavior: Behavior normal.         Thought Content: Thought content normal.         Judgment: Judgment normal.         INSTRUCTIONS FOR PATIENT:     Scheduled Follow-up, Appts Reviewed with Modifications if Needed: Yes  Future Appointments   Date Time Provider Department Center   9/16/2024 10:00 AM Coretta Cheung MD [facility] ID [address]   9/25/2024  1:40 PM Sergio Guo III, MD [facility]         Signature: Jyothi Sargent NP    Transition of Care Visit:  I have reviewed and updated the history and problem list.  I have reconciled the medication list.  I have  discussed the hospitalization and current medical issues, prognosis and plans with the patient/family.

## 2024-08-19 ENCOUNTER — LAB VISIT (OUTPATIENT)
Dept: LAB | Facility: HOSPITAL | Age: 59
End: 2024-08-19
Attending: INTERNAL MEDICINE
Payer: OTHER GOVERNMENT

## 2024-08-19 ENCOUNTER — ANESTHESIA EVENT (OUTPATIENT)
Dept: SURGERY | Facility: OTHER | Age: 59
End: 2024-08-19
Payer: OTHER GOVERNMENT

## 2024-08-19 ENCOUNTER — PATIENT MESSAGE (OUTPATIENT)
Dept: PREADMISSION TESTING | Facility: OTHER | Age: 59
End: 2024-08-19
Payer: OTHER GOVERNMENT

## 2024-08-19 DIAGNOSIS — T84.53XA INFECTION OF TOTAL RIGHT KNEE REPLACEMENT: Primary | ICD-10-CM

## 2024-08-19 LAB
ALBUMIN SERPL BCP-MCNC: 2 G/DL (ref 3.5–5.2)
ALP SERPL-CCNC: 153 U/L (ref 55–135)
ALT SERPL W/O P-5'-P-CCNC: 20 U/L (ref 10–44)
ANION GAP SERPL CALC-SCNC: 3 MMOL/L (ref 8–16)
AST SERPL-CCNC: 50 U/L (ref 10–40)
BASOPHILS # BLD AUTO: 0.03 K/UL (ref 0–0.2)
BASOPHILS NFR BLD: 1 % (ref 0–1.9)
BILIRUB SERPL-MCNC: 1.3 MG/DL (ref 0.1–1)
BUN SERPL-MCNC: 22 MG/DL (ref 6–20)
CALCIUM SERPL-MCNC: 7.9 MG/DL (ref 8.7–10.5)
CHLORIDE SERPL-SCNC: 115 MMOL/L (ref 95–110)
CK SERPL-CCNC: 29 U/L (ref 20–200)
CO2 SERPL-SCNC: 21 MMOL/L (ref 23–29)
CREAT SERPL-MCNC: 0.9 MG/DL (ref 0.5–1.4)
CRP SERPL-MCNC: 26.9 MG/L (ref 0–8.2)
DIFFERENTIAL METHOD BLD: ABNORMAL
EOSINOPHIL # BLD AUTO: 0.3 K/UL (ref 0–0.5)
EOSINOPHIL NFR BLD: 9.1 % (ref 0–8)
ERYTHROCYTE [DISTWIDTH] IN BLOOD BY AUTOMATED COUNT: 16.2 % (ref 11.5–14.5)
EST. GFR  (NO RACE VARIABLE): >60 ML/MIN/1.73 M^2
GLUCOSE SERPL-MCNC: 116 MG/DL (ref 70–110)
HCT VFR BLD AUTO: 26.2 % (ref 40–54)
HGB BLD-MCNC: 7.9 G/DL (ref 14–18)
IMM GRANULOCYTES # BLD AUTO: 0.02 K/UL (ref 0–0.04)
IMM GRANULOCYTES NFR BLD AUTO: 0.6 % (ref 0–0.5)
LYMPHOCYTES # BLD AUTO: 0.6 K/UL (ref 1–4.8)
LYMPHOCYTES NFR BLD: 19.8 % (ref 18–48)
MCH RBC QN AUTO: 29 PG (ref 27–31)
MCHC RBC AUTO-ENTMCNC: 30.2 G/DL (ref 32–36)
MCV RBC AUTO: 96 FL (ref 82–98)
MONOCYTES # BLD AUTO: 0.5 K/UL (ref 0.3–1)
MONOCYTES NFR BLD: 14.9 % (ref 4–15)
NEUTROPHILS # BLD AUTO: 1.7 K/UL (ref 1.8–7.7)
NEUTROPHILS NFR BLD: 54.6 % (ref 38–73)
NRBC BLD-RTO: 0 /100 WBC
PLATELET # BLD AUTO: 94 K/UL (ref 150–450)
PMV BLD AUTO: ABNORMAL FL (ref 9.2–12.9)
POTASSIUM SERPL-SCNC: 3.9 MMOL/L (ref 3.5–5.1)
PROT SERPL-MCNC: 5.6 G/DL (ref 6–8.4)
RBC # BLD AUTO: 2.72 M/UL (ref 4.6–6.2)
SODIUM SERPL-SCNC: 139 MMOL/L (ref 136–145)
WBC # BLD AUTO: 3.08 K/UL (ref 3.9–12.7)

## 2024-08-19 PROCEDURE — 82550 ASSAY OF CK (CPK): CPT | Performed by: INTERNAL MEDICINE

## 2024-08-19 PROCEDURE — 85025 COMPLETE CBC W/AUTO DIFF WBC: CPT | Performed by: INTERNAL MEDICINE

## 2024-08-19 PROCEDURE — 86140 C-REACTIVE PROTEIN: CPT | Performed by: INTERNAL MEDICINE

## 2024-08-19 PROCEDURE — 80053 COMPREHEN METABOLIC PANEL: CPT | Performed by: INTERNAL MEDICINE

## 2024-08-19 NOTE — PRE-PROCEDURE INSTRUCTIONS
Information to Prepare you for your Surgery    PRE-ADMIT TESTING -  [phone]    [address]  [facility]          Your surgery has been scheduled at [facility]. We are pleased to have the opportunity to serve you. For Further Information please call [phone].    On the day of surgery please report to the Information Desk on the 1st floor.    CONTACT YOUR PHYSICIAN'S OFFICE THE DAY PRIOR TO YOUR SURGERY TO OBTAIN YOUR ARRIVAL TIME.     The evening before surgery do not eat anything after 9 p.m. ( this includes hard candy, chewing gum and mints).  You may only have GATORADE, POWERADE AND WATER  from 9 p.m. until you leave your home.   DO NOT DRINK ANY LIQUIDS ON THE WAY TO THE HOSPITAL.      Why does your anesthesiologist allow you to drink Gatorade/Powerade before surgery?  Gatorade/Powerade helps to increase your comfort before surgery and to decrease your nausea after surgery. The carbohydrates in Gatorade/Powerade help reduce your body's stress response to surgery.  If you are a diabetic-drink only water prior to surgery.    Outpatient Surgery- May allow 2 adult (18 and older) Support Persons (1 being the designated ) for all surgical/procedural patients. A breastfeeding mother will be allowed her infant and 2 adult Support Persons. No one under the age of 18 will be allowed in the building. No swapping out of visitors in the [facility].      SPECIAL MEDICATION INSTRUCTIONS: TAKE medications checked off by the Anesthesiologist on your Medication List.    Angiogram Patients: Take medications as instructed by your physician, including aspirin.     Surgery Patients:    If you take ASPIRIN - Your PHYSICIAN/SURGEON will need to inform you IF/OR when you need to stop taking aspirin prior to your surgery.     The week prior to surgery do not ot take any medications containing IBUPROFEN or NSAIDS ( Advil, Motrin, Goodys, BC, Aleve, Naproxen etc)  If you are not sure if you should take a medicine please call your surgeon's office.  Ok to take Tylenol    Do Not Wear any make-up (especially eye make-up) to surgery. Please remove any false eyelashes or eyelash extensions. If you arrive the day of surgery with makeup/eyelashes on you will be required to remove prior to surgery. (There is a risk of corneal abrasions if eye makeup/eyelash extensions are not removed)      Leave all valuables at home.   Do Not wear any jewelry or watches, including any metal in body piercings. Jewelry must be removed prior to coming to the hospital.  There is a possibility that rings that are unable to be removed may be cut off if they are on the surgical extremity.    Please remove all hair extensions, wigs, clips and any other metal accessories/ ornaments from your hair.  These items may pose a flammable/fire risk in Surgery and must be removed.    Do not shave your surgical area at least 5 days prior to your surgery. The surgical prep will be performed at the hospital according to Infection Control regulations.    Contact Lens must be removed before surgery. Either do not wear the contact lens or bring a case and solution for storage.  Please bring a container for eyeglasses or dentures as required.  Bring any paperwork your physician has provided, such as consent forms,  history and physicals, doctor's orders, etc.   Bring comfortable clothes that are loose fitting to wear upon discharge. Take into consideration the type of surgery being performed.  Maintain your diet as advised per your physician the day prior to surgery.      Adequate rest the night before surgery is advised.   Park in the Parking lot behind the hospital or in the [facility] across the street from the parking lot. Parking is complimentary.  If you will be discharged the same day as your procedure, please arrange for a responsible adult to drive you home or to accompany you if traveling by taxi.    YOU WILL NOT BE PERMITTED TO DRIVE OR TO LEAVE THE HOSPITAL ALONE AFTER SURGERY.   If you are being discharged the same day, it is strongly recommended that you arrange for someone to remain with you for the first 24 hrs following your surgery.    The Surgeon will speak to your family/visitor after your surgery regarding the outcome of your surgery and post op care.  The Surgeon may speak to you after your surgery, but there is a possibility you may not remember the details.  Please check with your family members regarding the conversation with the Surgeon.    We strongly recommend whoever is bringing you home be present for discharge instructions.  This will ensure a thorough understanding for your post op home care.    If the patient has fever, cough, or signs/symptoms of Flu or Covid please do not come in for your surgery. Contact your surgeon and your primary care physician for further instructions.           Thank you for your cooperation.  The Staff of [facility].            Bathing Instructions with Hibiclens    Shower the evening before and morning of your procedure with Chlorhexidine (Hibiclens)  do not use Chlorhexidine on your face or genitals. Do not get in your eyes.  Wash your face with water and your regular face wash/soap  Use your regular shampoo  Apply Chlorhexidine (Hibiclens) directly on your skin or on a wet washcloth and wash gently. When showering: Move away from the shower stream when applying Chlorhexidine (Hibiclens) to avoid rinsing off too soon.  Rinse thoroughly with warm water  Do not dilute Chlorhexidine (Hibiclens)   Dry off as usual, do not use any deodorant, powder, body lotions, perfume, after shave or cologne.

## 2024-08-19 NOTE — ASSESSMENT & PLAN NOTE
There is no height or weight on file to calculate BMI. Morbid obesity complicates all aspects of disease management from diagnostic modalities to treatment.     Patient continues on ozempic as outpatient for weight loss.

## 2024-08-19 NOTE — PLAN OF CARE
LMSW contacted patient. Patient is currently receiving HH from [facility]. Patient has DME. Patient choice pharmacy is bedside. Patient's PCP is correct. Patient's family will transport the patient home at discharge.     [facility] ([place])  Initial Discharge Assessment       Primary Care Provider: Sergio Guo III, MD    Admission Diagnosis: Infection of prosthetic joint, subsequent encounter [T84.50XD]    Admission Date: (Not on file)  Expected Discharge Date:     Transition of Care Barriers: (P) None    Payor: [employer] / Plan: [employer] / Product Type: PPO /     Extended Emergency Contact Information  Primary Emergency Contact: Daysi Peter  Address: [address] [place] [address] [facility]  Mobile Phone: [phone]  Relation: Spouse    Discharge Plan A: Home Health  Discharge Plan B: Home Health      [id number] [place] - [address] AT [facility]  [address]  [facility] [place] [address]  Phone: [phone] Fax: [phone]      Initial Assessment (most recent)       Adult Discharge Assessment - 08/19/24 1548          Discharge Assessment    Assessment Type Discharge Planning Assessment     Confirmed/corrected address, phone number and insurance Yes     Confirmed Demographics Correct on Facesheet     Source of Information patient;health record     People in Home spouse     Do you expect to return to your current living situation? Yes     Do you have help at home or someone to help you manage your care at home? Yes     Who are your caregiver(s) and their phone number(s)? Wife     Prior to hospitilization cognitive status: Alert/Oriented;No Deficits     Current cognitive status: Alert/Oriented;No Deficits     Equipment Currently Used at Home cane, straight;walker, rolling;rollator     Readmission within 30 days? Yes (P)      Patient currently being followed by outpatient case  management? No (P)      Do you currently have service(s) that help you manage your care at home? Yes (P)      Name and Contact number of agency [facility] (P)      Is the pt/caregiver preference to resume services with current agency Yes (P)      Do you take prescription medications? Yes (P)      Do you have prescription coverage? Yes (P)      Do you have any problems affording any of your prescribed medications? No (P)      Is the patient taking medications as prescribed? yes (P)      How do you get to doctors appointments? car, drives self;family or friend will provide (P)      Are you on dialysis? No (P)      Do you take coumadin? No (P)      Discharge Plan A Home Health     Discharge Plan B Home Health     Discharge Plan discussed with: Patient (P)      Transition of Care Barriers None (P)

## 2024-08-19 NOTE — H&P
[facility] ([place])  History & Physical    Subjective:      Proximally 2 weeks status post 1 stage revision for infected right knee replacement.  Cultures negative.  Never have had a positive organism other than multiple E coli in his bladder.  Continue significant drainage.  Incision wound VAC placed last week.  It ran its course.  There only good for a week.  Still putting out about 150 cc every 2 days.  Difficult to manage with bandages.  Consequently wound VAC elected with the irrigation debridement.  We are going to try to retain the implants since we do not have a bacteria and because of his size and medical status it would be very hard to tolerate a two-stage.  He is presently on vancomycin and Rocephin.  Infectious Disease is helping in this matter.    Patient Active Problem List    Diagnosis Date Noted    Gastroesophageal reflux disease 08/08/2024    Obstructive sleep apnea 08/08/2024    Infection of prosthetic right knee joint 08/02/2024    Mechanical complication of internal orthopedic device 08/01/2024    GLORIA (generalized anxiety disorder) 01/24/2024    Nausea & vomiting 01/10/2024    Hepatic encephalopathy 01/09/2024    Multiple neurological symptoms 01/09/2024    Calculus of ureter 01/09/2024    Neutropenia 01/09/2024    Osteoarthritis of right knee 11/07/2023    Sarcoidosis, unspecified 08/30/2022    Pulmonary hypertension 06/17/2022    LAW on CPAP 06/17/2022    Swelling of lower extremity 11/08/2021    Acquired lymphedema of lower extremity 11/08/2021    Liver cirrhosis secondary to nonalcoholic steatohepatitis (GUAJARDO) 04/08/2021    Leg edema 02/11/2021    Portal hypertension 02/11/2021    Other cirrhosis of liver 01/13/2021    Splenomegaly 12/22/2020    Thrombocytopenia 12/22/2020    Sarcoidosis of lung with sarcoidosis of lymph nodes     Severe obesity (BMI >= 40)      Past Medical History:   Diagnosis Date    Anxiety     BMI 38.0-38.9,adult     Hyperlipidemia     Hypervolemia 07/22/2022     Multiple pulmonary nodules     Obesity     Other cirrhosis of liver 01/13/2021    Portal hypertension 02/11/2021    Rectal bleeding 02/11/2021    Sarcoidosis of lung with sarcoidosis of lymph nodes     Sleep apnea     CPAP USED    Splenomegaly 12/22/2020    Thrombocytopenia 12/22/2020     Past Surgical History:   Procedure Laterality Date    ANTERIOR CRUCIATE LIGAMENT REPAIR  2007    right knee    COLONOSCOPY N/A 4/8/2021    Procedure: COLONOSCOPY;  Surgeon: Jeff Olvera MD;  [facility]  Service: Endoscopy;  Laterality: N/A;  rectal bleeding,   2nd floor BMI 50 (354 lbs)  COVID test on 4/5/21 at [facility]    ESOPHAGOGASTRODUODENOSCOPY N/A 4/8/2021    Procedure: EGD (ESOPHAGOGASTRODUODENOSCOPY);  Surgeon: Jeff Olvera MD;  [facility]  Service: Endoscopy;  Laterality: N/A;  variceal screening/ labs the am of procedure  2nd floor BMI 50 (354 lbs)    ESOPHAGOGASTRODUODENOSCOPY N/A 3/31/2022    Procedure: EGD (ESOPHAGOGASTRODUODENOSCOPY);  Surgeon: Jeff Olvera MD;  [facility]  Service: Endoscopy;  Laterality: N/A;  BMI-52    Wt:375#      cirrhosis, variceal screening-labs done on 3/29-GT  vaccinated-GT    ESOPHAGOGASTRODUODENOSCOPY N/A 4/21/2023    Procedure: EGD (ESOPHAGOGASTRODUODENOSCOPY);  Surgeon: Christopher Britton MD;  Location: [facility] ([address] [place] [address]);  Service: Endoscopy;  Laterality: N/A;  pt requested Friday due to work schedule  ECHO PA 44-51  BMI 47.97 Wt 343 lbs  inst portal-RB  last CBC PT/INR 1/24/23 4/17/23- Precall confirmed.    ESOPHAGOGASTRODUODENOSCOPY N/A 1/10/2024    Procedure: EGD (ESOPHAGOGASTRODUODENOSCOPY);  Surgeon: Christopher Conley MD;  Location: [facility] ([address] [place] [address]);  Service: Endoscopy;  Laterality: N/A;    INCISION AND DRAINAGE Right 8/1/2024    Procedure: INCISION AND DRAINAGE;  Surgeon: Darnell Liao MD;  Location: [facility];  Service: Orthopedics;  Laterality: Right;    LYMPHADENECTOMY  1985    MENISCECTOMY       left knee    NASAL SEPTUM SURGERY  1985    REVISION OF KNEE ARTHROPLASTY Right 8/1/2024    Procedure: REVISION, ARTHROPLASTY, KNEE TOTAL;  Surgeon: Darnell Liao MD;  Location: [facility];  Service: Orthopedics;  Laterality: Right;    TOTAL KNEE ARTHROPLASTY Right 11/7/2023    Procedure: ARTHROPLASTY, KNEE, TOTAL;  Surgeon: Darnell Liao MD;  Location: [facility];  Service: Orthopedics;  Laterality: Right;    WISDOM TOOTH EXTRACTION       No medications prior to admission.     Review of patient's allergies indicates:  No Known Allergies  Social History     Tobacco Use    Smoking status: Never     Passive exposure: Never    Smokeless tobacco: Never   Substance Use Topics    Alcohol use: No     Family History   Problem Relation Name Age of Onset    Hypertension Father      Heart attack Father  55    Diabetes Paternal Grandmother      Aneurysm Mother          eye    Dementia Mother      Colon cancer Neg Hx      Prostate cancer Neg Hx      Sarcoidosis Neg Hx       Social History     Tobacco Use    Smoking status: Never     Passive exposure: Never    Smokeless tobacco: Never   Substance Use Topics    Alcohol use: No    Drug use: No       No medications prior to admission.     Review of patient's allergies indicates:  No Known Allergies     Review of Systems:  All other systems reviewed and are negative.  .    No current facility-administered medications for this encounter.     Current Outpatient Medications   Medication Sig    0.9% NaCl SolP 50 mL with DAPTOmycin 500 mg SolR 997.5 mg Inject 997.5 mg into the vein once daily.    acetaminophen (TYLENOL) 500 MG tablet Take 2 tablets (1,000 mg total) by mouth every 8 (eight) hours as needed for Pain.    aspirin 81 MG Chew Take 1 tablet (81 mg total) by mouth 2 (two) times daily.    D5W PgBk 100 mL with ceFEPIme 2 gram SolR 2 g Inject 2 g into the vein every 8 (eight) hours.    fluticasone propionate (FLONASE) 50 mcg/actuation nasal spray 1 spray by Each Nostril  route as needed for Rhinitis.    hydrOXYzine HCL (ATARAX) 25 MG tablet Take 1 tablet (25 mg total) by mouth 3 (three) times daily as needed for Anxiety.    lactulose (CONSTULOSE) 10 gram/15 mL solution Take 15 mLs (10 g total) by mouth 3 (three) times daily.    omeprazole (PRILOSEC) 40 MG capsule Take 40 mg by mouth once daily.    oxyCODONE (ROXICODONE) 5 MG immediate release tablet Take 1 tablet (5 mg total) by mouth every 4 (four) hours as needed for Pain.    torsemide (DEMADEX) 20 MG Tab Take 2 tablets (40 mg total) by mouth every morning.    torsemide 40 mg Tab Take 20 mg by mouth after lunch.       Objective:      Vital Signs (Most Recent)       Vital Signs Range (Last 24H):       Physical Exam heart regular lungs clear obese.  Right knee 0-9 0° range of motion drainage around the prepatellar region.  No warmth or discoloration.    Imaging Review:   Well-fixed cemented implants      Assessment:      There are no hospital problems to display for this patient.      Plan:    The various methods of treatment have been discussed with the patient and family.   After consideration of risks, benefits and other options for treatment, the patient has consented to surgical interventions (severe risks discussed loss of limb etcetera).  Questions were answered and Pre-op teaching was done by me.

## 2024-08-20 ENCOUNTER — ANESTHESIA (OUTPATIENT)
Dept: SURGERY | Facility: OTHER | Age: 59
End: 2024-08-20
Payer: OTHER GOVERNMENT

## 2024-08-20 ENCOUNTER — HOSPITAL ENCOUNTER (OUTPATIENT)
Facility: OTHER | Age: 59
Discharge: HOME | End: 2024-08-20
Attending: ORTHOPAEDIC SURGERY | Admitting: ORTHOPAEDIC SURGERY
Payer: OTHER GOVERNMENT

## 2024-08-20 DIAGNOSIS — Z96.659 INFECTION ASSOCIATED WITH INTERNAL KNEE PROSTHESIS, SEQUELA: ICD-10-CM

## 2024-08-20 DIAGNOSIS — T84.50XD INFECTION OF PROSTHETIC JOINT, SUBSEQUENT ENCOUNTER: Primary | ICD-10-CM

## 2024-08-20 DIAGNOSIS — T84.59XS INFECTION ASSOCIATED WITH INTERNAL KNEE PROSTHESIS, SEQUELA: ICD-10-CM

## 2024-08-20 PROBLEM — T84.50XA PROSTHETIC JOINT INFECTION: Status: ACTIVE | Noted: 2024-08-20

## 2024-08-20 LAB
CRP SERPL-MCNC: 25 MG/L (ref 0–8.2)
ERYTHROCYTE [SEDIMENTATION RATE] IN BLOOD BY PHOTOMETRIC METHOD: 29 MM/HR (ref 0–23)
GRAM STN SPEC: NORMAL

## 2024-08-20 PROCEDURE — 86140 C-REACTIVE PROTEIN: CPT | Performed by: ORTHOPAEDIC SURGERY

## 2024-08-20 PROCEDURE — 87205 SMEAR GRAM STAIN: CPT | Performed by: ORTHOPAEDIC SURGERY

## 2024-08-20 PROCEDURE — 71000033 HC RECOVERY, INTIAL HOUR: Performed by: ORTHOPAEDIC SURGERY

## 2024-08-20 PROCEDURE — 87116 MYCOBACTERIA CULTURE: CPT | Mod: 59 | Performed by: ORTHOPAEDIC SURGERY

## 2024-08-20 PROCEDURE — 87075 CULTR BACTERIA EXCEPT BLOOD: CPT | Mod: 59 | Performed by: ORTHOPAEDIC SURGERY

## 2024-08-20 PROCEDURE — 36415 COLL VENOUS BLD VENIPUNCTURE: CPT | Performed by: ORTHOPAEDIC SURGERY

## 2024-08-20 PROCEDURE — 87206 SMEAR FLUORESCENT/ACID STAI: CPT | Performed by: ORTHOPAEDIC SURGERY

## 2024-08-20 PROCEDURE — 27201423 OPTIME MED/SURG SUP & DEVICES STERILE SUPPLY: Performed by: ORTHOPAEDIC SURGERY

## 2024-08-20 PROCEDURE — 36000705 HC OR TIME LEV I EA ADD 15 MIN: Performed by: ORTHOPAEDIC SURGERY

## 2024-08-20 PROCEDURE — 63600175 PHARM REV CODE 636 W HCPCS: Performed by: STUDENT IN AN ORGANIZED HEALTH CARE EDUCATION/TRAINING PROGRAM

## 2024-08-20 PROCEDURE — 87070 CULTURE OTHR SPECIMN AEROBIC: CPT | Mod: 59 | Performed by: ORTHOPAEDIC SURGERY

## 2024-08-20 PROCEDURE — 71000039 HC RECOVERY, EACH ADD'L HOUR: Performed by: ORTHOPAEDIC SURGERY

## 2024-08-20 PROCEDURE — D9220A PRA ANESTHESIA: Mod: ANES,,, | Performed by: ANESTHESIOLOGY

## 2024-08-20 PROCEDURE — 25000003 PHARM REV CODE 250: Performed by: STUDENT IN AN ORGANIZED HEALTH CARE EDUCATION/TRAINING PROGRAM

## 2024-08-20 PROCEDURE — 85652 RBC SED RATE AUTOMATED: CPT | Performed by: ORTHOPAEDIC SURGERY

## 2024-08-20 PROCEDURE — 71000015 HC POSTOP RECOV 1ST HR: Performed by: ORTHOPAEDIC SURGERY

## 2024-08-20 PROCEDURE — 36000704 HC OR TIME LEV I 1ST 15 MIN: Performed by: ORTHOPAEDIC SURGERY

## 2024-08-20 PROCEDURE — 71000016 HC POSTOP RECOV ADDL HR: Performed by: ORTHOPAEDIC SURGERY

## 2024-08-20 PROCEDURE — 25000003 PHARM REV CODE 250: Performed by: ANESTHESIOLOGY

## 2024-08-20 PROCEDURE — 37000009 HC ANESTHESIA EA ADD 15 MINS: Performed by: ORTHOPAEDIC SURGERY

## 2024-08-20 PROCEDURE — 87102 FUNGUS ISOLATION CULTURE: CPT | Mod: 59 | Performed by: ORTHOPAEDIC SURGERY

## 2024-08-20 PROCEDURE — 37000008 HC ANESTHESIA 1ST 15 MINUTES: Performed by: ORTHOPAEDIC SURGERY

## 2024-08-20 PROCEDURE — D9220A PRA ANESTHESIA: Mod: CRNA,,, | Performed by: STUDENT IN AN ORGANIZED HEALTH CARE EDUCATION/TRAINING PROGRAM

## 2024-08-20 RX ORDER — SODIUM CHLORIDE 9 MG/ML
INJECTION, SOLUTION INTRAVENOUS CONTINUOUS
Status: DISCONTINUED | OUTPATIENT
Start: 2024-08-20 | End: 2024-08-20 | Stop reason: HOSPADM

## 2024-08-20 RX ORDER — ROCURONIUM BROMIDE 10 MG/ML
INJECTION, SOLUTION INTRAVENOUS
Status: DISCONTINUED | OUTPATIENT
Start: 2024-08-20 | End: 2024-08-20

## 2024-08-20 RX ORDER — NAPROXEN SODIUM 220 MG/1
81 TABLET, FILM COATED ORAL DAILY
Qty: 60 TABLET | Refills: 0 | Status: SHIPPED | OUTPATIENT
Start: 2024-08-20

## 2024-08-20 RX ORDER — ONDANSETRON HYDROCHLORIDE 2 MG/ML
INJECTION, SOLUTION INTRAVENOUS
Status: DISCONTINUED | OUTPATIENT
Start: 2024-08-20 | End: 2024-08-20

## 2024-08-20 RX ORDER — OXYCODONE HYDROCHLORIDE 5 MG/1
5 TABLET ORAL EVERY 6 HOURS PRN
Qty: 25 TABLET | Refills: 0 | Status: SHIPPED | OUTPATIENT
Start: 2024-08-20

## 2024-08-20 RX ORDER — OXYCODONE HYDROCHLORIDE 5 MG/1
5 TABLET ORAL EVERY 4 HOURS PRN
Status: DISCONTINUED | OUTPATIENT
Start: 2024-08-20 | End: 2024-08-20 | Stop reason: HOSPADM

## 2024-08-20 RX ORDER — HYDROMORPHONE HYDROCHLORIDE 2 MG/ML
0.4 INJECTION, SOLUTION INTRAMUSCULAR; INTRAVENOUS; SUBCUTANEOUS EVERY 5 MIN PRN
Status: DISCONTINUED | OUTPATIENT
Start: 2024-08-20 | End: 2024-08-20 | Stop reason: HOSPADM

## 2024-08-20 RX ORDER — MEPERIDINE HYDROCHLORIDE 25 MG/ML
12.5 INJECTION INTRAMUSCULAR; INTRAVENOUS; SUBCUTANEOUS ONCE AS NEEDED
Status: DISCONTINUED | OUTPATIENT
Start: 2024-08-20 | End: 2024-08-20 | Stop reason: HOSPADM

## 2024-08-20 RX ORDER — PROCHLORPERAZINE EDISYLATE 5 MG/ML
5 INJECTION INTRAMUSCULAR; INTRAVENOUS EVERY 30 MIN PRN
Status: DISCONTINUED | OUTPATIENT
Start: 2024-08-20 | End: 2024-08-20 | Stop reason: HOSPADM

## 2024-08-20 RX ORDER — SODIUM CHLORIDE 0.9 % (FLUSH) 0.9 %
3 SYRINGE (ML) INJECTION
Status: DISCONTINUED | OUTPATIENT
Start: 2024-08-20 | End: 2024-08-20 | Stop reason: HOSPADM

## 2024-08-20 RX ORDER — LIDOCAINE HYDROCHLORIDE 20 MG/ML
INJECTION INTRAVENOUS
Status: DISCONTINUED | OUTPATIENT
Start: 2024-08-20 | End: 2024-08-20

## 2024-08-20 RX ORDER — GLUCAGON 1 MG
1 KIT INJECTION
Status: DISCONTINUED | OUTPATIENT
Start: 2024-08-20 | End: 2024-08-20 | Stop reason: HOSPADM

## 2024-08-20 RX ORDER — PROPOFOL 10 MG/ML
VIAL (ML) INTRAVENOUS
Status: DISCONTINUED | OUTPATIENT
Start: 2024-08-20 | End: 2024-08-20

## 2024-08-20 RX ORDER — LIDOCAINE HYDROCHLORIDE 10 MG/ML
0.5 INJECTION, SOLUTION EPIDURAL; INFILTRATION; INTRACAUDAL; PERINEURAL ONCE
Status: DISCONTINUED | OUTPATIENT
Start: 2024-08-20 | End: 2024-08-20 | Stop reason: HOSPADM

## 2024-08-20 RX ORDER — SODIUM CHLORIDE 0.9 % (FLUSH) 0.9 %
5 SYRINGE (ML) INJECTION
Status: DISCONTINUED | OUTPATIENT
Start: 2024-08-20 | End: 2024-08-20 | Stop reason: HOSPADM

## 2024-08-20 RX ORDER — MUPIROCIN 20 MG/G
OINTMENT TOPICAL 2 TIMES DAILY
Status: DISCONTINUED | OUTPATIENT
Start: 2024-08-20 | End: 2024-08-20 | Stop reason: HOSPADM

## 2024-08-20 RX ORDER — DIPHENHYDRAMINE HYDROCHLORIDE 50 MG/ML
12.5 INJECTION INTRAMUSCULAR; INTRAVENOUS EVERY 30 MIN PRN
Status: DISCONTINUED | OUTPATIENT
Start: 2024-08-20 | End: 2024-08-20 | Stop reason: HOSPADM

## 2024-08-20 RX ORDER — DEXAMETHASONE SODIUM PHOSPHATE 4 MG/ML
INJECTION, SOLUTION INTRA-ARTICULAR; INTRALESIONAL; INTRAMUSCULAR; INTRAVENOUS; SOFT TISSUE
Status: DISCONTINUED | OUTPATIENT
Start: 2024-08-20 | End: 2024-08-20

## 2024-08-20 RX ORDER — SUCCINYLCHOLINE CHLORIDE 20 MG/ML
INJECTION INTRAMUSCULAR; INTRAVENOUS
Status: DISCONTINUED | OUTPATIENT
Start: 2024-08-20 | End: 2024-08-20

## 2024-08-20 RX ORDER — FENTANYL CITRATE 50 UG/ML
INJECTION, SOLUTION INTRAMUSCULAR; INTRAVENOUS
Status: DISCONTINUED | OUTPATIENT
Start: 2024-08-20 | End: 2024-08-20

## 2024-08-20 RX ORDER — SODIUM CHLORIDE, SODIUM LACTATE, POTASSIUM CHLORIDE, CALCIUM CHLORIDE 600; 310; 30; 20 MG/100ML; MG/100ML; MG/100ML; MG/100ML
INJECTION, SOLUTION INTRAVENOUS CONTINUOUS
Status: DISCONTINUED | OUTPATIENT
Start: 2024-08-20 | End: 2024-08-20 | Stop reason: HOSPADM

## 2024-08-20 RX ADMIN — FENTANYL CITRATE 50 MCG: 50 INJECTION, SOLUTION INTRAMUSCULAR; INTRAVENOUS at 11:08

## 2024-08-20 RX ADMIN — FENTANYL CITRATE 50 MCG: 50 INJECTION, SOLUTION INTRAMUSCULAR; INTRAVENOUS at 12:08

## 2024-08-20 RX ADMIN — DEXAMETHASONE SODIUM PHOSPHATE 4 MG: 4 INJECTION, SOLUTION INTRAMUSCULAR; INTRAVENOUS at 12:08

## 2024-08-20 RX ADMIN — PROPOFOL 50 MG: 10 INJECTION, EMULSION INTRAVENOUS at 12:08

## 2024-08-20 RX ADMIN — LIDOCAINE HYDROCHLORIDE 80 MG: 20 INJECTION, SOLUTION INTRAVENOUS at 11:08

## 2024-08-20 RX ADMIN — SODIUM CHLORIDE, SODIUM LACTATE, POTASSIUM CHLORIDE, AND CALCIUM CHLORIDE: .6; .31; .03; .02 INJECTION, SOLUTION INTRAVENOUS at 11:08

## 2024-08-20 RX ADMIN — SUCCINYLCHOLINE CHLORIDE 200 MG: 20 INJECTION, SOLUTION INTRAMUSCULAR; INTRAVENOUS at 11:08

## 2024-08-20 RX ADMIN — ROCURONIUM BROMIDE 50 MG: 10 INJECTION, SOLUTION INTRAVENOUS at 12:08

## 2024-08-20 RX ADMIN — PROPOFOL 150 MG: 10 INJECTION, EMULSION INTRAVENOUS at 11:08

## 2024-08-20 RX ADMIN — ONDANSETRON HYDROCHLORIDE 4 MG: 2 INJECTION INTRAMUSCULAR; INTRAVENOUS at 12:08

## 2024-08-20 RX ADMIN — OXYCODONE HYDROCHLORIDE 5 MG: 5 TABLET ORAL at 12:08

## 2024-08-20 RX ADMIN — SUGAMMADEX 400 MG: 100 INJECTION, SOLUTION INTRAVENOUS at 12:08

## 2024-08-20 NOTE — ANESTHESIA PREPROCEDURE EVALUATION
08/20/2024  Ashwin Peter is a 59 y.o., male.      Pre-op Assessment    I have reviewed the Patient Summary Reports.     I have reviewed the Nursing Notes. I have reviewed the NPO Status.   I have reviewed the Medications.   Steroids Taken In Past Year:     Review of Systems  Anesthesia Hx:  No problems with previous Anesthesia   History of prior surgery of interest to airway management or planning:  Previous anesthesia: General [facility]ism Rt total knee with general anesthesia.  Procedure performed at an [facility].       Denies Family Hx of Anesthesia complications.    Denies Personal Hx of Anesthesia complications.                    Social:  Non-Smoker       Hematology/Oncology:    Oncology Normal    -- Anemia:               Hematology Comments: Thrombocytopenia                      EENT/Dental:  EENT/Dental Normal           Cardiovascular:     Hypertension   Denies MI.        CHF        3/2024 ECHO - nml EF; severe biatrial enlargement; PAP 34    Neg stress test 2022    BL LE lymphedema                         Pulmonary:        Sleep Apnea Sarcoidosis                  Renal/:   renal calculi               Hepatic/GI:     GERD Liver Disease,  GUAJARDO / Cirrhosis          Musculoskeletal:  Arthritis               Neurological:    Denies CVA.        Hx Hepatic Encephalopathy                            Endocrine:     Ozempic    On Prednisone for sacoidosis - weaned off several months ago (was on steroid Rx for few years)      Morbid Obesity / BMI > 40  Psych:  Psychiatric History anxiety               Physical Exam  General: Well nourished, Cooperative, Alert and Oriented    Airway:  Mallampati: IV   Mouth Opening: Normal  TM Distance: Normal  Tongue: Large  Neck ROM: Normal ROM    Dental:  Intact    Anesthesia Plan  Type of Anesthesia, risks & benefits discussed:    Anesthesia Type: Gen ETT  Intra-op  Monitoring Plan: Standard ASA Monitors  Post Op Pain Control Plan: multimodal analgesia  Induction:  IV  Airway Plan: Video  Informed Consent: Informed consent signed with the Patient and all parties understand the risks and agree with anesthesia plan.  All questions answered.   ASA Score: 3  Day of Surgery Review of History & Physical: H&P Update referred to the surgeon/provider.  Anesthesia Plan Notes: Plan GA LUCIO as before!    Ready For Surgery From Anesthesia Perspective.     .

## 2024-08-20 NOTE — ANESTHESIA POSTPROCEDURE EVALUATION
Anesthesia Post Evaluation    Patient: Ashwin Peter    Procedure(s) Performed: Procedure(s) (LRB):  INCISION AND DRAINAGE, KNEE WITH WOUND VAC PLACEMENT (Right)    Final Anesthesia Type: general      Patient location during evaluation: PACU  Patient participation: Yes- Able to Participate  Level of consciousness: awake and alert  Post-procedure vital signs: reviewed and stable  Pain management: adequate  Airway patency: patent    PONV status at discharge: No PONV  Anesthetic complications: no      Cardiovascular status: blood pressure returned to baseline  Respiratory status: spontaneous ventilation  Hydration status: euvolemic  Follow-up not needed.          Vitals Value Taken Time   /60 08/20/24 1445   Temp 36.7 °C (98 °F) 08/20/24 1345   Pulse 84 08/20/24 1445   Resp 17 08/20/24 1445   SpO2 100 % 08/20/24 1445         Event Time   Out of Recovery 13:36:15         Pain/Akbar Score: Pain Rating Prior to Med Admin: 4 (8/20/2024 12:50 PM)  Pain Rating Post Med Admin: 0 (8/20/2024  1:45 PM)  Akbar Score: 10 (8/20/2024  2:45 PM)

## 2024-08-20 NOTE — OP NOTE
[facility] ([facility]  Surgery Department  Operative Note    SUMMARY     Date of Procedure: 8/20/2024     Procedure: Procedure(s) (LRB):  INCISION AND DRAINAGE, KNEE WITH WOUND VAC PLACEMENT (Right)     Surgeons and Role:     * Darnell Liao MD - Primary    Assisting Surgeon: None    Pre-Operative Diagnosis: Infection of prosthetic joint, subsequent encounter [T84.50XD]    Post-Operative Diagnosis: Post-Op Diagnosis Codes:     * Infection of prosthetic joint, subsequent encounter [T84.50XD]    Anesthesia: General    Operative Findings (including complications, if any):  Infection right knee    Description of Technical Procedures:  Patient brought the operating room and a general anesthesia without difficulty right lower extremity prepped and draped extra help scrubbing etcetera due to infection.  Prepped and draped in usual sterile fashion.  No tourniquet applied.  Prior sutures removed.  Over the patellar region which was where he had the drainage at the time of surgery 2 weeks ago was opened up bluntly with my finger.  Extended proximally and distally a little bit.  3 in x 1 in x 1 in deep.  Serosanguineous fluid in the area.  Cultures taken.  The extensor mechanism was inspected no area of exposure to the joint no area appreciate of draining from the joint.  Periosteal elevator was used to scrape that back to mechanism.  He had some synovitis type debris consistent with the infection.  Some of the calcium remnants were still there from the antibiotic impregnated calcium and put in.  Back to sure irrigation was then used.  Then regular irrigation.  Then the wound VAC was placed.  Good seal was obtained.  Brought to recovery room sterile fashion.  Tissue cultures were taken as well as fluid.  Tissue cultures were 2nd.  Labeled appropriately.    This performed with a poor recognition system    Significant Surgical Tasks Conducted by the Assistant(s), if Applicable:  Retraction positioning    Estimated  Blood Loss (EBL): * No values recorded between 8/20/2024 12:07 PM and 8/20/2024 12:32 PM * less than 100           Implants: * No implants in log *    Specimens:   Specimen (24h ago, onward)      None                    Condition: Good    Disposition: PACU - hemodynamically stable.    Attestation: Op Note Attestation: I was physically present and scrubbed for the entire procedure.

## 2024-08-20 NOTE — TRANSFER OF CARE
"Anesthesia Transfer of Care Note    Patient: Ashwin Peter    Procedure(s) Performed: Procedure(s) (LRB):  INCISION AND DRAINAGE, KNEE WITH WOUND VAC PLACEMENT (Right)    Patient location: PACU    Anesthesia Type: general    Transport from OR: Transported from OR on 6-10 L/min O2 by face mask with adequate spontaneous ventilation    Post pain: adequate analgesia    Post assessment: no apparent anesthetic complications    Post vital signs: stable    Level of consciousness: awake    Nausea/Vomiting: no nausea/vomiting    Complications: none    Transfer of care protocol was followed      Last vitals: Visit Vitals  BP (!) 146/65 (BP Location: Left arm, Patient Position: Sitting)   Pulse 88   Temp (P) 36.5 °C (97.7 °F) (Temporal)   Resp 18   Ht 5' 11" (1.803 m)   Wt 124.7 kg (275 lb)   SpO2 95%   BMI 38.35 kg/m²     "

## 2024-08-20 NOTE — ANESTHESIA PROCEDURE NOTES
Intubation    Date/Time: 8/20/2024 11:57 AM    Performed by: Nataliya Ontiveros CRNA  Authorized by: Carroll Valero MD    Intubation:     Induction:  Intravenous    Intubated:  Postinduction    Mask Ventilation:  Not attempted    Attempts:  1    Attempted By:  CRNA    Method of Intubation:  Video laryngoscopy    Blade:  Alexander 4    Laryngeal View Grade: Grade I - full view of cords      Difficult Airway Encountered?: No      Complications:  None    Airway Device:  Oral endotracheal tube    Airway Device Size:  8.0    Style/Cuff Inflation:  Cuffed    Tube secured:  23    Secured at:  The lips    Placement Verified By:  Capnometry    Complicating Factors:  None    Findings Post-Intubation:  BS equal bilateral and atraumatic/condition of teeth unchanged

## 2024-08-20 NOTE — BRIEF OP NOTE
[facility] - Surgery ([place])  Brief Operative Note    Surgery Date: 8/20/2024     Surgeons and Role:     * Darnell Liao MD - Primary    Assisting Surgeon: None    Pre-op Diagnosis:  Infection of prosthetic joint, subsequent encounter [T84.50XD]    Post-op Diagnosis:  Post-Op Diagnosis Codes:     * Infection of prosthetic joint, subsequent encounter [T84.50XD]    Procedure(s) (LRB):  INCISION AND DRAINAGE, KNEE WITH WOUND VAC PLACEMENT (Right)    Anesthesia: General    Operative Findings:  Infection right knee prosthesis    Estimated Blood Loss: * No values recorded between 8/20/2024 12:07 PM and 8/20/2024 12:32 PM * less than 100         Specimens:  Cultures  Specimen (24h ago, onward)      None              Discharge Note    OUTCOME: Patient tolerated treatment/procedure well without complication and is now ready for discharge.    DISPOSITION: Home-Health Care [facility]    FINAL DIAGNOSIS:  Prosthetic joint infection    FOLLOWUP: In clinic    DISCHARGE INSTRUCTIONS:    Discharge Procedure Orders   Ambulatory referral/consult to Home Health   Standing Status: Future   Referral Priority: Routine Referral Type: Home Health Care   Referral Reason: Other   Requested Specialty: Home Health Services   Number of Visits Requested: 1     Diet general     Call MD for:  temperature >100.4     Call MD for:  persistent nausea and vomiting     Call MD for:  severe uncontrolled pain     Call MD for:  difficulty breathing, headache or visual disturbances     Call MD for:  redness, tenderness, or signs of infection (pain, swelling, redness, odor or green/yellow discharge around incision site)     Call MD for:  hives     Call MD for:  persistent dizziness or light-headedness     Call MD for:  extreme fatigue     No dressing needed     Wound care routine (specify)   Order Comments: Wound care routine:  Wound VAC care per home health.  Change 2 times a week.

## 2024-08-20 NOTE — PLAN OF CARE
Ashwin Peter has met all discharge criteria from Phase II. Vital Signs are stable, ambulating  without difficulty. Discharge instructions given, patient verbalized understanding. Discharged from facility via wheelchair in stable condition.

## 2024-08-20 NOTE — PLAN OF CARE
Patient arrived preop in wheelchair with wife.  He is able to transfer independently.  PICC line RUE with antibiotic infusion going.  Lab at bedside to draw CRP and Sed rate

## 2024-08-21 VITALS
WEIGHT: 275 LBS | HEIGHT: 71 IN | TEMPERATURE: 98 F | SYSTOLIC BLOOD PRESSURE: 138 MMHG | RESPIRATION RATE: 17 BRPM | HEART RATE: 84 BPM | BODY MASS INDEX: 38.5 KG/M2 | DIASTOLIC BLOOD PRESSURE: 60 MMHG | OXYGEN SATURATION: 100 %

## 2024-08-21 LAB
ACID FAST MOD KINY STN SPEC: NORMAL
MYCOBACTERIUM SPEC QL CULT: NORMAL

## 2024-08-22 LAB
ACID FAST MOD KINY STN SPEC: NORMAL
MYCOBACTERIUM SPEC QL CULT: NORMAL

## 2024-08-23 LAB
BACTERIA SPEC AEROBE CULT: NORMAL
BACTERIA SPEC ANAEROBE CULT: NORMAL
BACTERIA SPEC ANAEROBE CULT: NORMAL

## 2024-08-24 LAB — BACTERIA SPEC AEROBE CULT: NO GROWTH

## 2024-08-26 ENCOUNTER — LAB VISIT (OUTPATIENT)
Dept: LAB | Facility: HOSPITAL | Age: 59
End: 2024-08-26
Attending: STUDENT IN AN ORGANIZED HEALTH CARE EDUCATION/TRAINING PROGRAM
Payer: OTHER GOVERNMENT

## 2024-08-26 DIAGNOSIS — T84.092A OTHER MECHANICAL COMPLICATION OF INTERNAL RIGHT KNEE PROSTHESIS, INITIAL ENCOUNTER: Primary | ICD-10-CM

## 2024-08-26 LAB
ALBUMIN SERPL BCP-MCNC: 2.3 G/DL (ref 3.5–5.2)
ALP SERPL-CCNC: 153 U/L (ref 55–135)
ALT SERPL W/O P-5'-P-CCNC: 26 U/L (ref 10–44)
ANION GAP SERPL CALC-SCNC: 7 MMOL/L (ref 8–16)
AST SERPL-CCNC: 54 U/L (ref 10–40)
BASOPHILS # BLD AUTO: 0.03 K/UL (ref 0–0.2)
BASOPHILS NFR BLD: 0.7 % (ref 0–1.9)
BILIRUB SERPL-MCNC: 1.4 MG/DL (ref 0.1–1)
BUN SERPL-MCNC: 23 MG/DL (ref 6–20)
CALCIUM SERPL-MCNC: 8.1 MG/DL (ref 8.7–10.5)
CHLORIDE SERPL-SCNC: 112 MMOL/L (ref 95–110)
CK SERPL-CCNC: 30 U/L (ref 20–200)
CO2 SERPL-SCNC: 21 MMOL/L (ref 23–29)
CREAT SERPL-MCNC: 0.9 MG/DL (ref 0.5–1.4)
CRP SERPL-MCNC: 18.7 MG/L (ref 0–8.2)
DIFFERENTIAL METHOD BLD: ABNORMAL
EOSINOPHIL # BLD AUTO: 0.2 K/UL (ref 0–0.5)
EOSINOPHIL NFR BLD: 5.4 % (ref 0–8)
ERYTHROCYTE [DISTWIDTH] IN BLOOD BY AUTOMATED COUNT: 16.3 % (ref 11.5–14.5)
EST. GFR  (NO RACE VARIABLE): >60 ML/MIN/1.73 M^2
GLUCOSE SERPL-MCNC: 71 MG/DL (ref 70–110)
HCT VFR BLD AUTO: 30 % (ref 40–54)
HGB BLD-MCNC: 9 G/DL (ref 14–18)
IMM GRANULOCYTES # BLD AUTO: 0.01 K/UL (ref 0–0.04)
IMM GRANULOCYTES NFR BLD AUTO: 0.2 % (ref 0–0.5)
LYMPHOCYTES # BLD AUTO: 0.6 K/UL (ref 1–4.8)
LYMPHOCYTES NFR BLD: 14.3 % (ref 18–48)
MCH RBC QN AUTO: 27.5 PG (ref 27–31)
MCHC RBC AUTO-ENTMCNC: 30 G/DL (ref 32–36)
MCV RBC AUTO: 92 FL (ref 82–98)
MONOCYTES # BLD AUTO: 0.7 K/UL (ref 0.3–1)
MONOCYTES NFR BLD: 16.1 % (ref 4–15)
NEUTROPHILS # BLD AUTO: 2.8 K/UL (ref 1.8–7.7)
NEUTROPHILS NFR BLD: 63.3 % (ref 38–73)
NRBC BLD-RTO: 0 /100 WBC
PLATELET # BLD AUTO: 109 K/UL (ref 150–450)
PMV BLD AUTO: ABNORMAL FL (ref 9.2–12.9)
POTASSIUM SERPL-SCNC: 3.8 MMOL/L (ref 3.5–5.1)
PROT SERPL-MCNC: 6.2 G/DL (ref 6–8.4)
RBC # BLD AUTO: 3.27 M/UL (ref 4.6–6.2)
SODIUM SERPL-SCNC: 140 MMOL/L (ref 136–145)
WBC # BLD AUTO: 4.42 K/UL (ref 3.9–12.7)

## 2024-08-26 PROCEDURE — 85025 COMPLETE CBC W/AUTO DIFF WBC: CPT | Performed by: STUDENT IN AN ORGANIZED HEALTH CARE EDUCATION/TRAINING PROGRAM

## 2024-08-26 PROCEDURE — 86140 C-REACTIVE PROTEIN: CPT | Performed by: STUDENT IN AN ORGANIZED HEALTH CARE EDUCATION/TRAINING PROGRAM

## 2024-08-26 PROCEDURE — 82550 ASSAY OF CK (CPK): CPT | Performed by: STUDENT IN AN ORGANIZED HEALTH CARE EDUCATION/TRAINING PROGRAM

## 2024-08-26 PROCEDURE — 80053 COMPREHEN METABOLIC PANEL: CPT | Performed by: STUDENT IN AN ORGANIZED HEALTH CARE EDUCATION/TRAINING PROGRAM

## 2024-09-03 ENCOUNTER — LAB VISIT (OUTPATIENT)
Dept: LAB | Facility: HOSPITAL | Age: 59
End: 2024-09-03
Attending: STUDENT IN AN ORGANIZED HEALTH CARE EDUCATION/TRAINING PROGRAM
Payer: OTHER GOVERNMENT

## 2024-09-03 DIAGNOSIS — T84.53XA INFECTION OF TOTAL RIGHT KNEE REPLACEMENT: Primary | ICD-10-CM

## 2024-09-03 LAB
ALBUMIN SERPL BCP-MCNC: 2 G/DL (ref 3.5–5.2)
ALP SERPL-CCNC: 147 U/L (ref 55–135)
ALT SERPL W/O P-5'-P-CCNC: 28 U/L (ref 10–44)
ANION GAP SERPL CALC-SCNC: 8 MMOL/L (ref 8–16)
AST SERPL-CCNC: 53 U/L (ref 10–40)
BILIRUB SERPL-MCNC: 1.6 MG/DL (ref 0.1–1)
BUN SERPL-MCNC: 17 MG/DL (ref 6–20)
CALCIUM SERPL-MCNC: 7.5 MG/DL (ref 8.7–10.5)
CHLORIDE SERPL-SCNC: 113 MMOL/L (ref 95–110)
CK SERPL-CCNC: 31 U/L (ref 20–200)
CO2 SERPL-SCNC: 17 MMOL/L (ref 23–29)
CREAT SERPL-MCNC: 0.7 MG/DL (ref 0.5–1.4)
CRP SERPL-MCNC: 27.9 MG/L (ref 0–8.2)
ERYTHROCYTE [DISTWIDTH] IN BLOOD BY AUTOMATED COUNT: 16.5 % (ref 11.5–14.5)
EST. GFR  (NO RACE VARIABLE): >60 ML/MIN/1.73 M^2
GLUCOSE SERPL-MCNC: 112 MG/DL (ref 70–110)
HCT VFR BLD AUTO: 26.9 % (ref 40–54)
HGB BLD-MCNC: 7.9 G/DL (ref 14–18)
MCH RBC QN AUTO: 26.3 PG (ref 27–31)
MCHC RBC AUTO-ENTMCNC: 29.4 G/DL (ref 32–36)
MCV RBC AUTO: 90 FL (ref 82–98)
PLATELET # BLD AUTO: 69 K/UL (ref 150–450)
PMV BLD AUTO: ABNORMAL FL (ref 9.2–12.9)
POTASSIUM SERPL-SCNC: 3.4 MMOL/L (ref 3.5–5.1)
PROT SERPL-MCNC: 5.5 G/DL (ref 6–8.4)
RBC # BLD AUTO: 3 M/UL (ref 4.6–6.2)
SODIUM SERPL-SCNC: 138 MMOL/L (ref 136–145)
WBC # BLD AUTO: 4.65 K/UL (ref 3.9–12.7)

## 2024-09-03 PROCEDURE — 82550 ASSAY OF CK (CPK): CPT | Performed by: STUDENT IN AN ORGANIZED HEALTH CARE EDUCATION/TRAINING PROGRAM

## 2024-09-03 PROCEDURE — 86140 C-REACTIVE PROTEIN: CPT | Performed by: STUDENT IN AN ORGANIZED HEALTH CARE EDUCATION/TRAINING PROGRAM

## 2024-09-03 PROCEDURE — 85027 COMPLETE CBC AUTOMATED: CPT | Performed by: STUDENT IN AN ORGANIZED HEALTH CARE EDUCATION/TRAINING PROGRAM

## 2024-09-03 PROCEDURE — 80053 COMPREHEN METABOLIC PANEL: CPT | Performed by: STUDENT IN AN ORGANIZED HEALTH CARE EDUCATION/TRAINING PROGRAM

## 2024-09-09 ENCOUNTER — PATIENT MESSAGE (OUTPATIENT)
Dept: INFECTIOUS DISEASES | Facility: CLINIC | Age: 59
End: 2024-09-09
Payer: OTHER GOVERNMENT

## 2024-09-09 ENCOUNTER — LAB VISIT (OUTPATIENT)
Dept: LAB | Facility: HOSPITAL | Age: 59
End: 2024-09-09
Attending: INTERNAL MEDICINE
Payer: OTHER GOVERNMENT

## 2024-09-09 DIAGNOSIS — T84.092A OTHER MECHANICAL COMPLICATION OF INTERNAL RIGHT KNEE PROSTHESIS, INITIAL ENCOUNTER: Primary | ICD-10-CM

## 2024-09-09 LAB
ALBUMIN SERPL BCP-MCNC: 2 G/DL (ref 3.5–5.2)
ALP SERPL-CCNC: 148 U/L (ref 55–135)
ALT SERPL W/O P-5'-P-CCNC: 26 U/L (ref 10–44)
ANION GAP SERPL CALC-SCNC: 4 MMOL/L (ref 8–16)
AST SERPL-CCNC: 47 U/L (ref 10–40)
BASOPHILS # BLD AUTO: 0.02 K/UL (ref 0–0.2)
BASOPHILS NFR BLD: 0.5 % (ref 0–1.9)
BILIRUB SERPL-MCNC: 1.3 MG/DL (ref 0.1–1)
BUN SERPL-MCNC: 17 MG/DL (ref 6–20)
CALCIUM SERPL-MCNC: 7.7 MG/DL (ref 8.7–10.5)
CHLORIDE SERPL-SCNC: 112 MMOL/L (ref 95–110)
CK SERPL-CCNC: 50 U/L (ref 20–200)
CO2 SERPL-SCNC: 21 MMOL/L (ref 23–29)
CREAT SERPL-MCNC: 0.8 MG/DL (ref 0.5–1.4)
CRP SERPL-MCNC: 22.4 MG/L (ref 0–8.2)
DIFFERENTIAL METHOD BLD: ABNORMAL
EOSINOPHIL # BLD AUTO: 0.3 K/UL (ref 0–0.5)
EOSINOPHIL NFR BLD: 6.8 % (ref 0–8)
ERYTHROCYTE [DISTWIDTH] IN BLOOD BY AUTOMATED COUNT: 17.2 % (ref 11.5–14.5)
EST. GFR  (NO RACE VARIABLE): >60 ML/MIN/1.73 M^2
GLUCOSE SERPL-MCNC: 141 MG/DL (ref 70–110)
HCT VFR BLD AUTO: 28.1 % (ref 40–54)
HGB BLD-MCNC: 7.9 G/DL (ref 14–18)
IMM GRANULOCYTES # BLD AUTO: 0.01 K/UL (ref 0–0.04)
IMM GRANULOCYTES NFR BLD AUTO: 0.3 % (ref 0–0.5)
LYMPHOCYTES # BLD AUTO: 0.5 K/UL (ref 1–4.8)
LYMPHOCYTES NFR BLD: 13.9 % (ref 18–48)
MCH RBC QN AUTO: 25.3 PG (ref 27–31)
MCHC RBC AUTO-ENTMCNC: 28.1 G/DL (ref 32–36)
MCV RBC AUTO: 90 FL (ref 82–98)
MONOCYTES # BLD AUTO: 0.5 K/UL (ref 0.3–1)
MONOCYTES NFR BLD: 11.8 % (ref 4–15)
NEUTROPHILS # BLD AUTO: 2.5 K/UL (ref 1.8–7.7)
NEUTROPHILS NFR BLD: 66.7 % (ref 38–73)
NRBC BLD-RTO: 0 /100 WBC
PLATELET # BLD AUTO: 80 K/UL (ref 150–450)
PMV BLD AUTO: ABNORMAL FL (ref 9.2–12.9)
POTASSIUM SERPL-SCNC: 3.7 MMOL/L (ref 3.5–5.1)
PROT SERPL-MCNC: 5.4 G/DL (ref 6–8.4)
RBC # BLD AUTO: 3.12 M/UL (ref 4.6–6.2)
SODIUM SERPL-SCNC: 137 MMOL/L (ref 136–145)
WBC # BLD AUTO: 3.8 K/UL (ref 3.9–12.7)

## 2024-09-09 PROCEDURE — 86140 C-REACTIVE PROTEIN: CPT | Performed by: INTERNAL MEDICINE

## 2024-09-09 PROCEDURE — 82550 ASSAY OF CK (CPK): CPT | Performed by: INTERNAL MEDICINE

## 2024-09-09 PROCEDURE — 85025 COMPLETE CBC W/AUTO DIFF WBC: CPT | Performed by: INTERNAL MEDICINE

## 2024-09-09 PROCEDURE — 80053 COMPREHEN METABOLIC PANEL: CPT | Performed by: INTERNAL MEDICINE

## 2024-09-16 ENCOUNTER — OFFICE VISIT (OUTPATIENT)
Dept: INFECTIOUS DISEASES | Facility: CLINIC | Age: 59
End: 2024-09-16
Payer: OTHER GOVERNMENT

## 2024-09-16 ENCOUNTER — INFUSION (OUTPATIENT)
Dept: INFECTIOUS DISEASES | Facility: HOSPITAL | Age: 59
End: 2024-09-16
Payer: OTHER GOVERNMENT

## 2024-09-16 VITALS
TEMPERATURE: 99 F | SYSTOLIC BLOOD PRESSURE: 116 MMHG | BODY MASS INDEX: 42.94 KG/M2 | WEIGHT: 306.69 LBS | HEIGHT: 71 IN | DIASTOLIC BLOOD PRESSURE: 62 MMHG | HEART RATE: 96 BPM

## 2024-09-16 VITALS
BODY MASS INDEX: 42.84 KG/M2 | TEMPERATURE: 98 F | RESPIRATION RATE: 18 BRPM | WEIGHT: 306 LBS | HEART RATE: 86 BPM | HEIGHT: 71 IN | SYSTOLIC BLOOD PRESSURE: 127 MMHG | DIASTOLIC BLOOD PRESSURE: 58 MMHG

## 2024-09-16 DIAGNOSIS — T84.53XD INFECTION ASSOCIATED WITH INTERNAL RIGHT KNEE PROSTHESIS, SUBSEQUENT ENCOUNTER: Primary | ICD-10-CM

## 2024-09-16 PROCEDURE — 99215 OFFICE O/P EST HI 40 MIN: CPT | Mod: S$GLB,,, | Performed by: STUDENT IN AN ORGANIZED HEALTH CARE EDUCATION/TRAINING PROGRAM

## 2024-09-16 PROCEDURE — 99999 PR PBB SHADOW E&M-EST. PATIENT-LVL III: CPT | Mod: PBBFAC,,, | Performed by: STUDENT IN AN ORGANIZED HEALTH CARE EDUCATION/TRAINING PROGRAM

## 2024-09-16 RX ORDER — AZELASTINE 1 MG/ML
SPRAY, METERED NASAL
COMMUNITY
Start: 2024-09-12

## 2024-09-16 RX ORDER — CEFADROXIL 500 MG/1
1000 CAPSULE ORAL EVERY 12 HOURS
Qty: 120 CAPSULE | Refills: 5 | Status: SHIPPED | OUTPATIENT
Start: 2024-09-16 | End: 2025-03-03

## 2024-09-16 RX ORDER — HYDROCODONE BITARTRATE AND ACETAMINOPHEN 5; 325 MG/1; MG/1
1 TABLET ORAL 2 TIMES DAILY
Status: ON HOLD | COMMUNITY
Start: 2024-04-09 | End: 2024-09-19

## 2024-09-16 NOTE — PROGRESS NOTES
Pt arrivesPICC removal per MD order; Pt educated on site care with pressure dressing;  DEANDRE PICC d/c'd by RN, measured at 44 cm and wrapped with a pressure dressing of Steril Gauze, Vaseline Gauze and kerlex; pt monitored for 30 mins post removal of PICC, and has no s/s of distress adverse effects; pt tolerated well and d/c'd from clinic;

## 2024-09-16 NOTE — PROGRESS NOTES
Infectious Diseases Progress Note  Subjective:     Chief Complaint: follow up for PJI    HPI: Ashwin Peter is a 59 y.o. male with a history of obesity and right total knee arthroplasty in November of 2023.  He was evaluated in July of 2024 for significant pain of the right knee.  He was admitted and underwent revision of the right knee arthoplasty on 8/1.  Per operative note, purulence was encountered in the prepatellar bursa area.  He underwent I&D.  A decision was made not to place a spacer.  Instead, the old artifical knee was removed and a new artificial knee of the same size with antibiotic impregnated cement.  Surgical cultures were obtained which were negative.  Infectious diseases was consulted and the patient was started on empiric vancomycin and ceftriaxone.  He was re-admitted to hospital 8/8 with worsening pain in his right knee and drainage from surgical wound. Wound vac was placed, but no surgical intervention taken. His antibiotics were changed to daptomycin and cefepime with planned end date of 9/11. He was then taken back to OR on 8/20 due to continued drainage from surgical wound. Serosanguinous fluid was noted over the patellar region as well as synovitis. Cultures were obtained which were once again negative.     Today he still has his wound vac, drainage is persistent. He is able to bear weight and walk. He is working with physical therapy twice per week. He states he has nausea, vomiting with IV antibiotics and is eager to stop. He denies issues with his PICC line. [facility] He is willing to take PO antibiotics for the next 6 months out of caution if this did represent a prosthetic joint infection.        Immunization History   Administered Date(s) Administered    COVID-19, MRNA, LN-S, PF (MODERNA FULL 0.5 ML DOSE) 12/09/2021    COVID-19, MRNA, LN-S, PF (Pfizer) (Purple Cap) 01/19/2021, 02/02/2021, 02/02/2021    Hepatitis A / Hepatitis B 02/12/2021,  03/17/2021, 08/16/2021    Influenza 10/08/2020    Influenza - Quadrivalent - MDCK - PF 09/21/2020    Influenza - Quadrivalent - PF *Preferred* (6 months and older) 10/25/2017, 09/17/2021, 10/25/2022, 11/16/2023    Pneumococcal Polysaccharide - 23 Valent 09/17/2021    Tdap 07/14/2014, 12/13/2023        Review of Systems   Constitutional: Positive for malaise/fatigue.   HENT:  Positive for tinnitus.    Cardiovascular:  Positive for leg swelling.   Respiratory:  Positive for cough.    Hematologic/Lymphatic: Bruises/bleeds easily.   Gastrointestinal:  Positive for abdominal pain.   Psychiatric/Behavioral:  The patient is nervous/anxious.         Past Medical History:   Diagnosis Date    Anxiety     BMI 38.0-38.9,adult     Hyperlipidemia     Hypervolemia 07/22/2022    Multiple pulmonary nodules     Obesity     Other cirrhosis of liver 01/13/2021    Portal hypertension 02/11/2021    Rectal bleeding 02/11/2021    Sarcoidosis of lung with sarcoidosis of lymph nodes     Sleep apnea     CPAP USED    Splenomegaly 12/22/2020    Thrombocytopenia 12/22/2020     Past Surgical History:   Procedure Laterality Date    ANTERIOR CRUCIATE LIGAMENT REPAIR  2007    right knee    COLONOSCOPY N/A 4/8/2021    Procedure: COLONOSCOPY;  Surgeon: Jeff Olvera MD;  Location: [facility] ([address] [place] [address]);  Service: Endoscopy;  Laterality: N/A;  rectal bleeding,   2nd floor BMI 50 (354 lbs)  COVID test on 4/5/21 at [facility]    ESOPHAGOGASTRODUODENOSCOPY N/A 4/8/2021    Procedure: EGD (ESOPHAGOGASTRODUODENOSCOPY);  Surgeon: Jeff Olvera MD;  Location: [facility] ([address] [place] [address]);  Service: Endoscopy;  Laterality: N/A;  variceal screening/ labs the am of procedure  2nd floor BMI 50 (354 lbs)    ESOPHAGOGASTRODUODENOSCOPY N/A 3/31/2022    Procedure: EGD (ESOPHAGOGASTRODUODENOSCOPY);  Surgeon: Jeff Olvera MD;  Location: [address]);  Service: Endoscopy;  Laterality: N/A;  BMI-52    Wt:375#      cirrhosis, variceal screening-labs done on  3/29-GT  vaccinated-GT    ESOPHAGOGASTRODUODENOSCOPY N/A 4/21/2023    Procedure: EGD (ESOPHAGOGASTRODUODENOSCOPY);  Surgeon: Christopher Britton MD;  Location: [facility] (2ND FLR);  Service: Endoscopy;  Laterality: N/A;  pt requested Friday due to work schedule  ECHO PA 44-51  BMI 47.97 Wt 343 lbs  inst portal-RB  last CBC PT/INR 1/24/23 4/17/23- Precall confirmed.    ESOPHAGOGASTRODUODENOSCOPY N/A 1/10/2024    Procedure: EGD (ESOPHAGOGASTRODUODENOSCOPY);  Surgeon: Christopher Conley MD;  Location: [facility] (2ND FLR);  Service: Endoscopy;  Laterality: N/A;    INCISION AND DRAINAGE Right 8/1/2024    Procedure: INCISION AND DRAINAGE;  Surgeon: Darnell Liao MD;  Location: [facility];  Service: Orthopedics;  Laterality: Right;    INCISION AND DRAINAGE OF KNEE Right 8/20/2024    Procedure: INCISION AND DRAINAGE, KNEE WITH WOUND VAC PLACEMENT;  Surgeon: Darnell Liao MD;  Location: [facility];  Service: Orthopedics;  Laterality: Right;    LYMPHADENECTOMY  1985    MENISCECTOMY      left knee    NASAL SEPTUM SURGERY  1985    REVISION OF KNEE ARTHROPLASTY Right 8/1/2024    Procedure: REVISION, ARTHROPLASTY, KNEE TOTAL;  Surgeon: Darnell Liao MD;  Location: [facility];  Service: Orthopedics;  Laterality: Right;    TOTAL KNEE ARTHROPLASTY Right 11/7/2023    Procedure: ARTHROPLASTY, KNEE, TOTAL;  Surgeon: Darnell Liao MD;  Location: [facility];  Service: Orthopedics;  Laterality: Right;    WISDOM TOOTH EXTRACTION       Family History   Problem Relation Name Age of Onset    Hypertension Father      Heart attack Father  55    Diabetes Paternal Grandmother      Aneurysm Mother          eye    Dementia Mother      Colon cancer Neg Hx      Prostate cancer Neg Hx      Sarcoidosis Neg Hx       Social History     Tobacco Use    Smoking status: Never     Passive exposure: Never    Smokeless tobacco: Never   Substance Use Topics    Alcohol use: No    Drug use: No       Objective:     Physical Exam  Vitals and nursing note  reviewed.   Constitutional:       Appearance: Normal appearance.      Comments: Rolling walker   HENT:      Head: Normocephalic.   Pulmonary:      Effort: Pulmonary effort is normal. No respiratory distress.   Musculoskeletal:      Comments: RLE with increased swelling, erythema and warmth compared to LLE. Wound vac in place draining serosanguinous material.    Skin:     Comments: PICC   Neurological:      Mental Status: He is alert.   Psychiatric:         Mood and Affect: Mood normal.         Behavior: Behavior normal.         Data:    All data, including recent labs, radiology, and pathology, has been independently reviewed.    Labs:    Recent Labs   Lab Result Units 08/26/24  1604 09/03/24  1130 09/03/24  1153 09/09/24  1128   WBC K/uL 4.42  --  4.65 3.80*   Hemoglobin g/dL 9.0*  --  7.9* 7.9*   Hematocrit % 30.0*  --  26.9* 28.1*   Sodium mmol/L 140 138  --  137   Potassium mmol/L 3.8 3.4*  --  3.7   Chloride mmol/L 112* 113*  --  112*   BUN mg/dL 23* 17  --  17   Creatinine mg/dL 0.9 0.7  --  0.8   AST U/L 54* 53*  --  47*   ALT U/L 26 28  --  26   Alkaline Phosphatase U/L 153* 147*  --  148*   Total Bilirubin mg/dL 1.4* 1.6*  --  1.3*   CRP mg/L 18.7* 27.9*  --  22.4*        Radiology:    No results found in the last 24 hours.     Assessment:  Ashwin Peter is a 59 year old man right total knee arthroplasty in November of 2023. Subsequently developed pain and swelling, concern for PJI during revision on 8/1 however cultures were no growth. Discharged with empiric 6 week course of vanc and ceftriaxone but had continued serous drainage from surgical site so antibiotics changed to daptomycin and cefepime on 8/9. Returned for surgical evaluation 8/20 due to persistent wound drainage, serosanguinous material encountered, all surgical cultures no growth. Continued on daptomycin and cefepime until today.     Recommendations  - Will remove PICC and stop dapto/cefepime today  - will start on suppressive PO antibiotics  for 6 months given concern for PJI, start cefadroxil 1 gram BID   - would consider other causes for presentation including hypersensitivity to PJI itself given consistently negative cultures and continued surgical site drainage despite >6 weeks of IV antibiotics and two debridements     Follow up in 6 weeks    I spent a total of 49 minutes on the day of the visit.  This includes face to face time and non-face to face time preparing to see the patient (eg, review of tests), obtaining and/or reviewing separately obtained history, documenting clinical information in the electronic or other health record, independently interpreting results and communicating results to the patient/family/caregiver, or care coordinator.    Coretta Cheung MD  Infectious Disease

## 2024-09-18 ENCOUNTER — ANESTHESIA EVENT (OUTPATIENT)
Dept: SURGERY | Facility: OTHER | Age: 59
End: 2024-09-18
Payer: OTHER GOVERNMENT

## 2024-09-18 NOTE — H&P
[facility] ([place])  History & Physical    Subjective:      Complicated course right knee replacement being in a year.  Roughly 6 months later persistent pain is eventually had some drainage.  Right knee revision performed.  One stage because aspiration preop was negative that was before he was draining.  Also because of his size and poor medical status is in think he could tolerate a two-stage.  One stage was performed.  Cultures negative.  Persistent drainage wound VAC I&D was placed.  That was 7 weeks ago.  He just completed IV antibiotics.  He is oOn suppressive antibiotics cefoxitin drill.  Drainage at this point is markedly decreased.  Probably less than 20 day.  Plan is for primary closure.  Repeat cultures.  Continue suppressive antibiotics.  Etiology of the infection still unknown.  Certainly his immune system is not mounting much response.  I think it's unlikely that he is metal allergy and because he works as a  and has been around multiple metals and fragments for many years and never had any type of reaction.  Hopefully we can get this closed and get it to heal.  Loss of function loss of limb discussed with the patient.    Patient Active Problem List    Diagnosis Date Noted    Prosthetic joint infection 08/20/2024    Gastroesophageal reflux disease 08/08/2024    Obstructive sleep apnea 08/08/2024    Infection of prosthetic right knee joint 08/02/2024    Mechanical complication of internal orthopedic device 08/01/2024    GLORIA (generalized anxiety disorder) 01/24/2024    Nausea & vomiting 01/10/2024    Hepatic encephalopathy 01/09/2024    Multiple neurological symptoms 01/09/2024    Calculus of ureter 01/09/2024    Neutropenia 01/09/2024    Osteoarthritis of right knee 11/07/2023    Sarcoidosis, unspecified 08/30/2022    Pulmonary hypertension 06/17/2022    LAW on CPAP 06/17/2022    Swelling of lower extremity 11/08/2021    Acquired lymphedema of lower extremity 11/08/2021    Liver  cirrhosis secondary to nonalcoholic steatohepatitis (GUAJARDO) 04/08/2021    Leg edema 02/11/2021    Portal hypertension 02/11/2021    Other cirrhosis of liver 01/13/2021    Splenomegaly 12/22/2020    Thrombocytopenia 12/22/2020    Sarcoidosis of lung with sarcoidosis of lymph nodes     Severe obesity (BMI >= 40)      Past Medical History:   Diagnosis Date    Anxiety     BMI 38.0-38.9,adult     Hyperlipidemia     Hypervolemia 07/22/2022    Multiple pulmonary nodules     Obesity     Other cirrhosis of liver 01/13/2021    Portal hypertension 02/11/2021    Rectal bleeding 02/11/2021    Sarcoidosis of lung with sarcoidosis of lymph nodes     Sleep apnea     CPAP USED    Splenomegaly 12/22/2020    Thrombocytopenia 12/22/2020     Past Surgical History:   Procedure Laterality Date    ANTERIOR CRUCIATE LIGAMENT REPAIR  2007    right knee    COLONOSCOPY N/A 4/8/2021    Procedure: COLONOSCOPY;  Surgeon: Jeff Olvera MD;  Location: [facility] ([address] [place] [address]);  Service: Endoscopy;  Laterality: N/A;  rectal bleeding,   2nd floor BMI 50 (354 lbs)  COVID test on 4/5/21 at [facility]    ESOPHAGOGASTRODUODENOSCOPY N/A 4/8/2021    Procedure: EGD (ESOPHAGOGASTRODUODENOSCOPY);  Surgeon: Jeff Olvera MD;  [facility]  Service: Endoscopy;  Laterality: N/A;  variceal screening/ labs the am of procedure  2nd floor BMI 50 (354 lbs)    ESOPHAGOGASTRODUODENOSCOPY N/A 3/31/2022    Procedure: EGD (ESOPHAGOGASTRODUODENOSCOPY);  Surgeon: Jeff Olvera MD;  [facility]  Service: Endoscopy;  Laterality: N/A;  BMI-52    Wt:375#      cirrhosis, variceal screening-labs done on 3/29-GT  vaccinated-GT    ESOPHAGOGASTRODUODENOSCOPY N/A 4/21/2023    Procedure: EGD (ESOPHAGOGASTRODUODENOSCOPY);  Surgeon: Christopher Britton MD;  [facility]  Service: Endoscopy;  Laterality: N/A;  pt requested Friday due to work schedule  ECHO PA 44-51  BMI 47.97 Wt 343 lbs  inst portal-RB  last CBC PT/INR  1/24/23 4/17/23- Precall confirmed.    ESOPHAGOGASTRODUODENOSCOPY N/A 1/10/2024    Procedure: EGD (ESOPHAGOGASTRODUODENOSCOPY);  Surgeon: Christopher Conley MD;  Location: [facility] ([address] [place] [address]);  Service: Endoscopy;  Laterality: N/A;    INCISION AND DRAINAGE Right 8/1/2024    Procedure: INCISION AND DRAINAGE;  Surgeon: Darnell Liao MD;  Location: [facility];  Service: Orthopedics;  Laterality: Right;    INCISION AND DRAINAGE OF KNEE Right 8/20/2024    Procedure: INCISION AND DRAINAGE, KNEE WITH WOUND VAC PLACEMENT;  Surgeon: Darnell Liao MD;  Location: [facility];  Service: Orthopedics;  Laterality: Right;    LYMPHADENECTOMY  1985    MENISCECTOMY      left knee    NASAL SEPTUM SURGERY  1985    REVISION OF KNEE ARTHROPLASTY Right 8/1/2024    Procedure: REVISION, ARTHROPLASTY, KNEE TOTAL;  Surgeon: Darnell Liao MD;  Location: [facility];  Service: Orthopedics;  Laterality: Right;    TOTAL KNEE ARTHROPLASTY Right 11/7/2023    Procedure: ARTHROPLASTY, KNEE, TOTAL;  Surgeon: Darnell Liao MD;  Location: [facility];  Service: Orthopedics;  Laterality: Right;    WISDOM TOOTH EXTRACTION       No medications prior to admission.     Review of patient's allergies indicates:  No Known Allergies  Social History     Tobacco Use    Smoking status: Never     Passive exposure: Never    Smokeless tobacco: Never   Substance Use Topics    Alcohol use: No     Family History   Problem Relation Name Age of Onset    Hypertension Father      Heart attack Father  55    Diabetes Paternal Grandmother      Aneurysm Mother          eye    Dementia Mother      Colon cancer Neg Hx      Prostate cancer Neg Hx      Sarcoidosis Neg Hx       Social History     Tobacco Use    Smoking status: Never     Passive exposure: Never    Smokeless tobacco: Never   Substance Use Topics    Alcohol use: No    Drug use: No       No medications prior to admission.     Review of patient's allergies indicates:  No Known Allergies     Review of  Systems:  All other systems reviewed and are negative.  .    No current facility-administered medications for this encounter.     Current Outpatient Medications   Medication Sig    0.9% NaCl SolP 50 mL with DAPTOmycin 500 mg SolR 997.5 mg Inject 997.5 mg into the vein once daily.    acetaminophen (TYLENOL) 500 MG tablet Take 2 tablets (1,000 mg total) by mouth every 8 (eight) hours as needed for Pain.    aspirin 81 MG Chew Take 1 tablet (81 mg total) by mouth once daily.    azelastine (ASTELIN) 137 mcg (0.1 %) nasal spray by Nasal route.    cefadroxil (DURICEF) 500 MG Cap Take 2 capsules (1,000 mg total) by mouth every 12 (twelve) hours.    D5W PgBk 100 mL with ceFEPIme 2 gram SolR 2 g Inject 2 g into the vein every 8 (eight) hours.    fluticasone propionate (FLONASE) 50 mcg/actuation nasal spray 1 spray by Each Nostril route as needed for Rhinitis.    HYDROcodone-acetaminophen (NORCO) 5-325 mg per tablet Take 1 tablet by mouth 2 (two) times daily.    hydrOXYzine HCL (ATARAX) 25 MG tablet Take 1 tablet (25 mg total) by mouth 3 (three) times daily as needed for Anxiety.    lactulose (CONSTULOSE) 10 gram/15 mL solution Take 15 mLs (10 g total) by mouth 3 (three) times daily.    omeprazole (PRILOSEC) 40 MG capsule Take 40 mg by mouth once daily.    oxyCODONE (ROXICODONE) 5 MG immediate release tablet Take 1 tablet (5 mg total) by mouth every 6 (six) hours as needed for Pain.    torsemide (DEMADEX) 20 MG Tab Take 2 tablets (40 mg total) by mouth every morning.    torsemide 40 mg Tab Take 20 mg by mouth after lunch.       Objective:      Vital Signs (Most Recent)       Vital Signs Range (Last 24H):  BP: ()/()   Arterial Line BP: ()/()     Physical Exam Heart regular lungs mildly congested right knee good range of motion wound VAC in place.  He has severe venous stasis he's had a long-standing with his obesity    Imaging Review:   None      Assessment:      There are no hospital problems to display for this  patient.      Plan:    The various methods of treatment have been discussed with the patient and family.   After consideration of risks, benefits and other options for treatment, the patient has consented to surgical interventions (Significant risks as above).  Questions were answered and Pre-op teaching was done by me.

## 2024-09-19 ENCOUNTER — HOSPITAL ENCOUNTER (OUTPATIENT)
Facility: OTHER | Age: 59
Discharge: HOME HEALTH | End: 2024-09-19
Attending: ORTHOPAEDIC SURGERY | Admitting: ORTHOPAEDIC SURGERY
Payer: OTHER GOVERNMENT

## 2024-09-19 ENCOUNTER — ANESTHESIA (OUTPATIENT)
Dept: SURGERY | Facility: OTHER | Age: 59
End: 2024-09-19
Payer: OTHER GOVERNMENT

## 2024-09-19 DIAGNOSIS — Z01.818 PRE-OP TESTING: Primary | ICD-10-CM

## 2024-09-19 DIAGNOSIS — T84.50XD INFECTION OR INFLAMMATORY REACTION DUE TO INTERNAL JOINT PROSTHESIS, SUBSEQUENT ENCOUNTER: ICD-10-CM

## 2024-09-19 DIAGNOSIS — T84.59XA INFECTION OF TOTAL KNEE REPLACEMENT: ICD-10-CM

## 2024-09-19 DIAGNOSIS — Z96.659 INFECTION OF TOTAL KNEE REPLACEMENT: ICD-10-CM

## 2024-09-19 LAB
ANION GAP SERPL CALC-SCNC: 5 MMOL/L (ref 8–16)
BASOPHILS # BLD AUTO: 0.02 K/UL (ref 0–0.2)
BASOPHILS NFR BLD: 0.6 % (ref 0–1.9)
BUN SERPL-MCNC: 17 MG/DL (ref 6–20)
CALCIUM SERPL-MCNC: 7.6 MG/DL (ref 8.7–10.5)
CHLORIDE SERPL-SCNC: 115 MMOL/L (ref 95–110)
CO2 SERPL-SCNC: 20 MMOL/L (ref 23–29)
CREAT SERPL-MCNC: 0.7 MG/DL (ref 0.5–1.4)
DIFFERENTIAL METHOD BLD: ABNORMAL
EOSINOPHIL # BLD AUTO: 0.2 K/UL (ref 0–0.5)
EOSINOPHIL NFR BLD: 5.8 % (ref 0–8)
ERYTHROCYTE [DISTWIDTH] IN BLOOD BY AUTOMATED COUNT: 17 % (ref 11.5–14.5)
EST. GFR  (NO RACE VARIABLE): >60 ML/MIN/1.73 M^2
GLUCOSE SERPL-MCNC: 78 MG/DL (ref 70–110)
HCT VFR BLD AUTO: 30.2 % (ref 40–54)
HGB BLD-MCNC: 8.6 G/DL (ref 14–18)
IMM GRANULOCYTES # BLD AUTO: 0.01 K/UL (ref 0–0.04)
IMM GRANULOCYTES NFR BLD AUTO: 0.3 % (ref 0–0.5)
LYMPHOCYTES # BLD AUTO: 0.7 K/UL (ref 1–4.8)
LYMPHOCYTES NFR BLD: 20.5 % (ref 18–48)
MCH RBC QN AUTO: 24.5 PG (ref 27–31)
MCHC RBC AUTO-ENTMCNC: 28.5 G/DL (ref 32–36)
MCV RBC AUTO: 86 FL (ref 82–98)
MONOCYTES # BLD AUTO: 0.4 K/UL (ref 0.3–1)
MONOCYTES NFR BLD: 12.5 % (ref 4–15)
NEUTROPHILS # BLD AUTO: 2 K/UL (ref 1.8–7.7)
NEUTROPHILS NFR BLD: 60.3 % (ref 38–73)
NRBC BLD-RTO: 0 /100 WBC
PLATELET # BLD AUTO: 86 K/UL (ref 150–450)
PMV BLD AUTO: ABNORMAL FL (ref 9.2–12.9)
POTASSIUM SERPL-SCNC: 4 MMOL/L (ref 3.5–5.1)
RBC # BLD AUTO: 3.51 M/UL (ref 4.6–6.2)
SODIUM SERPL-SCNC: 140 MMOL/L (ref 136–145)
WBC # BLD AUTO: 3.27 K/UL (ref 3.9–12.7)

## 2024-09-19 PROCEDURE — 87075 CULTR BACTERIA EXCEPT BLOOD: CPT | Performed by: ORTHOPAEDIC SURGERY

## 2024-09-19 PROCEDURE — 87102 FUNGUS ISOLATION CULTURE: CPT | Performed by: ORTHOPAEDIC SURGERY

## 2024-09-19 PROCEDURE — 71000016 HC POSTOP RECOV ADDL HR: Performed by: ORTHOPAEDIC SURGERY

## 2024-09-19 PROCEDURE — 87205 SMEAR GRAM STAIN: CPT | Performed by: ORTHOPAEDIC SURGERY

## 2024-09-19 PROCEDURE — 25000003 PHARM REV CODE 250: Performed by: ANESTHESIOLOGY

## 2024-09-19 PROCEDURE — 37000009 HC ANESTHESIA EA ADD 15 MINS: Performed by: ORTHOPAEDIC SURGERY

## 2024-09-19 PROCEDURE — 37000008 HC ANESTHESIA 1ST 15 MINUTES: Performed by: ORTHOPAEDIC SURGERY

## 2024-09-19 PROCEDURE — 87186 SC STD MICRODIL/AGAR DIL: CPT | Performed by: ORTHOPAEDIC SURGERY

## 2024-09-19 PROCEDURE — A6011 COLLAGEN GEL/PASTE WOUND FIL: HCPCS | Performed by: ORTHOPAEDIC SURGERY

## 2024-09-19 PROCEDURE — 87206 SMEAR FLUORESCENT/ACID STAI: CPT | Performed by: ORTHOPAEDIC SURGERY

## 2024-09-19 PROCEDURE — 87116 MYCOBACTERIA CULTURE: CPT | Performed by: ORTHOPAEDIC SURGERY

## 2024-09-19 PROCEDURE — 80048 BASIC METABOLIC PNL TOTAL CA: CPT | Performed by: ANESTHESIOLOGY

## 2024-09-19 PROCEDURE — 85025 COMPLETE CBC W/AUTO DIFF WBC: CPT | Performed by: ANESTHESIOLOGY

## 2024-09-19 PROCEDURE — 63600175 PHARM REV CODE 636 W HCPCS: Performed by: ANESTHESIOLOGY

## 2024-09-19 PROCEDURE — 71000015 HC POSTOP RECOV 1ST HR: Performed by: ORTHOPAEDIC SURGERY

## 2024-09-19 PROCEDURE — 27201423 OPTIME MED/SURG SUP & DEVICES STERILE SUPPLY: Performed by: ORTHOPAEDIC SURGERY

## 2024-09-19 PROCEDURE — 25000003 PHARM REV CODE 250

## 2024-09-19 PROCEDURE — 63600175 PHARM REV CODE 636 W HCPCS

## 2024-09-19 PROCEDURE — 36000706: Performed by: ORTHOPAEDIC SURGERY

## 2024-09-19 PROCEDURE — 87070 CULTURE OTHR SPECIMN AEROBIC: CPT | Performed by: ORTHOPAEDIC SURGERY

## 2024-09-19 PROCEDURE — 63600175 PHARM REV CODE 636 W HCPCS: Performed by: ORTHOPAEDIC SURGERY

## 2024-09-19 PROCEDURE — 71000033 HC RECOVERY, INTIAL HOUR: Performed by: ORTHOPAEDIC SURGERY

## 2024-09-19 PROCEDURE — 36000707: Performed by: ORTHOPAEDIC SURGERY

## 2024-09-19 PROCEDURE — 71000039 HC RECOVERY, EACH ADD'L HOUR: Performed by: ORTHOPAEDIC SURGERY

## 2024-09-19 RX ORDER — PROCHLORPERAZINE EDISYLATE 5 MG/ML
5 INJECTION INTRAMUSCULAR; INTRAVENOUS EVERY 30 MIN PRN
Status: DISCONTINUED | OUTPATIENT
Start: 2024-09-19 | End: 2024-09-19 | Stop reason: HOSPADM

## 2024-09-19 RX ORDER — HYDROCODONE BITARTRATE AND ACETAMINOPHEN 5; 325 MG/1; MG/1
1 TABLET ORAL 2 TIMES DAILY
Qty: 25 TABLET | Refills: 0 | Status: SHIPPED | OUTPATIENT
Start: 2024-09-19

## 2024-09-19 RX ORDER — DEXAMETHASONE SODIUM PHOSPHATE 4 MG/ML
INJECTION, SOLUTION INTRA-ARTICULAR; INTRALESIONAL; INTRAMUSCULAR; INTRAVENOUS; SOFT TISSUE
Status: DISCONTINUED | OUTPATIENT
Start: 2024-09-19 | End: 2024-09-19

## 2024-09-19 RX ORDER — HYDROMORPHONE HYDROCHLORIDE 2 MG/ML
0.4 INJECTION, SOLUTION INTRAMUSCULAR; INTRAVENOUS; SUBCUTANEOUS EVERY 5 MIN PRN
Status: DISCONTINUED | OUTPATIENT
Start: 2024-09-19 | End: 2024-09-19 | Stop reason: HOSPADM

## 2024-09-19 RX ORDER — OXYCODONE HYDROCHLORIDE 5 MG/1
5 TABLET ORAL EVERY 4 HOURS PRN
Status: DISCONTINUED | OUTPATIENT
Start: 2024-09-19 | End: 2024-09-19 | Stop reason: HOSPADM

## 2024-09-19 RX ORDER — FENTANYL CITRATE 50 UG/ML
INJECTION, SOLUTION INTRAMUSCULAR; INTRAVENOUS
Status: DISCONTINUED | OUTPATIENT
Start: 2024-09-19 | End: 2024-09-19

## 2024-09-19 RX ORDER — ONDANSETRON HYDROCHLORIDE 2 MG/ML
INJECTION, SOLUTION INTRAVENOUS
Status: DISCONTINUED | OUTPATIENT
Start: 2024-09-19 | End: 2024-09-19

## 2024-09-19 RX ORDER — SODIUM CHLORIDE 0.9 % (FLUSH) 0.9 %
5 SYRINGE (ML) INJECTION
Status: DISCONTINUED | OUTPATIENT
Start: 2024-09-19 | End: 2024-09-19 | Stop reason: HOSPADM

## 2024-09-19 RX ORDER — MUPIROCIN 20 MG/G
OINTMENT TOPICAL 2 TIMES DAILY
Status: DISCONTINUED | OUTPATIENT
Start: 2024-09-19 | End: 2024-09-19 | Stop reason: HOSPADM

## 2024-09-19 RX ORDER — GLUCAGON 1 MG
1 KIT INJECTION
Status: DISCONTINUED | OUTPATIENT
Start: 2024-09-19 | End: 2024-09-19 | Stop reason: HOSPADM

## 2024-09-19 RX ORDER — PROPOFOL 10 MG/ML
VIAL (ML) INTRAVENOUS
Status: DISCONTINUED | OUTPATIENT
Start: 2024-09-19 | End: 2024-09-19

## 2024-09-19 RX ORDER — DIPHENHYDRAMINE HYDROCHLORIDE 50 MG/ML
12.5 INJECTION INTRAMUSCULAR; INTRAVENOUS EVERY 30 MIN PRN
Status: DISCONTINUED | OUTPATIENT
Start: 2024-09-19 | End: 2024-09-19 | Stop reason: HOSPADM

## 2024-09-19 RX ORDER — CEFAZOLIN SODIUM 1 G/3ML
2 INJECTION, POWDER, FOR SOLUTION INTRAMUSCULAR; INTRAVENOUS
Status: COMPLETED | OUTPATIENT
Start: 2024-09-19 | End: 2024-09-19

## 2024-09-19 RX ORDER — LIDOCAINE HYDROCHLORIDE 20 MG/ML
INJECTION INTRAVENOUS
Status: DISCONTINUED | OUTPATIENT
Start: 2024-09-19 | End: 2024-09-19

## 2024-09-19 RX ORDER — ROCURONIUM BROMIDE 10 MG/ML
INJECTION, SOLUTION INTRAVENOUS
Status: DISCONTINUED | OUTPATIENT
Start: 2024-09-19 | End: 2024-09-19

## 2024-09-19 RX ORDER — SUCCINYLCHOLINE CHLORIDE 20 MG/ML
INJECTION INTRAMUSCULAR; INTRAVENOUS
Status: DISCONTINUED | OUTPATIENT
Start: 2024-09-19 | End: 2024-09-19

## 2024-09-19 RX ORDER — SODIUM CHLORIDE 9 MG/ML
INJECTION, SOLUTION INTRAVENOUS CONTINUOUS
Status: DISCONTINUED | OUTPATIENT
Start: 2024-09-19 | End: 2024-09-19 | Stop reason: HOSPADM

## 2024-09-19 RX ORDER — MEPERIDINE HYDROCHLORIDE 25 MG/ML
12.5 INJECTION INTRAMUSCULAR; INTRAVENOUS; SUBCUTANEOUS ONCE AS NEEDED
Status: DISCONTINUED | OUTPATIENT
Start: 2024-09-19 | End: 2024-09-19 | Stop reason: HOSPADM

## 2024-09-19 RX ORDER — LIDOCAINE HYDROCHLORIDE 10 MG/ML
0.5 INJECTION, SOLUTION EPIDURAL; INFILTRATION; INTRACAUDAL; PERINEURAL ONCE
Status: DISCONTINUED | OUTPATIENT
Start: 2024-09-19 | End: 2024-09-19 | Stop reason: HOSPADM

## 2024-09-19 RX ORDER — SODIUM CHLORIDE, SODIUM LACTATE, POTASSIUM CHLORIDE, CALCIUM CHLORIDE 600; 310; 30; 20 MG/100ML; MG/100ML; MG/100ML; MG/100ML
INJECTION, SOLUTION INTRAVENOUS CONTINUOUS
Status: DISCONTINUED | OUTPATIENT
Start: 2024-09-19 | End: 2024-09-19 | Stop reason: HOSPADM

## 2024-09-19 RX ORDER — SODIUM CHLORIDE 0.9 % (FLUSH) 0.9 %
3 SYRINGE (ML) INJECTION
Status: DISCONTINUED | OUTPATIENT
Start: 2024-09-19 | End: 2024-09-19 | Stop reason: HOSPADM

## 2024-09-19 RX ADMIN — SUCCINYLCHOLINE CHLORIDE 200 MG: 20 INJECTION, SOLUTION INTRAMUSCULAR; INTRAVENOUS at 09:09

## 2024-09-19 RX ADMIN — LIDOCAINE HYDROCHLORIDE 50 MG: 20 INJECTION, SOLUTION INTRAVENOUS at 09:09

## 2024-09-19 RX ADMIN — ROCURONIUM BROMIDE 50 MG: 10 SOLUTION INTRAVENOUS at 09:09

## 2024-09-19 RX ADMIN — PROPOFOL 150 MG: 10 INJECTION, EMULSION INTRAVENOUS at 09:09

## 2024-09-19 RX ADMIN — PROPOFOL 50 MG: 10 INJECTION, EMULSION INTRAVENOUS at 10:09

## 2024-09-19 RX ADMIN — SUGAMMADEX 400 MG: 100 INJECTION, SOLUTION INTRAVENOUS at 10:09

## 2024-09-19 RX ADMIN — HYDROMORPHONE HYDROCHLORIDE 0.4 MG: 2 INJECTION, SOLUTION INTRAMUSCULAR; INTRAVENOUS; SUBCUTANEOUS at 11:09

## 2024-09-19 RX ADMIN — SODIUM CHLORIDE, SODIUM LACTATE, POTASSIUM CHLORIDE, AND CALCIUM CHLORIDE: .6; .31; .03; .02 INJECTION, SOLUTION INTRAVENOUS at 09:09

## 2024-09-19 RX ADMIN — CEFAZOLIN 3 G: 330 INJECTION, POWDER, FOR SOLUTION INTRAMUSCULAR; INTRAVENOUS at 09:09

## 2024-09-19 RX ADMIN — ONDANSETRON HYDROCHLORIDE 4 MG: 2 INJECTION INTRAMUSCULAR; INTRAVENOUS at 09:09

## 2024-09-19 RX ADMIN — OXYCODONE HYDROCHLORIDE 5 MG: 5 TABLET ORAL at 11:09

## 2024-09-19 RX ADMIN — FENTANYL CITRATE 50 MCG: 50 INJECTION, SOLUTION INTRAMUSCULAR; INTRAVENOUS at 09:09

## 2024-09-19 RX ADMIN — DEXAMETHASONE SODIUM PHOSPHATE 4 MG: 4 INJECTION, SOLUTION INTRAMUSCULAR; INTRAVENOUS at 09:09

## 2024-09-19 RX ADMIN — FENTANYL CITRATE 100 MCG: 50 INJECTION, SOLUTION INTRAMUSCULAR; INTRAVENOUS at 09:09

## 2024-09-19 RX ADMIN — FENTANYL CITRATE 50 MCG: 50 INJECTION, SOLUTION INTRAMUSCULAR; INTRAVENOUS at 10:09

## 2024-09-19 RX ADMIN — OXYCODONE HYDROCHLORIDE 5 MG: 5 TABLET ORAL at 10:09

## 2024-09-19 NOTE — ANESTHESIA PROCEDURE NOTES
Intubation    Date/Time: 9/19/2024 9:38 AM    Performed by: Peterson Franco CRNA  Authorized by: Peterson Franco CRNA    Intubation:     Induction:  Rapid sequence induction    Intubated:  Postinduction    Mask Ventilation:  Not attempted    Attempts:  1    Attempted By:  CRNA    Method of Intubation:  Video laryngoscopy    Blade:  Alexander 3    Laryngeal View Grade: Grade I - full view of cords      Difficult Airway Encountered?: No      Complications:  None    Airway Device:  Oral endotracheal tube    Airway Device Size:  8.0    Style/Cuff Inflation:  Cuffed (inflated to minimal occlusive pressure)    Tube secured:  23    Secured at:  The lips    Placement Verified By:  Capnometry    Complicating Factors:  Obesity and oropharyngeal edema or fat    Findings Post-Intubation:  BS equal bilateral and atraumatic/condition of teeth unchanged

## 2024-09-19 NOTE — PLAN OF CARE
Patient requests the presence of his wife at the bedside at the time of discharge and for transportation to the home setting.

## 2024-09-19 NOTE — BRIEF OP NOTE
[facility] - Surgery ([place])  Brief Operative Note    Surgery Date: 9/19/2024     Surgeons and Role:     * Darnell Liao MD - Primary    Assisting Surgeon: None    Pre-op Diagnosis:  Infection or inflammatory reaction due to internal joint prosthesis, subsequent encounter [T84.50XD]    Post-op Diagnosis:  Post-Op Diagnosis Codes:     * Infection or inflammatory reaction due to internal joint prosthesis, subsequent encounter [T84.50XD]    Procedure(s) (LRB):  INCISION AND DRAINAGE, KNEE (Right)  CLOSURE, WOUND, KNEE (Right)    Anesthesia: Choice    Operative Findings:  Open wound right knee wound VAC rapid test negative for infection    Estimated Blood Loss: * No values recorded between 9/19/2024  9:52 AM and 9/19/2024 10:40 AM * less than 100         Specimens:   Specimen (24h ago, onward)      None              Discharge Note    OUTCOME: Patient tolerated treatment/procedure well without complication and is now ready for discharge.    [place] [facility] [place]AL DIAGNOSIS:  Infection and inflammatory reaction due to internal joint prosthesis    FOLLOWUP: In clinic    DISCHARGE INSTRUCTIONS:    Discharge Procedure Orders   Diet general     Activity as tolerated     Sponge bath only until clinic visit     Weight bearing restrictions (specify)   Order Comments: Weight Bearing as tolerated using crutches as needed.     No driving, operating heavy equipment or signing legal documents while taking pain medication     Leave dressing on - Keep it clean, dry, and intact until clinic visit     Call MD for:  temperature >100.4     Call MD for:  persistent nausea and vomiting     Call MD for:  severe uncontrolled pain     Call MD for:  difficulty breathing, headache or visual disturbances     Call MD for:  redness, tenderness, or signs of infection (pain, swelling, redness, odor or green/yellow discharge around incision site)     Apply knee immobilizer   Order Comments: FT       
21-Aug-2019 17:42

## 2024-09-19 NOTE — ANESTHESIA PREPROCEDURE EVALUATION
09/19/2024  Ashwin Peter is a 59 y.o., male.      Pre-op Assessment    I have reviewed the Patient Summary Reports.     I have reviewed the Nursing Notes. I have reviewed the NPO Status.   I have reviewed the Medications.   Steroids Taken In Past Year:     Review of Systems  Anesthesia Hx:  No problems with previous Anesthesia   History of prior surgery of interest to airway management or planning:  Previous anesthesia: General I&D Knee with general anesthesia.  Procedure performed at an [facility].       Denies Family Hx of Anesthesia complications.    Denies Personal Hx of Anesthesia complications.                    Social:  Non-Smoker       Hematology/Oncology:    Oncology Normal    -- Anemia:               Hematology Comments: Thrombocytopenia                      EENT/Dental:  EENT/Dental Normal           Cardiovascular:     Hypertension   Denies MI.        CHF        3/2024 ECHO - nml EF; severe biatrial enlargement; PAP 34    Neg stress test 2022    BL LE lymphedema                         Pulmonary:        Sleep Apnea Sarcoidosis                  Renal/:   renal calculi               Hepatic/GI:     GERD Liver Disease,  GUAJARDO / Cirrhosis          Musculoskeletal:  Arthritis               Neurological:    Denies CVA.        Hx Hepatic Encephalopathy                            Endocrine:     Ozempic    On Prednisone for sacoidosis - weaned off several months ago (was on steroid Rx for few years)      Morbid Obesity / BMI > 40  Psych:  Psychiatric History anxiety               Physical Exam  General: Well nourished, Cooperative, Alert and Oriented    Airway:  Mallampati: IV   Mouth Opening: Normal  TM Distance: Normal  Tongue: Large  Neck ROM: Normal ROM    Dental:  Intact    Anesthesia Plan  Type of Anesthesia, risks & benefits discussed:    Anesthesia Type: Gen ETT  Intra-op Monitoring  Plan: Standard ASA Monitors  Post Op Pain Control Plan: multimodal analgesia  Induction:  IV  Airway Plan: Video  Informed Consent: Informed consent signed with the Patient and all parties understand the risks and agree with anesthesia plan.  All questions answered.   ASA Score: 3  Day of Surgery Review of History & Physical: H&P Update referred to the surgeon/provider.  Anesthesia Plan Notes: Plan GA LUCIO as before!    Off ozempic many months    Hgb 8.6  Plat 86    Ready For Surgery From Anesthesia Perspective.     .

## 2024-09-19 NOTE — OP NOTE
[facility]  Surgery Department  Operative Note    SUMMARY     Date of Procedure: 9/19/2024     Procedure: Procedure(s) (LRB):  INCISION AND DRAINAGE, KNEE (Right)  CLOSURE, WOUND, KNEE (Right)     Surgeons and Role:     * Darnell Liao MD - Primary    Assisting Surgeon: None    Pre-Operative Diagnosis: Infection or inflammatory reaction due to internal joint prosthesis, subsequent encounter [T84.50XD]    Post-Operative Diagnosis: Post-Op Diagnosis Codes:     * Infection or inflammatory reaction due to internal joint prosthesis, subsequent encounter [T84.50XD]    Anesthesia: Choice    Operative Findings (including complications, if any):  Open wound wound VAC right knee    Description of Technical Procedures:  Patient brought the operating room and general anesthesia without difficulty positioned appropriately prepped and draped in usual fashion tourniquet was placed but not inflated.  Under clean conditions an aspiration was performed of the knee clear fluid obtained.  Scant amount which was nice to see rapid test alpha defense and was performed.  It was negative.  So we pressed head I was checking them to see if we had to do some inside the joint.  It appears that we do not half to so the full leg was prepped in usual fashion.  Using elevator and blunt dissection the flaps were elevated.  Pulse lavage irrigation was used with back to shore and then regular regular fluid.  I had it freed up and elevated enough to where I could put stitches then closed.  Cellerate collagen powder was placed on the extensor mechanism.  Then using 2. And 2-0 nylon with stress relieving type suturing multiple interrupted nylons were placed.  Got good closure.  I was able to bend him 30 40° without any undue pressure of the flaps looked to be viable nothing was necrotic.  A couple of the edges of the skin did tear with the sutures and had to replace this sutures but nothing severe.  A bulky sterile bandage was  then placed after closure.  He is placed in a hinged knee brace full extension with some difficulty because of the size of his leg we could not get it fully strap but it is a reminder to keep him straight.  Brought to come sterile fashion     This performed with a poor recognition system.    Significant Surgical Tasks Conducted by the Assistant(s), if Applicable:  Retraction    Estimated Blood Loss (EBL): * No values recorded between 9/19/2024  9:52 AM and 9/19/2024 10:46 AM * less than 100           Implants: * No implants in log *    Specimens:   Specimen (24h ago, onward)      None                    Condition: Good    Disposition: PACU - hemodynamically stable.    Attestation: Op Note Attestation: I was physically present and scrubbed for the entire procedure.

## 2024-09-19 NOTE — ANESTHESIA POSTPROCEDURE EVALUATION
Anesthesia Post Evaluation    Patient: Ashwin Peter    Procedure(s) Performed: Procedure(s) (LRB):  INCISION AND DRAINAGE, KNEE (Right)  CLOSURE, WOUND, KNEE (Right)    Final Anesthesia Type: general      Patient location during evaluation: PACU  Patient participation: Yes- Able to Participate  Level of consciousness: awake and alert  Post-procedure vital signs: reviewed and stable  Pain management: adequate  Airway patency: patent    PONV status at discharge: No PONV  Anesthetic complications: no      Cardiovascular status: blood pressure returned to baseline  Respiratory status: spontaneous ventilation  Hydration status: euvolemic  Follow-up not needed.          Vitals Value Taken Time   /61 09/19/24 1350   Temp 36.6 °C (97.8 °F) 09/19/24 1250   Pulse 98 09/19/24 1350   Resp 16 09/19/24 1350   SpO2 98 % 09/19/24 1350         Event Time   Out of Recovery 12:43:34         Pain/Akbar Score: Pain Rating Prior to Med Admin: 6 (9/19/2024 11:39 AM)  Pain Rating Post Med Admin: 5 (9/19/2024 12:02 PM)  Akbar Score: 10 (9/19/2024  1:50 PM)

## 2024-09-19 NOTE — TRANSFER OF CARE
"Anesthesia Transfer of Care Note    Patient: Ashwin Peter    Procedure(s) Performed: Procedure(s) (LRB):  INCISION AND DRAINAGE, KNEE (Right)  CLOSURE, WOUND, KNEE (Right)    Patient location: PACU    Anesthesia Type: general    Transport from OR: Transported from OR on 6-10 L/min O2 by face mask with adequate spontaneous ventilation    Post pain: adequate analgesia    Post assessment: no apparent anesthetic complications and tolerated procedure well    Post vital signs: stable    Level of consciousness: awake    Nausea/Vomiting: no nausea/vomiting    Complications: none    Transfer of care protocol was followed      Last vitals: Visit Vitals  /67 (BP Location: Left arm, Patient Position: Lying)   Pulse 68   Temp 36.9 °C (98.4 °F) (Oral)   Resp 20   Ht 5' 11" (1.803 m)   Wt 133.8 kg (295 lb)   SpO2 (!) 94%   BMI 41.14 kg/m²     "

## 2024-09-20 VITALS
OXYGEN SATURATION: 98 % | SYSTOLIC BLOOD PRESSURE: 114 MMHG | DIASTOLIC BLOOD PRESSURE: 61 MMHG | BODY MASS INDEX: 41.3 KG/M2 | HEIGHT: 71 IN | WEIGHT: 295 LBS | HEART RATE: 98 BPM | TEMPERATURE: 98 F | RESPIRATION RATE: 16 BRPM

## 2024-09-20 LAB
ACID FAST MOD KINY STN SPEC: NORMAL
MYCOBACTERIUM SPEC QL CULT: NORMAL

## 2024-09-22 LAB
BACTERIA FLD AEROBE CULT: ABNORMAL
GRAM STN SPEC: ABNORMAL

## 2024-09-23 ENCOUNTER — DOCUMENT SCAN (OUTPATIENT)
Dept: HOME HEALTH SERVICES | Facility: HOSPITAL | Age: 59
End: 2024-09-23
Payer: OTHER GOVERNMENT

## 2024-09-23 ENCOUNTER — TELEPHONE (OUTPATIENT)
Dept: FAMILY MEDICINE | Facility: CLINIC | Age: 59
End: 2024-09-23
Payer: OTHER GOVERNMENT

## 2024-09-23 ENCOUNTER — EXTERNAL HOME HEALTH (OUTPATIENT)
Dept: HOME HEALTH SERVICES | Facility: HOSPITAL | Age: 59
End: 2024-09-23
Payer: OTHER GOVERNMENT

## 2024-09-23 ENCOUNTER — TELEPHONE (OUTPATIENT)
Dept: INTERNAL MEDICINE | Facility: CLINIC | Age: 59
End: 2024-09-23
Payer: OTHER GOVERNMENT

## 2024-09-23 LAB
BACTERIA SPEC ANAEROBE CULT: NORMAL
FUNGUS SPEC CULT: NORMAL

## 2024-09-23 NOTE — TELEPHONE ENCOUNTER
Called and spoke with pt in regards to appt scheduled on 09/25/24 at 140pm informed pt that  will be out of the office and offered an appt with dr wu physician assistant chet pt agreed to appt with the pa on 09/30/24 at 1pm

## 2024-09-23 NOTE — TELEPHONE ENCOUNTER
Called patient to reschedule appointment with Dr Guo to Dr Brant Shah. Patient didn't answer. Left .

## 2024-10-08 ENCOUNTER — DOCUMENT SCAN (OUTPATIENT)
Dept: HOME HEALTH SERVICES | Facility: HOSPITAL | Age: 59
End: 2024-10-08
Payer: OTHER GOVERNMENT

## 2024-10-14 ENCOUNTER — DOCUMENT SCAN (OUTPATIENT)
Dept: HOME HEALTH SERVICES | Facility: HOSPITAL | Age: 59
End: 2024-10-14
Payer: OTHER GOVERNMENT

## 2024-10-17 ENCOUNTER — DOCUMENT SCAN (OUTPATIENT)
Dept: HOME HEALTH SERVICES | Facility: HOSPITAL | Age: 59
End: 2024-10-17
Payer: OTHER GOVERNMENT

## 2024-10-21 ENCOUNTER — OFFICE VISIT (OUTPATIENT)
Dept: INFECTIOUS DISEASES | Facility: CLINIC | Age: 59
End: 2024-10-21
Payer: OTHER GOVERNMENT

## 2024-10-21 DIAGNOSIS — T84.53XD INFECTION ASSOCIATED WITH INTERNAL RIGHT KNEE PROSTHESIS, SUBSEQUENT ENCOUNTER: Primary | ICD-10-CM

## 2024-10-21 PROCEDURE — 99213 OFFICE O/P EST LOW 20 MIN: CPT | Mod: 95,,, | Performed by: STUDENT IN AN ORGANIZED HEALTH CARE EDUCATION/TRAINING PROGRAM

## 2024-10-21 NOTE — PROGRESS NOTES
The patient location is: home  The chief complaint leading to consultation is: follow up on PJI    Visit type: audiovisual    Face to Face time with patient: 15 minutes  29 minutes of total time spent on the encounter, which includes face to face time and non-face to face time preparing to see the patient (eg, review of tests), Obtaining and/or reviewing separately obtained history, Documenting clinical information in the electronic or other health record, Independently interpreting results (not separately reported) and communicating results to the patient/family/caregiver, or Care coordination (not separately reported).         Each patient to whom he or she provides medical services by telemedicine is:  (1) informed of the relationship between the physician and patient and the respective role of any other health care provider with respect to management of the patient; and (2) notified that he or she may decline to receive medical services by telemedicine and may withdraw from such care at any time.    Notes:     Infectious Diseases Progress Note  Subjective:     Chief Complaint: follow up for PJI     HPI: Ashwin Peter is a 59 y.o. male with a history of obesity and right total knee arthroplasty in November of 2023.  He was evaluated in July of 2024 for significant pain of the right knee.  He was admitted and underwent revision of the right knee arthoplasty on 8/1.  Per operative note, purulence was encountered in the prepatellar bursa area.  He underwent I&D.  A decision was made not to place a spacer.  Instead, the old artifical knee was removed and a new artificial knee of the same size with antibiotic impregnated cement.  Surgical cultures were obtained which were negative.  Infectious diseases was consulted and the patient was started on empiric vancomycin and ceftriaxone.  He was re-admitted to hospital 8/8 with worsening pain in his right knee and drainage from surgical wound. Wound vac was placed, but no  surgical intervention taken. His antibiotics were changed to daptomycin and cefepime with planned end date of 9/11. He was then taken back to OR on 8/20 due to continued drainage from surgical wound. Serosanguinous fluid was noted over the patellar region as well as synovitis. Cultures were obtained which were once again negative.      He was last seen in ID clinic on 9/16 at end of care and his daptomycin and cefepime were stopped, picc line was removed. He was then seen by orthopedist on 9/19 and had joint aspiration. No cell count available, however culture was sent and grew ESBL E. Coli. Per patient, he was sent bactrim on 10/4 and has been taking it since then. He does note an improvement in his symptoms recently. He is also still taking the cefadroxil. He is planning to receive an iron infusion next week with his hepatologist.     Immunization History   Administered Date(s) Administered    COVID-19, MRNA, LN-S, PF (MODERNA FULL 0.5 ML DOSE) 12/09/2021    COVID-19, MRNA, LN-S, PF (Pfizer) (Purple Cap) 01/19/2021, 02/02/2021, 02/02/2021    Hepatitis A / Hepatitis B 02/12/2021, 03/17/2021, 08/16/2021    Influenza 10/08/2020    Influenza - Quadrivalent - MDCK - PF 09/21/2020    Influenza - Quadrivalent - PF *Preferred* (6 months and older) 10/25/2017, 09/17/2021, 10/25/2022, 11/16/2023    Pneumococcal Polysaccharide - 23 Valent 09/17/2021    Tdap 07/14/2014, 12/13/2023        Review of Systems   Skin:  Positive for poor wound healing.        Past Medical History:   Diagnosis Date    Anxiety     BMI 38.0-38.9,adult     Hyperlipidemia     Hypervolemia 07/22/2022    Multiple pulmonary nodules     Obesity     Other cirrhosis of liver 01/13/2021    Portal hypertension 02/11/2021    Rectal bleeding 02/11/2021    Sarcoidosis of lung with sarcoidosis of lymph nodes     Sleep apnea     CPAP USED    Splenomegaly 12/22/2020    Thrombocytopenia 12/22/2020     Past Surgical History:   Procedure Laterality Date    ANTERIOR  CRUCIATE LIGAMENT REPAIR  2007    right knee    CLOSURE, WOUND, LOWER EXTREMITY, LEFT Right 9/19/2024    Procedure: CLOSURE, WOUND, KNEE;  Surgeon: Darnell Liao MD;  Location: [facility] OR;  Service: Orthopedics;  Laterality: Right;    COLONOSCOPY N/A 4/8/2021    Procedure: COLONOSCOPY;  Surgeon: Jeff Olvera MD;  Location: [facility] (2ND FLR);  Service: Endoscopy;  Laterality: N/A;  rectal bleeding,   2nd floor BMI 50 (354 lbs)  COVID test on 4/5/21 at [facility]    ESOPHAGOGASTRODUODENOSCOPY N/A 4/8/2021    Procedure: EGD (ESOPHAGOGASTRODUODENOSCOPY);  Surgeon: Jeff Olvera MD;  Location: [facility] (2ND FLR);  Service: Endoscopy;  Laterality: N/A;  variceal screening/ labs the am of procedure  2nd floor BMI 50 (354 lbs)    ESOPHAGOGASTRODUODENOSCOPY N/A 3/31/2022    Procedure: EGD (ESOPHAGOGASTRODUODENOSCOPY);  Surgeon: Jeff Olvera MD;  Location: [facility] (2ND FLR);  Service: Endoscopy;  Laterality: N/A;  BMI-52    Wt:375#      cirrhosis, variceal screening-labs done on 3/29-GT  vaccinated-GT    ESOPHAGOGASTRODUODENOSCOPY N/A 4/21/2023    Procedure: EGD (ESOPHAGOGASTRODUODENOSCOPY);  Surgeon: Christopher Britton MD;  Location: [facility] (2ND FLR);  Service: Endoscopy;  Laterality: N/A;  pt requested Friday due to work schedule  ECHO PA 44-51  BMI 47.97 Wt 343 lbs  inst portal-RB  last CBC PT/INR 1/24/23 4/17/23- Precall confirmed.    ESOPHAGOGASTRODUODENOSCOPY N/A 1/10/2024    Procedure: EGD (ESOPHAGOGASTRODUODENOSCOPY);  Surgeon: Christopher Conley MD;  Location: [facility] (2ND FLR);  Service: Endoscopy;  Laterality: N/A;    INCISION AND DRAINAGE Right 8/1/2024    Procedure: INCISION AND DRAINAGE;  Surgeon: Darnell Liao MD;  Location: [facility];  Service: Orthopedics;  Laterality: Right;    INCISION AND DRAINAGE OF KNEE Right 8/20/2024    Procedure: INCISION AND DRAINAGE, KNEE WITH WOUND VAC PLACEMENT;  Surgeon: Darnell Liao MD;  Location: [facility];  Service: Orthopedics;  Laterality:  Right;    INCISION AND DRAINAGE OF KNEE Right 9/19/2024    Procedure: INCISION AND DRAINAGE, KNEE;  Surgeon: Darnell Liao MD;  Location: [facility] OR;  Service: Orthopedics;  Laterality: Right;    LYMPHADENECTOMY  1985    MENISCECTOMY      left knee    NASAL SEPTUM SURGERY  1985    REVISION OF KNEE ARTHROPLASTY Right 8/1/2024    Procedure: REVISION, ARTHROPLASTY, KNEE TOTAL;  Surgeon: Darnell Liao MD;  Location: [facility] OR;  Service: Orthopedics;  Laterality: Right;    TOTAL KNEE ARTHROPLASTY Right 11/7/2023    Procedure: ARTHROPLASTY, KNEE, TOTAL;  Surgeon: Darnell Liao MD;  Location: [facility] OR;  Service: Orthopedics;  Laterality: Right;    WISDOM TOOTH EXTRACTION       Family History   Problem Relation Name Age of Onset    Hypertension Father      Heart attack Father  55    Diabetes Paternal Grandmother      Aneurysm Mother          eye    Dementia Mother      Colon cancer Neg Hx      Prostate cancer Neg Hx      Sarcoidosis Neg Hx       Social History     Tobacco Use    Smoking status: Never     Passive exposure: Never    Smokeless tobacco: Never   Substance Use Topics    Alcohol use: No    Drug use: Not Currently       Objective:     Physical Exam  Constitutional:       Appearance: Normal appearance. He is not ill-appearing.   Pulmonary:      Effort: No respiratory distress.   Neurological:      Mental Status: He is alert and oriented to person, place, and time.   Psychiatric:         Mood and Affect: Mood normal.         Behavior: Behavior normal.         Data:    All data, including recent labs, radiology, and pathology, has been independently reviewed.    Labs:    Recent Labs   Lab Result Units 08/26/24  1604 09/03/24  1130 09/03/24  1153 09/09/24  1128 09/19/24  0640   WBC K/uL 4.42  --  4.65 3.80* 3.27*   Hemoglobin g/dL 9.0*  --  7.9* 7.9* 8.6*   Hematocrit % 30.0*  --  26.9* 28.1* 30.2*   Sodium mmol/L 140 138  --  137 140   Potassium mmol/L 3.8 3.4*  --  3.7 4.0   Chloride mmol/L 112* 113*  --   112* 115*   BUN mg/dL 23* 17  --  17 17   Creatinine mg/dL 0.9 0.7  --  0.8 0.7   AST U/L 54* 53*  --  47*  --    ALT U/L 26 28  --  26  --    Alkaline Phosphatase U/L 153* 147*  --  148*  --    Total Bilirubin mg/dL 1.4* 1.6*  --  1.3*  --    CRP mg/L 18.7* 27.9*  --  22.4*  --         Radiology:    No results found in the last 24 hours.     Assessment:  Ashwin Pteer is a 59 year old man right total knee arthroplasty in November of 2023. Subsequently developed pain and swelling, concern for PJI during revision on 8/1 however cultures were no growth. Discharged with empiric 6 week course of vanc and ceftriaxone but had continued serous drainage from surgical site so antibiotics changed to daptomycin and cefepime on 8/9. Returned for surgical evaluation 8/20 due to persistent wound drainage, serosanguinous material encountered, all surgical cultures no growth. Daptomycin and cefepime stopped 9/16 and changed to PO cefadroxil for empiric suppression. Went for joint aspiration on 9/19, no cell counts obtained but culture with ESBL E. Coli. It is unclear what this represents since cell counts are not available. It is surprising that after months of antibiotics an organism finally grew, and a not particularly difficult to grow organism at that. However given his unclear diagnosis it does appear to warrant treatment.      Recommendations  - will stop cefadroxil  - will obtain monitoring CBC, CMP and CRP   - if labs wnl, will continue bactrim for a total of 6 months (otherwise will switch to levofloxacin)    Follow up in 1 month    I spent a total of 29 minutes on the day of the visit.  This includes face to face time and non-face to face time preparing to see the patient (eg, review of tests), obtaining and/or reviewing separately obtained history, documenting clinical information in the electronic or other health record, independently interpreting results and communicating results to the patient/family/caregiver, or care  coordinator.      Coretta Cheung MD  Infectious Disease

## 2024-10-22 ENCOUNTER — LAB VISIT (OUTPATIENT)
Dept: LAB | Facility: HOSPITAL | Age: 59
End: 2024-10-22
Attending: STUDENT IN AN ORGANIZED HEALTH CARE EDUCATION/TRAINING PROGRAM
Payer: OTHER GOVERNMENT

## 2024-10-22 DIAGNOSIS — T84.53XD INFECTION ASSOCIATED WITH INTERNAL RIGHT KNEE PROSTHESIS, SUBSEQUENT ENCOUNTER: ICD-10-CM

## 2024-10-22 LAB
ALBUMIN SERPL BCP-MCNC: 2.2 G/DL (ref 3.5–5.2)
ALP SERPL-CCNC: 135 U/L (ref 40–150)
ALT SERPL W/O P-5'-P-CCNC: 17 U/L (ref 10–44)
ANION GAP SERPL CALC-SCNC: 6 MMOL/L (ref 8–16)
AST SERPL-CCNC: 43 U/L (ref 10–40)
BASOPHILS # BLD AUTO: 0.05 K/UL (ref 0–0.2)
BASOPHILS NFR BLD: 1.7 % (ref 0–1.9)
BILIRUB SERPL-MCNC: 1.6 MG/DL (ref 0.1–1)
BUN SERPL-MCNC: 14 MG/DL (ref 6–20)
CALCIUM SERPL-MCNC: 7.9 MG/DL (ref 8.7–10.5)
CHLORIDE SERPL-SCNC: 111 MMOL/L (ref 95–110)
CO2 SERPL-SCNC: 21 MMOL/L (ref 23–29)
CREAT SERPL-MCNC: 0.9 MG/DL (ref 0.5–1.4)
CRP SERPL-MCNC: 36 MG/L (ref 0–8.2)
DIFFERENTIAL METHOD BLD: ABNORMAL
EOSINOPHIL # BLD AUTO: 0.2 K/UL (ref 0–0.5)
EOSINOPHIL NFR BLD: 8.1 % (ref 0–8)
ERYTHROCYTE [DISTWIDTH] IN BLOOD BY AUTOMATED COUNT: 21 % (ref 11.5–14.5)
EST. GFR  (NO RACE VARIABLE): >60 ML/MIN/1.73 M^2
GLUCOSE SERPL-MCNC: 77 MG/DL (ref 70–110)
HCT VFR BLD AUTO: 33.6 % (ref 40–54)
HGB BLD-MCNC: 9.4 G/DL (ref 14–18)
IMM GRANULOCYTES # BLD AUTO: 0.01 K/UL (ref 0–0.04)
IMM GRANULOCYTES NFR BLD AUTO: 0.3 % (ref 0–0.5)
LYMPHOCYTES # BLD AUTO: 0.7 K/UL (ref 1–4.8)
LYMPHOCYTES NFR BLD: 22 % (ref 18–48)
MCH RBC QN AUTO: 23 PG (ref 27–31)
MCHC RBC AUTO-ENTMCNC: 28 G/DL (ref 32–36)
MCV RBC AUTO: 82 FL (ref 82–98)
MONOCYTES # BLD AUTO: 0.5 K/UL (ref 0.3–1)
MONOCYTES NFR BLD: 17.2 % (ref 4–15)
NEUTROPHILS # BLD AUTO: 1.5 K/UL (ref 1.8–7.7)
NEUTROPHILS NFR BLD: 50.7 % (ref 38–73)
NRBC BLD-RTO: 0 /100 WBC
PLATELET # BLD AUTO: 123 K/UL (ref 150–450)
PMV BLD AUTO: ABNORMAL FL (ref 9.2–12.9)
POTASSIUM SERPL-SCNC: 4.2 MMOL/L (ref 3.5–5.1)
PROT SERPL-MCNC: 6.3 G/DL (ref 6–8.4)
RBC # BLD AUTO: 4.08 M/UL (ref 4.6–6.2)
SODIUM SERPL-SCNC: 138 MMOL/L (ref 136–145)
WBC # BLD AUTO: 2.96 K/UL (ref 3.9–12.7)

## 2024-10-22 PROCEDURE — 85025 COMPLETE CBC W/AUTO DIFF WBC: CPT | Performed by: STUDENT IN AN ORGANIZED HEALTH CARE EDUCATION/TRAINING PROGRAM

## 2024-10-22 PROCEDURE — 36415 COLL VENOUS BLD VENIPUNCTURE: CPT | Performed by: STUDENT IN AN ORGANIZED HEALTH CARE EDUCATION/TRAINING PROGRAM

## 2024-10-22 PROCEDURE — 80053 COMPREHEN METABOLIC PANEL: CPT | Performed by: STUDENT IN AN ORGANIZED HEALTH CARE EDUCATION/TRAINING PROGRAM

## 2024-10-22 PROCEDURE — 86140 C-REACTIVE PROTEIN: CPT | Performed by: STUDENT IN AN ORGANIZED HEALTH CARE EDUCATION/TRAINING PROGRAM

## 2024-10-23 RX ORDER — SULFAMETHOXAZOLE AND TRIMETHOPRIM 800; 160 MG/1; MG/1
1 TABLET ORAL 2 TIMES DAILY
Qty: 280 TABLET | Refills: 0 | Status: SHIPPED | OUTPATIENT
Start: 2024-11-22 | End: 2025-04-11

## 2024-10-23 RX ORDER — SULFAMETHOXAZOLE AND TRIMETHOPRIM 800; 160 MG/1; MG/1
2 TABLET ORAL 2 TIMES DAILY
Qty: 120 TABLET | Refills: 0 | Status: SHIPPED | OUTPATIENT
Start: 2024-10-23 | End: 2024-11-22

## 2024-11-05 ENCOUNTER — LAB VISIT (OUTPATIENT)
Dept: LAB | Facility: HOSPITAL | Age: 59
End: 2024-11-05
Attending: STUDENT IN AN ORGANIZED HEALTH CARE EDUCATION/TRAINING PROGRAM
Payer: OTHER GOVERNMENT

## 2024-11-05 DIAGNOSIS — T84.53XA INFECTION OF TOTAL RIGHT KNEE REPLACEMENT: ICD-10-CM

## 2024-11-05 DIAGNOSIS — T84.53XD INFECTION ASSOCIATED WITH INTERNAL RIGHT KNEE PROSTHESIS, SUBSEQUENT ENCOUNTER: ICD-10-CM

## 2024-11-05 LAB
ALBUMIN SERPL BCP-MCNC: 2.4 G/DL (ref 3.5–5.2)
ALP SERPL-CCNC: 146 U/L (ref 40–150)
ALT SERPL W/O P-5'-P-CCNC: 26 U/L (ref 10–44)
ANION GAP SERPL CALC-SCNC: 8 MMOL/L (ref 8–16)
AST SERPL-CCNC: 54 U/L (ref 10–40)
BASOPHILS # BLD AUTO: 0.05 K/UL (ref 0–0.2)
BASOPHILS NFR BLD: 1.2 % (ref 0–1.9)
BILIRUB SERPL-MCNC: 1.3 MG/DL (ref 0.1–1)
BUN SERPL-MCNC: 12 MG/DL (ref 6–20)
CALCIUM SERPL-MCNC: 8 MG/DL (ref 8.7–10.5)
CHLORIDE SERPL-SCNC: 109 MMOL/L (ref 95–110)
CO2 SERPL-SCNC: 22 MMOL/L (ref 23–29)
CREAT SERPL-MCNC: 0.9 MG/DL (ref 0.5–1.4)
CRP SERPL-MCNC: 11.4 MG/L (ref 0–8.2)
DIFFERENTIAL METHOD BLD: ABNORMAL
EOSINOPHIL # BLD AUTO: 0.3 K/UL (ref 0–0.5)
EOSINOPHIL NFR BLD: 6.6 % (ref 0–8)
ERYTHROCYTE [DISTWIDTH] IN BLOOD BY AUTOMATED COUNT: 26.4 % (ref 11.5–14.5)
EST. GFR  (NO RACE VARIABLE): >60 ML/MIN/1.73 M^2
GLUCOSE SERPL-MCNC: 89 MG/DL (ref 70–110)
HCT VFR BLD AUTO: 38.6 % (ref 40–54)
HGB BLD-MCNC: 10.5 G/DL (ref 14–18)
IMM GRANULOCYTES # BLD AUTO: 0.01 K/UL (ref 0–0.04)
IMM GRANULOCYTES NFR BLD AUTO: 0.2 % (ref 0–0.5)
LYMPHOCYTES # BLD AUTO: 0.9 K/UL (ref 1–4.8)
LYMPHOCYTES NFR BLD: 21.3 % (ref 18–48)
MCH RBC QN AUTO: 23.3 PG (ref 27–31)
MCHC RBC AUTO-ENTMCNC: 27.2 G/DL (ref 32–36)
MCV RBC AUTO: 86 FL (ref 82–98)
MONOCYTES # BLD AUTO: 0.3 K/UL (ref 0.3–1)
MONOCYTES NFR BLD: 7.6 % (ref 4–15)
NEUTROPHILS # BLD AUTO: 2.6 K/UL (ref 1.8–7.7)
NEUTROPHILS NFR BLD: 63.1 % (ref 38–73)
NRBC BLD-RTO: 0 /100 WBC
PLATELET # BLD AUTO: 96 K/UL (ref 150–450)
PMV BLD AUTO: ABNORMAL FL (ref 9.2–12.9)
POTASSIUM SERPL-SCNC: 3.8 MMOL/L (ref 3.5–5.1)
PROT SERPL-MCNC: 6.2 G/DL (ref 6–8.4)
RBC # BLD AUTO: 4.5 M/UL (ref 4.6–6.2)
SODIUM SERPL-SCNC: 139 MMOL/L (ref 136–145)
WBC # BLD AUTO: 4.09 K/UL (ref 3.9–12.7)

## 2024-11-05 PROCEDURE — 86140 C-REACTIVE PROTEIN: CPT | Performed by: STUDENT IN AN ORGANIZED HEALTH CARE EDUCATION/TRAINING PROGRAM

## 2024-11-05 PROCEDURE — 85025 COMPLETE CBC W/AUTO DIFF WBC: CPT | Performed by: STUDENT IN AN ORGANIZED HEALTH CARE EDUCATION/TRAINING PROGRAM

## 2024-11-05 PROCEDURE — 36415 COLL VENOUS BLD VENIPUNCTURE: CPT | Performed by: STUDENT IN AN ORGANIZED HEALTH CARE EDUCATION/TRAINING PROGRAM

## 2024-11-05 PROCEDURE — 80053 COMPREHEN METABOLIC PANEL: CPT | Performed by: STUDENT IN AN ORGANIZED HEALTH CARE EDUCATION/TRAINING PROGRAM

## 2024-11-19 ENCOUNTER — LAB VISIT (OUTPATIENT)
Dept: LAB | Facility: HOSPITAL | Age: 59
End: 2024-11-19
Attending: STUDENT IN AN ORGANIZED HEALTH CARE EDUCATION/TRAINING PROGRAM
Payer: OTHER GOVERNMENT

## 2024-11-19 ENCOUNTER — OFFICE VISIT (OUTPATIENT)
Dept: INFECTIOUS DISEASES | Facility: CLINIC | Age: 59
End: 2024-11-19
Payer: OTHER GOVERNMENT

## 2024-11-19 VITALS
DIASTOLIC BLOOD PRESSURE: 69 MMHG | TEMPERATURE: 98 F | HEIGHT: 71 IN | WEIGHT: 315 LBS | SYSTOLIC BLOOD PRESSURE: 133 MMHG | HEART RATE: 77 BPM | BODY MASS INDEX: 44.1 KG/M2

## 2024-11-19 DIAGNOSIS — T84.53XD INFECTION ASSOCIATED WITH INTERNAL RIGHT KNEE PROSTHESIS, SUBSEQUENT ENCOUNTER: Primary | ICD-10-CM

## 2024-11-19 DIAGNOSIS — T84.53XD INFECTION ASSOCIATED WITH INTERNAL RIGHT KNEE PROSTHESIS, SUBSEQUENT ENCOUNTER: ICD-10-CM

## 2024-11-19 LAB
ALBUMIN SERPL BCP-MCNC: 2.4 G/DL (ref 3.5–5.2)
ALP SERPL-CCNC: 124 U/L (ref 40–150)
ALT SERPL W/O P-5'-P-CCNC: 24 U/L (ref 10–44)
ANION GAP SERPL CALC-SCNC: 7 MMOL/L (ref 8–16)
AST SERPL-CCNC: 50 U/L (ref 10–40)
BASOPHILS # BLD AUTO: 0.03 K/UL (ref 0–0.2)
BASOPHILS NFR BLD: 0.8 % (ref 0–1.9)
BILIRUB SERPL-MCNC: 1.7 MG/DL (ref 0.1–1)
BUN SERPL-MCNC: 20 MG/DL (ref 6–20)
CALCIUM SERPL-MCNC: 8.2 MG/DL (ref 8.7–10.5)
CHLORIDE SERPL-SCNC: 109 MMOL/L (ref 95–110)
CO2 SERPL-SCNC: 23 MMOL/L (ref 23–29)
CREAT SERPL-MCNC: 1.1 MG/DL (ref 0.5–1.4)
CRP SERPL-MCNC: 11.8 MG/L (ref 0–8.2)
DIFFERENTIAL METHOD BLD: ABNORMAL
EOSINOPHIL # BLD AUTO: 0.3 K/UL (ref 0–0.5)
EOSINOPHIL NFR BLD: 8.7 % (ref 0–8)
ERYTHROCYTE [DISTWIDTH] IN BLOOD BY AUTOMATED COUNT: 27.4 % (ref 11.5–14.5)
EST. GFR  (NO RACE VARIABLE): >60 ML/MIN/1.73 M^2
GLUCOSE SERPL-MCNC: 70 MG/DL (ref 70–110)
HCT VFR BLD AUTO: 38 % (ref 40–54)
HGB BLD-MCNC: 10.8 G/DL (ref 14–18)
IMM GRANULOCYTES # BLD AUTO: 0.01 K/UL (ref 0–0.04)
IMM GRANULOCYTES NFR BLD AUTO: 0.3 % (ref 0–0.5)
LYMPHOCYTES # BLD AUTO: 0.7 K/UL (ref 1–4.8)
LYMPHOCYTES NFR BLD: 19.6 % (ref 18–48)
MCH RBC QN AUTO: 25.1 PG (ref 27–31)
MCHC RBC AUTO-ENTMCNC: 28.4 G/DL (ref 32–36)
MCV RBC AUTO: 88 FL (ref 82–98)
MONOCYTES # BLD AUTO: 0.5 K/UL (ref 0.3–1)
MONOCYTES NFR BLD: 12.2 % (ref 4–15)
NEUTROPHILS # BLD AUTO: 2.2 K/UL (ref 1.8–7.7)
NEUTROPHILS NFR BLD: 58.4 % (ref 38–73)
NRBC BLD-RTO: 0 /100 WBC
PLATELET # BLD AUTO: 83 K/UL (ref 150–450)
PMV BLD AUTO: ABNORMAL FL (ref 9.2–12.9)
POTASSIUM SERPL-SCNC: 4.1 MMOL/L (ref 3.5–5.1)
PROT SERPL-MCNC: 5.7 G/DL (ref 6–8.4)
RBC # BLD AUTO: 4.3 M/UL (ref 4.6–6.2)
SODIUM SERPL-SCNC: 139 MMOL/L (ref 136–145)
WBC # BLD AUTO: 3.68 K/UL (ref 3.9–12.7)

## 2024-11-19 PROCEDURE — 99999 PR PBB SHADOW E&M-EST. PATIENT-LVL IV: CPT | Mod: PBBFAC,,, | Performed by: STUDENT IN AN ORGANIZED HEALTH CARE EDUCATION/TRAINING PROGRAM

## 2024-11-19 PROCEDURE — G2211 COMPLEX E/M VISIT ADD ON: HCPCS | Mod: S$GLB,,, | Performed by: STUDENT IN AN ORGANIZED HEALTH CARE EDUCATION/TRAINING PROGRAM

## 2024-11-19 PROCEDURE — 36415 COLL VENOUS BLD VENIPUNCTURE: CPT | Performed by: STUDENT IN AN ORGANIZED HEALTH CARE EDUCATION/TRAINING PROGRAM

## 2024-11-19 PROCEDURE — 99213 OFFICE O/P EST LOW 20 MIN: CPT | Mod: S$GLB,,, | Performed by: STUDENT IN AN ORGANIZED HEALTH CARE EDUCATION/TRAINING PROGRAM

## 2024-11-19 PROCEDURE — 80053 COMPREHEN METABOLIC PANEL: CPT | Performed by: STUDENT IN AN ORGANIZED HEALTH CARE EDUCATION/TRAINING PROGRAM

## 2024-11-19 PROCEDURE — 85025 COMPLETE CBC W/AUTO DIFF WBC: CPT | Performed by: STUDENT IN AN ORGANIZED HEALTH CARE EDUCATION/TRAINING PROGRAM

## 2024-11-19 PROCEDURE — 86140 C-REACTIVE PROTEIN: CPT | Performed by: STUDENT IN AN ORGANIZED HEALTH CARE EDUCATION/TRAINING PROGRAM

## 2024-11-19 RX ORDER — DIETHYLPROPION HYDROCHLORIDE 25 MG/1
TABLET ORAL
COMMUNITY
Start: 2024-10-03

## 2024-11-19 RX ORDER — POTASSIUM CHLORIDE 750 MG/1
10 TABLET, EXTENDED RELEASE ORAL
COMMUNITY
Start: 2024-05-17

## 2024-11-19 RX ORDER — FERROUS SULFATE 325(65) MG
325 TABLET ORAL
COMMUNITY
Start: 2024-10-10

## 2024-11-19 NOTE — PROGRESS NOTES
Infectious Diseases Progress Note  Subjective:     Chief Complaint: follow up for PJI     HPI: Ashwin Peter is a 59 y.o. male with a history of obesity and right total knee arthroplasty in November of 2023.  He was evaluated in July of 2024 for significant pain of the right knee.  He was admitted and underwent revision of the right knee arthoplasty on 8/1.  Per operative note, purulence was encountered in the prepatellar bursa area.  He underwent I&D.  A decision was made not to place a spacer.  Instead, the old artifical knee was removed and a new artificial knee of the same size with antibiotic impregnated cement.  Surgical cultures were obtained which were negative.  Infectious diseases was consulted and the patient was started on empiric vancomycin and ceftriaxone.  He was re-admitted to hospital 8/8 with worsening pain in his right knee and drainage from surgical wound. Wound vac was placed, but no surgical intervention taken. His antibiotics were changed to daptomycin and cefepime with planned end date of 9/11. He was then taken back to OR on 8/20 due to continued drainage from surgical wound. Serosanguinous fluid was noted over the patellar region as well as synovitis. Cultures were obtained which were once again negative.  He was then seen by orthopedist on 9/19 and had joint aspiration. No cell count available, however culture was sent and grew ESBL E. Coli. He was sent bactrim on 10/4.     At last visit on 10/21 he noted some improvement in his symptoms (swelling, discharge) since starting bactrim. Decided together that since this was the only positive culture, to pursue treatment with 6 month course. Today he reports some GI upset (nausea), but no vomiting or diarrhea. His pain has improved, discharge and swelling improved. He is willing to continue bactrim for now. Labs drawn today.     Immunization History   Administered Date(s) Administered    COVID-19, MRNA, LN-S, PF (MODERNA FULL 0.5 ML DOSE)  12/09/2021    COVID-19, MRNA, LN-S, PF (Pfizer) (Purple Cap) 01/19/2021, 02/02/2021, 02/02/2021    Hepatitis A / Hepatitis B 02/12/2021, 03/17/2021, 08/16/2021    Influenza 10/08/2020    Influenza - Quadrivalent - MDCK - PF 09/21/2020    Influenza - Quadrivalent - PF *Preferred* (6 months and older) 10/25/2017, 09/17/2021, 10/25/2022, 11/16/2023    Pneumococcal Polysaccharide - 23 Valent 09/17/2021    Tdap 07/14/2014, 12/13/2023        Review of Systems   Constitutional: Positive for malaise/fatigue. Negative for fever.   HENT:  Positive for tinnitus.    Musculoskeletal:  Positive for joint pain.   Gastrointestinal:  Positive for nausea. Negative for diarrhea and vomiting.   Neurological:  Positive for headaches.   Psychiatric/Behavioral:  The patient is nervous/anxious.         Past Medical History:   Diagnosis Date    Anxiety     BMI 38.0-38.9,adult     Hyperlipidemia     Hypervolemia 07/22/2022    Multiple pulmonary nodules     Obesity     Other cirrhosis of liver 01/13/2021    Portal hypertension 02/11/2021    Rectal bleeding 02/11/2021    Sarcoidosis of lung with sarcoidosis of lymph nodes     Sleep apnea     CPAP USED    Splenomegaly 12/22/2020    Thrombocytopenia 12/22/2020     Past Surgical History:   Procedure Laterality Date    ANTERIOR CRUCIATE LIGAMENT REPAIR  2007    right knee    CLOSURE, WOUND, LOWER EXTREMITY, LEFT Right 9/19/2024    Procedure: CLOSURE, WOUND, KNEE;  Surgeon: Darnell Liao MD;  Location: [facility];  Service: Orthopedics;  Laterality: Right;    COLONOSCOPY N/A 4/8/2021    Procedure: COLONOSCOPY;  Surgeon: Jeff Olvera MD;  Location: [facility] (2ND FLR);  Service: Endoscopy;  Laterality: N/A;  rectal bleeding,   2nd floor BMI 50 (354 lbs)  COVID test on 4/5/21 at [facility]    ESOPHAGOGASTRODUODENOSCOPY N/A 4/8/2021    Procedure: EGD (ESOPHAGOGASTRODUODENOSCOPY);  Surgeon: Jeff Olvera MD;  Location: [facility] (2ND FLR);  Service: Endoscopy;  Laterality: N/A;  variceal  screening/ labs the am of procedure  2nd floor BMI 50 (354 lbs)    ESOPHAGOGASTRODUODENOSCOPY N/A 3/31/2022    Procedure: EGD (ESOPHAGOGASTRODUODENOSCOPY);  Surgeon: Jeff Olvera MD;  Location: [facility] (2ND FLR);  Service: Endoscopy;  Laterality: N/A;  BMI-52    Wt:375#      cirrhosis, variceal screening-labs done on 3/29-GT  vaccinated-GT    ESOPHAGOGASTRODUODENOSCOPY N/A 4/21/2023    Procedure: EGD (ESOPHAGOGASTRODUODENOSCOPY);  Surgeon: Christopher Britton MD;  [facility]  Service: Endoscopy;  Laterality: N/A;  pt requested Friday due to work schedule  ECHO PA 44-51  BMI 47.97 Wt 343 lbs  inst portal-RB  last CBC PT/INR 1/24/23 4/17/23- Precall confirmed.    ESOPHAGOGASTRODUODENOSCOPY N/A 1/10/2024    Procedure: EGD (ESOPHAGOGASTRODUODENOSCOPY);  Surgeon: Christopher Conley MD;  Location: [facility] ([address] [place] [address]);  Service: Endoscopy;  Laterality: N/A;    INCISION AND DRAINAGE Right 8/1/2024    Procedure: INCISION AND DRAINAGE;  Surgeon: Darnell Liao MD;  Location: [facility];  Service: Orthopedics;  Laterality: Right;    INCISION AND DRAINAGE OF KNEE Right 8/20/2024    Procedure: INCISION AND DRAINAGE, KNEE WITH WOUND VAC PLACEMENT;  Surgeon: Darnell Liao MD;  Location: [facility] OR;  Service: Orthopedics;  Laterality: Right;    INCISION AND DRAINAGE OF KNEE Right 9/19/2024    Procedure: INCISION AND DRAINAGE, KNEE;  Surgeon: Darnell Liao MD;  Location: [facility] OR;  Service: Orthopedics;  Laterality: Right;    LYMPHADENECTOMY  1985    MENISCECTOMY      left knee    NASAL SEPTUM SURGERY  1985    REVISION OF KNEE ARTHROPLASTY Right 8/1/2024    Procedure: REVISION, ARTHROPLASTY, KNEE TOTAL;  Surgeon: Darnell Liao MD;  Location: [facility] OR;  Service: Orthopedics;  Laterality: Right;    TOTAL KNEE ARTHROPLASTY Right 11/7/2023    Procedure: ARTHROPLASTY, KNEE, TOTAL;  Surgeon: Darnell Liao MD;  Location: [facility] OR;  Service: Orthopedics;  Laterality: Right;    WISDOM TOOTH  EXTRACTION       Family History   Problem Relation Name Age of Onset    Hypertension Father      Heart attack Father  55    Diabetes Paternal Grandmother      Aneurysm Mother          eye    Dementia Mother      Colon cancer Neg Hx      Prostate cancer Neg Hx      Sarcoidosis Neg Hx       Social History     Tobacco Use    Smoking status: Never     Passive exposure: Never    Smokeless tobacco: Never   Substance Use Topics    Alcohol use: No    Drug use: Not Currently       Objective:     Physical Exam  Vitals reviewed.   Constitutional:       Appearance: Normal appearance. He is not ill-appearing.      Comments: In rolling walker   HENT:      Head: Normocephalic.   Pulmonary:      Effort: Pulmonary effort is normal. No respiratory distress.   Musculoskeletal:      Comments: R knee with improved erythema and swelling. Surgical site healing with small eschar   Skin:     Findings: No rash.   Neurological:      General: No focal deficit present.      Mental Status: He is alert.   Psychiatric:         Mood and Affect: Mood normal.         Behavior: Behavior normal.         Data:    All data, including recent labs, radiology, and pathology, has been independently reviewed.    Labs:    Recent Labs   Lab Result Units 09/09/24  1128 09/19/24  0640 10/22/24  0713 11/05/24  0722   WBC K/uL 3.80* 3.27* 2.96* 4.09   Hemoglobin g/dL 7.9* 8.6* 9.4* 10.5*   Hematocrit % 28.1* 30.2* 33.6* 38.6*   Sodium mmol/L 137 140 138 139   Potassium mmol/L 3.7 4.0 4.2 3.8   Chloride mmol/L 112* 115* 111* 109   BUN mg/dL 17 17 14 12   Creatinine mg/dL 0.8 0.7 0.9 0.9   AST U/L 47*  --  43* 54*   ALT U/L 26  --  17 26   Alkaline Phosphatase U/L 148*  --  135 146   Total Bilirubin mg/dL 1.3*  --  1.6* 1.3*   CRP mg/L 22.4*  --  36.0* 11.4*        Radiology:    No results found in the last 24 hours.     Assessment:  Ashwin Peter is a 59 year old man right total knee arthroplasty in November of 2023. Subsequently developed pain and swelling, concern  for PJI during revision on 8/1 however cultures were no growth. Discharged with empiric 6 week course of vanc and ceftriaxone but had continued serous drainage from surgical site so antibiotics changed to daptomycin and cefepime on 8/9. Returned for surgical evaluation 8/20 due to persistent wound drainage, serosanguinous material encountered, all surgical cultures no growth. Daptomycin and cefepime stopped 9/16 and changed to PO cefadroxil for empiric suppression. Went for joint aspiration on 9/19, no cell counts obtained but culture with ESBL E. Coli. It is unclear what this represents since cell counts are not available. It is surprising that after months of antibiotics an organism finally grew, and a not particularly difficult to grow organism at that. However given his unclear diagnosis it does appear to warrant treatment.      Recommendations  - continue monitoring CBC, CMP and CRP   - continue bactrim for a total of 6 months, end ~ 3/21/25 if able to tolerate    Follow up in 2 months    I spent a total of 28 minutes on the day of the visit.  This includes face to face time and non-face to face time preparing to see the patient (eg, review of tests), obtaining and/or reviewing separately obtained history, documenting clinical information in the electronic or other health record, independently interpreting results and communicating results to the patient/family/caregiver, or care coordinator.    Visit today included increased complexity associated with the care of the episodic problem prosthetic joint infection addressed and managing the longitudinal care of the patient due to the serious and/or complex managed problem(s).      Coretta Cheung MD  Infectious Disease

## 2024-12-17 ENCOUNTER — LAB VISIT (OUTPATIENT)
Dept: LAB | Facility: HOSPITAL | Age: 59
End: 2024-12-17
Attending: STUDENT IN AN ORGANIZED HEALTH CARE EDUCATION/TRAINING PROGRAM
Payer: OTHER GOVERNMENT

## 2024-12-17 DIAGNOSIS — T84.53XD INFECTION ASSOCIATED WITH INTERNAL RIGHT KNEE PROSTHESIS, SUBSEQUENT ENCOUNTER: ICD-10-CM

## 2024-12-17 LAB — FOLATE SERPL-MCNC: 10.4 NG/ML (ref 4–24)

## 2024-12-17 PROCEDURE — 36415 COLL VENOUS BLD VENIPUNCTURE: CPT | Performed by: STUDENT IN AN ORGANIZED HEALTH CARE EDUCATION/TRAINING PROGRAM

## 2024-12-17 PROCEDURE — 82746 ASSAY OF FOLIC ACID SERUM: CPT | Performed by: STUDENT IN AN ORGANIZED HEALTH CARE EDUCATION/TRAINING PROGRAM

## 2024-12-30 ENCOUNTER — LAB VISIT (OUTPATIENT)
Dept: LAB | Facility: HOSPITAL | Age: 59
End: 2024-12-30
Attending: STUDENT IN AN ORGANIZED HEALTH CARE EDUCATION/TRAINING PROGRAM
Payer: OTHER GOVERNMENT

## 2024-12-30 DIAGNOSIS — T84.53XD INFECTION ASSOCIATED WITH INTERNAL RIGHT KNEE PROSTHESIS, SUBSEQUENT ENCOUNTER: ICD-10-CM

## 2024-12-30 LAB
ALBUMIN SERPL BCP-MCNC: 2.6 G/DL (ref 3.5–5.2)
ALP SERPL-CCNC: 168 U/L (ref 40–150)
ALT SERPL W/O P-5'-P-CCNC: 29 U/L (ref 10–44)
ANION GAP SERPL CALC-SCNC: 8 MMOL/L (ref 8–16)
AST SERPL-CCNC: 62 U/L (ref 10–40)
BASOPHILS # BLD AUTO: 0.04 K/UL (ref 0–0.2)
BASOPHILS NFR BLD: 1 % (ref 0–1.9)
BILIRUB SERPL-MCNC: 1.7 MG/DL (ref 0.1–1)
BUN SERPL-MCNC: 19 MG/DL (ref 6–20)
CALCIUM SERPL-MCNC: 8.2 MG/DL (ref 8.7–10.5)
CHLORIDE SERPL-SCNC: 106 MMOL/L (ref 95–110)
CO2 SERPL-SCNC: 25 MMOL/L (ref 23–29)
CREAT SERPL-MCNC: 1.1 MG/DL (ref 0.5–1.4)
CRP SERPL-MCNC: 20.7 MG/L (ref 0–8.2)
DIFFERENTIAL METHOD BLD: ABNORMAL
EOSINOPHIL # BLD AUTO: 0.3 K/UL (ref 0–0.5)
EOSINOPHIL NFR BLD: 7.6 % (ref 0–8)
ERYTHROCYTE [DISTWIDTH] IN BLOOD BY AUTOMATED COUNT: 20.3 % (ref 11.5–14.5)
EST. GFR  (NO RACE VARIABLE): >60 ML/MIN/1.73 M^2
GLUCOSE SERPL-MCNC: 104 MG/DL (ref 70–110)
HCT VFR BLD AUTO: 43.9 % (ref 40–54)
HGB BLD-MCNC: 13.4 G/DL (ref 14–18)
IMM GRANULOCYTES # BLD AUTO: 0.01 K/UL (ref 0–0.04)
IMM GRANULOCYTES NFR BLD AUTO: 0.2 % (ref 0–0.5)
LYMPHOCYTES # BLD AUTO: 0.9 K/UL (ref 1–4.8)
LYMPHOCYTES NFR BLD: 20.9 % (ref 18–48)
MCH RBC QN AUTO: 27.5 PG (ref 27–31)
MCHC RBC AUTO-ENTMCNC: 30.5 G/DL (ref 32–36)
MCV RBC AUTO: 90 FL (ref 82–98)
MONOCYTES # BLD AUTO: 0.6 K/UL (ref 0.3–1)
MONOCYTES NFR BLD: 14.3 % (ref 4–15)
NEUTROPHILS # BLD AUTO: 2.4 K/UL (ref 1.8–7.7)
NEUTROPHILS NFR BLD: 56 % (ref 38–73)
NRBC BLD-RTO: 0 /100 WBC
PLATELET # BLD AUTO: 126 K/UL (ref 150–450)
PMV BLD AUTO: ABNORMAL FL (ref 9.2–12.9)
POTASSIUM SERPL-SCNC: 4.4 MMOL/L (ref 3.5–5.1)
PROT SERPL-MCNC: 6.7 G/DL (ref 6–8.4)
RBC # BLD AUTO: 4.88 M/UL (ref 4.6–6.2)
SODIUM SERPL-SCNC: 139 MMOL/L (ref 136–145)
WBC # BLD AUTO: 4.21 K/UL (ref 3.9–12.7)

## 2024-12-30 PROCEDURE — 80053 COMPREHEN METABOLIC PANEL: CPT | Performed by: STUDENT IN AN ORGANIZED HEALTH CARE EDUCATION/TRAINING PROGRAM

## 2024-12-30 PROCEDURE — 36415 COLL VENOUS BLD VENIPUNCTURE: CPT | Performed by: STUDENT IN AN ORGANIZED HEALTH CARE EDUCATION/TRAINING PROGRAM

## 2024-12-30 PROCEDURE — 85025 COMPLETE CBC W/AUTO DIFF WBC: CPT | Performed by: STUDENT IN AN ORGANIZED HEALTH CARE EDUCATION/TRAINING PROGRAM

## 2024-12-30 PROCEDURE — 86140 C-REACTIVE PROTEIN: CPT | Performed by: STUDENT IN AN ORGANIZED HEALTH CARE EDUCATION/TRAINING PROGRAM

## 2025-01-13 ENCOUNTER — LAB VISIT (OUTPATIENT)
Dept: LAB | Facility: HOSPITAL | Age: 60
End: 2025-01-13
Attending: STUDENT IN AN ORGANIZED HEALTH CARE EDUCATION/TRAINING PROGRAM
Payer: OTHER GOVERNMENT

## 2025-01-13 ENCOUNTER — TELEPHONE (OUTPATIENT)
Dept: INTERNAL MEDICINE | Facility: CLINIC | Age: 60
End: 2025-01-13
Payer: OTHER GOVERNMENT

## 2025-01-13 DIAGNOSIS — I89.0 ACQUIRED LYMPHEDEMA OF LOWER EXTREMITY: ICD-10-CM

## 2025-01-13 DIAGNOSIS — K74.60 LIVER CIRRHOSIS SECONDARY TO NASH: ICD-10-CM

## 2025-01-13 DIAGNOSIS — K75.81 LIVER CIRRHOSIS SECONDARY TO NASH: ICD-10-CM

## 2025-01-13 DIAGNOSIS — T84.53XD INFECTION ASSOCIATED WITH INTERNAL RIGHT KNEE PROSTHESIS, SUBSEQUENT ENCOUNTER: ICD-10-CM

## 2025-01-13 DIAGNOSIS — Z12.5 ENCOUNTER FOR PROSTATE CANCER SCREENING: ICD-10-CM

## 2025-01-13 LAB
AFP SERPL-MCNC: <2 NG/ML (ref 0–8.4)
ALBUMIN SERPL BCP-MCNC: 2.4 G/DL (ref 3.5–5.2)
ALBUMIN SERPL BCP-MCNC: 2.4 G/DL (ref 3.5–5.2)
ALP SERPL-CCNC: 152 U/L (ref 40–150)
ALP SERPL-CCNC: 152 U/L (ref 40–150)
ALT SERPL W/O P-5'-P-CCNC: 32 U/L (ref 10–44)
ALT SERPL W/O P-5'-P-CCNC: 32 U/L (ref 10–44)
ANION GAP SERPL CALC-SCNC: 6 MMOL/L (ref 8–16)
ANION GAP SERPL CALC-SCNC: 6 MMOL/L (ref 8–16)
AST SERPL-CCNC: 74 U/L (ref 10–40)
AST SERPL-CCNC: 74 U/L (ref 10–40)
BASOPHILS # BLD AUTO: 0.02 K/UL (ref 0–0.2)
BASOPHILS # BLD AUTO: 0.02 K/UL (ref 0–0.2)
BASOPHILS NFR BLD: 0.7 % (ref 0–1.9)
BASOPHILS NFR BLD: 0.7 % (ref 0–1.9)
BILIRUB DIRECT SERPL-MCNC: 0.6 MG/DL (ref 0.1–0.3)
BILIRUB SERPL-MCNC: 1 MG/DL (ref 0.1–1)
BILIRUB SERPL-MCNC: 1 MG/DL (ref 0.1–1)
BUN SERPL-MCNC: 24 MG/DL (ref 6–20)
BUN SERPL-MCNC: 24 MG/DL (ref 6–20)
CALCIUM SERPL-MCNC: 8 MG/DL (ref 8.7–10.5)
CALCIUM SERPL-MCNC: 8 MG/DL (ref 8.7–10.5)
CHLORIDE SERPL-SCNC: 110 MMOL/L (ref 95–110)
CHLORIDE SERPL-SCNC: 110 MMOL/L (ref 95–110)
CHOLEST SERPL-MCNC: 123 MG/DL (ref 120–199)
CHOLEST/HDLC SERPL: 3.6 {RATIO} (ref 2–5)
CO2 SERPL-SCNC: 20 MMOL/L (ref 23–29)
CO2 SERPL-SCNC: 20 MMOL/L (ref 23–29)
COMPLEXED PSA SERPL-MCNC: 0.3 NG/ML (ref 0–4)
CREAT SERPL-MCNC: 1.1 MG/DL (ref 0.5–1.4)
CREAT SERPL-MCNC: 1.1 MG/DL (ref 0.5–1.4)
CRP SERPL-MCNC: 21.5 MG/L (ref 0–8.2)
DIFFERENTIAL METHOD BLD: ABNORMAL
DIFFERENTIAL METHOD BLD: ABNORMAL
EOSINOPHIL # BLD AUTO: 0.3 K/UL (ref 0–0.5)
EOSINOPHIL # BLD AUTO: 0.3 K/UL (ref 0–0.5)
EOSINOPHIL NFR BLD: 10.4 % (ref 0–8)
EOSINOPHIL NFR BLD: 10.4 % (ref 0–8)
ERYTHROCYTE [DISTWIDTH] IN BLOOD BY AUTOMATED COUNT: 19.7 % (ref 11.5–14.5)
ERYTHROCYTE [DISTWIDTH] IN BLOOD BY AUTOMATED COUNT: 19.7 % (ref 11.5–14.5)
EST. GFR  (NO RACE VARIABLE): >60 ML/MIN/1.73 M^2
EST. GFR  (NO RACE VARIABLE): >60 ML/MIN/1.73 M^2
ESTIMATED AVG GLUCOSE: 74 MG/DL (ref 68–131)
GLUCOSE SERPL-MCNC: 99 MG/DL (ref 70–110)
GLUCOSE SERPL-MCNC: 99 MG/DL (ref 70–110)
HBA1C MFR BLD: 4.2 % (ref 4–5.6)
HCT VFR BLD AUTO: 39.7 % (ref 40–54)
HCT VFR BLD AUTO: 39.7 % (ref 40–54)
HDLC SERPL-MCNC: 34 MG/DL (ref 40–75)
HDLC SERPL: 27.6 % (ref 20–50)
HGB BLD-MCNC: 11.9 G/DL (ref 14–18)
HGB BLD-MCNC: 11.9 G/DL (ref 14–18)
IMM GRANULOCYTES # BLD AUTO: 0 K/UL (ref 0–0.04)
IMM GRANULOCYTES # BLD AUTO: 0 K/UL (ref 0–0.04)
IMM GRANULOCYTES NFR BLD AUTO: 0 % (ref 0–0.5)
IMM GRANULOCYTES NFR BLD AUTO: 0 % (ref 0–0.5)
INR PPP: 1.2 (ref 0.8–1.2)
LDLC SERPL CALC-MCNC: 79 MG/DL (ref 63–159)
LYMPHOCYTES # BLD AUTO: 0.5 K/UL (ref 1–4.8)
LYMPHOCYTES # BLD AUTO: 0.5 K/UL (ref 1–4.8)
LYMPHOCYTES NFR BLD: 16.3 % (ref 18–48)
LYMPHOCYTES NFR BLD: 16.3 % (ref 18–48)
MCH RBC QN AUTO: 27.9 PG (ref 27–31)
MCH RBC QN AUTO: 27.9 PG (ref 27–31)
MCHC RBC AUTO-ENTMCNC: 30 G/DL (ref 32–36)
MCHC RBC AUTO-ENTMCNC: 30 G/DL (ref 32–36)
MCV RBC AUTO: 93 FL (ref 82–98)
MCV RBC AUTO: 93 FL (ref 82–98)
MONOCYTES # BLD AUTO: 0.4 K/UL (ref 0.3–1)
MONOCYTES # BLD AUTO: 0.4 K/UL (ref 0.3–1)
MONOCYTES NFR BLD: 12.2 % (ref 4–15)
MONOCYTES NFR BLD: 12.2 % (ref 4–15)
NEUTROPHILS # BLD AUTO: 1.7 K/UL (ref 1.8–7.7)
NEUTROPHILS # BLD AUTO: 1.7 K/UL (ref 1.8–7.7)
NEUTROPHILS NFR BLD: 60.4 % (ref 38–73)
NEUTROPHILS NFR BLD: 60.4 % (ref 38–73)
NONHDLC SERPL-MCNC: 89 MG/DL
NRBC BLD-RTO: 0 /100 WBC
NRBC BLD-RTO: 0 /100 WBC
PLATELET # BLD AUTO: 77 K/UL (ref 150–450)
PLATELET # BLD AUTO: 77 K/UL (ref 150–450)
PMV BLD AUTO: ABNORMAL FL (ref 9.2–12.9)
PMV BLD AUTO: ABNORMAL FL (ref 9.2–12.9)
POTASSIUM SERPL-SCNC: 4.4 MMOL/L (ref 3.5–5.1)
POTASSIUM SERPL-SCNC: 4.4 MMOL/L (ref 3.5–5.1)
PROT SERPL-MCNC: 6 G/DL (ref 6–8.4)
PROT SERPL-MCNC: 6 G/DL (ref 6–8.4)
PROTHROMBIN TIME: 13 SEC (ref 9–12.5)
RBC # BLD AUTO: 4.27 M/UL (ref 4.6–6.2)
RBC # BLD AUTO: 4.27 M/UL (ref 4.6–6.2)
SODIUM SERPL-SCNC: 136 MMOL/L (ref 136–145)
SODIUM SERPL-SCNC: 136 MMOL/L (ref 136–145)
TRIGL SERPL-MCNC: 50 MG/DL (ref 30–150)
WBC # BLD AUTO: 2.88 K/UL (ref 3.9–12.7)
WBC # BLD AUTO: 2.88 K/UL (ref 3.9–12.7)

## 2025-01-13 PROCEDURE — 80053 COMPREHEN METABOLIC PANEL: CPT | Performed by: STUDENT IN AN ORGANIZED HEALTH CARE EDUCATION/TRAINING PROGRAM

## 2025-01-13 PROCEDURE — 36415 COLL VENOUS BLD VENIPUNCTURE: CPT | Performed by: INTERNAL MEDICINE

## 2025-01-13 PROCEDURE — 82248 BILIRUBIN DIRECT: CPT | Performed by: STUDENT IN AN ORGANIZED HEALTH CARE EDUCATION/TRAINING PROGRAM

## 2025-01-13 PROCEDURE — 84153 ASSAY OF PSA TOTAL: CPT | Performed by: INTERNAL MEDICINE

## 2025-01-13 PROCEDURE — 83036 HEMOGLOBIN GLYCOSYLATED A1C: CPT | Performed by: INTERNAL MEDICINE

## 2025-01-13 PROCEDURE — 80061 LIPID PANEL: CPT | Performed by: INTERNAL MEDICINE

## 2025-01-13 PROCEDURE — 86140 C-REACTIVE PROTEIN: CPT | Performed by: STUDENT IN AN ORGANIZED HEALTH CARE EDUCATION/TRAINING PROGRAM

## 2025-01-13 PROCEDURE — 85610 PROTHROMBIN TIME: CPT | Performed by: STUDENT IN AN ORGANIZED HEALTH CARE EDUCATION/TRAINING PROGRAM

## 2025-01-13 PROCEDURE — 82105 ALPHA-FETOPROTEIN SERUM: CPT | Performed by: STUDENT IN AN ORGANIZED HEALTH CARE EDUCATION/TRAINING PROGRAM

## 2025-01-13 PROCEDURE — 85025 COMPLETE CBC W/AUTO DIFF WBC: CPT | Performed by: STUDENT IN AN ORGANIZED HEALTH CARE EDUCATION/TRAINING PROGRAM

## 2025-01-13 NOTE — TELEPHONE ENCOUNTER
----- Message from Sergio Guo MD sent at 1/13/2025  2:27 PM CST -----  Hi  Patient has labs done , but no follow up visit? Can we get them scheduled for the follow up   Thanks!   <-- Click to add NO significant Past Surgical History

## 2025-01-13 NOTE — PROGRESS NOTES
Hi  Patient has labs done , but no follow up visit? Can we get them scheduled for the follow up   Thanks!

## 2025-01-23 ENCOUNTER — OFFICE VISIT (OUTPATIENT)
Dept: INFECTIOUS DISEASES | Facility: CLINIC | Age: 60
End: 2025-01-23
Payer: OTHER GOVERNMENT

## 2025-01-23 DIAGNOSIS — T84.50XD INFECTION OR INFLAMMATORY REACTION DUE TO INTERNAL JOINT PROSTHESIS, SUBSEQUENT ENCOUNTER: Primary | ICD-10-CM

## 2025-01-23 NOTE — PROGRESS NOTES
The patient location is: home  The chief complaint leading to consultation is: follow up on PJI    Visit type: audiovisual    Face to Face time with patient: 7 minutes  30 minutes of total time spent on the encounter, which includes face to face time and non-face to face time preparing to see the patient (eg, review of tests), Obtaining and/or reviewing separately obtained history, Documenting clinical information in the electronic or other health record, Independently interpreting results (not separately reported) and communicating results to the patient/family/caregiver, or Care coordination (not separately reported).         Each patient to whom he or she provides medical services by telemedicine is:  (1) informed of the relationship between the physician and patient and the respective role of any other health care provider with respect to management of the patient; and (2) notified that he or she may decline to receive medical services by telemedicine and may withdraw from such care at any time.    Notes:     Infectious Diseases Progress Note  Subjective:     Chief Complaint: follow up for PJI     HPI: Ashwin Peter is a 59 y.o. male with a history of obesity and right total knee arthroplasty in November of 2023.  He was evaluated in July of 2024 for significant pain of the right knee.  He was admitted and underwent revision of the right knee arthoplasty on 8/1.  Per operative note, purulence was encountered in the prepatellar bursa area.  He underwent I&D.  A decision was made not to place a spacer.  Instead, the old artifical knee was removed and a new artificial knee of the same size with antibiotic impregnated cement.  Surgical cultures were obtained which were negative.  Infectious diseases was consulted and the patient was started on empiric vancomycin and ceftriaxone.  He was re-admitted to hospital 8/8 with worsening pain in his right knee and drainage from surgical wound. Wound vac was placed, but no  surgical intervention taken. His antibiotics were changed to daptomycin and cefepime with planned end date of 9/11. He was then taken back to OR on 8/20 due to continued drainage from surgical wound. Serosanguinous fluid was noted over the patellar region as well as synovitis. Cultures were obtained which were once again negative.  He was then seen by orthopedist on 9/19 and had joint aspiration. No cell count available, however culture was sent and grew ESBL E. Coli. He was sent bactrim on 10/4.      On 10/21 he noted some improvement in his symptoms (swelling, discharge) since starting bactrim. Decided together that since this was the only positive culture, to pursue treatment with 6 month course. Today he reports some GI upset (nausea), but no vomiting or diarrhea. His pain has improved, discharge and swelling improved.     Since our last visit on 11/19[facility] He is now doing well. Tolerating bactrim without issue. It was continued while inpatient and he is taking now. No missed doses. He is working to increase his mobility and doing physical therapy. Having some increased pain due to cold weather, but no change in swelling. Feels that it is healing.       Immunization History   Administered Date(s) Administered    COVID-19, MRNA, LN-S, PF (MODERNA FULL 0.5 ML DOSE) 12/09/2021    COVID-19, MRNA, LN-S, PF (Pfizer) (Purple Cap) 01/19/2021, 02/02/2021, 02/02/2021    COVID-19, mRNA, LNP-S, PF, baylee-sucrose, 30 mcg/0.3 mL (Pfizer Ages 12+) 01/10/2025    Hepatitis A / Hepatitis B 02/12/2021, 03/17/2021, 08/16/2021    Influenza 10/08/2020    Influenza - Quadrivalent - MDCK - PF 09/21/2020    Influenza - Quadrivalent - PF *Preferred* (6 months and older) 10/25/2017, 09/17/2021, 10/25/2022, 11/16/2023    Pneumococcal Polysaccharide - 23 Valent 09/17/2021    Tdap 07/14/2014, 12/13/2023        Review of Systems   Constitutional: Negative for fever.   Musculoskeletal:  Positive for  joint pain and joint swelling.   Gastrointestinal:  Negative for diarrhea, nausea and vomiting.        Past Medical History:   Diagnosis Date    Anxiety     BMI 38.0-38.9,adult     Hyperlipidemia     Hypervolemia 07/22/2022    Multiple pulmonary nodules     Obesity     Other cirrhosis of liver 01/13/2021    Portal hypertension 02/11/2021    Rectal bleeding 02/11/2021    Sarcoidosis of lung with sarcoidosis of lymph nodes     Sleep apnea     CPAP USED    Splenomegaly 12/22/2020    Thrombocytopenia 12/22/2020     Past Surgical History:   Procedure Laterality Date    ANTERIOR CRUCIATE LIGAMENT REPAIR  2007    right knee    CLOSURE, WOUND, LOWER EXTREMITY, LEFT Right 9/19/2024    Procedure: CLOSURE, WOUND, KNEE;  Surgeon: Darnell Liao MD;  Location: [facility];  Service: Orthopedics;  Laterality: Right;    COLONOSCOPY N/A 4/8/2021    Procedure: COLONOSCOPY;  Surgeon: Jeff Olvera MD;  Location: [facility] ([address] [place] [address]);  Service: Endoscopy;  Laterality: N/A;  rectal bleeding,   2nd floor BMI 50 (354 lbs)  COVID test on 4/5/21 at [facility]    ESOPHAGOGASTRODUODENOSCOPY N/A 4/8/2021    Procedure: EGD (ESOPHAGOGASTRODUODENOSCOPY);  Surgeon: Jeff Olvera MD;  Location: [facility] ([address] [place] [address]);  Service: Endoscopy;  Laterality: N/A;  variceal screening/ labs the am of procedure  2nd floor BMI 50 (354 lbs)    ESOPHAGOGASTRODUODENOSCOPY N/A 3/31/2022    Procedure: EGD (ESOPHAGOGASTRODUODENOSCOPY);  Surgeon: Jeff Olvera MD;  [facility]  Service: Endoscopy;  Laterality: N/A;  BMI-52    Wt:375#      cirrhosis, variceal screening-labs done on 3/29-GT  vaccinated-GT    ESOPHAGOGASTRODUODENOSCOPY N/A 4/21/2023    Procedure: EGD (ESOPHAGOGASTRODUODENOSCOPY);  Surgeon: Christopher Britton MD;  [facility]  Service: Endoscopy;  Laterality: N/A;  pt requested Friday due to work schedule  ECHO PA 44-51  BMI 47.97 Wt 343 lbs  inst portal-RB  last CBC PT/INR 1/24/23 4/17/23- Precall  confirmed.    ESOPHAGOGASTRODUODENOSCOPY N/A 1/10/2024    Procedure: EGD (ESOPHAGOGASTRODUODENOSCOPY);  Surgeon: Christopher Conley MD;  Location: [address]);  Service: Endoscopy;  Laterality: N/A;    INCISION AND DRAINAGE Right 8/1/2024    Procedure: INCISION AND DRAINAGE;  Surgeon: Darnell Liao MD;  Location: [facility] OR;  Service: Orthopedics;  Laterality: Right;    INCISION AND DRAINAGE OF KNEE Right 8/20/2024    Procedure: INCISION AND DRAINAGE, KNEE WITH WOUND VAC PLACEMENT;  Surgeon: Darnell Liao MD;  Location: [facility] OR;  Service: Orthopedics;  Laterality: Right;    INCISION AND DRAINAGE OF KNEE Right 9/19/2024    Procedure: INCISION AND DRAINAGE, KNEE;  Surgeon: Darnell Liao MD;  Location: [facility];  Service: Orthopedics;  Laterality: Right;    LYMPHADENECTOMY  1985    MENISCECTOMY      left knee    NASAL SEPTUM SURGERY  1985    REVISION OF KNEE ARTHROPLASTY Right 8/1/2024    Procedure: REVISION, ARTHROPLASTY, KNEE TOTAL;  Surgeon: Darnell Liao MD;  Location: [facility];  Service: Orthopedics;  Laterality: Right;    TOTAL KNEE ARTHROPLASTY Right 11/7/2023    Procedure: ARTHROPLASTY, KNEE, TOTAL;  Surgeon: Darnell Liao MD;  Location: [facility];  Service: Orthopedics;  Laterality: Right;    WISDOM TOOTH EXTRACTION       Family History   Problem Relation Name Age of Onset    Hypertension Father      Heart attack Father  55    Diabetes Paternal Grandmother      Aneurysm Mother          eye    Dementia Mother      Colon cancer Neg Hx      Prostate cancer Neg Hx      Sarcoidosis Neg Hx       Social History     Tobacco Use    Smoking status: Never     Passive exposure: Never    Smokeless tobacco: Never   Substance Use Topics    Alcohol use: No    Drug use: Not Currently       Objective:     Physical Exam  Constitutional:       General: He is not in acute distress.  Pulmonary:      Effort: No respiratory distress.   Neurological:      Mental Status: He is alert.   Psychiatric:          Mood and Affect: Mood normal.         Behavior: Behavior normal.         Data:    All data, including recent labs, radiology, and pathology, has been independently reviewed.    Labs:    Recent Labs   Lab Result Units 11/19/24  0705 12/30/24  0659 01/13/25  0738   WBC K/uL 3.68* 4.21 2.88*  2.88*   Hemoglobin g/dL 10.8* 13.4* 11.9*  11.9*   Hematocrit % 38.0* 43.9 39.7*  39.7*   Sodium mmol/L 139 139 136  136   Potassium mmol/L 4.1 4.4 4.4  4.4   Chloride mmol/L 109 106 110  110   BUN mg/dL 20 19 24*  24*   Creatinine mg/dL 1.1 1.1 1.1  1.1   AST U/L 50* 62* 74*  74*   ALT U/L 24 29 32  32   Alkaline Phosphatase U/L 124 168* 152*  152*   Total Bilirubin mg/dL 1.7* 1.7* 1.0  1.0   CRP mg/L 11.8* 20.7* 21.5*   INR   --   --  1.2        Radiology:    No results found in the last 24 hours.     Assessment:  Ashwin Peter is a 59 year old man right total knee arthroplasty in November of 2023. Subsequently developed pain and swelling, concern for PJI during revision on 8/1 however cultures were no growth. Discharged with empiric 6 week course of vanc and ceftriaxone but had continued serous drainage from surgical site so antibiotics changed to daptomycin and cefepime on 8/9. Returned for surgical evaluation 8/20 due to persistent wound drainage, serosanguinous material encountered, all surgical cultures no growth. Daptomycin and cefepime stopped 9/16 and changed to PO cefadroxil for empiric suppression. Went for joint aspiration on 9/19, no cell counts obtained but culture with ESBL E. Coli. It is unclear what this represents since cell counts are not available. It is surprising that after months of antibiotics an organism finally grew, and a not particularly difficult to grow organism at that. However given his unclear diagnosis it does appear to warrant treatment.      Recommendations  - continue monitoring CBC, CMP and CRP   - continue bactrim for a total of 6 months, end ~ 3/21/25 if able to  tolerate    Follow up in 2 months    I spent a total of 30 minutes on the day of the visit.  This includes face to face time and non-face to face time preparing to see the patient (eg, review of tests), obtaining and/or reviewing separately obtained history, documenting clinical information in the electronic or other health record, independently interpreting results and communicating results to the patient/family/caregiver, or care coordinator.    Visit today included increased complexity associated with the care of the episodic problem prosthetic joint infection addressed and managing the longitudinal care of the patient due to the serious and/or complex managed problem(s).      Coretta Cheung MD  Infectious Disease

## 2025-01-28 ENCOUNTER — LAB VISIT (OUTPATIENT)
Dept: LAB | Facility: HOSPITAL | Age: 60
End: 2025-01-28
Attending: STUDENT IN AN ORGANIZED HEALTH CARE EDUCATION/TRAINING PROGRAM
Payer: OTHER GOVERNMENT

## 2025-01-28 DIAGNOSIS — T84.53XD INFECTION ASSOCIATED WITH INTERNAL RIGHT KNEE PROSTHESIS, SUBSEQUENT ENCOUNTER: ICD-10-CM

## 2025-01-28 LAB
ALBUMIN SERPL BCP-MCNC: 2.5 G/DL (ref 3.5–5.2)
ALP SERPL-CCNC: 143 U/L (ref 40–150)
ALT SERPL W/O P-5'-P-CCNC: 33 U/L (ref 10–44)
ANION GAP SERPL CALC-SCNC: 8 MMOL/L (ref 8–16)
AST SERPL-CCNC: 62 U/L (ref 10–40)
BASOPHILS # BLD AUTO: 0.04 K/UL (ref 0–0.2)
BASOPHILS NFR BLD: 0.9 % (ref 0–1.9)
BILIRUB SERPL-MCNC: 1.8 MG/DL (ref 0.1–1)
BUN SERPL-MCNC: 14 MG/DL (ref 6–20)
CALCIUM SERPL-MCNC: 7.9 MG/DL (ref 8.7–10.5)
CHLORIDE SERPL-SCNC: 109 MMOL/L (ref 95–110)
CO2 SERPL-SCNC: 21 MMOL/L (ref 23–29)
CREAT SERPL-MCNC: 0.9 MG/DL (ref 0.5–1.4)
CRP SERPL-MCNC: 11.9 MG/L (ref 0–8.2)
DIFFERENTIAL METHOD BLD: ABNORMAL
EOSINOPHIL # BLD AUTO: 0.3 K/UL (ref 0–0.5)
EOSINOPHIL NFR BLD: 6.3 % (ref 0–8)
ERYTHROCYTE [DISTWIDTH] IN BLOOD BY AUTOMATED COUNT: 18.8 % (ref 11.5–14.5)
EST. GFR  (NO RACE VARIABLE): >60 ML/MIN/1.73 M^2
GLUCOSE SERPL-MCNC: 71 MG/DL (ref 70–110)
HCT VFR BLD AUTO: 42.8 % (ref 40–54)
HGB BLD-MCNC: 12.8 G/DL (ref 14–18)
IMM GRANULOCYTES # BLD AUTO: 0.01 K/UL (ref 0–0.04)
IMM GRANULOCYTES NFR BLD AUTO: 0.2 % (ref 0–0.5)
LYMPHOCYTES # BLD AUTO: 1 K/UL (ref 1–4.8)
LYMPHOCYTES NFR BLD: 22.2 % (ref 18–48)
MCH RBC QN AUTO: 28 PG (ref 27–31)
MCHC RBC AUTO-ENTMCNC: 29.9 G/DL (ref 32–36)
MCV RBC AUTO: 94 FL (ref 82–98)
MONOCYTES # BLD AUTO: 0.6 K/UL (ref 0.3–1)
MONOCYTES NFR BLD: 13.9 % (ref 4–15)
NEUTROPHILS # BLD AUTO: 2.6 K/UL (ref 1.8–7.7)
NEUTROPHILS NFR BLD: 56.5 % (ref 38–73)
NRBC BLD-RTO: 0 /100 WBC
PLATELET # BLD AUTO: 85 K/UL (ref 150–450)
PMV BLD AUTO: ABNORMAL FL (ref 9.2–12.9)
POTASSIUM SERPL-SCNC: 4.1 MMOL/L (ref 3.5–5.1)
PROT SERPL-MCNC: 6.5 G/DL (ref 6–8.4)
RBC # BLD AUTO: 4.57 M/UL (ref 4.6–6.2)
SODIUM SERPL-SCNC: 138 MMOL/L (ref 136–145)
WBC # BLD AUTO: 4.59 K/UL (ref 3.9–12.7)

## 2025-01-28 PROCEDURE — 85025 COMPLETE CBC W/AUTO DIFF WBC: CPT | Performed by: STUDENT IN AN ORGANIZED HEALTH CARE EDUCATION/TRAINING PROGRAM

## 2025-01-28 PROCEDURE — 36415 COLL VENOUS BLD VENIPUNCTURE: CPT | Performed by: STUDENT IN AN ORGANIZED HEALTH CARE EDUCATION/TRAINING PROGRAM

## 2025-01-28 PROCEDURE — 86140 C-REACTIVE PROTEIN: CPT | Performed by: STUDENT IN AN ORGANIZED HEALTH CARE EDUCATION/TRAINING PROGRAM

## 2025-01-28 PROCEDURE — 80053 COMPREHEN METABOLIC PANEL: CPT | Performed by: STUDENT IN AN ORGANIZED HEALTH CARE EDUCATION/TRAINING PROGRAM

## 2025-02-11 ENCOUNTER — LAB VISIT (OUTPATIENT)
Dept: LAB | Facility: HOSPITAL | Age: 60
End: 2025-02-11
Attending: STUDENT IN AN ORGANIZED HEALTH CARE EDUCATION/TRAINING PROGRAM

## 2025-02-11 DIAGNOSIS — T84.53XD INFECTION ASSOCIATED WITH INTERNAL RIGHT KNEE PROSTHESIS, SUBSEQUENT ENCOUNTER: ICD-10-CM

## 2025-02-11 LAB
ALBUMIN SERPL BCP-MCNC: 2.6 G/DL (ref 3.5–5.2)
ALP SERPL-CCNC: 119 U/L (ref 40–150)
ALT SERPL W/O P-5'-P-CCNC: 31 U/L (ref 10–44)
ANION GAP SERPL CALC-SCNC: 7 MMOL/L (ref 8–16)
AST SERPL-CCNC: 58 U/L (ref 10–40)
BASOPHILS # BLD AUTO: 0.04 K/UL (ref 0–0.2)
BASOPHILS NFR BLD: 1 % (ref 0–1.9)
BILIRUB SERPL-MCNC: 1.6 MG/DL (ref 0.1–1)
BUN SERPL-MCNC: 17 MG/DL (ref 6–20)
CALCIUM SERPL-MCNC: 8.2 MG/DL (ref 8.7–10.5)
CHLORIDE SERPL-SCNC: 108 MMOL/L (ref 95–110)
CO2 SERPL-SCNC: 20 MMOL/L (ref 23–29)
CREAT SERPL-MCNC: 0.9 MG/DL (ref 0.5–1.4)
CRP SERPL-MCNC: 17.8 MG/L (ref 0–8.2)
DIFFERENTIAL METHOD BLD: ABNORMAL
EOSINOPHIL # BLD AUTO: 0.2 K/UL (ref 0–0.5)
EOSINOPHIL NFR BLD: 6.2 % (ref 0–8)
ERYTHROCYTE [DISTWIDTH] IN BLOOD BY AUTOMATED COUNT: 18.7 % (ref 11.5–14.5)
EST. GFR  (NO RACE VARIABLE): >60 ML/MIN/1.73 M^2
GLUCOSE SERPL-MCNC: 71 MG/DL (ref 70–110)
HCT VFR BLD AUTO: 42.8 % (ref 40–54)
HGB BLD-MCNC: 12.6 G/DL (ref 14–18)
IMM GRANULOCYTES # BLD AUTO: 0.01 K/UL (ref 0–0.04)
IMM GRANULOCYTES NFR BLD AUTO: 0.3 % (ref 0–0.5)
LYMPHOCYTES # BLD AUTO: 0.8 K/UL (ref 1–4.8)
LYMPHOCYTES NFR BLD: 19.9 % (ref 18–48)
MCH RBC QN AUTO: 27.6 PG (ref 27–31)
MCHC RBC AUTO-ENTMCNC: 29.4 G/DL (ref 32–36)
MCV RBC AUTO: 94 FL (ref 82–98)
MONOCYTES # BLD AUTO: 0.7 K/UL (ref 0.3–1)
MONOCYTES NFR BLD: 17.6 % (ref 4–15)
NEUTROPHILS # BLD AUTO: 2.1 K/UL (ref 1.8–7.7)
NEUTROPHILS NFR BLD: 55 % (ref 38–73)
NRBC BLD-RTO: 0 /100 WBC
PLATELET # BLD AUTO: 134 K/UL (ref 150–450)
PMV BLD AUTO: 12.5 FL (ref 9.2–12.9)
POTASSIUM SERPL-SCNC: 4.5 MMOL/L (ref 3.5–5.1)
PROT SERPL-MCNC: 6.1 G/DL (ref 6–8.4)
RBC # BLD AUTO: 4.57 M/UL (ref 4.6–6.2)
SODIUM SERPL-SCNC: 135 MMOL/L (ref 136–145)
WBC # BLD AUTO: 3.87 K/UL (ref 3.9–12.7)

## 2025-02-11 PROCEDURE — 85025 COMPLETE CBC W/AUTO DIFF WBC: CPT | Performed by: STUDENT IN AN ORGANIZED HEALTH CARE EDUCATION/TRAINING PROGRAM

## 2025-02-11 PROCEDURE — 36415 COLL VENOUS BLD VENIPUNCTURE: CPT | Performed by: STUDENT IN AN ORGANIZED HEALTH CARE EDUCATION/TRAINING PROGRAM

## 2025-02-11 PROCEDURE — 80053 COMPREHEN METABOLIC PANEL: CPT | Performed by: STUDENT IN AN ORGANIZED HEALTH CARE EDUCATION/TRAINING PROGRAM

## 2025-02-11 PROCEDURE — 86140 C-REACTIVE PROTEIN: CPT | Performed by: STUDENT IN AN ORGANIZED HEALTH CARE EDUCATION/TRAINING PROGRAM

## 2025-02-24 DIAGNOSIS — J32.0 CHRONIC MAXILLARY SINUSITIS: Primary | ICD-10-CM

## 2025-02-25 ENCOUNTER — LAB VISIT (OUTPATIENT)
Dept: LAB | Facility: HOSPITAL | Age: 60
End: 2025-02-25
Attending: STUDENT IN AN ORGANIZED HEALTH CARE EDUCATION/TRAINING PROGRAM
Payer: OTHER GOVERNMENT

## 2025-02-25 DIAGNOSIS — T84.53XD INFECTION ASSOCIATED WITH INTERNAL RIGHT KNEE PROSTHESIS, SUBSEQUENT ENCOUNTER: ICD-10-CM

## 2025-02-25 LAB
ALBUMIN SERPL BCP-MCNC: 2.6 G/DL (ref 3.5–5.2)
ALP SERPL-CCNC: 122 U/L (ref 40–150)
ALT SERPL W/O P-5'-P-CCNC: 31 U/L (ref 10–44)
ANION GAP SERPL CALC-SCNC: 7 MMOL/L (ref 8–16)
AST SERPL-CCNC: 70 U/L (ref 10–40)
BASOPHILS # BLD AUTO: 0.04 K/UL (ref 0–0.2)
BASOPHILS NFR BLD: 1 % (ref 0–1.9)
BILIRUB SERPL-MCNC: 1.7 MG/DL (ref 0.1–1)
BUN SERPL-MCNC: 14 MG/DL (ref 6–20)
CALCIUM SERPL-MCNC: 8.5 MG/DL (ref 8.7–10.5)
CHLORIDE SERPL-SCNC: 108 MMOL/L (ref 95–110)
CO2 SERPL-SCNC: 21 MMOL/L (ref 23–29)
CREAT SERPL-MCNC: 0.8 MG/DL (ref 0.5–1.4)
CRP SERPL-MCNC: 10.9 MG/L (ref 0–8.2)
DIFFERENTIAL METHOD BLD: ABNORMAL
EOSINOPHIL # BLD AUTO: 0.3 K/UL (ref 0–0.5)
EOSINOPHIL NFR BLD: 7.3 % (ref 0–8)
ERYTHROCYTE [DISTWIDTH] IN BLOOD BY AUTOMATED COUNT: 19 % (ref 11.5–14.5)
EST. GFR  (NO RACE VARIABLE): >60 ML/MIN/1.73 M^2
GLUCOSE SERPL-MCNC: 80 MG/DL (ref 70–110)
HCT VFR BLD AUTO: 41.2 % (ref 40–54)
HGB BLD-MCNC: 12.7 G/DL (ref 14–18)
IMM GRANULOCYTES # BLD AUTO: 0.01 K/UL (ref 0–0.04)
IMM GRANULOCYTES NFR BLD AUTO: 0.2 % (ref 0–0.5)
LYMPHOCYTES # BLD AUTO: 0.9 K/UL (ref 1–4.8)
LYMPHOCYTES NFR BLD: 21.1 % (ref 18–48)
MCH RBC QN AUTO: 28.2 PG (ref 27–31)
MCHC RBC AUTO-ENTMCNC: 30.8 G/DL (ref 32–36)
MCV RBC AUTO: 92 FL (ref 82–98)
MONOCYTES # BLD AUTO: 0.6 K/UL (ref 0.3–1)
MONOCYTES NFR BLD: 14.5 % (ref 4–15)
NEUTROPHILS # BLD AUTO: 2.3 K/UL (ref 1.8–7.7)
NEUTROPHILS NFR BLD: 55.9 % (ref 38–73)
NRBC BLD-RTO: 0 /100 WBC
PLATELET # BLD AUTO: 82 K/UL (ref 150–450)
PMV BLD AUTO: ABNORMAL FL (ref 9.2–12.9)
POTASSIUM SERPL-SCNC: 4 MMOL/L (ref 3.5–5.1)
PROT SERPL-MCNC: 6.2 G/DL (ref 6–8.4)
RBC # BLD AUTO: 4.5 M/UL (ref 4.6–6.2)
SODIUM SERPL-SCNC: 136 MMOL/L (ref 136–145)
WBC # BLD AUTO: 4.13 K/UL (ref 3.9–12.7)

## 2025-02-25 PROCEDURE — 80053 COMPREHEN METABOLIC PANEL: CPT | Performed by: STUDENT IN AN ORGANIZED HEALTH CARE EDUCATION/TRAINING PROGRAM

## 2025-02-25 PROCEDURE — 86140 C-REACTIVE PROTEIN: CPT | Performed by: STUDENT IN AN ORGANIZED HEALTH CARE EDUCATION/TRAINING PROGRAM

## 2025-02-25 PROCEDURE — 85025 COMPLETE CBC W/AUTO DIFF WBC: CPT | Performed by: STUDENT IN AN ORGANIZED HEALTH CARE EDUCATION/TRAINING PROGRAM

## 2025-02-25 PROCEDURE — 36415 COLL VENOUS BLD VENIPUNCTURE: CPT | Performed by: STUDENT IN AN ORGANIZED HEALTH CARE EDUCATION/TRAINING PROGRAM

## 2025-02-26 ENCOUNTER — RESULTS FOLLOW-UP (OUTPATIENT)
Dept: INFECTIOUS DISEASES | Facility: CLINIC | Age: 60
End: 2025-02-26

## 2025-03-10 ENCOUNTER — OFFICE VISIT (OUTPATIENT)
Dept: INFECTIOUS DISEASES | Facility: CLINIC | Age: 60
End: 2025-03-10
Payer: OTHER GOVERNMENT

## 2025-03-10 VITALS
TEMPERATURE: 97 F | SYSTOLIC BLOOD PRESSURE: 129 MMHG | DIASTOLIC BLOOD PRESSURE: 67 MMHG | HEIGHT: 71 IN | WEIGHT: 306 LBS | BODY MASS INDEX: 42.84 KG/M2 | HEART RATE: 87 BPM

## 2025-03-10 DIAGNOSIS — T84.53XD INFECTION ASSOCIATED WITH INTERNAL RIGHT KNEE PROSTHESIS, SUBSEQUENT ENCOUNTER: Primary | ICD-10-CM

## 2025-03-10 PROCEDURE — 99999 PR PBB SHADOW E&M-EST. PATIENT-LVL IV: CPT | Mod: PBBFAC,,, | Performed by: STUDENT IN AN ORGANIZED HEALTH CARE EDUCATION/TRAINING PROGRAM

## 2025-03-10 PROCEDURE — G2211 COMPLEX E/M VISIT ADD ON: HCPCS | Mod: S$PBB,,, | Performed by: STUDENT IN AN ORGANIZED HEALTH CARE EDUCATION/TRAINING PROGRAM

## 2025-03-10 PROCEDURE — 99214 OFFICE O/P EST MOD 30 MIN: CPT | Mod: PBBFAC | Performed by: STUDENT IN AN ORGANIZED HEALTH CARE EDUCATION/TRAINING PROGRAM

## 2025-03-10 PROCEDURE — 99212 OFFICE O/P EST SF 10 MIN: CPT | Mod: S$PBB,,, | Performed by: STUDENT IN AN ORGANIZED HEALTH CARE EDUCATION/TRAINING PROGRAM

## 2025-03-11 ENCOUNTER — LAB VISIT (OUTPATIENT)
Dept: LAB | Facility: HOSPITAL | Age: 60
End: 2025-03-11
Attending: STUDENT IN AN ORGANIZED HEALTH CARE EDUCATION/TRAINING PROGRAM
Payer: OTHER GOVERNMENT

## 2025-03-11 DIAGNOSIS — T84.53XD INFECTION ASSOCIATED WITH INTERNAL RIGHT KNEE PROSTHESIS, SUBSEQUENT ENCOUNTER: ICD-10-CM

## 2025-03-11 LAB
ALBUMIN SERPL BCP-MCNC: 2.6 G/DL (ref 3.5–5.2)
ALP SERPL-CCNC: 106 U/L (ref 40–150)
ALT SERPL W/O P-5'-P-CCNC: 31 U/L (ref 10–44)
ANION GAP SERPL CALC-SCNC: 6 MMOL/L (ref 8–16)
AST SERPL-CCNC: 45 U/L (ref 10–40)
BASOPHILS # BLD AUTO: 0.04 K/UL (ref 0–0.2)
BASOPHILS NFR BLD: 0.8 % (ref 0–1.9)
BILIRUB SERPL-MCNC: 1.9 MG/DL (ref 0.1–1)
BUN SERPL-MCNC: 16 MG/DL (ref 6–20)
CALCIUM SERPL-MCNC: 8.2 MG/DL (ref 8.7–10.5)
CHLORIDE SERPL-SCNC: 112 MMOL/L (ref 95–110)
CO2 SERPL-SCNC: 21 MMOL/L (ref 23–29)
CREAT SERPL-MCNC: 0.9 MG/DL (ref 0.5–1.4)
CRP SERPL-MCNC: 1.5 MG/L (ref 0–8.2)
DIFFERENTIAL METHOD BLD: ABNORMAL
EOSINOPHIL # BLD AUTO: 0.2 K/UL (ref 0–0.5)
EOSINOPHIL NFR BLD: 3.4 % (ref 0–8)
ERYTHROCYTE [DISTWIDTH] IN BLOOD BY AUTOMATED COUNT: 18.4 % (ref 11.5–14.5)
EST. GFR  (NO RACE VARIABLE): >60 ML/MIN/1.73 M^2
GLUCOSE SERPL-MCNC: 79 MG/DL (ref 70–110)
HCT VFR BLD AUTO: 41.9 % (ref 40–54)
HGB BLD-MCNC: 12.7 G/DL (ref 14–18)
IMM GRANULOCYTES # BLD AUTO: 0.01 K/UL (ref 0–0.04)
IMM GRANULOCYTES NFR BLD AUTO: 0.2 % (ref 0–0.5)
LYMPHOCYTES # BLD AUTO: 1.2 K/UL (ref 1–4.8)
LYMPHOCYTES NFR BLD: 23.3 % (ref 18–48)
MCH RBC QN AUTO: 28 PG (ref 27–31)
MCHC RBC AUTO-ENTMCNC: 30.3 G/DL (ref 32–36)
MCV RBC AUTO: 92 FL (ref 82–98)
MONOCYTES # BLD AUTO: 0.6 K/UL (ref 0.3–1)
MONOCYTES NFR BLD: 11.9 % (ref 4–15)
NEUTROPHILS # BLD AUTO: 3 K/UL (ref 1.8–7.7)
NEUTROPHILS NFR BLD: 60.4 % (ref 38–73)
NRBC BLD-RTO: 0 /100 WBC
PLATELET # BLD AUTO: 91 K/UL (ref 150–450)
PMV BLD AUTO: ABNORMAL FL (ref 9.2–12.9)
POTASSIUM SERPL-SCNC: 4.2 MMOL/L (ref 3.5–5.1)
PROT SERPL-MCNC: 5.7 G/DL (ref 6–8.4)
RBC # BLD AUTO: 4.54 M/UL (ref 4.6–6.2)
SODIUM SERPL-SCNC: 139 MMOL/L (ref 136–145)
WBC # BLD AUTO: 4.94 K/UL (ref 3.9–12.7)

## 2025-03-11 PROCEDURE — 86140 C-REACTIVE PROTEIN: CPT | Performed by: STUDENT IN AN ORGANIZED HEALTH CARE EDUCATION/TRAINING PROGRAM

## 2025-03-11 PROCEDURE — 80053 COMPREHEN METABOLIC PANEL: CPT | Performed by: STUDENT IN AN ORGANIZED HEALTH CARE EDUCATION/TRAINING PROGRAM

## 2025-03-11 PROCEDURE — 36415 COLL VENOUS BLD VENIPUNCTURE: CPT | Performed by: STUDENT IN AN ORGANIZED HEALTH CARE EDUCATION/TRAINING PROGRAM

## 2025-03-11 PROCEDURE — 85025 COMPLETE CBC W/AUTO DIFF WBC: CPT | Performed by: STUDENT IN AN ORGANIZED HEALTH CARE EDUCATION/TRAINING PROGRAM

## 2025-03-25 ENCOUNTER — LAB VISIT (OUTPATIENT)
Dept: LAB | Facility: HOSPITAL | Age: 60
End: 2025-03-25
Attending: STUDENT IN AN ORGANIZED HEALTH CARE EDUCATION/TRAINING PROGRAM
Payer: OTHER GOVERNMENT

## 2025-03-25 DIAGNOSIS — T84.53XD INFECTION ASSOCIATED WITH INTERNAL RIGHT KNEE PROSTHESIS, SUBSEQUENT ENCOUNTER: ICD-10-CM

## 2025-03-25 LAB
ABSOLUTE EOSINOPHIL (OHS): 0.17 K/UL
ABSOLUTE MONOCYTE (OHS): 0.53 K/UL (ref 0.3–1)
ABSOLUTE NEUTROPHIL COUNT (OHS): 1.92 K/UL (ref 1.8–7.7)
ALBUMIN SERPL BCP-MCNC: 2.7 G/DL (ref 3.5–5.2)
ALP SERPL-CCNC: 108 UNIT/L (ref 40–150)
ALT SERPL W/O P-5'-P-CCNC: 27 UNIT/L (ref 10–44)
ANION GAP (OHS): 6 MMOL/L (ref 8–16)
AST SERPL-CCNC: 41 UNIT/L (ref 11–45)
BASOPHILS # BLD AUTO: 0.02 K/UL
BASOPHILS NFR BLD AUTO: 0.6 %
BILIRUB SERPL-MCNC: 1.6 MG/DL (ref 0.1–1)
BUN SERPL-MCNC: 22 MG/DL (ref 6–20)
CALCIUM SERPL-MCNC: 8.1 MG/DL (ref 8.7–10.5)
CHLORIDE SERPL-SCNC: 112 MMOL/L (ref 95–110)
CO2 SERPL-SCNC: 20 MMOL/L (ref 23–29)
CREAT SERPL-MCNC: 1 MG/DL (ref 0.5–1.4)
CRP SERPL-MCNC: 3 MG/L
ERYTHROCYTE [DISTWIDTH] IN BLOOD BY AUTOMATED COUNT: 18.6 % (ref 11.5–14.5)
GFR SERPLBLD CREATININE-BSD FMLA CKD-EPI: >60 ML/MIN/1.73/M2
GLUCOSE SERPL-MCNC: 81 MG/DL (ref 70–110)
HCT VFR BLD AUTO: 41.1 % (ref 40–54)
HGB BLD-MCNC: 12.1 GM/DL (ref 14–18)
IMM GRANULOCYTES # BLD AUTO: 0.01 K/UL (ref 0–0.04)
IMM GRANULOCYTES NFR BLD AUTO: 0.3 % (ref 0–0.5)
LYMPHOCYTES # BLD AUTO: 0.83 K/UL (ref 1–4.8)
MCH RBC QN AUTO: 27.3 PG (ref 27–50)
MCHC RBC AUTO-ENTMCNC: 29.4 G/DL (ref 32–36)
MCV RBC AUTO: 93 FL (ref 82–98)
NUCLEATED RBC (/100WBC) (OHS): 0 /100 WBC
PLATELET # BLD AUTO: 75 K/UL (ref 150–450)
PMV BLD AUTO: ABNORMAL FL
POTASSIUM SERPL-SCNC: 4.1 MMOL/L (ref 3.5–5.1)
PROT SERPL-MCNC: 5.9 GM/DL (ref 6–8.4)
RBC # BLD AUTO: 4.44 M/UL (ref 4.6–6.2)
RELATIVE EOSINOPHIL (OHS): 4.9 %
RELATIVE LYMPHOCYTE (OHS): 23.9 % (ref 18–48)
RELATIVE MONOCYTE (OHS): 15.2 % (ref 4–15)
RELATIVE NEUTROPHIL (OHS): 55.1 % (ref 38–73)
SODIUM SERPL-SCNC: 138 MMOL/L (ref 136–145)
WBC # BLD AUTO: 3.48 K/UL (ref 3.9–12.7)

## 2025-03-25 PROCEDURE — 84075 ASSAY ALKALINE PHOSPHATASE: CPT

## 2025-03-25 PROCEDURE — 85025 COMPLETE CBC W/AUTO DIFF WBC: CPT

## 2025-03-25 PROCEDURE — 36415 COLL VENOUS BLD VENIPUNCTURE: CPT

## 2025-03-25 PROCEDURE — 86140 C-REACTIVE PROTEIN: CPT

## 2025-05-09 ENCOUNTER — OFFICE VISIT (OUTPATIENT)
Dept: OTOLARYNGOLOGY | Facility: CLINIC | Age: 60
End: 2025-05-09
Payer: OTHER GOVERNMENT

## 2025-05-09 VITALS — DIASTOLIC BLOOD PRESSURE: 76 MMHG | SYSTOLIC BLOOD PRESSURE: 115 MMHG

## 2025-05-09 DIAGNOSIS — J34.2 NASAL SEPTAL DEVIATION: Primary | ICD-10-CM

## 2025-05-09 DIAGNOSIS — J34.3 HYPERTROPHY OF BOTH INFERIOR NASAL TURBINATES: ICD-10-CM

## 2025-05-09 DIAGNOSIS — J32.0 CHRONIC MAXILLARY SINUSITIS: ICD-10-CM

## 2025-05-09 PROCEDURE — 99999 PR PBB SHADOW E&M-EST. PATIENT-LVL III: CPT | Mod: PBBFAC,,, | Performed by: STUDENT IN AN ORGANIZED HEALTH CARE EDUCATION/TRAINING PROGRAM

## 2025-05-09 PROCEDURE — 99213 OFFICE O/P EST LOW 20 MIN: CPT | Mod: PBBFAC | Performed by: STUDENT IN AN ORGANIZED HEALTH CARE EDUCATION/TRAINING PROGRAM

## 2025-05-09 PROCEDURE — 87070 CULTURE OTHR SPECIMN AEROBIC: CPT | Performed by: STUDENT IN AN ORGANIZED HEALTH CARE EDUCATION/TRAINING PROGRAM

## 2025-05-09 PROCEDURE — 87075 CULTR BACTERIA EXCEPT BLOOD: CPT | Performed by: STUDENT IN AN ORGANIZED HEALTH CARE EDUCATION/TRAINING PROGRAM

## 2025-05-09 NOTE — PROGRESS NOTES
Otolaryngology - Head and Neck Surgery New Patient Visit    5/9/2025    Referring Provider: , [facility] -*    No chief complaint on file.    History of Present Illness, Otolaryngology Specialty-Specific Exam, and Assessment and Plan:     Ashwin Peter is a 59 y.o. male who presents for evaluation of recurrent sinus infections, which has been present for over 10 years. He complains of bilateral facial pressure mostly under his eyes the traverses temporal region.  Reports episodes of acute sinusitis and bronchitis causing worsening of shortness of breath.  He does report more recent shortness of breath fatigue over the last 2 years.  Has trouble catching his breath when walking.. He denies epistaxis, vision change, anosmia, or clear nasal drainage. He has been treated with astelin & floanse in the past. Has recent hx of infected knee implant and has been on bactrim recently.  Also recently diagnosed with sarcoidosis.  He has had allergy testing done in the past and was told he is allergic to grasses and cockroaches. He denies previous skullbase surgery. He has a history of obstructive sleep apnea and uses a CPAP. The assessment of quality and severity of symptoms as measured by the SNOT-22 score: : (Patient-Rptd) (P) 60    Past Medical History:   Diagnosis Date    Anxiety     BMI 38.0-38.9,adult     Hyperlipidemia     Hypervolemia 07/22/2022    Multiple pulmonary nodules     Obesity     Other cirrhosis of liver 01/13/2021    Portal hypertension 02/11/2021    Rectal bleeding 02/11/2021    Sarcoidosis of lung with sarcoidosis of lymph nodes     Sleep apnea     CPAP USED    Splenomegaly 12/22/2020    Thrombocytopenia 12/22/2020     He had a CTH done on 1/9/24 which I reviewed along with the associated radiology report.    On exam today, the ears are normal. The oral cavity is clear. The neck is clear. The nasopharynx, hypopharynx, and larynx are normal. Nasal endoscopy reveals scant purulent  drainage noted in the bilateral middle meatus.  No nasal masses or nasal polyposis.  Slight septal deviation.  Hypertrophic inferior turbinates bilaterally.  Adenoid pad absent.  Eustachian tube normal.    Impression today is chronic recurrent sinusitis.  Cultures of the sinuses were obtained today.  I will based amount antibiotic recommendations from this.  He will likely benefit from topical [facility].    He will follow up with me in 3 months.  If still no improvement can consider immune testing.     Thank you for allowing us to participate in the care of your patient. We will continue to keep you informed of his progress.    Sincerely yours,    Patel Lo MD      Objective     Physical Examination  Vitals -  vitals were not taken for this visit.   Constitutional - General appearance: Normal. Ability to communicate: Normal.  Head & Face - Overall appearance, scars, masses: Normal. Palpation &/or percussion of face: Normal. Salivary glands: Normal. Facial strength: Normal  ENMT - Otoscopic exam: Normal. Assessment of hearing: Normal. External inspection: Normal. Nasal mucosa, septum, turbinates: Abnormal see exam details. Lips, teeth, gums: Normal. Oropharynx: Normal. Pharyngeal walls/pyriform sinus: Normal. Larynx: Normal. Nasopharynx: Normal  Neck - Neck: Normal. Thyroid: Normal  Lymphatic - Palpation of lymph nodes: Normal  Eyes - Ocular mobility: Normal  Respiratory - Inspection of Chest: Normal  Cardiovascular - Peripheral vascular system: Normal  Neurological/Psychiatric - Orientation: Normal    Review of Systems  Review of Systems   Constitutional:  Positive for malaise/fatigue.   HENT:  Positive for ear discharge and hearing loss.    Respiratory:  Positive for cough and shortness of breath.    Skin: Negative.    Neurological:  Positive for dizziness.   Endo/Heme/Allergies:  Bruises/bleeds easily.   Psychiatric/Behavioral:  The patient is nervous/anxious.        Answers submitted by the patient for this visit:  Review of Symptoms Questionnaire  (Submitted on 5/7/2025)  sinus pressure : Yes  postnasal drip: Yes  eye itching: Yes  Snoring?: Yes  Sleep Apnea?: Yes  Foot swelling?: Yes  Acid Reflux?: Yes  Urinating too frequently?: Yes  Seasonal Allergies?: Yes  Cold all of the time? : Yes  Light-headedness: Yes  decreased concentration: Yes  sleep disturbance: Yes    A complete review of systems was obtained 05/09/2025 and reviewed.  The review of systems is negative for symptoms except as described above.    There were no vitals taken for this visit.     Nasal Endoscopy:  5/9/2025    The use of diagnostic nasal endoscopy was considered medically necessary for the evaluation and visualization of the nasal anatomy for symptoms suggestive of nasal or sinus origin. Physical examination (including a nasal speculum evaluation) did not provide sufficient clinical information to establish a diagnosis, or symptoms did not improve or worsened following treatment.     The nasal cavity was decongested with topical 1% phenylephrine and anesthetized with 4% lidocaine.  A rigid 0-degree endoscope was introduced into the nasal cavity.    The patient was seated in the examination chair. After discussion of risks and benefits, a nasal endoscope was inserted into the nose the endoscope was passed along the left nasal floor to the nasopharynx. It was then passed between the middle and superior meatus, nasal turbinates, nasal septum, nasopharynx and sphenoethmoid region. The nasal endoscope was withdrawn and there was no complications. An identical procedure was performed on the right side. I was present for the entire procedure.The patient tolerated the above procedure well. The findings of this procedure can be found in the dictated note from 5/9/2025 visit.                            Data Reviewed    WBC (K/uL)   Date Value   03/25/2025 3.48 (L)     Eosinophil % (%)   Date Value   03/11/2025  "3.4     Eos # (K/uL)   Date Value   03/25/2025 0.17   03/11/2025 0.2     Eos % (%)   Date Value   03/25/2025 4.9     Platelet Count (K/uL)   Date Value   03/25/2025 75 (L)     Platelets (K/uL)   Date Value   03/11/2025 91 (L)     Glucose (mg/dL)   Date Value   03/25/2025 81   03/11/2025 79     No results found for: "IGE"    I independently reviewed the images of the CT sinuses dated 1/9/24. Pertinent findings include Right septal deviation.  Patchy opacification frontal sinuses bilaterally.  Patchy ethmoid sinus opacification bilaterally.  Narrowing of the OMC's bilaterally.  With mucosal thickening in the right maxillary sinus right-greater-than-left.  Partial opacification of the bilateral sphenoid sinuses..   "

## 2025-05-10 LAB
BACTERIA SPEC AEROBE CULT: NORMAL
BACTERIA SPEC ANAEROBE CULT: NORMAL

## 2025-07-21 NOTE — ASSESSMENT & PLAN NOTE
-no williams hematemesis noted  -given report of dark emesis - burgundy color seen and cirrhosis  -keep NPO - give protonix 80mg IV and 40mg BID after.   -trend CBC every 6-8hours  -blood product consent obtained, sent type& screen with AM labs  -Gi consult to evaluate for EGD as bleeding could precipitate hepatic encephalopathy exacerbation     Spoke with patient and conveyed information below. He verbalized understanding and had no questions. Phone number given to schedule US.

## 2025-07-25 ENCOUNTER — TELEPHONE (OUTPATIENT)
Dept: INFECTIOUS DISEASES | Facility: CLINIC | Age: 60
End: 2025-07-25
Payer: OTHER GOVERNMENT

## 2025-07-28 ENCOUNTER — OFFICE VISIT (OUTPATIENT)
Dept: INFECTIOUS DISEASES | Facility: CLINIC | Age: 60
End: 2025-07-28
Payer: OTHER GOVERNMENT

## 2025-07-28 VITALS — SYSTOLIC BLOOD PRESSURE: 85 MMHG | DIASTOLIC BLOOD PRESSURE: 46 MMHG | TEMPERATURE: 98 F | HEART RATE: 72 BPM

## 2025-07-28 DIAGNOSIS — T84.50XD INFECTION OR INFLAMMATORY REACTION DUE TO INTERNAL JOINT PROSTHESIS, SUBSEQUENT ENCOUNTER: Primary | ICD-10-CM

## 2025-07-28 PROCEDURE — 99999 PR PBB SHADOW E&M-EST. PATIENT-LVL III: CPT | Mod: PBBFAC,,, | Performed by: STUDENT IN AN ORGANIZED HEALTH CARE EDUCATION/TRAINING PROGRAM

## 2025-07-28 PROCEDURE — 99215 OFFICE O/P EST HI 40 MIN: CPT | Mod: S$GLB,,, | Performed by: STUDENT IN AN ORGANIZED HEALTH CARE EDUCATION/TRAINING PROGRAM

## 2025-07-28 PROCEDURE — G2211 COMPLEX E/M VISIT ADD ON: HCPCS | Mod: S$GLB,,, | Performed by: STUDENT IN AN ORGANIZED HEALTH CARE EDUCATION/TRAINING PROGRAM

## 2025-07-28 RX ORDER — SPIRONOLACTONE 25 MG/1
50 TABLET ORAL
COMMUNITY
Start: 2025-07-23

## 2025-07-28 NOTE — PROGRESS NOTES
Infectious Diseases Progress Note  Subjective:     Chief Complaint: PJI follow up    HPI: Ashwin Peter is a 59 y.o. male with a history of obesity and right total knee arthroplasty in November of 2023.  He was evaluated in July of 2024 for significant pain of the right knee.  He was admitted and underwent revision of the right knee arthoplasty on 8/1.  Per operative note, purulence was encountered in the prepatellar bursa area.  He underwent I&D.  A decision was made not to place a spacer.  Instead, the old artifical knee was removed and a new artificial knee of the same size with antibiotic impregnated cement.  Surgical cultures were obtained which were negative.  Infectious diseases was consulted and the patient was started on empiric vancomycin and ceftriaxone.  He was re-admitted to hospital 8/8 with worsening pain in his right knee and drainage from surgical wound. Wound vac was placed, but no surgical intervention taken. His antibiotics were changed to daptomycin and cefepime with planned end date of 9/11. He was then taken back to OR on 8/20 due to continued drainage from surgical wound. Serosanguinous fluid was noted over the patellar region as well as synovitis. Cultures were obtained which were once again negative.  He was then seen by orthopedist on 9/19 and had joint aspiration. No cell count available, however culture was sent and grew ESBL E. Coli. He was sent bactrim on 10/4.      On 10/21 he noted some improvement in his symptoms (swelling, discharge) since starting bactrim. Decided together that since this was the only positive culture, to pursue treatment with 6 month course. Today he reports some GI upset (nausea), but no vomiting or diarrhea. His pain has improved, discharge and swelling improved.      Prior to our most recent visit 1/23[facility]      At our last visit 3/2025 he was doing well, tolerating bactrim. Had planned to complete 1 more month of  bactrim for total of 6 months. He cancelled follow up visit 5/5.     [facility], discharged 7/24. He reports he was diuresed aggressively, sounds like he experienced some delirium. He states he was told he had an infection, he is not currently on any antibiotics. States he was discharged without anything. He was taking his bactrim up to and during his admission. His bactrim was stopped on discharge, however. He states he feels well. He is still on prednisone 20 mg with plan to taper dose. He denies knee pain.     Immunization History   Administered Date(s) Administered    COVID-19, MRNA, LN-S, PF (MODERNA FULL 0.5 ML DOSE) 12/09/2021    COVID-19, MRNA, LN-S, PF (Pfizer) (Purple Cap) 01/19/2021, 02/02/2021, 02/02/2021    COVID-19, mRNA, LNP-S, PF, baylee-sucrose, 30 mcg/0.3 mL (Pfizer Ages 12+) 01/10/2025    Hepatitis A / Hepatitis B 02/12/2021, 03/17/2021, 08/16/2021    Influenza 10/08/2020    Influenza - Quadrivalent - MDCK - PF 09/21/2020    Influenza - Quadrivalent - PF *Preferred* (6 months and older) 10/25/2017, 09/17/2021, 10/25/2022, 11/16/2023    Pneumococcal Polysaccharide - 23 Valent 09/17/2021    Tdap 07/14/2014, 12/13/2023        Review of Systems   HENT:  Positive for tinnitus.    Eyes:  Positive for blurred vision.   Cardiovascular:  Positive for leg swelling.   Hematologic/Lymphatic: Bruises/bleeds easily.   Musculoskeletal:  Positive for joint pain.   Psychiatric/Behavioral:  The patient is nervous/anxious.         Past Medical History:   Diagnosis Date    Anxiety     BMI 38.0-38.9,adult     Hyperlipidemia     Hypervolemia 07/22/2022    Multiple pulmonary nodules     Obesity     Other cirrhosis of liver 01/13/2021    Portal hypertension 02/11/2021    Rectal bleeding 02/11/2021    Sarcoidosis of lung with sarcoidosis of lymph nodes     Sleep apnea     CPAP USED    Splenomegaly 12/22/2020    Thrombocytopenia 12/22/2020     Past Surgical History:   Procedure  Laterality Date    ANTERIOR CRUCIATE LIGAMENT REPAIR  2007    right knee    CLOSURE, WOUND, LOWER EXTREMITY, LEFT Right 9/19/2024    Procedure: CLOSURE, WOUND, KNEE;  Surgeon: Darnell Liao MD;  Location: [facility] OR;  Service: Orthopedics;  Laterality: Right;    COLONOSCOPY N/A 4/8/2021    Procedure: COLONOSCOPY;  Surgeon: Jeff Olvera MD;  Location: [facility] (2ND FLR);  Service: Endoscopy;  Laterality: N/A;  rectal bleeding,   2nd floor BMI 50 (354 lbs)  COVID test on 4/5/21 at [facility]    ESOPHAGOGASTRODUODENOSCOPY N/A 4/8/2021    Procedure: EGD (ESOPHAGOGASTRODUODENOSCOPY);  Surgeon: Jeff Olvera MD;  Location: [facility] (2ND FLR);  Service: Endoscopy;  Laterality: N/A;  variceal screening/ labs the am of procedure  2nd floor BMI 50 (354 lbs)    ESOPHAGOGASTRODUODENOSCOPY N/A 3/31/2022    Procedure: EGD (ESOPHAGOGASTRODUODENOSCOPY);  Surgeon: Jeff Olvera MD;  Location: [facility] (2ND FLR);  Service: Endoscopy;  Laterality: N/A;  BMI-52    Wt:375#      cirrhosis, variceal screening-labs done on 3/29-GT  vaccinated-GT    ESOPHAGOGASTRODUODENOSCOPY N/A 4/21/2023    Procedure: EGD (ESOPHAGOGASTRODUODENOSCOPY);  Surgeon: Christopher Britton MD;  Location: [facility] (2ND FLR);  Service: Endoscopy;  Laterality: N/A;  pt requested Friday due to work schedule  ECHO PA 44-51  BMI 47.97 Wt 343 lbs  inst portal-RB  last CBC PT/INR 1/24/23 4/17/23- Precall confirmed.    ESOPHAGOGASTRODUODENOSCOPY N/A 1/10/2024    Procedure: EGD (ESOPHAGOGASTRODUODENOSCOPY);  Surgeon: Christopher Conley MD;  Location: [facility] ENDO (2ND FLR);  Service: Endoscopy;  Laterality: N/A;    INCISION AND DRAINAGE Right 8/1/2024    Procedure: INCISION AND DRAINAGE;  Surgeon: Darnell Liao MD;  Location: [facility];  Service: Orthopedics;  Laterality: Right;    INCISION AND DRAINAGE OF KNEE Right 8/20/2024    Procedure: INCISION AND DRAINAGE, KNEE WITH WOUND VAC PLACEMENT;  Surgeon: Darnell Liao MD;  Location: [facility];   "Service: Orthopedics;  Laterality: Right;    INCISION AND DRAINAGE OF KNEE Right 9/19/2024    Procedure: INCISION AND DRAINAGE, KNEE;  Surgeon: Darnell Liao MD;  Location: [facility];  Service: Orthopedics;  Laterality: Right;    LYMPHADENECTOMY  1985    MENISCECTOMY      left knee    NASAL SEPTUM SURGERY  1985    REVISION OF KNEE ARTHROPLASTY Right 8/1/2024    Procedure: REVISION, ARTHROPLASTY, KNEE TOTAL;  Surgeon: Darnell Liao MD;  Location: [facility];  Service: Orthopedics;  Laterality: Right;    TOTAL KNEE ARTHROPLASTY Right 11/7/2023    Procedure: ARTHROPLASTY, KNEE, TOTAL;  Surgeon: Darnell Liao MD;  Location: [facility];  Service: Orthopedics;  Laterality: Right;    WISDOM TOOTH EXTRACTION       Family History   Problem Relation Name Age of Onset    Hypertension Father      Heart attack Father  55    Diabetes Paternal Grandmother      Aneurysm Mother          eye    Dementia Mother      Colon cancer Neg Hx      Prostate cancer Neg Hx      Sarcoidosis Neg Hx       Social History[1]    Objective:     Physical Exam  Vitals and nursing note reviewed.   Constitutional:       Appearance: He is not ill-appearing.   HENT:      Head: Normocephalic.   Musculoskeletal:      Right lower leg: Edema present.      Left lower leg: Edema present.      Comments: R knee, no effusion or tenderness. Surgical site is intact.    Skin:     Findings: Bruising (bilateral arms) present.   Neurological:      Mental Status: He is alert.         Data:    All data, including recent labs, radiology, and pathology, has been independently reviewed.    Labs:    No results for input(s): "WBC", "HGB", "HCT", "NA", "K", "CL", "BUN", "CREATININE", "CRCL", "AST", "ALT", "ALKPHOS", "BILITOT", "CRP", "INR", "[id number]", "[id number]", "[id number]" in the last 2160 hours.    Invalid input(s): "GRAN%", "PLATELET"     Radiology:    No results found in the last 24 hours.     Assessment:  Ashwin Peter is a 59 year old man right total knee " arthroplasty in November of 2023 complicated by PJI 8/2024. Underwent multiple I&Ds, all culture negative. Completed empiric 6 week course of daptomycin and cefepime without resolution. Joint aspiration on 9/19 off antibiotics, no cell counts obtained but culture with ESBL E. Coli. Completed 9 months of PO bactrim. [facility], no [facility] but sounds like decompensated heart failure and cirrhosis with hepatic encephalopathy) and was taken off bactrim. He reports having an infection, but unsure what. He is off all antibiotics and doing well. His blood pressure was noted to be low initially, 107/66 on manual repeat and he is asymptomatic.      Recommendations  - counseled to follow up with [facility] if scheduled  - would prefer to continue PO bactrim at least for PJP ppx while on prednisone 20   - will request [facility] records of all recent ID notes     Follow up in 3 months    I spent a total of 43 minutes on the day of the visit.  This includes face to face time and non-face to face time preparing to see the patient (eg, review of tests), obtaining and/or reviewing separately obtained history, documenting clinical information in the electronic or other health record, independently interpreting results and communicating results to the patient/family/caregiver, or care coordinator.    Visit today included increased complexity associated with the care of the episodic problem prosthetic joint infection addressed and managing the longitudinal care of the patient due to the serious and/or complex managed problem(s).      Coretta Cheung MD  Infectious Disease           [1]   Social History  Tobacco Use    Smoking status: Never     Passive exposure: Never    Smokeless tobacco: Never   Substance Use Topics    Alcohol use: No    Drug use: Not Currently

## 2025-08-12 ENCOUNTER — LAB VISIT (OUTPATIENT)
Dept: LAB | Facility: HOSPITAL | Age: 60
End: 2025-08-12
Attending: STUDENT IN AN ORGANIZED HEALTH CARE EDUCATION/TRAINING PROGRAM
Payer: OTHER GOVERNMENT

## 2025-08-12 ENCOUNTER — OFFICE VISIT (OUTPATIENT)
Dept: OTOLARYNGOLOGY | Facility: CLINIC | Age: 60
End: 2025-08-12
Payer: OTHER GOVERNMENT

## 2025-08-12 DIAGNOSIS — J32.0 CHRONIC MAXILLARY SINUSITIS: ICD-10-CM

## 2025-08-12 DIAGNOSIS — J34.3 HYPERTROPHY OF BOTH INFERIOR NASAL TURBINATES: ICD-10-CM

## 2025-08-12 DIAGNOSIS — J32.0 CHRONIC MAXILLARY SINUSITIS: Primary | ICD-10-CM

## 2025-08-12 DIAGNOSIS — J34.2 NASAL SEPTAL DEVIATION: ICD-10-CM

## 2025-08-12 DIAGNOSIS — G47.33 OSA ON CPAP: ICD-10-CM

## 2025-08-12 LAB
IGA SERPL-MCNC: 487 MG/DL (ref 40–350)
IGG SERPL-MCNC: 1245 MG/DL (ref 650–1600)
IGM SERPL-MCNC: 152 MG/DL (ref 50–300)

## 2025-08-12 PROCEDURE — 31231 NASAL ENDOSCOPY DX: CPT | Mod: S$PBB,,, | Performed by: STUDENT IN AN ORGANIZED HEALTH CARE EDUCATION/TRAINING PROGRAM

## 2025-08-12 PROCEDURE — 86317 IMMUNOASSAY INFECTIOUS AGENT: CPT

## 2025-08-12 PROCEDURE — 87070 CULTURE OTHR SPECIMN AEROBIC: CPT | Performed by: STUDENT IN AN ORGANIZED HEALTH CARE EDUCATION/TRAINING PROGRAM

## 2025-08-12 PROCEDURE — 99214 OFFICE O/P EST MOD 30 MIN: CPT | Mod: 25,S$PBB,, | Performed by: STUDENT IN AN ORGANIZED HEALTH CARE EDUCATION/TRAINING PROGRAM

## 2025-08-12 PROCEDURE — 99999 PR PBB SHADOW E&M-EST. PATIENT-LVL III: CPT | Mod: PBBFAC,,, | Performed by: STUDENT IN AN ORGANIZED HEALTH CARE EDUCATION/TRAINING PROGRAM

## 2025-08-12 PROCEDURE — 87075 CULTR BACTERIA EXCEPT BLOOD: CPT | Performed by: STUDENT IN AN ORGANIZED HEALTH CARE EDUCATION/TRAINING PROGRAM

## 2025-08-12 PROCEDURE — 36415 COLL VENOUS BLD VENIPUNCTURE: CPT

## 2025-08-12 PROCEDURE — 31231 NASAL ENDOSCOPY DX: CPT | Mod: PBBFAC | Performed by: STUDENT IN AN ORGANIZED HEALTH CARE EDUCATION/TRAINING PROGRAM

## 2025-08-12 PROCEDURE — 99213 OFFICE O/P EST LOW 20 MIN: CPT | Mod: PBBFAC | Performed by: STUDENT IN AN ORGANIZED HEALTH CARE EDUCATION/TRAINING PROGRAM

## 2025-08-12 PROCEDURE — 82784 ASSAY IGA/IGD/IGG/IGM EACH: CPT

## 2025-08-12 RX ORDER — SULFAMETHOXAZOLE AND TRIMETHOPRIM 800; 160 MG/1; MG/1
1 TABLET ORAL 2 TIMES DAILY
Qty: 20 TABLET | Refills: 0 | Status: SHIPPED | OUTPATIENT
Start: 2025-08-12 | End: 2025-08-12

## 2025-08-12 RX ORDER — SULFAMETHOXAZOLE AND TRIMETHOPRIM 800; 160 MG/1; MG/1
1 TABLET ORAL 2 TIMES DAILY
Qty: 20 TABLET | Refills: 0 | Status: SHIPPED | OUTPATIENT
Start: 2025-08-12 | End: 2025-08-22

## 2025-08-13 ENCOUNTER — HOSPITAL ENCOUNTER (OUTPATIENT)
Dept: RADIOLOGY | Facility: HOSPITAL | Age: 60
Discharge: HOME | End: 2025-08-13
Attending: STUDENT IN AN ORGANIZED HEALTH CARE EDUCATION/TRAINING PROGRAM
Payer: OTHER GOVERNMENT

## 2025-08-13 DIAGNOSIS — J32.0 CHRONIC MAXILLARY SINUSITIS: ICD-10-CM

## 2025-08-13 PROCEDURE — 70486 CT MAXILLOFACIAL W/O DYE: CPT | Mod: TC

## 2025-08-13 PROCEDURE — 70486 CT MAXILLOFACIAL W/O DYE: CPT | Mod: 26,,, | Performed by: RADIOLOGY

## 2025-08-15 LAB
BACTERIA SPEC AEROBE CULT: ABNORMAL
BACTERIA SPEC ANAEROBE CULT: NORMAL

## 2025-08-18 LAB
SEROTYPE 1 (1): 0.9
SEROTYPE 12 (12F): <0.3
SEROTYPE 14 (14): 0.7
SEROTYPE 19 (19F): 1.4
SEROTYPE 23 (23F): 0.6
SEROTYPE 26 (6B): <0.3
SEROTYPE 3 (3): <0.3
SEROTYPE 4 (4): 0.4
SEROTYPE 5 (5): 0.5
SEROTYPE 51 (7F): <0.3
SEROTYPE 56 (18C): 0.5
SEROTYPE 68 (9V): <0.3
SEROTYPE 8 (8): <0.3
SEROTYPE 9 (9N): <0.3

## 2025-08-29 ENCOUNTER — PATIENT MESSAGE (OUTPATIENT)
Dept: OTOLARYNGOLOGY | Facility: CLINIC | Age: 60
End: 2025-08-29
Payer: OTHER GOVERNMENT

## 2025-08-29 DIAGNOSIS — J32.0 CHRONIC MAXILLARY SINUSITIS: Primary | ICD-10-CM

## (undated) DEVICE — Device

## (undated) DEVICE — DRAPE EXTREMITY ORTHOMAX

## (undated) DEVICE — GLOVE BIOGEL SKINSENSE PI 6.5

## (undated) DEVICE — STOCKINETTE TUBULAR 2PL 6 X 4

## (undated) DEVICE — STAPLER SKIN ROTATING HEAD

## (undated) DEVICE — CELLERATE

## (undated) DEVICE — BRACE KNEE T SCOPE PREMIER

## (undated) DEVICE — SOL IRR SOD CHL .9% POUR

## (undated) DEVICE — PAD CAST SPECIALIST STRL 6

## (undated) DEVICE — COVER HD BACK TABLE 6FT

## (undated) DEVICE — SPONGE COTTON TRAY 4X4IN

## (undated) DEVICE — BANDAGE ESMARK ELASTIC ST 6X9

## (undated) DEVICE — KIT 1-3 IVD ICT RAPID

## (undated) DEVICE — BANDAGE MATRIX HK LOOP 6IN 5YD

## (undated) DEVICE — GLOVE BIOGEL SKINSENSE PI 8.5

## (undated) DEVICE — DRAPE STERI U-SHAPED 47X51IN

## (undated) DEVICE — NDL HYPO STD REG BVL 18GX1.5IN

## (undated) DEVICE — CUFF TOURNIQUET DL PRT

## (undated) DEVICE — YANKAUER OPEN TIP W/O VENT

## (undated) DEVICE — DRESSING N ADH OIL EMUL 3X3

## (undated) DEVICE — APPLICATOR CHLORAPREP ORN 26ML

## (undated) DEVICE — TOURNIQUET SB QC DP 34X4IN

## (undated) DEVICE — SOCKINETTE IMPERVIOUS 12X48IN

## (undated) DEVICE — DRESSING GAUZE OIL EMUL 3X8

## (undated) DEVICE — SPONGE LAP STRL 18X18IN

## (undated) DEVICE — ELECTRODE REM PLYHSV RETURN 9

## (undated) DEVICE — SUT VICRYL+ 1 CTX 18IN VIOL

## (undated) DEVICE — TRAY CATH FOL SIL URIMTR 16FR

## (undated) DEVICE — HOOD T7 W/ PEEL AWAY LENS

## (undated) DEVICE — GOWN ECLIPSE REINF LV4 XLNG XL

## (undated) DEVICE — UNDERGLOVES BIOGEL PI SZ 7 LF

## (undated) DEVICE — KIT TOTAL HIP OPTIVAC

## (undated) DEVICE — DRAPE INCISE IOBAN 2 23X17IN

## (undated) DEVICE — SWAB CULTURETTE II DUAL

## (undated) DEVICE — TRAY BONE MARROW BEK3411

## (undated) DEVICE — SUT 2-0 ETHILON 18 FS

## (undated) DEVICE — GLOVE BIOGEL SKINSENSE PI 7.0

## (undated) DEVICE — SUT VICRYL 2-0 8-18 CP-2

## (undated) DEVICE — UNDERGLOVE BIOGEL PI SZ 6.5 LF

## (undated) DEVICE — TRAY CATH 1-LYR URIMTR 16FR

## (undated) DEVICE — COLLECTOR SPECIMEN ANAEROBIC

## (undated) DEVICE — BLANKET HYPER ADULT 24X60IN

## (undated) DEVICE — DRAPE SURG W/TWL 17 5/8X23

## (undated) DEVICE — MASK FLYTE HOOD PEEL AWAY

## (undated) DEVICE — BLADE SAW SAG 25.40MM X 1.27MM

## (undated) DEVICE — LAVAGE WOUND BACTISURE 1L BAG

## (undated) DEVICE — PULSAVAC ZIMMER

## (undated) DEVICE — BLADE SURG STAINLESS STEEL #15

## (undated) DEVICE — KIT EVACUATOR 3-SPRING 1/8 DRN

## (undated) DEVICE — PAD ABDOMINAL STERILE 8X10IN